# Patient Record
Sex: FEMALE | Race: WHITE | NOT HISPANIC OR LATINO | Employment: FULL TIME | URBAN - METROPOLITAN AREA
[De-identification: names, ages, dates, MRNs, and addresses within clinical notes are randomized per-mention and may not be internally consistent; named-entity substitution may affect disease eponyms.]

---

## 2017-02-21 DIAGNOSIS — F19.982 INSOMNIA DUE TO DRUG: ICD-10-CM

## 2017-02-21 RX ORDER — ZOLPIDEM TARTRATE 5 MG/1
5 TABLET ORAL NIGHTLY PRN
Qty: 30 TABLET | Refills: 1 | Status: SHIPPED | OUTPATIENT
Start: 2017-02-21 | End: 2017-05-29 | Stop reason: SDUPTHER

## 2017-05-29 DIAGNOSIS — F19.982 INSOMNIA DUE TO DRUG: ICD-10-CM

## 2017-05-30 RX ORDER — ZOLPIDEM TARTRATE 5 MG/1
5 TABLET ORAL NIGHTLY PRN
Qty: 30 TABLET | Refills: 1 | Status: SHIPPED | OUTPATIENT
Start: 2017-05-30 | End: 2018-01-31

## 2017-06-01 ENCOUNTER — TELEPHONE (OUTPATIENT)
Dept: INTERNAL MEDICINE | Facility: CLINIC | Age: 47
End: 2017-06-01

## 2017-06-01 NOTE — TELEPHONE ENCOUNTER
----- Message from Lina Morales sent at 5/31/2017  2:58 PM CDT -----  Contact: [facility]  Pt needs to have a refill on Ambien. Pls send rx to..    [facility] [place] - [address]  [address] [facility] [place] [address]  Phone: [phone] Fax: [phone]

## 2017-06-29 DIAGNOSIS — F41.9 ANXIETY: ICD-10-CM

## 2017-06-30 RX ORDER — ALPRAZOLAM 0.5 MG/1
0.5 TABLET, EXTENDED RELEASE ORAL DAILY
Qty: 30 TABLET | Refills: 5 | Status: SHIPPED | OUTPATIENT
Start: 2017-06-30 | End: 2018-03-29 | Stop reason: SDUPTHER

## 2017-07-25 DIAGNOSIS — G25.81 RLS (RESTLESS LEGS SYNDROME): ICD-10-CM

## 2017-08-03 RX ORDER — ROPINIROLE 1 MG/1
1 TABLET, FILM COATED ORAL NIGHTLY
Qty: 30 TABLET | Refills: 0 | Status: SHIPPED | OUTPATIENT
Start: 2017-08-03 | End: 2017-12-20 | Stop reason: SDUPTHER

## 2017-12-20 DIAGNOSIS — G25.81 RLS (RESTLESS LEGS SYNDROME): ICD-10-CM

## 2017-12-20 RX ORDER — ROPINIROLE 1 MG/1
1 TABLET, FILM COATED ORAL NIGHTLY
Qty: 30 TABLET | Refills: 0 | Status: SHIPPED | OUTPATIENT
Start: 2017-12-20 | End: 2018-01-31 | Stop reason: SDUPTHER

## 2018-01-30 ENCOUNTER — TELEPHONE (OUTPATIENT)
Dept: INTERNAL MEDICINE | Facility: CLINIC | Age: 48
End: 2018-01-30

## 2018-01-30 NOTE — TELEPHONE ENCOUNTER
S/w pt sched, pt states was seeing PCP @ [facility] but reports they no longer take her ins. Pt request appt for med refills and pain on palm/right 5th digit, sched pt for appt w/ PA [place] tomorrow 01/31/18. Pt conf appt date/time and provider.

## 2018-01-30 NOTE — TELEPHONE ENCOUNTER
----- Message from Kayla Woods sent at 1/30/2018  4:32 PM CST -----  Contact: Pt   Pt called and requested to be seen sooner than the time offered which was 2- pt is almost out of her medication callback number to discuss is [phone]

## 2018-01-31 ENCOUNTER — OFFICE VISIT (OUTPATIENT)
Dept: INTERNAL MEDICINE | Facility: CLINIC | Age: 48
End: 2018-01-31
Payer: MEDICAID

## 2018-01-31 VITALS
SYSTOLIC BLOOD PRESSURE: 90 MMHG | RESPIRATION RATE: 16 BRPM | OXYGEN SATURATION: 99 % | BODY MASS INDEX: 23.2 KG/M2 | TEMPERATURE: 98 F | HEART RATE: 71 BPM | HEIGHT: 63 IN | DIASTOLIC BLOOD PRESSURE: 68 MMHG | WEIGHT: 130.94 LBS

## 2018-01-31 DIAGNOSIS — L08.9 INFECTED ABRASION OF RIGHT HAND, INITIAL ENCOUNTER: Primary | ICD-10-CM

## 2018-01-31 DIAGNOSIS — G25.81 RLS (RESTLESS LEGS SYNDROME): ICD-10-CM

## 2018-01-31 DIAGNOSIS — S60.511A INFECTED ABRASION OF RIGHT HAND, INITIAL ENCOUNTER: Primary | ICD-10-CM

## 2018-01-31 DIAGNOSIS — K22.4 ESOPHAGEAL SPASM: ICD-10-CM

## 2018-01-31 PROCEDURE — 3008F BODY MASS INDEX DOCD: CPT | Mod: ,,, | Performed by: PHYSICIAN ASSISTANT

## 2018-01-31 PROCEDURE — 99999 PR PBB SHADOW E&M-EST. PATIENT-LVL IV: CPT | Mod: PBBFAC,,, | Performed by: PHYSICIAN ASSISTANT

## 2018-01-31 PROCEDURE — 99213 OFFICE O/P EST LOW 20 MIN: CPT | Mod: S$PBB,,, | Performed by: PHYSICIAN ASSISTANT

## 2018-01-31 PROCEDURE — 99214 OFFICE O/P EST MOD 30 MIN: CPT | Mod: PBBFAC,PO | Performed by: PHYSICIAN ASSISTANT

## 2018-01-31 RX ORDER — MUPIROCIN CALCIUM 20 MG/G
CREAM TOPICAL 2 TIMES DAILY
Qty: 15 G | Refills: 2 | Status: SHIPPED | OUTPATIENT
Start: 2018-01-31 | End: 2018-03-29 | Stop reason: ALTCHOICE

## 2018-01-31 RX ORDER — MELOXICAM 15 MG/1
15 TABLET ORAL DAILY PRN
COMMUNITY
End: 2018-08-30

## 2018-01-31 RX ORDER — SULFAMETHOXAZOLE AND TRIMETHOPRIM 400; 80 MG/1; MG/1
1 TABLET ORAL 2 TIMES DAILY
Qty: 20 TABLET | Refills: 1 | Status: SHIPPED | OUTPATIENT
Start: 2018-01-31 | End: 2018-03-29 | Stop reason: SINTOL

## 2018-01-31 RX ORDER — ROPINIROLE 1 MG/1
1 TABLET, FILM COATED ORAL NIGHTLY
Qty: 30 TABLET | Refills: 0 | Status: SHIPPED | OUTPATIENT
Start: 2018-01-31 | End: 2018-03-12 | Stop reason: SDUPTHER

## 2018-01-31 RX ORDER — TRAZODONE HYDROCHLORIDE 50 MG/1
50 TABLET ORAL NIGHTLY
Qty: 30 TABLET | Refills: 5 | Status: SHIPPED | OUTPATIENT
Start: 2018-01-31 | End: 2018-07-27 | Stop reason: SDUPTHER

## 2018-01-31 RX ORDER — TRAZODONE HYDROCHLORIDE 50 MG/1
50 TABLET ORAL NIGHTLY
COMMUNITY
End: 2018-01-31 | Stop reason: SDUPTHER

## 2018-01-31 NOTE — PROGRESS NOTES
Subjective:       Patient ID: Laurel Calvo is a 47 y.o.W/ female.    Chief Complaint: Hand Injury (R) and Medication Refill    HPI         She comes in by herself today and has the above need.  She fell on concrete about 2 weeks ago and abraded her right hand.  It doesn't want to heal up.  She has been applying Neosporin to it but it doesn't seem to help.  She also needs refill of her Requip and also her trazodone.  She doesn't think she's had a tetanus shot in the past 10 years.        She just recently had to give up her job so she is now unemployed and she's getting along on Medicaid.  She needs to see her rheumatologist, dermatologist and neurologist but most of them will take Medicaid.  We will help her try to find doctors at except her insurance.    Review of Systems    Otherwise negative concerning the INTEGUMENT, INFECTIOUS DISEASE, IMMUNE system review.    Objective:      Physical Exam    She has an ulceration on the palmar aspect of her fifth metacarpal distal end.  It doesn't show any signs of inflammation and there is no pus forming.  There is a yellowish coating across it.  There is no lymphangitis present either.  She is able to make a fist.    Assessment:       1. Infected abrasion of right hand, initial encounter    2. RLS (restless legs syndrome)    3. Esophageal spasm        Plan:     1.  Stop the Neosporin and a want her to start taking Bactroban 2-3 times per day.  Also start her on Bactrim 400 mg twice a day ×10-20 days.  Then she needs to follow-up here in about 11 days to see what kind of progress she is making.  2.  Refilled her Desyrel and also her Requip.

## 2018-02-01 ENCOUNTER — TELEPHONE (OUTPATIENT)
Dept: INTERNAL MEDICINE | Facility: CLINIC | Age: 48
End: 2018-02-01

## 2018-02-01 NOTE — TELEPHONE ENCOUNTER
----- Message from Angelique Rosenberg sent at 1/31/2018  1:59 PM CST -----  Contact: pt  Please call pt @[phone], pt returning nurse call, pt was in clinic this morning.

## 2018-02-05 ENCOUNTER — TELEPHONE (OUTPATIENT)
Dept: INTERNAL MEDICINE | Facility: CLINIC | Age: 48
End: 2018-02-05

## 2018-02-05 NOTE — TELEPHONE ENCOUNTER
Pt states that the Bactrim she is taking for the infection is causing her Lupus to flare up terribly.  It started within 2 hours of the first dose.  She feels so bad she can hardly get out of bed.  She stated this is the 2nd antibiotic to do this to her in 6 months.  The first was Cipro.  She would like to know what to do.  Should she keep taking it an continue to feel bad?  Should she change meds? She stated that her hand is improving.  She cannot come in for an appointment until tomorrow at the earliest.  Please advise./rpr

## 2018-02-05 NOTE — TELEPHONE ENCOUNTER
----- Message from Keshawn Dimas sent at 2/5/2018 12:35 PM CST -----  Contact: self [phone]  Would like to consult with nurse regarding reaction to antibiotic medication, and appointment access for a follow-up appointment before the end of the week.     Please call back at [phone].  Md Garry

## 2018-02-06 NOTE — TELEPHONE ENCOUNTER
----- Message from Skylar Chavez sent at 2/6/2018  9:28 AM CST -----  Contact: Pt  Please call pt at [phone] (home)   Regarding an antibiotic that she was put on is causing her lupus to flare up pt was waiting for return phone call follow up with what she needs to do in regards to the medication.

## 2018-02-07 ENCOUNTER — TELEPHONE (OUTPATIENT)
Dept: INTERNAL MEDICINE | Facility: CLINIC | Age: 48
End: 2018-02-07

## 2018-02-07 NOTE — TELEPHONE ENCOUNTER
----- Message from Keshawn Dimas sent at 2/7/2018  3:40 PM CST -----  Contact: self [phone]  Would like to consult with nurse regarding side effect of antibiotic medication..  Please call back at [phone].  Md Garry

## 2018-02-07 NOTE — TELEPHONE ENCOUNTER
S/w pt (see telephone call documentation 02/05/18), pt states that Bactrim is 2nd antibiotic to cause lupus symptom flare-up this year, 1st one was Cipro. Pt states she has been holding Bactrim for the last 2 days and migraine has resolved, and lupus flare-up symptom has also resolved.   Advised pt I would advise PA [place] her reported side effects resolved and pt also reports R hand wound has also approved in appearance and pain has all but resolved, and call her back w/ his response, pt voiced understanding.

## 2018-02-08 NOTE — TELEPHONE ENCOUNTER
If there is no surrounding redness, and it is not raised, and is not leaking, then we don't need an antibiotic other than applying some Bactroban to it once or twice a day.

## 2018-03-07 ENCOUNTER — TELEPHONE (OUTPATIENT)
Dept: INTERNAL MEDICINE | Facility: CLINIC | Age: 48
End: 2018-03-07

## 2018-03-07 NOTE — TELEPHONE ENCOUNTER
----- Message from Cori Esqueda sent at 3/7/2018 12:16 PM CST -----  Contact: self   Patient would like to consult with nurse regarding previous neurologist appointment . Please call back at [phone].      Thanks,  Cori Esqueda

## 2018-03-07 NOTE — TELEPHONE ENCOUNTER
Left pt voicemail to call appt desk at [phone] to r/s missed 02/2018 Neurology appt, see [employer] message sent.

## 2018-03-12 DIAGNOSIS — G25.81 RLS (RESTLESS LEGS SYNDROME): ICD-10-CM

## 2018-03-12 RX ORDER — ROPINIROLE 1 MG/1
1 TABLET, FILM COATED ORAL NIGHTLY
Qty: 30 TABLET | Refills: 5 | Status: SHIPPED | OUTPATIENT
Start: 2018-03-12 | End: 2018-10-15 | Stop reason: SDUPTHER

## 2018-03-13 ENCOUNTER — TELEPHONE (OUTPATIENT)
Dept: INTERNAL MEDICINE | Facility: CLINIC | Age: 48
End: 2018-03-13

## 2018-03-13 NOTE — TELEPHONE ENCOUNTER
----- Message from Angelique Rosenberg sent at 3/13/2018 12:13 PM CDT -----  Contact: pt  Please call pt @ [phone] regarding appt missed on 2/1, pt states she need nurse to get her in before the year is out, pt was sick.

## 2018-03-16 ENCOUNTER — TELEPHONE (OUTPATIENT)
Dept: INTERNAL MEDICINE | Facility: CLINIC | Age: 48
End: 2018-03-16

## 2018-03-16 NOTE — TELEPHONE ENCOUNTER
----- Message from Annemarie Sabillon sent at 3/16/2018  8:51 AM CDT -----  Contact: Patient  Patient states that she had to cancel her neurology appt because of illness, and wants the nurse to reschedule for her, because patient feels nurse can get her in soon, please call her back at [phone]. Thank you

## 2018-03-16 NOTE — TELEPHONE ENCOUNTER
----- Message from Td Avitia sent at 3/16/2018  9:24 AM CDT -----  Pt is requesting a call from nurse to discuss a prescription for panic attacks due to the lupus flare.        Please call pt back at [phone]

## 2018-03-29 ENCOUNTER — OFFICE VISIT (OUTPATIENT)
Dept: INTERNAL MEDICINE | Facility: CLINIC | Age: 48
End: 2018-03-29
Payer: MEDICAID

## 2018-03-29 VITALS
DIASTOLIC BLOOD PRESSURE: 62 MMHG | BODY MASS INDEX: 22.61 KG/M2 | SYSTOLIC BLOOD PRESSURE: 82 MMHG | HEIGHT: 63 IN | WEIGHT: 127.63 LBS | HEART RATE: 74 BPM | TEMPERATURE: 98 F | OXYGEN SATURATION: 97 % | RESPIRATION RATE: 16 BRPM

## 2018-03-29 DIAGNOSIS — M32.19 SYSTEMIC LUPUS ERYTHEMATOSUS WITH OTHER ORGAN INVOLVEMENT, UNSPECIFIED SLE TYPE: Primary | Chronic | ICD-10-CM

## 2018-03-29 DIAGNOSIS — G40.409: ICD-10-CM

## 2018-03-29 DIAGNOSIS — F41.9 ANXIETY: ICD-10-CM

## 2018-03-29 PROCEDURE — 99213 OFFICE O/P EST LOW 20 MIN: CPT | Mod: S$PBB,,, | Performed by: PHYSICIAN ASSISTANT

## 2018-03-29 PROCEDURE — 99215 OFFICE O/P EST HI 40 MIN: CPT | Mod: PBBFAC,PO | Performed by: PHYSICIAN ASSISTANT

## 2018-03-29 PROCEDURE — 99999 PR PBB SHADOW E&M-EST. PATIENT-LVL V: CPT | Mod: PBBFAC,,, | Performed by: PHYSICIAN ASSISTANT

## 2018-03-29 RX ORDER — LAMOTRIGINE 100 MG/1
100 TABLET ORAL 2 TIMES DAILY
Qty: 60 TABLET | Refills: 3 | Status: SHIPPED | OUTPATIENT
Start: 2018-03-29 | End: 2018-07-24 | Stop reason: SDUPTHER

## 2018-03-29 RX ORDER — CHOLECALCIFEROL (VITAMIN D3) 25 MCG
1000 TABLET ORAL DAILY
COMMUNITY

## 2018-03-29 RX ORDER — ALPRAZOLAM 0.5 MG/1
0.5 TABLET, EXTENDED RELEASE ORAL DAILY
Qty: 30 TABLET | Refills: 5 | Status: SHIPPED | OUTPATIENT
Start: 2018-03-29 | End: 2019-07-29

## 2018-03-29 NOTE — PROGRESS NOTES
Subjective:       Patient ID: Laurel Calvo is a 47 y.o.W/ female.    Chief Complaint: Panic Attack    HPI         She comes in today by herself and has the above problem.  She recently lost her job and now she is on Medicaid.  She was seeing a rheumatologist at the [facility] but they refused to see her now because she has Medicaid.  She can get into any rheumatologist here in town for at least 4 or 5 months because of her insurance situation.  She is getting into a little bit of a panic because she is about to run out of her seizure medicine Lamictal, and Xanax for her panic attacks.  She also needs to get in with a rheumatologist relatively soon and also a neurologist because of her seizure disorder.        At this time of year, she usually has a flareup with her systemic lupus erythematosus and it can cause a variety of problems.  She gets migraines more often, joint pain, skin rash due to UV exposure, fatigue, anxiousness, and cognitive issues.  She has a few others that she can't think of them right now.    Review of Systems    Otherwise negative.  She doesn't have any acute problems or chronic problems she wishes to discuss further.    Objective:      Physical Exam    NEUROLOGIC: She is alert, oriented ×3, cooperative and quite pleasant.  Her mentation is clear and concise.  CN II through XII are intact and equal.  Motor and neuro sensory functions are intact and normal.  She is ambulatory without any mechanical device assistance such as a cane or walker.  EENT: All within normal limits.  She is wearing glasses today.  CHEST: Clear BS anterior posterior.  HEART: RSR.  S1 is greater than S2.  There is no murmur or gallop.  She doesn't have any peripheral edema.    Assessment:       1. Systemic lupus erythematosus with other organ involvement, unspecified SLE type    2. Anxiety    3. Simple partial to complex partial seizure to generalized seizure        Plan:     1.  Refilled both of her medicines  today.  2.  We'll try and get her worked in to rheumatology and neurology here in the clinic at the earliest we can get available.  3.  She has a new PCP Dr. Zachary Blue and she supposed to see him next week and I encouraged her to keep that appointment.

## 2018-04-02 ENCOUNTER — TELEPHONE (OUTPATIENT)
Dept: INTERNAL MEDICINE | Facility: CLINIC | Age: 48
End: 2018-04-02

## 2018-04-02 NOTE — TELEPHONE ENCOUNTER
D/t pt's ins, unable to find pt appt w/ 2-3mths w/ Neuro and Rheum providers w/in system, faxed referrals w/ their universal referral form & pt's demographics and snapshot to [facility] for ASAP appts for Rheumotology and Neurology Providers, fax transmission confirmed [phone].   See [employer] message.

## 2018-04-03 ENCOUNTER — TELEPHONE (OUTPATIENT)
Dept: INTERNAL MEDICINE | Facility: CLINIC | Age: 48
End: 2018-04-03

## 2018-04-03 NOTE — TELEPHONE ENCOUNTER
(see previous telephone call documentation) Received notification from [facility] that they do not offer Neurology/Provider services. See [employer] message sent to pt.

## 2018-04-10 ENCOUNTER — TELEPHONE (OUTPATIENT)
Dept: INTERNAL MEDICINE | Facility: CLINIC | Age: 48
End: 2018-04-10

## 2018-04-10 NOTE — TELEPHONE ENCOUNTER
Per fax request received from pt's pharm, initiated prior auth request to pt's ins for Alprazolam 0.5mg rx, submitted online via [email or web address] Request [id number]; returned fax to pt's ins advising them PA request initiated and will alert them when we receive a response from pt's ins, fax transmission confirmed [phone].

## 2018-04-11 ENCOUNTER — TELEPHONE (OUTPATIENT)
Dept: INTERNAL MEDICINE | Facility: CLINIC | Age: 48
End: 2018-04-11

## 2018-04-11 NOTE — TELEPHONE ENCOUNTER
Received PA DENIAL from pt's ins for Alprazolam ER rx. Faxed denial to pt's pharm notifying them pt will have to pay out of pocket/cash copay for rx and instructed pharm to apply [email or web address] coupon/discount included in fax for approx <$27 pt copay for rx, fax transmission confirmed [phone].

## 2018-04-27 ENCOUNTER — TELEPHONE (OUTPATIENT)
Dept: RHEUMATOLOGY | Facility: CLINIC | Age: 48
End: 2018-04-27

## 2018-04-27 NOTE — TELEPHONE ENCOUNTER
Called patient regarding referral from Sadi Peoples PA-C, scheduled an appointment with Dr. DAVID for 4.30.18 at 10 am. Pt verbalized understanding.

## 2018-04-30 ENCOUNTER — OFFICE VISIT (OUTPATIENT)
Dept: RHEUMATOLOGY | Facility: CLINIC | Age: 48
End: 2018-04-30
Payer: MEDICAID

## 2018-04-30 ENCOUNTER — LAB VISIT (OUTPATIENT)
Dept: LAB | Facility: HOSPITAL | Age: 48
End: 2018-04-30
Attending: INTERNAL MEDICINE
Payer: MEDICAID

## 2018-04-30 VITALS
WEIGHT: 126.75 LBS | HEART RATE: 69 BPM | DIASTOLIC BLOOD PRESSURE: 54 MMHG | HEIGHT: 63 IN | BODY MASS INDEX: 22.46 KG/M2 | SYSTOLIC BLOOD PRESSURE: 84 MMHG

## 2018-04-30 DIAGNOSIS — L93.0 DISCOID LUPUS ERYTHEMATOSUS: Chronic | ICD-10-CM

## 2018-04-30 DIAGNOSIS — M32.19 SYSTEMIC LUPUS ERYTHEMATOSUS WITH OTHER ORGAN INVOLVEMENT, UNSPECIFIED SLE TYPE: Chronic | ICD-10-CM

## 2018-04-30 DIAGNOSIS — M32.19 SYSTEMIC LUPUS ERYTHEMATOSUS WITH OTHER ORGAN INVOLVEMENT, UNSPECIFIED SLE TYPE: Primary | Chronic | ICD-10-CM

## 2018-04-30 LAB
ALBUMIN SERPL BCP-MCNC: 4.6 G/DL
ALP SERPL-CCNC: 35 U/L
ALT SERPL W/O P-5'-P-CCNC: 8 U/L
ANION GAP SERPL CALC-SCNC: 9 MMOL/L
AST SERPL-CCNC: 14 U/L
BASOPHILS # BLD AUTO: 0.02 K/UL
BASOPHILS NFR BLD: 0.4 %
BILIRUB SERPL-MCNC: 1.1 MG/DL
BUN SERPL-MCNC: 16 MG/DL
C3 SERPL-MCNC: 98 MG/DL
C4 SERPL-MCNC: 17 MG/DL
CALCIUM SERPL-MCNC: 9.7 MG/DL
CHLORIDE SERPL-SCNC: 103 MMOL/L
CO2 SERPL-SCNC: 29 MMOL/L
CREAT SERPL-MCNC: 0.9 MG/DL
CRP SERPL-MCNC: 1.2 MG/L
DIFFERENTIAL METHOD: NORMAL
EOSINOPHIL # BLD AUTO: 0.1 K/UL
EOSINOPHIL NFR BLD: 1.1 %
ERYTHROCYTE [DISTWIDTH] IN BLOOD BY AUTOMATED COUNT: 12.5 %
ERYTHROCYTE [SEDIMENTATION RATE] IN BLOOD BY WESTERGREN METHOD: 2 MM/HR
EST. GFR  (AFRICAN AMERICAN): >60 ML/MIN/1.73 M^2
EST. GFR  (NON AFRICAN AMERICAN): >60 ML/MIN/1.73 M^2
GLUCOSE SERPL-MCNC: 77 MG/DL
HCT VFR BLD AUTO: 43.9 %
HGB BLD-MCNC: 14.4 G/DL
LYMPHOCYTES # BLD AUTO: 1.7 K/UL
LYMPHOCYTES NFR BLD: 31.8 %
MCH RBC QN AUTO: 29.7 PG
MCHC RBC AUTO-ENTMCNC: 32.8 G/DL
MCV RBC AUTO: 91 FL
MONOCYTES # BLD AUTO: 0.4 K/UL
MONOCYTES NFR BLD: 8.1 %
NEUTROPHILS # BLD AUTO: 3.1 K/UL
NEUTROPHILS NFR BLD: 58.6 %
PLATELET # BLD AUTO: 193 K/UL
PMV BLD AUTO: 11.9 FL
POTASSIUM SERPL-SCNC: 4.4 MMOL/L
PROT SERPL-MCNC: 7 G/DL
RBC # BLD AUTO: 4.85 M/UL
SODIUM SERPL-SCNC: 141 MMOL/L
WBC # BLD AUTO: 5.31 K/UL

## 2018-04-30 PROCEDURE — 86160 COMPLEMENT ANTIGEN: CPT

## 2018-04-30 PROCEDURE — 85613 RUSSELL VIPER VENOM DILUTED: CPT

## 2018-04-30 PROCEDURE — 36415 COLL VENOUS BLD VENIPUNCTURE: CPT | Mod: PO

## 2018-04-30 PROCEDURE — 86140 C-REACTIVE PROTEIN: CPT

## 2018-04-30 PROCEDURE — 85651 RBC SED RATE NONAUTOMATED: CPT | Mod: PO

## 2018-04-30 PROCEDURE — 86146 BETA-2 GLYCOPROTEIN ANTIBODY: CPT | Mod: 59

## 2018-04-30 PROCEDURE — 99213 OFFICE O/P EST LOW 20 MIN: CPT | Mod: PBBFAC,PO | Performed by: INTERNAL MEDICINE

## 2018-04-30 PROCEDURE — 99999 PR PBB SHADOW E&M-EST. PATIENT-LVL III: CPT | Mod: PBBFAC,,, | Performed by: INTERNAL MEDICINE

## 2018-04-30 PROCEDURE — 99205 OFFICE O/P NEW HI 60 MIN: CPT | Mod: S$PBB,,, | Performed by: INTERNAL MEDICINE

## 2018-04-30 PROCEDURE — 86160 COMPLEMENT ANTIGEN: CPT | Mod: 59

## 2018-04-30 PROCEDURE — 80053 COMPREHEN METABOLIC PANEL: CPT | Mod: PO

## 2018-04-30 PROCEDURE — 86038 ANTINUCLEAR ANTIBODIES: CPT

## 2018-04-30 PROCEDURE — 85025 COMPLETE CBC W/AUTO DIFF WBC: CPT | Mod: PO

## 2018-04-30 PROCEDURE — 86147 CARDIOLIPIN ANTIBODY EA IG: CPT

## 2018-04-30 RX ORDER — METHYLPREDNISOLONE 4 MG/1
TABLET ORAL
Qty: 1 PACKAGE | Refills: 2 | Status: SHIPPED | OUTPATIENT
Start: 2018-04-30 | End: 2018-08-30

## 2018-04-30 ASSESSMENT — SYSTEMIC LUPUS ERYTHEMATOSUS DISEASE ACTIVITY INDEX (SLEDAI): TOTAL_SCORE: 0

## 2018-04-30 NOTE — PROGRESS NOTES
Subjective:       Patient ID: Laurel Calvo is a 47 y.o. female.    Chief Complaint: Lupus    HPI 47YF with PMH Discoid SLE dx 8yrs ago ( SU 1: 160 speckled manifested fatigue, photosensitivity, rash, joint pains), Seizures dx 4yrs ago on Lamictal. Pt was being managed by Dr Younger @ [facility] on Plaquenil 200mg BID since diagnosis ( skin biopsy). Pt reports that she has a lupus flare last spring where she had severe joint pains/swelling requiring MTX injection which she did for about 2 months and discontinued due to GI side effects.  Pt reports fatigue. Seen by Optho ~ 2 weeks ago  Dr Pearl @ [facility] and was told everything was normal but she will check if OCT was done.     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 Denies illicit drug use, tobacco use. Occasional EtOh use     Past Medical History:   Diagnosis Date    Anxiety     GERD (gastroesophageal reflux disease)      Past Surgical History:   Procedure Laterality Date    HYSTERECTOMY  2016    WISDOM TOOTH EXTRACTION      X 4     Family History   Problem Relation Age of Onset    No Known Problems Mother     No Known Problems Father     No Known Problems Sister     No Known Problems Brother     Stomach cancer Paternal Grandfather     Colon cancer Neg Hx        Current Outpatient Prescriptions on File Prior to Visit   Medication Sig Dispense Refill    ALPRAZolam (XANAX XR) 0.5 MG Tb24 Take 1 tablet (0.5 mg total) by mouth once daily. 30 tablet 5    hydroxychloroquine (PLAQUENIL) 200 mg tablet Take  "200 mg by mouth 2 (two) times daily.       lamoTRIgine (LAMICTAL) 100 MG tablet Take 1 tablet (100 mg total) by mouth 2 (two) times daily. 60 tablet 3    meloxicam (MOBIC) 15 MG tablet Take 15 mg by mouth daily as needed for Pain.      multivitamin (THERAGRAN) per tablet Take 1 tablet by mouth once daily.       ondansetron (ZOFRAN-ODT) 4 MG TbDL Take 4 mg by mouth every 6 (six) hours as needed.       rOPINIRole (REQUIP) 1 MG tablet TAKE 1 TABLET (1 MG TOTAL) BY MOUTH EVERY EVENING. 30 tablet 5    traZODone (DESYREL) 50 MG tablet Take 1 tablet (50 mg total) by mouth every evening. 30 tablet 5    vitamin D 1000 units Tab Take 1,000 Units by mouth once daily.       No current facility-administered medications on file prior to visit.      Review of patient's allergies indicates:   Allergen Reactions    Bactrim [sulfamethoxazole-trimethoprim] Other (See Comments)     Lupus symptoms exacerbation, headache.    Ciprofloxacin Other (See Comments)     Lupus symptoms exacerbation.         Review of Systems   Constitutional: Positive for fatigue. Negative for chills and fever.   HENT: Negative for mouth sores and trouble swallowing.    Eyes: Negative for redness and visual disturbance.   Respiratory: Negative for chest tightness and shortness of breath.    Cardiovascular: Negative for chest pain and palpitations.   Gastrointestinal: Negative for abdominal pain, blood in stool, diarrhea, nausea and vomiting.   Genitourinary: Negative for difficulty urinating, frequency, hematuria and urgency.   Musculoskeletal: Negative for arthralgias and joint swelling.   Skin: Negative for color change and rash.   Neurological: Positive for dizziness and weakness. Negative for tremors and numbness.   Psychiatric/Behavioral: Negative for behavioral problems, dysphoric mood and sleep disturbance.         Objective:   BP (!) 84/54   Pulse 69   Ht 5' 3" (1.6 m)   Wt 57.5 kg (126 lb 12.2 oz)   LMP 08/26/2013   BMI 22.46 kg/m²    "   Physical Exam   Constitutional: She is oriented to person, place, and time and well-developed, well-nourished, and in no distress.   HENT:   Head: Normocephalic and atraumatic.   Eyes: EOM are normal. Pupils are equal, round, and reactive to light.   Neck: Normal range of motion. Neck supple.   Cardiovascular: Normal rate and regular rhythm.    Pulmonary/Chest: Effort normal and breath sounds normal.   Abdominal: Soft. Bowel sounds are normal.   Lymphadenopathy:     She has no cervical adenopathy.   Neurological: She is alert and oriented to person, place, and time.   Skin: Skin is warm and dry.     Psychiatric: Affect normal.   Musculoskeletal: Normal range of motion.       Results for BRITTANY BILLINGS (MRN [id number]) as of 4/30/2018 10:39   Ref. Range 8/20/2015 14:33   WBC Latest Ref Range: 3.90 - 12.70 K/uL 7.00   RBC Latest Ref Range: 4.00 - 5.40 M/uL 4.39   Hemoglobin Latest Ref Range: 12.0 - 16.0 g/dL 13.9   Hematocrit Latest Ref Range: 37.0 - 48.5 % 41.5   MCV Latest Ref Range: 82 - 98 fL 95   MCH Latest Ref Range: 27.0 - 31.0 pg 31.7 (H)   MCHC Latest Ref Range: 32.0 - 36.0 % 33.5   RDW Latest Ref Range: 11.5 - 14.5 % 12.1   Platelets Latest Ref Range: 150 - 350 K/uL 193   MPV Latest Ref Range: 9.2 - 12.9 fL 12.1   Gran% Latest Ref Range: 38.0 - 73.0 % 62.0   Gran # (ANC) Latest Ref Range: 1.8 - 7.7 K/uL 4.3   Lymph% Latest Ref Range: 18.0 - 48.0 % 31.4   Lymph # Latest Ref Range: 1.0 - 4.8 K/uL 2.2   Mono% Latest Ref Range: 4.0 - 15.0 % 5.3   Mono # Latest Ref Range: 0.3 - 1.0 K/uL 0.4   Eosinophil% Latest Ref Range: 0.0 - 8.0 % 0.9   Eos # Latest Ref Range: 0.0 - 0.5 K/uL 0.1   Basophil% Latest Ref Range: 0.0 - 1.9 % 0.4   Baso # Latest Ref Range: 0.00 - 0.20 K/uL 0.03     Results for BRITTANY BILLINGS (MRN [id number]) as of 4/30/2018 11:05   Ref. Range 8/20/2015 14:33   Sodium Latest Ref Range: 136 - 145 mmol/L 141   Potassium Latest Ref Range: 3.5 - 5.1 mmol/L 3.8   Chloride Latest Ref Range: 95 - 110 mmol/L  105   CO2 Latest Ref Range: 23 - 29 mmol/L 26   Anion Gap Latest Ref Range: 8 - 16 mmol/L 10   BUN, Bld Latest Ref Range: 6 - 20 mg/dL 10   Creatinine Latest Ref Range: 0.5 - 1.4 mg/dL 0.9   eGFR if non African American Latest Ref Range: >60 mL/min/1.73 m^2 >60   eGFR if  Latest Ref Range: >60 mL/min/1.73 m^2 >60   Glucose Latest Ref Range: 70 - 110 mg/dL 102   Calcium Latest Ref Range: 8.7 - 10.5 mg/dL 10.0   Alkaline Phosphatase Latest Ref Range: 55 - 135 U/L 17 (L)   Total Protein Latest Ref Range: 6.0 - 8.4 g/dL 6.8   Albumin Latest Ref Range: 3.5 - 5.2 g/dL 4.3   Total Bilirubin Latest Ref Range: 0.1 - 1.0 mg/dL 0.8   AST Latest Ref Range: 10 - 40 U/L 14   ALT Latest Ref Range: 10 - 44 U/L 10   Results for BRITTANY BILLINGS (MRN [id number]) as of 4/30/2018 11:05   Ref. Range 8/20/2015 14:33   Vit D, 25-Hydroxy Latest Ref Range: 30 - 96 ng/mL 50       anti DNA 2.92 <25.00 IU    Note: INTERPRETATION:   NEGATIVE <25 IU  BORDERLINE 25 - 30 IU  LOW POSITIVE 30 - 60 IU  POSITIVE 60 - 200 IU  STRONG POSITIVE > 200 IU     Anti SSA 0.88 <20.00 EU   Anti SSB 0.37 <20.00 EU   Anti Sm 0.29 <20.00 EU   Anti Sm/RNP 1.36 <20.00 EU   Anti Scl70 0.00 <20.00 EU   Anti Centromere 1.04 <20.00 EU          Assessment:       1. Systemic lupus erythematosus with other organ involvement, unspecified SLE type            Plan:         Brittany was seen today for lupus.    Diagnoses and all orders for this visit:    Systemic lupus erythematosus with other organ involvement, unspecified SLE type  SLEDAI 0 with previous labs done @ outside facility.  SU 1: 160 speckled with negative profile. Normal complements, UA with no blood or protein.  Will obtain repeat labs to monitor disease activity  Continue plaquenil. Discussed with patient regarding decreasing dose of plaquenil from 200mg BID to 200mg qd since she has been on this medication for 8 yrs and is at increased risk of plaquenil toxicity  -     methylPREDNISolone (MEDROL  DOSEPACK) 4 mg tablet; use as directed  -     SU; Standing  -     C3 complement; Standing  -     C4 complement; Standing  -     CBC auto differential; Standing  -     Comprehensive metabolic panel; Standing  -     Sedimentation rate, manual; Standing  -     C-reactive protein; Standing  -     Cardiolipin antibody; Standing  -     Beta-2 glycoprotein antibodies; Standing  -     Protein / creatinine ratio, urine; Standing  -     Urinalysis; Standing  -     DRVVT; Standing    Discoid lupus erythematosus  -     methylPREDNISolone (MEDROL DOSEPACK) 4 mg tablet; use as directed  -     SU; Standing  -     C3 complement; Standing  -     C4 complement; Standing  -     CBC auto differential; Standing  -     Comprehensive metabolic panel; Standing  -     Sedimentation rate, manual; Standing  -     C-reactive protein; Standing  -     Cardiolipin antibody; Standing  -     Beta-2 glycoprotein antibodies; Standing  -     Protein / creatinine ratio, urine; Standing  -     Urinalysis; Standing  -     DRVVT; Standing       RTC in 6 months

## 2018-04-30 NOTE — LETTER
April 30, 2018      Sadi Peoples PA-C  [address] [place] [address]           [facility] - Rheumatology  [address] [place] [address]  Phone: [phone]  Fax: [phone]          Patient: Laurel Calvo   MR Number: [id number]   YOB: 1970   Date of Visit: 4/30/2018       Dear Sadi Peoples:    Thank you for referring Laurel Calvo to me for evaluation. Attached you will find relevant portions of my assessment and plan of care.    If you have questions, please do not hesitate to call me. I look forward to following Laurel Calvo along with you.    Sincerely,    Teodoro Forman MD    Enclosure  CC:  No Recipients    If you would like to receive this communication electronically, please contact [email or web address] or [phone] to request more information on [email or web address] Link access.    For providers and/or their staff who would like to refer a patient to [facility], please contact us through our one-stop-shop provider referral line, [facility] , at [phone].    If you feel you have received this communication in error or would no longer like to receive these types of communications, please e-mail [email or web address]

## 2018-04-30 NOTE — ASSESSMENT & PLAN NOTE
Long-standing history of systemic lupus erythematosus with discoid lupus, extreme photosensitivity, arthralgias, myalgias, fatigue.  Her lupus has been stable for several years until last year when she flared up very bad in the spring.  She is here to establish care since her prior rheumatologist have stopped accepting her insurance since she switched to Medicaid.  Her SLEDAI score today was 0.  She is on 200 mg twice daily plaque on all for more than 8 years.  Check serologies today.  Consider dose reduction in future visits.

## 2018-04-30 NOTE — PROGRESS NOTES
Assessment/Plans:-  # Systemic lupus erythematosus:-  Long-standing history of systemic lupus erythematosus with discoid lupus, extreme photosensitivity, arthralgias, myalgias, fatigue.  Her lupus has been stable for several years until last year when she flared up very bad in the spring.  She is here to establish care since her prior rheumatologist have stopped accepting her insurance since she switched to Medicaid.  Her SLEDAI score today was 0.  She is on 200 mg twice daily plaque on all for more than 8 years.  Check serologies today.  Consider dose reduction in future visits.  - methylPREDNISolone (MEDROL DOSEPACK) 4 mg tablet; use as directed  Dispense: 1 Package; Refill: 2  - SU; Standing  - C3 complement; Standing  - C4 complement; Standing  - CBC auto differential; Standing  - Comprehensive metabolic panel; Standing  - Sedimentation rate, manual; Standing  - C-reactive protein; Standing  - Cardiolipin antibody; Standing  - Beta-2 glycoprotein antibodies; Standing  - Protein / creatinine ratio, urine; Standing  - Urinalysis; Standing  - DRVVT; Standing    # Discoid lupus erythematosus  - methylPREDNISolone (MEDROL DOSEPACK) 4 mg tablet; use as directed  Dispense: 1 Package; Refill: 2  - SU; Standing  - C3 complement; Standing  - C4 complement; Standing  - CBC auto differential; Standing  - Comprehensive metabolic panel; Standing  - Sedimentation rate, manual; Standing  - C-reactive protein; Standing  - Cardiolipin antibody; Standing  - Beta-2 glycoprotein antibodies; Standing  - Protein / creatinine ratio, urine; Standing  - Urinalysis; Standing  - DRVVT; Standing       Disclaimer: This note was prepared using voice recognition system and is likely to have sound alike errors .  Please call me with any questions    Teodoro Forman MD  [facility] - Rheumatology

## 2018-05-01 LAB
ANA SER QL IF: NORMAL
CARDIOLIPIN IGG SER IA-ACNC: <9.4 GPL
CARDIOLIPIN IGM SER IA-ACNC: <9.4 MPL
LA PPP-IMP: NEGATIVE

## 2018-05-03 LAB
B2 GLYCOPROT1 IGA SER QL: <9 SAU
B2 GLYCOPROT1 IGG SER QL: <9 SGU
B2 GLYCOPROT1 IGM SER QL: <9 SMU

## 2018-07-24 DIAGNOSIS — M32.19 SYSTEMIC LUPUS ERYTHEMATOSUS WITH OTHER ORGAN INVOLVEMENT, UNSPECIFIED SLE TYPE: Chronic | ICD-10-CM

## 2018-07-24 DIAGNOSIS — G40.409: ICD-10-CM

## 2018-07-24 RX ORDER — LAMOTRIGINE 100 MG/1
100 TABLET ORAL 2 TIMES DAILY
Qty: 60 TABLET | Refills: 5 | Status: SHIPPED | OUTPATIENT
Start: 2018-07-24 | End: 2019-01-28 | Stop reason: SDUPTHER

## 2018-07-27 DIAGNOSIS — K22.4 ESOPHAGEAL SPASM: ICD-10-CM

## 2018-07-27 RX ORDER — TRAZODONE HYDROCHLORIDE 50 MG/1
50 TABLET ORAL NIGHTLY
Qty: 30 TABLET | Refills: 3 | Status: SHIPPED | OUTPATIENT
Start: 2018-07-27 | End: 2018-11-23 | Stop reason: SDUPTHER

## 2018-08-30 ENCOUNTER — TELEPHONE (OUTPATIENT)
Dept: INTERNAL MEDICINE | Facility: CLINIC | Age: 48
End: 2018-08-30

## 2018-08-30 ENCOUNTER — HOSPITAL ENCOUNTER (OUTPATIENT)
Dept: RADIOLOGY | Facility: HOSPITAL | Age: 48
Discharge: HOME | End: 2018-08-30
Attending: FAMILY MEDICINE
Payer: MEDICAID

## 2018-08-30 ENCOUNTER — OFFICE VISIT (OUTPATIENT)
Dept: INTERNAL MEDICINE | Facility: CLINIC | Age: 48
End: 2018-08-30
Payer: MEDICAID

## 2018-08-30 VITALS
OXYGEN SATURATION: 99 % | HEART RATE: 73 BPM | SYSTOLIC BLOOD PRESSURE: 102 MMHG | HEIGHT: 64 IN | BODY MASS INDEX: 22.2 KG/M2 | WEIGHT: 130.06 LBS | DIASTOLIC BLOOD PRESSURE: 78 MMHG | TEMPERATURE: 99 F

## 2018-08-30 VITALS — HEIGHT: 64 IN | WEIGHT: 130 LBS | BODY MASS INDEX: 22.2 KG/M2

## 2018-08-30 DIAGNOSIS — R79.89 LOW TESTOSTERONE LEVEL IN FEMALE: Chronic | ICD-10-CM

## 2018-08-30 DIAGNOSIS — R79.89 ABNORMAL THYROID BLOOD TEST: ICD-10-CM

## 2018-08-30 DIAGNOSIS — E80.6 HYPERBILIRUBINEMIA: ICD-10-CM

## 2018-08-30 DIAGNOSIS — Z12.39 SCREENING FOR BREAST CANCER: ICD-10-CM

## 2018-08-30 DIAGNOSIS — Z13.220 SCREENING FOR LIPID DISORDERS: ICD-10-CM

## 2018-08-30 DIAGNOSIS — Z11.4 SCREENING FOR HIV WITHOUT PRESENCE OF RISK FACTORS: ICD-10-CM

## 2018-08-30 DIAGNOSIS — Z23 NEED FOR DIPHTHERIA-TETANUS-PERTUSSIS (TDAP) VACCINE: ICD-10-CM

## 2018-08-30 DIAGNOSIS — Z11.3 ROUTINE SCREENING FOR STI (SEXUALLY TRANSMITTED INFECTION): ICD-10-CM

## 2018-08-30 DIAGNOSIS — R53.83 OTHER FATIGUE: Primary | Chronic | ICD-10-CM

## 2018-08-30 DIAGNOSIS — G40.409: ICD-10-CM

## 2018-08-30 PROCEDURE — 77067 SCR MAMMO BI INCL CAD: CPT | Mod: TC,PO

## 2018-08-30 PROCEDURE — 77063 BREAST TOMOSYNTHESIS BI: CPT | Mod: 26,,, | Performed by: RADIOLOGY

## 2018-08-30 PROCEDURE — 99214 OFFICE O/P EST MOD 30 MIN: CPT | Mod: S$PBB,,, | Performed by: FAMILY MEDICINE

## 2018-08-30 PROCEDURE — 90471 IMMUNIZATION ADMIN: CPT | Mod: PBBFAC,PO

## 2018-08-30 PROCEDURE — 77067 SCR MAMMO BI INCL CAD: CPT | Mod: 26,,, | Performed by: RADIOLOGY

## 2018-08-30 PROCEDURE — 99999 PR PBB SHADOW E&M-EST. PATIENT-LVL IV: CPT | Mod: PBBFAC,,, | Performed by: FAMILY MEDICINE

## 2018-08-30 PROCEDURE — 99214 OFFICE O/P EST MOD 30 MIN: CPT | Mod: PBBFAC,PO,25 | Performed by: FAMILY MEDICINE

## 2018-08-30 RX ORDER — ESTRADIOL 2 MG/1
TABLET ORAL
Refills: 1 | COMMUNITY
Start: 2018-07-13

## 2018-08-30 NOTE — PROGRESS NOTES
"CHIEF COMPLAINT  Establish Care    This is my first time treating her here. All problems addressed today are NEW TO ME.     HISTORY OF PRESENT ILLNESS    PROBLEM/CONDITION: She reports her seizure disorder is well-controlled, with no seizures within the last year. She is due for serum drug level monitoring, and she is requesting referral to new neurologist.      PROBLEM/CONDITION: She reports abnormal serum TSH on recent labs performed elsewhere.      PROBLEM/CONDITION: She says that she receives testosterone supplements from [facility] clinic for treatment of "low testosterone."      PROBLEM/CONDITION: Previous labs showed nonspecific elevated total bilirubin. She reports no hepatic specific symptoms.      No other complaints or concerns reported.    Problem List Items Addressed This Visit        Neuro    Simple partial to complex partial seizure to generalized seizure    Overview     Prior neurologist: Ivan Huynh MD           Current Assessment & Plan     Last seizure 12-15 months ago.         Relevant Orders    Ambulatory referral to Neurology    LAMOTRIGINE LEVEL (Completed)       Endocrine    Abnormal thyroid blood test    Relevant Orders    TSH (Completed)    Low testosterone level in female (Chronic)    Overview     Treated by Dr. Brennen Mercer ([facility])            Other    Hyperbilirubinemia    Current Assessment & Plan     Lab Results   Component Value Date    ALT 8 (L) 04/30/2018    AST 14 04/30/2018    ALKPHOS 35 (L) 04/30/2018    BILITOT 1.1 (H) 04/30/2018             Relevant Orders    Hepatic function panel (Completed)    Other fatigue - Primary (Chronic)    Relevant Orders    TSH (Completed)      Other Visit Diagnoses     Screening for HIV without presence of risk factors        Relevant Orders    HIV 1 / 2 ANTIBODY (Completed)    Routine screening for STI (sexually transmitted infection)        Relevant Orders    RPR (Completed)    Screening for lipid disorders     " "   Relevant Orders    Lipid panel (Completed)    Screening for breast cancer        Need for diphtheria-tetanus-pertussis (Tdap) vaccine        Relevant Orders    (In Office Administered) Tdap Vaccine (Completed)          PAST MEDICAL HISTORY, FAMILY HISTORY and SOCIAL HISTORY, CURRENT MEDICATION LIST, and ALLERGY LIST reviewed by me (MICHAEL Antony MD) and are updated consistent with the patient's report.    REVIEW OF SYSTEMS  CONSTITUTIONAL: No fever or chills reported.   CARDIOVASCULAR: No angina or orthopnea reported.   ENDOCRINE: No polyuria or polydipsia reported.   GASTROINTESTINAL: No hematemesis or melena reported.     PHYSICAL EXAM  Vitals:    08/30/18 1431   BP: 102/78   BP Location: Right arm   Patient Position: Sitting   BP Method: Medium (Manual)   Pulse: 73   Temp: 98.6 °F (37 °C)   TempSrc: Tympanic   SpO2: 99%   Weight: 59 kg (130 lb 1.1 oz)   Height: 5' 4" (1.626 m)     CONSTITUTIONAL: Vital signs noted. No apparent distress. Does not appear acutely ill or septic. Appears adequately hydrated.  EYE: Sclerae anicteric. Lids and conjunctiva unremarkable.  ENT: External ENT unremarkable. Oropharynx moist.  NECK: Trachea midline. Thyroid nontender.  PULM: Lungs clear. Breathing unlabored.  CV: Auscultation reveals regular rate and rhythm without murmur, gallop or rub. No carotid bruit.  GI: Soft and nontender. Bowel sounds normal.  DERM: Skin warm and moist with normal turgor.  NEURO: There are no gross focal motor deficits or gross deficits of cranial nerves III-XII.  PSYCH: Alert and oriented x 3. Mood is grossly neutral. Affect appropriate. Judgment and insight not grossly compromised.  MSK: Grossly normal stance and gait.     ASSESSMENT and PLAN  Other fatigue  -     TSH; Future; Expected date: 08/30/2018    Simple partial to complex partial seizure to generalized seizure  -     Ambulatory referral to Neurology  -     LAMOTRIGINE LEVEL; Future; Expected date: 08/30/2018    Abnormal thyroid blood " "test  -     TSH; Future; Expected date: 08/30/2018    Hyperbilirubinemia  -     Hepatic function panel; Future; Expected date: 08/30/2018    Screening for HIV without presence of risk factors  -     HIV 1 / 2 ANTIBODY; Future; Expected date: 08/30/2018    Routine screening for STI (sexually transmitted infection)  -     RPR; Future; Expected date: 08/30/2018    Screening for lipid disorders  -     Lipid panel; Future; Expected date: 08/30/2018    Screening for breast cancer  -     Cancel: Mammo Digital Screening Bilateral With CAD; Future; Expected date: 08/30/2018    Need for diphtheria-tetanus-pertussis (Tdap) vaccine  -     (In Office Administered) Tdap Vaccine    Low testosterone level in female        PRESCRIPTION MEDICATION MANAGEMENT  Except as noted below, CURRENT MEDICATIONS are to remain unchanged from that listed above.     Medications Discontinued During This Encounter   Medication Reason    meloxicam (MOBIC) 15 MG tablet Patient no longer taking    methylPREDNISolone (MEDROL DOSEPACK) 4 mg tablet Patient no longer taking    multivitamin capsule Duplicate Order       Follow-up in about 2 weeks (around 9/13/2018) for review test results and discuss treatment plan, re-evaluate problem(s) discussed today.    There are no Patient Instructions on file for this visit.    ABOUT THIS DOCUMENTATION:  · The order of the conditions listed in the HPI is one of convenience and does not necessarily reflect the chronology of the appointment, nor the relative importance of a condition.  · Documentation entered by me for this encounter was done in part using speech-recognition technology. Although I have made an effort to ensure accuracy, "sound like" errors may exist and should be interpreted in context.                        -MICHAEL Antony MD     "

## 2018-08-30 NOTE — TELEPHONE ENCOUNTER
----- Message from Cori Esqueda sent at 8/30/2018  1:50 PM CDT -----  Contact: self   Patient ride running late she will be about 15 minutes late for her appointment. Arrival time approximately 2:15 pm.      Thanks,  Cori Esqueda

## 2018-08-30 NOTE — ASSESSMENT & PLAN NOTE
Lab Results   Component Value Date    ALT 8 (L) 04/30/2018    AST 14 04/30/2018    ALKPHOS 35 (L) 04/30/2018    BILITOT 1.1 (H) 04/30/2018

## 2018-09-19 ENCOUNTER — TELEPHONE (OUTPATIENT)
Dept: INTERNAL MEDICINE | Facility: CLINIC | Age: 48
End: 2018-09-19

## 2018-09-19 NOTE — TELEPHONE ENCOUNTER
----- Message from Landry Jensen sent at 9/19/2018  9:06 AM CDT -----  Contact: Pt  Please give pt a call at ..[phone] (home) regarding her appt being rescheduled before the next available due to the medicaid

## 2018-09-28 ENCOUNTER — OFFICE VISIT (OUTPATIENT)
Dept: INTERNAL MEDICINE | Facility: CLINIC | Age: 48
End: 2018-09-28
Payer: MEDICAID

## 2018-09-28 VITALS
OXYGEN SATURATION: 99 % | TEMPERATURE: 98 F | DIASTOLIC BLOOD PRESSURE: 68 MMHG | BODY MASS INDEX: 21.76 KG/M2 | SYSTOLIC BLOOD PRESSURE: 102 MMHG | HEIGHT: 64 IN | WEIGHT: 127.44 LBS | HEART RATE: 68 BPM

## 2018-09-28 DIAGNOSIS — M25.562 CHRONIC PAIN OF LEFT KNEE: Chronic | ICD-10-CM

## 2018-09-28 DIAGNOSIS — M32.19 SYSTEMIC LUPUS ERYTHEMATOSUS WITH OTHER ORGAN INVOLVEMENT, UNSPECIFIED SLE TYPE: Chronic | ICD-10-CM

## 2018-09-28 DIAGNOSIS — R79.89 ABNORMAL THYROID BLOOD TEST: ICD-10-CM

## 2018-09-28 DIAGNOSIS — M54.50 CHRONIC LEFT-SIDED LOW BACK PAIN WITHOUT SCIATICA: Primary | Chronic | ICD-10-CM

## 2018-09-28 DIAGNOSIS — G89.29 CHRONIC PAIN OF LEFT KNEE: Chronic | ICD-10-CM

## 2018-09-28 DIAGNOSIS — G89.29 CHRONIC LEFT-SIDED LOW BACK PAIN WITHOUT SCIATICA: Primary | Chronic | ICD-10-CM

## 2018-09-28 PROCEDURE — 99214 OFFICE O/P EST MOD 30 MIN: CPT | Mod: PBBFAC,PO | Performed by: FAMILY MEDICINE

## 2018-09-28 PROCEDURE — 99999 PR PBB SHADOW E&M-EST. PATIENT-LVL IV: CPT | Mod: PBBFAC,,, | Performed by: FAMILY MEDICINE

## 2018-09-28 PROCEDURE — 99214 OFFICE O/P EST MOD 30 MIN: CPT | Mod: S$PBB,,, | Performed by: FAMILY MEDICINE

## 2018-09-28 NOTE — ASSESSMENT & PLAN NOTE
Lab Results   Component Value Date    TSH 1.646 08/30/2018    TSH 2.40 11/20/2009      This condition appears to be RESOLVED.

## 2018-09-28 NOTE — PROGRESS NOTES
"CHIEF COMPLAINT  Back Pain      HISTORY OF PRESENT ILLNESS    PROBLEM/CONDITION: NEW PROBLEM reported is chronic low back pain. ONSET greater than 15 years ago. QUALITY described as aching discomfort. TIMING described as intermittent, waxing and waning. SEVERITY described as MILD to MODERATE at worst at present. In the past, pain has been more MODERATELY SEVERE at worst. ALLEVIATING FACTORS in the past have included physical therapy and interventional pain management corticosteroid injections. She says that she had imaging studies performed around 15 years ago that showed a lumbar disc herniation. She hasn't had follow-up imaging since then. No fever, sweats, involuntary weight loss, loss of lower extremity strength or sensation, urinary incontinence or fecal incontinence reported. ASSOCIATED SYMPTOMS include discomfort in her left knee. ALLEVIATING FACTORS for the knee pain include wearing a knee support/brace. She identifies no recent relevant injuries for either of these two complaints. She identifies ASSOCIATED "sciatica." However, the symptoms she described are atypical for sciatica, and straight leg raise test is unequivocally negative on exam today. We discussed risks and benefits of treatment options. It was agreed to treat with physical therapy. She will let me know if this fails to resolve the problem.      PROBLEM/CONDITION: She is on plaquenil for diagnosis of lupus, having been evaluated by rheumatology here in April. All of her rheumatologic tests came back negative. Her lupus appears stable. She has follow-up with her rheumatologist next month.      PROBLEM/CONDITION: As noted previously, she repoted history of previous abnormal TSH. Repeat TSH measurement was NORMAL.    Problem List Items Addressed This Visit        Immunology/Multi System    Systemic lupus erythematosus (Chronic)       Endocrine    RESOLVED: Abnormal thyroid blood test    Current Assessment & Plan     Lab Results   Component Value " "Date    TSH 1.646 08/30/2018    TSH 2.40 11/20/2009      This condition appears to be RESOLVED.            Orthopedic    Chronic pain of left knee (Chronic)    Overview     XR KNEE COMP LEFT 4 OR MORE VIEWS - October 27, 2017  CLINICAL HISTORY:   Left knee pain  FINDINGS:   No fracture, dislocation, or arthritic change of the knee.  Dictated and Electronically Signed By: Jem Damon MD on October 27, 2017         Chronic left-sided low back pain without sciatica - Primary (Chronic)    Overview     Had MRI around 2003 which reportedly showed a "herniated disk." We are requesting records.         Relevant Orders    Ambulatory Referral to Physical/Occupational Therapy          PAST MEDICAL HISTORY, FAMILY HISTORY and SOCIAL HISTORY, CURRENT MEDICATION LIST, and ALLERGY LIST reviewed by me (MICHAEL Antony MD) and are updated consistent with the patient's report.    REVIEW OF SYSTEMS  CONSTITUTIONAL: No fever, sweats, or involuntary weight loss reported.  PULM: No shortness of breath or breathing problems reported.  : No change in urinary habits reported.  GI: No change in bowel habits reported.  NEURO: No saddle anesthesia or loss of lower extremity strength or sensation reported.     PHYSICAL EXAM  Vitals:    09/28/18 0842   BP: 102/68   BP Location: Right arm   Patient Position: Sitting   BP Method: Medium (Manual)   Pulse: 68   Temp: 97.6 °F (36.4 °C)   TempSrc: Oral   SpO2: 99%   Weight: 57.8 kg (127 lb 6.8 oz)   Height: 5' 4" (1.626 m)     CONSTITUTIONAL: Vital signs noted. No apparent distress. Does not appear acutely ill or septic. Appears adequately hydrated.  EYE: Sclerae anicteric. Lids and conjunctiva unremarkable.  ENT: External ENT unremarkable. Oropharynx moist.  NECK: Trachea midline. Thyroid nontender.  PULM: Lungs clear. Breathing unlabored.  CV: Auscultation reveals regular rate and rhythm without murmur, gallop or rub. No carotid bruit.  GI: Soft and nontender. Bowel sounds normal.  DERM: Skin " "warm and moist with normal turgor.  NEURO: There are no gross focal motor deficits or gross deficits of cranial nerves III-XII.  PSYCH: Alert and oriented x 3. Mood is grossly neutral. Affect appropriate. Judgment and insight not grossly compromised.  MSK: Grossly normal stance and gait. Thoracolumbar spine palpates normally and reasonably symmetric. Straight-leg raise test is negative. LE strength, gross sensation, and DTRs symmetric and normal. Bilateral knees are normal to inspection, palpation, stress testing, and ROM exam.       ASSESSMENT and PLAN  Chronic left-sided low back pain without sciatica  -     Ambulatory Referral to Physical/Occupational Therapy    Chronic pain of left knee    Systemic lupus erythematosus with other organ involvement, unspecified SLE type    Abnormal thyroid blood test        PRESCRIPTION MEDICATION MANAGEMENT     There are no discontinued medications.    Follow-up for any new complaints or concerns.    There are no Patient Instructions on file for this visit.    ABOUT THIS DOCUMENTATION:  · The order of the conditions listed in the HPI is one of convenience and does not necessarily reflect the chronology of the appointment, nor the relative importance of a condition.  · Documentation entered by me for this encounter was done in part using speech-recognition technology. Although I have made an effort to ensure accuracy, "sound like" errors may exist and should be interpreted in context.                        -MICHAEL Antony MD     "

## 2018-09-28 NOTE — PROGRESS NOTES
"New problem reported is chronic low back pain. Onset greater than 15 years ago. Quality described as aching discomfort. Timing described as intermittent, waxing and waning. Severity described as mild to moderate at worst at present. In the past, pain has been more moderately severe at worst. Alleviating factors in the past have included physical therapy and interventional pain management corticosteroid injections. She says that she had imaging studies performed around 15 years ago that showed a lumbar disc herniation. She hasn't had follow-up imaging since then. Low back pain is low risk. Associated symptoms include discomfort in her left knee. Alleviating factors for the knee pain include wearing a knee support/brace. She identifies no recent relevant injuries for either of these two complaints. She identifies associated "sciatica." However, the symptoms she described are atypical for sciatica, and straight leg raise test is unequivocally negative on exam today. We discussed risks and benefits of treatment options. It was agreed to treat with physical therapy. She will let me know if this fails to resolve the problem. Next problem. She is on plaquenil for diagnosis of lupus, having been evaluated by rheumatology here in April. All of her rheumatologic tests came back negative. Her lupus appears stable. She has follow-up with her rheumatologist next month. Next problem. As noted previously, she felt history of previous abnormal TSH. Repeat TSH measurement was NORMAL. ***Normal physical exam for back and knees."

## 2018-10-15 DIAGNOSIS — G25.81 RLS (RESTLESS LEGS SYNDROME): ICD-10-CM

## 2018-10-15 RX ORDER — ROPINIROLE 1 MG/1
1 TABLET, FILM COATED ORAL NIGHTLY
Qty: 30 TABLET | Refills: 5 | Status: SHIPPED | OUTPATIENT
Start: 2018-10-15 | End: 2019-03-25 | Stop reason: SDUPTHER

## 2018-11-19 DIAGNOSIS — K22.4 ESOPHAGEAL SPASM: ICD-10-CM

## 2018-11-23 RX ORDER — TRAZODONE HYDROCHLORIDE 50 MG/1
TABLET ORAL
Qty: 30 TABLET | Refills: 3 | Status: SHIPPED | OUTPATIENT
Start: 2018-11-23 | End: 2019-03-18 | Stop reason: SDUPTHER

## 2019-01-18 DIAGNOSIS — M32.19 SYSTEMIC LUPUS ERYTHEMATOSUS WITH OTHER ORGAN INVOLVEMENT, UNSPECIFIED SLE TYPE: Chronic | ICD-10-CM

## 2019-01-18 DIAGNOSIS — G40.409: ICD-10-CM

## 2019-01-18 RX ORDER — LAMOTRIGINE 100 MG/1
TABLET ORAL
Qty: 60 TABLET | Refills: 5 | Status: CANCELLED | OUTPATIENT
Start: 2019-01-18

## 2019-01-25 DIAGNOSIS — G40.409: ICD-10-CM

## 2019-01-25 DIAGNOSIS — M32.19 SYSTEMIC LUPUS ERYTHEMATOSUS WITH OTHER ORGAN INVOLVEMENT, UNSPECIFIED SLE TYPE: Chronic | ICD-10-CM

## 2019-01-25 RX ORDER — LAMOTRIGINE 100 MG/1
100 TABLET ORAL 2 TIMES DAILY
Qty: 60 TABLET | Refills: 0 | Status: CANCELLED | OUTPATIENT
Start: 2019-01-25

## 2019-01-25 NOTE — TELEPHONE ENCOUNTER
Pharmacy request refill for patient Lamotrigine.     Med/Refill set up to be routed by MD if approved

## 2019-01-25 NOTE — TELEPHONE ENCOUNTER
----- Message from Lina Sorto sent at 1/25/2019 12:15 PM CST -----  Contact: Jacqui- [facility]  Lindsay is calling in regards to Dr. Peoples's pt. Jacqui is calling for refill request on pt's Lamotrigine. Please call Jacqui back at [phone].      Thanks,   Lina Sorto

## 2019-01-28 DIAGNOSIS — M32.19 SYSTEMIC LUPUS ERYTHEMATOSUS WITH OTHER ORGAN INVOLVEMENT, UNSPECIFIED SLE TYPE: Chronic | ICD-10-CM

## 2019-01-28 DIAGNOSIS — G40.409: ICD-10-CM

## 2019-01-28 NOTE — TELEPHONE ENCOUNTER
Pharmacy requesting refill on patient lamictal. This is their 2nd request    Set up med/pharmacy for MD to route.

## 2019-01-30 RX ORDER — LAMOTRIGINE 100 MG/1
100 TABLET ORAL 2 TIMES DAILY
Qty: 180 TABLET | Refills: 1 | Status: SHIPPED | OUTPATIENT
Start: 2019-01-30 | End: 2019-07-25 | Stop reason: SDUPTHER

## 2019-01-30 NOTE — TELEPHONE ENCOUNTER
Attempted to call patient to advised medication refill approved no or answer and VM box full. Also sent [employer] message.

## 2019-03-18 DIAGNOSIS — K22.4 ESOPHAGEAL SPASM: ICD-10-CM

## 2019-03-25 DIAGNOSIS — K22.4 ESOPHAGEAL SPASM: ICD-10-CM

## 2019-03-25 DIAGNOSIS — G25.81 RLS (RESTLESS LEGS SYNDROME): ICD-10-CM

## 2019-03-25 NOTE — TELEPHONE ENCOUNTER
----- Message from Td Avitia sent at 3/25/2019  1:01 PM CDT -----  ..Type:  RX Refill Request    Who Called: Jacqui ( [facility] )  Refill or New Rx:refill   RX Name and Strength:trazodone 50 mg and ropinirole 1 mg   How is the patient currently taking it? (ex. 1XDay):  Is this a 30 day or 90 day RX:30  Preferred Pharmacy with phone number:.  [facility] [address] [address]  Phone: [phone] Fax: [phone]    Local or Mail Order:local   Ordering Provider: jackie  Would the patient rather a call back or a response via [facility]? Call back   Best Call Back Number: [phone]  Additional Information:

## 2019-03-26 RX ORDER — TRAZODONE HYDROCHLORIDE 50 MG/1
50 TABLET ORAL NIGHTLY PRN
Qty: 90 TABLET | Refills: 1 | Status: SHIPPED | OUTPATIENT
Start: 2019-03-26 | End: 2019-06-23 | Stop reason: SDUPTHER

## 2019-03-26 RX ORDER — TRAZODONE HYDROCHLORIDE 50 MG/1
50 TABLET ORAL NIGHTLY
Qty: 30 TABLET | Refills: 3 | OUTPATIENT
Start: 2019-03-26

## 2019-03-26 RX ORDER — ROPINIROLE 1 MG/1
1 TABLET, FILM COATED ORAL NIGHTLY PRN
Qty: 90 TABLET | Refills: 1 | Status: SHIPPED | OUTPATIENT
Start: 2019-03-26 | End: 2019-06-23 | Stop reason: SDUPTHER

## 2019-03-26 NOTE — TELEPHONE ENCOUNTER
Called patient and informed her that Dr. Antony refilled her prescriptions for Trazodone and Requip. She verbalized understanding.

## 2019-06-23 DIAGNOSIS — K22.4 ESOPHAGEAL SPASM: ICD-10-CM

## 2019-06-23 DIAGNOSIS — G25.81 RLS (RESTLESS LEGS SYNDROME): ICD-10-CM

## 2019-06-24 RX ORDER — ROPINIROLE 1 MG/1
1 TABLET, FILM COATED ORAL NIGHTLY PRN
Qty: 90 TABLET | Refills: 0 | Status: SHIPPED | OUTPATIENT
Start: 2019-06-24 | End: 2019-09-20 | Stop reason: SDUPTHER

## 2019-06-24 RX ORDER — TRAZODONE HYDROCHLORIDE 50 MG/1
50 TABLET ORAL NIGHTLY PRN
Qty: 90 TABLET | Refills: 0 | Status: SHIPPED | OUTPATIENT
Start: 2019-06-24 | End: 2019-09-20 | Stop reason: SDUPTHER

## 2019-06-24 NOTE — TELEPHONE ENCOUNTER
Left a voicemail for patient that Dr. Antony sent in two prescriptions for her and that she needs to schedule a follow up appointment with him.

## 2019-06-24 NOTE — TELEPHONE ENCOUNTER
TASK: Please let her know that I sent eRx refill for the requested medications, but I will need to see her for re-evaluation prior to giving additional refills. If she doesn't already have a timely follow-up appointment with me scheduled, please offer to help schedule one, anytime prior to needing another refill.

## 2019-07-25 ENCOUNTER — TELEPHONE (OUTPATIENT)
Dept: INTERNAL MEDICINE | Facility: CLINIC | Age: 49
End: 2019-07-25

## 2019-07-25 DIAGNOSIS — G40.409: ICD-10-CM

## 2019-07-25 DIAGNOSIS — Z79.899 ENCOUNTER FOR LONG-TERM CURRENT USE OF MEDICATION: Primary | ICD-10-CM

## 2019-07-25 DIAGNOSIS — M32.19 SYSTEMIC LUPUS ERYTHEMATOSUS WITH OTHER ORGAN INVOLVEMENT, UNSPECIFIED SLE TYPE: Chronic | ICD-10-CM

## 2019-07-29 RX ORDER — LAMOTRIGINE 100 MG/1
100 TABLET ORAL 2 TIMES DAILY
Qty: 60 TABLET | Refills: 2 | Status: SHIPPED | OUTPATIENT
Start: 2019-07-29 | End: 2019-10-14 | Stop reason: SDUPTHER

## 2019-07-29 NOTE — TELEPHONE ENCOUNTER
I am unable to give her any further refills until she comes in and allows me to re-evaluate her.    ACTION NEEDED: Please contact her to let her know I gave her a limited refill and help her schedule labs (listed below) and follow-up appointment with me a few days thereafter.  Schedule her in first available appointment. Thanks.    Orders Placed This Encounter    LAMOTRIGINE LEVEL    CBC auto differential    Comprehensive metabolic panel    Lipid panel    lamoTRIgine (LAMICTAL) 100 MG tablet        Requested Prescriptions     Signed Prescriptions Disp Refills    lamoTRIgine (LAMICTAL) 100 MG tablet 60 tablet 2     Sig: Take 1 tablet (100 mg total) by mouth 2 (two) times daily. - APPOINTMENT REQUIRED FOR MORE REFILLS     Authorizing Provider: MICHAEL AVERY

## 2019-07-29 NOTE — TELEPHONE ENCOUNTER
Called and left a voicemail for patient to call back in regards to setting up needed labs and follow up with Dr. Antony.

## 2019-08-21 ENCOUNTER — PATIENT MESSAGE (OUTPATIENT)
Dept: INTERNAL MEDICINE | Facility: CLINIC | Age: 49
End: 2019-08-21

## 2019-08-22 ENCOUNTER — TELEPHONE (OUTPATIENT)
Dept: INTERNAL MEDICINE | Facility: CLINIC | Age: 49
End: 2019-08-22

## 2019-08-22 NOTE — TELEPHONE ENCOUNTER
Spoke with patient about her symptoms and was able to get her an appointment with Dr. Sorto today, she verbalized understanding.

## 2019-08-22 NOTE — TELEPHONE ENCOUNTER
----- Message from Mariusz Thacker sent at 8/21/2019 11:45 AM CDT -----  Contact: pt   ..Type:  Sooner Apoointment Request    Caller is requesting a sooner appointment.  Caller declined first available appointment listed below.  Caller will not accept being placed on the waitlist and is requesting a message be sent to doctor.  Name of Caller: pt   When is the first available appointment? 10/3   Symptoms: flu like symptoms, headache   Would the patient rather a call back or a response via [facility]?      Best Call Back Number: ..[phone]    Additional Information:

## 2019-08-23 ENCOUNTER — PATIENT MESSAGE (OUTPATIENT)
Dept: RHEUMATOLOGY | Facility: CLINIC | Age: 49
End: 2019-08-23

## 2019-08-26 ENCOUNTER — LAB VISIT (OUTPATIENT)
Dept: LAB | Facility: HOSPITAL | Age: 49
End: 2019-08-26
Attending: INTERNAL MEDICINE
Payer: MEDICAID

## 2019-08-26 ENCOUNTER — OFFICE VISIT (OUTPATIENT)
Dept: RHEUMATOLOGY | Facility: CLINIC | Age: 49
End: 2019-08-26
Payer: MEDICAID

## 2019-08-26 VITALS
SYSTOLIC BLOOD PRESSURE: 109 MMHG | WEIGHT: 129.19 LBS | BODY MASS INDEX: 22.18 KG/M2 | HEART RATE: 78 BPM | DIASTOLIC BLOOD PRESSURE: 69 MMHG

## 2019-08-26 DIAGNOSIS — M32.19 SYSTEMIC LUPUS ERYTHEMATOSUS WITH OTHER ORGAN INVOLVEMENT, UNSPECIFIED SLE TYPE: Chronic | ICD-10-CM

## 2019-08-26 DIAGNOSIS — L93.0 DISCOID LUPUS ERYTHEMATOSUS: Chronic | ICD-10-CM

## 2019-08-26 DIAGNOSIS — R79.82 ELEVATED C-REACTIVE PROTEIN (CRP): ICD-10-CM

## 2019-08-26 DIAGNOSIS — Z79.899 LONG-TERM USE OF PLAQUENIL: ICD-10-CM

## 2019-08-26 DIAGNOSIS — L93.0 DISCOID LUPUS ERYTHEMATOSUS: Primary | ICD-10-CM

## 2019-08-26 LAB
C3 SERPL-MCNC: 133 MG/DL (ref 50–180)
C4 SERPL-MCNC: 37 MG/DL (ref 11–44)
CRP SERPL-MCNC: 10.8 MG/L (ref 0–8.2)
ERYTHROCYTE [SEDIMENTATION RATE] IN BLOOD BY WESTERGREN METHOD: 3 MM/HR (ref 0–36)

## 2019-08-26 PROCEDURE — 85652 RBC SED RATE AUTOMATED: CPT

## 2019-08-26 PROCEDURE — 99214 PR OFFICE/OUTPT VISIT, EST, LEVL IV, 30-39 MIN: ICD-10-PCS | Mod: S$PBB,,, | Performed by: INTERNAL MEDICINE

## 2019-08-26 PROCEDURE — 36415 COLL VENOUS BLD VENIPUNCTURE: CPT

## 2019-08-26 PROCEDURE — 99999 PR PBB SHADOW E&M-EST. PATIENT-LVL III: ICD-10-PCS | Mod: PBBFAC,,, | Performed by: INTERNAL MEDICINE

## 2019-08-26 PROCEDURE — 99214 OFFICE O/P EST MOD 30 MIN: CPT | Mod: S$PBB,,, | Performed by: INTERNAL MEDICINE

## 2019-08-26 PROCEDURE — 99999 PR PBB SHADOW E&M-EST. PATIENT-LVL III: CPT | Mod: PBBFAC,,, | Performed by: INTERNAL MEDICINE

## 2019-08-26 PROCEDURE — 86140 C-REACTIVE PROTEIN: CPT

## 2019-08-26 PROCEDURE — 99213 OFFICE O/P EST LOW 20 MIN: CPT | Mod: PBBFAC | Performed by: INTERNAL MEDICINE

## 2019-08-26 PROCEDURE — 86160 COMPLEMENT ANTIGEN: CPT | Mod: 59

## 2019-08-26 PROCEDURE — 86160 COMPLEMENT ANTIGEN: CPT

## 2019-08-26 PROCEDURE — 86038 ANTINUCLEAR ANTIBODIES: CPT

## 2019-08-26 RX ORDER — PREDNISONE 20 MG/1
TABLET ORAL
Refills: 0 | COMMUNITY
Start: 2019-08-23 | End: 2019-10-14

## 2019-08-26 RX ORDER — PROMETHAZINE HYDROCHLORIDE 25 MG/1
25 TABLET ORAL EVERY 6 HOURS PRN
COMMUNITY
Start: 2017-08-17 | End: 2021-03-01

## 2019-08-26 RX ORDER — EPINEPHRINE 0.3 MG/.3ML
0.3 INJECTION SUBCUTANEOUS DAILY PRN
COMMUNITY
Start: 2017-11-22

## 2019-08-26 NOTE — ASSESSMENT & PLAN NOTE
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  No recurrence of discoid lupus.  Concern for systemic lupus because of the recent symptoms explained above.  No active synovitis, dactylitis, enthesitis or effusion on examination today.  No rash.  Extremely low chances of recurrence of lupus or conversion to systemic lupus.  Check serologies.  Provided reassurance.

## 2019-08-26 NOTE — PROGRESS NOTES
RHEUMATOLOGY CLINIC FOLLOW UP VISIT  Chief complaints:-  To follow up for lupus.    HPI:-  Laurel Acevedo a 49 y.o. pleasant female comes in for a follow up visit.  She saw me once in April of 2018 when she established care for history of systemic lupus erythematosus and discoid lupus.  She was on Plaquenil for more than 5 years then.  She was supposed to return in 3 months which she have not done so far.  Recently she had symptoms of fever, headache, neck pain associated with chills for which she was seen at emergency department.  The condition was very similar to viral meningitis which was getting better on conservative therapy.  So she was advised to take Tylenol and NSAIDs and advised to check on her lupus activity.  She is here to do the same today.  In the last year or so she denies any recurrence of her lupus rash.  No discoid rash in her scalp.  No photosensitivity.  Her chronic fatigue syndrome has been persistent which has caused significant difficulty in her working more than 2 or 3 days a week as .  In terms of lupus she denies any symptoms of flare-up other than this acute symptoms last month.  Today she denies any of the symptoms.      Review of Systems   Constitutional: Positive for malaise/fatigue. Negative for chills and fever.   HENT: Negative for congestion and sore throat.    Eyes: Negative for blurred vision and redness.   Respiratory: Negative for cough and shortness of breath.    Cardiovascular: Negative for chest pain and leg swelling.   Gastrointestinal: Negative for abdominal pain.   Genitourinary: Negative for dysuria.   Musculoskeletal: Negative for back pain, falls, joint pain, myalgias and neck pain.   Skin: Negative for rash.   Neurological: Negative for headaches.   Endo/Heme/Allergies: Does not bruise/bleed easily.   Psychiatric/Behavioral: Negative for memory loss. The patient does not have insomnia.         Past Medical History:   Diagnosis Date    Anxiety     Chronic left-sided low back pain without sciatica 9/28/2018    Chronic pain of left knee 9/28/2018    XR KNEE COMP LEFT 4 OR MORE VIEWS - October 27, 2017 CLINICAL HISTORY:   Left knee pain FINDINGS:   No fracture, dislocation, or arthritic change of the knee. Dictated and Electronically Signed By: Jem Damon MD on October 27, 2017    GERD (gastroesophageal reflux disease)     Lupus     Seizures        Past Surgical History:   Procedure Laterality Date    HYSTERECTOMY  2016    OOPHORECTOMY      WISDOM TOOTH EXTRACTION      X 4        Social History     Tobacco Use    Smoking status: Never Smoker    Smokeless tobacco: Never Used   Substance Use Topics    Alcohol use: Yes     Comment: Occasional/Rare    Drug use: No       Family History   Problem Relation Age of Onset    No Known Problems Mother     No Known Problems Father     No Known Problems Sister     No Known Problems Brother     Stomach cancer Paternal Grandfather     Breast cancer Maternal Grandmother     Breast cancer Paternal Grandmother     Colon cancer Neg Hx        Review of patient's allergies indicates:   Allergen Reactions    Bactrim [sulfamethoxazole-trimethoprim] Other (See Comments)     Lupus symptoms exacerbation, headache.    Ciprofloxacin Other (See Comments)     Lupus symptoms exacerbation, headaches.       Vitals:    08/26/19 0916   BP: 109/69   Pulse: 78   Weight: 58.6 kg (129 lb 3 oz)   PainSc: 0-No pain       Physical Exam   Constitutional: She is oriented to person, place, and time and well-developed, well-nourished, and in no distress. No distress.   HENT:   Head: Normocephalic.   Mouth/Throat: Oropharynx is clear and moist.   Eyes: Pupils are equal, round, and reactive to light. Conjunctivae and EOM are normal.   Neck: Normal range of motion. Neck supple.   Cardiovascular: Normal rate and intact distal pulses.   Pulmonary/Chest: Effort normal. No  respiratory distress.   Abdominal: Soft. There is no tenderness.   Musculoskeletal:   No synovitis over small joints of hands or feet.  No effusion over large joints.   Neurological: She is alert and oriented to person, place, and time. No cranial nerve deficit.   Skin: Skin is warm. No rash noted. No erythema.   Psychiatric: Mood and affect normal.   Nursing note and vitals reviewed.      Medication List with Changes/Refills   Current Medications    EPINEPHRINE (EPIPEN 2-MELONIE) 0.3 MG/0.3 ML ATIN    Inject 0.3 mg into the muscle daily as needed.    ESTRADIOL (ESTRACE) 2 MG TABLET    TAKE 1 TABLET BY MOUTH ONE TIME DAILY    HYDROXYCHLOROQUINE (PLAQUENIL) 200 MG TABLET    Take 200 mg by mouth 2 (two) times daily.     LAMOTRIGINE (LAMICTAL) 100 MG TABLET    Take 1 tablet (100 mg total) by mouth 2 (two) times daily. - APPOINTMENT REQUIRED FOR MORE REFILLS    MULTIVITAMIN (THERAGRAN) PER TABLET    Take 1 tablet by mouth once daily.     ONDANSETRON (ZOFRAN-ODT) 4 MG TBDL    Take 4 mg by mouth every 6 (six) hours as needed.     PREDNISONE (DELTASONE) 20 MG TABLET    TAKE ONE TABLET BY MOUTH TWICE DAILY FOR THREE DAYS    PROMETHAZINE (PHENERGAN) 25 MG TABLET    Take 25 mg by mouth every 6 (six) hours as needed.    ROPINIROLE (REQUIP) 1 MG TABLET    Take 1 tablet (1 mg total) by mouth nightly as needed (restless legs). - APPOINTMENT REQUIRED FOR MORE REFILLS    TRAZODONE (DESYREL) 50 MG TABLET    Take 1 tablet (50 mg total) by mouth nightly as needed for Insomnia. - APPOINTMENT REQUIRED FOR MORE REFILLS    VITAMIN D 1000 UNITS TAB    Take 1,000 Units by mouth once daily.       Assessment/Plans:-  1. Discoid lupus erythematosus    2. Elevated C-reactive protein (CRP)    3. Long-term use of Plaquenil      Problem List Items Addressed This Visit        Derm    Discoid lupus erythematosus - Primary (Chronic)    Current Assessment & Plan     History of discoid lupus erythematosus which has been in remission for several years now.   No recurrence of discoid lupus.  Concern for systemic lupus because of the recent symptoms explained above.  No active synovitis, dactylitis, enthesitis or effusion on examination today.  No rash.  Extremely low chances of recurrence of lupus or conversion to systemic lupus.  Check serologies.  Provided reassurance.            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  Repeat C-reactive protein to document downward trending levels.  No active disease to suspect any underlying systemic lupus activity at this time.         Long-term use of Plaquenil    Current Assessment & Plan     On Plaquenil for several years.  Recent ophthalmology screening was normal per patient.  If serologies returned normal, consider dose reduction and discontinuation of Plaquenil.             # follow-up in 1 year.      Disclaimer: This note was prepared using voice recognition system and is likely to have sound alike errors and is not proof read.  Please call me with any questions.

## 2019-08-26 NOTE — ASSESSMENT & PLAN NOTE
On Plaquenil for several years.  Recent ophthalmology screening was normal per patient.  If serologies returned normal, consider dose reduction and discontinuation of Plaquenil.

## 2019-08-27 LAB — ANA SER QL IF: NORMAL

## 2019-09-10 ENCOUNTER — PATIENT MESSAGE (OUTPATIENT)
Dept: INTERNAL MEDICINE | Facility: CLINIC | Age: 49
End: 2019-09-10

## 2019-09-11 PROBLEM — F13.10 SEDATIVE, HYPNOTIC OR ANXIOLYTIC ABUSE: Status: ACTIVE | Noted: 2017-09-08

## 2019-09-11 PROBLEM — F13.20 ANXIOLYTIC DEPENDENCE: Status: ACTIVE | Noted: 2017-09-08

## 2019-09-12 ENCOUNTER — TELEPHONE (OUTPATIENT)
Dept: INTERNAL MEDICINE | Facility: CLINIC | Age: 49
End: 2019-09-12

## 2019-09-12 NOTE — TELEPHONE ENCOUNTER
S/w pt and sched for past due f/u/med refills, and per pt c/o change in Lt arm ROM w/ Dr. Antony 09/17/19. Pt confirmed appt date/time at new  location and to CB PRN.

## 2019-09-12 NOTE — TELEPHONE ENCOUNTER
----- Message from Geraldine Hinson sent at 9/12/2019  9:46 AM CDT -----  Contact: pt   .Type:  Sooner Apoointment Request    Caller is requesting a sooner appointment.  Caller declined first available appointment listed below.  Caller will not accept being placed on the waitlist and is requesting a message be sent to doctor.  Name of Caller: pt  When is the first available appointment? 12/3  Symptoms: medication refill/left arm pain   Would the patient rather a call back or a response via [facility]?  Either    Best Call Back Number: [phone] (home)     Additional Information:

## 2019-09-19 ENCOUNTER — PATIENT MESSAGE (OUTPATIENT)
Dept: INTERNAL MEDICINE | Facility: CLINIC | Age: 49
End: 2019-09-19

## 2019-09-19 DIAGNOSIS — G25.81 RLS (RESTLESS LEGS SYNDROME): ICD-10-CM

## 2019-09-19 DIAGNOSIS — K22.4 ESOPHAGEAL SPASM: ICD-10-CM

## 2019-09-19 NOTE — TELEPHONE ENCOUNTER
----- Message from Maria E Reeves sent at 9/19/2019  1:15 PM CDT -----  Contact: self  please work patient in tomorrow at anytime, couldn't find transportation today..[phone] (home)

## 2019-09-20 NOTE — TELEPHONE ENCOUNTER
I am unable to work her in for an appointment with me for the next 2 weeks because I will be out of the office attending a conference.    ACTIONS NEEDED:   1. Have her clarify what meds she needs refilled and submit those to me as a refill request.  2. Schedule her for appointment with me anytime after October 7. (Schedule override approved.) She can be seen at Urgent Care or worked in at to see an JOSE ENRIQUE if that appointment is not soon enough for her.     Thanks!     IN RESPONSE TO HER MESSAGE EARLIER TODAY:      IT APPEARS THAT SHE NEVER READ MY PREVIOUS MESSAGE:

## 2019-10-02 RX ORDER — TRAZODONE HYDROCHLORIDE 50 MG/1
50 TABLET ORAL NIGHTLY PRN
Qty: 30 TABLET | Refills: 0 | Status: SHIPPED | OUTPATIENT
Start: 2019-10-02 | End: 2019-10-14 | Stop reason: SDUPTHER

## 2019-10-02 RX ORDER — ROPINIROLE 1 MG/1
1 TABLET, FILM COATED ORAL NIGHTLY PRN
Qty: 30 TABLET | Refills: 0 | Status: SHIPPED | OUTPATIENT
Start: 2019-10-02 | End: 2019-10-14 | Stop reason: SDUPTHER

## 2019-10-02 NOTE — TELEPHONE ENCOUNTER
Requested Prescriptions     Signed Prescriptions Disp Refills    traZODone (DESYREL) 50 MG tablet 30 tablet 0     Sig: Take 1 tablet (50 mg total) by mouth nightly as needed for Insomnia. - APPOINTMENT REQUIRED FOR MORE REFILLS     Authorizing Provider: MICHAEL ANTONY    rOPINIRole (REQUIP) 1 MG tablet 30 tablet 0     Sig: Take 1 tablet (1 mg total) by mouth nightly as needed (restless legs). - APPOINTMENT REQUIRED FOR MORE REFILLS     Authorizing Provider: MICHAEL ANTONY     Future Appointments   Date Time Provider Department Center   10/14/2019 11:00 AM MICHAEL Antony MD [facility]

## 2019-10-14 ENCOUNTER — LAB VISIT (OUTPATIENT)
Dept: LAB | Facility: HOSPITAL | Age: 49
End: 2019-10-14
Payer: MEDICAID

## 2019-10-14 ENCOUNTER — TELEPHONE (OUTPATIENT)
Dept: INTERNAL MEDICINE | Facility: CLINIC | Age: 49
End: 2019-10-14

## 2019-10-14 ENCOUNTER — OFFICE VISIT (OUTPATIENT)
Dept: INTERNAL MEDICINE | Facility: CLINIC | Age: 49
End: 2019-10-14
Payer: MEDICAID

## 2019-10-14 VITALS
SYSTOLIC BLOOD PRESSURE: 90 MMHG | HEART RATE: 68 BPM | OXYGEN SATURATION: 98 % | WEIGHT: 128.5 LBS | TEMPERATURE: 97 F | HEIGHT: 64 IN | BODY MASS INDEX: 21.94 KG/M2 | DIASTOLIC BLOOD PRESSURE: 72 MMHG

## 2019-10-14 DIAGNOSIS — G43.109 MIGRAINE WITH AURA AND WITHOUT STATUS MIGRAINOSUS, NOT INTRACTABLE: Chronic | ICD-10-CM

## 2019-10-14 DIAGNOSIS — F41.9 INSOMNIA SECONDARY TO ANXIETY: ICD-10-CM

## 2019-10-14 DIAGNOSIS — Z12.39 SCREENING FOR BREAST CANCER: ICD-10-CM

## 2019-10-14 DIAGNOSIS — G40.409: Primary | ICD-10-CM

## 2019-10-14 DIAGNOSIS — G40.409: ICD-10-CM

## 2019-10-14 DIAGNOSIS — G25.81 RLS (RESTLESS LEGS SYNDROME): ICD-10-CM

## 2019-10-14 DIAGNOSIS — F51.05 INSOMNIA SECONDARY TO ANXIETY: ICD-10-CM

## 2019-10-14 DIAGNOSIS — F40.298 OTHER SPECIFIED PHOBIA: ICD-10-CM

## 2019-10-14 DIAGNOSIS — Z13.220 SCREENING FOR LIPID DISORDERS: ICD-10-CM

## 2019-10-14 DIAGNOSIS — Z79.899 ENCOUNTER FOR LONG-TERM CURRENT USE OF MEDICATION: ICD-10-CM

## 2019-10-14 DIAGNOSIS — S83.411A SPRAIN OF MEDIAL COLLATERAL LIGAMENT OF RIGHT KNEE, INITIAL ENCOUNTER: ICD-10-CM

## 2019-10-14 DIAGNOSIS — M75.102 ROTATOR CUFF SYNDROME, LEFT: Chronic | ICD-10-CM

## 2019-10-14 LAB
ALBUMIN SERPL BCP-MCNC: 4.8 G/DL (ref 3.5–5.2)
ALP SERPL-CCNC: 40 U/L (ref 55–135)
ALT SERPL W/O P-5'-P-CCNC: 11 U/L (ref 10–44)
ANION GAP SERPL CALC-SCNC: 9 MMOL/L (ref 8–16)
AST SERPL-CCNC: 17 U/L (ref 10–40)
BASOPHILS # BLD AUTO: 0.05 K/UL (ref 0–0.2)
BASOPHILS NFR BLD: 0.9 % (ref 0–1.9)
BILIRUB SERPL-MCNC: 1 MG/DL (ref 0.1–1)
BUN SERPL-MCNC: 17 MG/DL (ref 6–20)
CALCIUM SERPL-MCNC: 9.7 MG/DL (ref 8.7–10.5)
CHLORIDE SERPL-SCNC: 103 MMOL/L (ref 95–110)
CHOLEST SERPL-MCNC: 207 MG/DL (ref 120–199)
CHOLEST/HDLC SERPL: 2.7 {RATIO} (ref 2–5)
CO2 SERPL-SCNC: 29 MMOL/L (ref 23–29)
CREAT SERPL-MCNC: 1 MG/DL (ref 0.5–1.4)
DIFFERENTIAL METHOD: ABNORMAL
EOSINOPHIL # BLD AUTO: 0.1 K/UL (ref 0–0.5)
EOSINOPHIL NFR BLD: 0.9 % (ref 0–8)
ERYTHROCYTE [DISTWIDTH] IN BLOOD BY AUTOMATED COUNT: 13.4 % (ref 11.5–14.5)
EST. GFR  (AFRICAN AMERICAN): >60 ML/MIN/1.73 M^2
EST. GFR  (NON AFRICAN AMERICAN): >60 ML/MIN/1.73 M^2
GLUCOSE SERPL-MCNC: 84 MG/DL (ref 70–110)
HCT VFR BLD AUTO: 43.8 % (ref 37–48.5)
HDLC SERPL-MCNC: 77 MG/DL (ref 40–75)
HDLC SERPL: 37.2 % (ref 20–50)
HGB BLD-MCNC: 13.7 G/DL (ref 12–16)
IMM GRANULOCYTES # BLD AUTO: 0.01 K/UL (ref 0–0.04)
IMM GRANULOCYTES NFR BLD AUTO: 0.2 % (ref 0–0.5)
LDLC SERPL CALC-MCNC: 117.6 MG/DL (ref 63–159)
LYMPHOCYTES # BLD AUTO: 2.3 K/UL (ref 1–4.8)
LYMPHOCYTES NFR BLD: 41.4 % (ref 18–48)
MCH RBC QN AUTO: 29 PG (ref 27–31)
MCHC RBC AUTO-ENTMCNC: 31.3 G/DL (ref 32–36)
MCV RBC AUTO: 93 FL (ref 82–98)
MONOCYTES # BLD AUTO: 0.5 K/UL (ref 0.3–1)
MONOCYTES NFR BLD: 9.7 % (ref 4–15)
NEUTROPHILS # BLD AUTO: 2.6 K/UL (ref 1.8–7.7)
NEUTROPHILS NFR BLD: 46.9 % (ref 38–73)
NONHDLC SERPL-MCNC: 130 MG/DL
NRBC BLD-RTO: 0 /100 WBC
PLATELET # BLD AUTO: 201 K/UL (ref 150–350)
PMV BLD AUTO: 12.3 FL (ref 9.2–12.9)
POTASSIUM SERPL-SCNC: 4.2 MMOL/L (ref 3.5–5.1)
PROT SERPL-MCNC: 7.4 G/DL (ref 6–8.4)
RBC # BLD AUTO: 4.73 M/UL (ref 4–5.4)
SODIUM SERPL-SCNC: 141 MMOL/L (ref 136–145)
TRIGL SERPL-MCNC: 62 MG/DL (ref 30–150)
WBC # BLD AUTO: 5.48 K/UL (ref 3.9–12.7)

## 2019-10-14 PROCEDURE — 80175 DRUG SCREEN QUAN LAMOTRIGINE: CPT

## 2019-10-14 PROCEDURE — 80061 LIPID PANEL: CPT

## 2019-10-14 PROCEDURE — 99214 OFFICE O/P EST MOD 30 MIN: CPT | Mod: PBBFAC | Performed by: FAMILY MEDICINE

## 2019-10-14 PROCEDURE — 85025 COMPLETE CBC W/AUTO DIFF WBC: CPT

## 2019-10-14 PROCEDURE — 99999 PR PBB SHADOW E&M-EST. PATIENT-LVL IV: CPT | Mod: PBBFAC,,, | Performed by: FAMILY MEDICINE

## 2019-10-14 PROCEDURE — 99999 PR PBB SHADOW E&M-EST. PATIENT-LVL IV: ICD-10-PCS | Mod: PBBFAC,,, | Performed by: FAMILY MEDICINE

## 2019-10-14 PROCEDURE — 99214 OFFICE O/P EST MOD 30 MIN: CPT | Mod: S$PBB,,, | Performed by: FAMILY MEDICINE

## 2019-10-14 PROCEDURE — 99214 PR OFFICE/OUTPT VISIT, EST, LEVL IV, 30-39 MIN: ICD-10-PCS | Mod: S$PBB,,, | Performed by: FAMILY MEDICINE

## 2019-10-14 PROCEDURE — 80053 COMPREHEN METABOLIC PANEL: CPT

## 2019-10-14 RX ORDER — DIAZEPAM 5 MG/1
5 TABLET ORAL DAILY PRN
Qty: 2 TABLET | Refills: 1 | Status: SHIPPED | OUTPATIENT
Start: 2019-10-14 | End: 2021-03-04

## 2019-10-14 RX ORDER — TRAZODONE HYDROCHLORIDE 50 MG/1
50 TABLET ORAL NIGHTLY PRN
Qty: 90 TABLET | Refills: 4 | Status: SHIPPED | OUTPATIENT
Start: 2019-10-14 | End: 2020-12-02

## 2019-10-14 RX ORDER — LAMOTRIGINE 100 MG/1
100 TABLET ORAL 2 TIMES DAILY
Qty: 180 TABLET | Refills: 4 | Status: SHIPPED | OUTPATIENT
Start: 2019-10-14 | End: 2020-12-14 | Stop reason: SDUPTHER

## 2019-10-14 RX ORDER — TESTOSTERONE CYPIONATE 200 MG/ML
INJECTION, SOLUTION INTRAMUSCULAR
COMMUNITY

## 2019-10-14 RX ORDER — ROPINIROLE 1 MG/1
1 TABLET, FILM COATED ORAL NIGHTLY PRN
Qty: 90 TABLET | Refills: 4 | Status: SHIPPED | OUTPATIENT
Start: 2019-10-14 | End: 2020-12-02

## 2019-10-14 NOTE — TELEPHONE ENCOUNTER
Contacted patient to schedule overdue mammogram. Patient scheduled mammogram for 10/24/2019 @1:40pm.[place]augherty

## 2019-10-15 ENCOUNTER — TELEPHONE (OUTPATIENT)
Dept: INTERNAL MEDICINE | Facility: CLINIC | Age: 49
End: 2019-10-15

## 2019-10-15 NOTE — PROGRESS NOTES
CHIEF COMPLAINT  Medication Refill      HISTORY, ASSESSMENT, and PLAN    1. Simple partial to complex partial seizure to generalized seizure    2. Migraine with aura and without status migrainosus, not intractable    3. Insomnia secondary to anxiety    4. RLS (restless legs syndrome)    5. Rotator cuff syndrome, left (chronic, recurrent)    6. Sprain of medial collateral ligament of right knee, initial encounter    7. Screening for lipid disorders    8. Other specified phobia    9. Screening for breast cancer      Chronic conditions are STABLE and COMPENSATED/CONTROLLED. Musculoskeletal complaints are new, but the problems are actually chronic, previously responding well to physical therapy. She appears to be tolerating her current medications well and reports preceiving no adverse side-effects. [facility] Controlled Prescription Drug Monitoring database was queried and showed no activity to suggest abuse, diversion, or other inappropriate use of prescription medications.     Orders Placed This Encounter    Mammo Digital Screening Bilat    Lipid panel    LAMOTRIGINE LEVEL    Ambulatory Referral to Physical/Occupational Therapy    rOPINIRole (REQUIP) 1 MG tablet    traZODone (DESYREL) 50 MG tablet    lamoTRIgine (LAMICTAL) 100 MG tablet    diazePAM (VALIUM) 5 MG tablet       Problem List Items Addressed This Visit        Neuro    Simple partial to complex partial seizure to generalized seizure - Primary    Overview     Prior neurologist: Ivan Huynh MD           Relevant Medications    lamoTRIgine (LAMICTAL) 100 MG tablet    Other Relevant Orders    LAMOTRIGINE LEVEL    RLS (restless legs syndrome)    Relevant Medications    rOPINIRole (REQUIP) 1 MG tablet    Migraine with aura and without status migrainosus, not intractable (Chronic)    Relevant Orders    LAMOTRIGINE LEVEL       Psychiatric    Insomnia secondary to anxiety    Relevant Medications    traZODone (DESYREL) 50 MG tablet     Fear of flying    Overview     Fear of flying         Relevant Medications    diazePAM (VALIUM) 5 MG tablet       Orthopedic    Sprain of medial collateral ligament of right knee (recurrent)    Relevant Orders    Ambulatory Referral to Physical/Occupational Therapy    Rotator cuff syndrome, left (chronic, recurrent) (Chronic)    Relevant Orders    Ambulatory Referral to Physical/Occupational Therapy      Other Visit Diagnoses     Screening for lipid disorders        Relevant Orders    Lipid panel (Completed)    Screening for breast cancer        Relevant Orders    Mammo Digital Screening Bilat           Outpatient Medications Prior to Visit   Medication Sig Dispense Refill    EPINEPHrine (EPIPEN 2-MELONIE) 0.3 mg/0.3 mL AtIn Inject 0.3 mg into the muscle daily as needed.      estradiol (ESTRACE) 2 MG tablet TAKE 1 TABLET BY MOUTH ONE TIME DAILY  1    multivitamin (THERAGRAN) per tablet Take 1 tablet by mouth once daily.       ondansetron (ZOFRAN-ODT) 4 MG TbDL Take 4 mg by mouth every 6 (six) hours as needed.       promethazine (PHENERGAN) 25 MG tablet Take 25 mg by mouth every 6 (six) hours as needed.      testosterone cypionate (DEPOTESTOTERONE CYPIONATE) 200 mg/mL injection Inject into the muscle every 28 days. (Dose? - By Dr[facility] Brennen Mercer at [facility])      vitamin D 1000 units Tab Take 1,000 Units by mouth once daily.      lamoTRIgine (LAMICTAL) 100 MG tablet Take 1 tablet (100 mg total) by mouth 2 (two) times daily. - APPOINTMENT REQUIRED FOR MORE REFILLS 60 tablet 2    rOPINIRole (REQUIP) 1 MG tablet Take 1 tablet (1 mg total) by mouth nightly as needed (restless legs). - APPOINTMENT REQUIRED FOR MORE REFILLS 30 tablet 0    traZODone (DESYREL) 50 MG tablet Take 1 tablet (50 mg total) by mouth nightly as needed for Insomnia. - APPOINTMENT REQUIRED FOR MORE REFILLS 30 tablet 0    hydroxychloroquine (PLAQUENIL) 200 mg tablet Take 200 mg by mouth 2 (two) times daily.        "predniSONE (DELTASONE) 20 MG tablet TAKE ONE TABLET BY MOUTH TWICE DAILY FOR THREE DAYS  0     No facility-administered medications prior to visit.         Medications Ordered This Encounter   Medications    diazePAM (VALIUM) 5 MG tablet     Sig: Take 1 tablet (5 mg total) by mouth daily as needed (fear of flying).     Dispense:  2 tablet     Refill:  1    lamoTRIgine (LAMICTAL) 100 MG tablet     Sig: Take 1 tablet (100 mg total) by mouth 2 (two) times daily.     Dispense:  180 tablet     Refill:  4    rOPINIRole (REQUIP) 1 MG tablet     Sig: Take 1 tablet (1 mg total) by mouth nightly as needed (restless legs).     Dispense:  90 tablet     Refill:  4    traZODone (DESYREL) 50 MG tablet     Sig: Take 1 tablet (50 mg total) by mouth nightly as needed for Insomnia.     Dispense:  90 tablet     Refill:  4       Medications Discontinued During This Encounter   Medication Reason    hydroxychloroquine (PLAQUENIL) 200 mg tablet Patient no longer taking    predniSONE (DELTASONE) 20 MG tablet Patient no longer taking    rOPINIRole (REQUIP) 1 MG tablet Reorder    traZODone (DESYREL) 50 MG tablet Reorder    lamoTRIgine (LAMICTAL) 100 MG tablet Reorder        Follow up for any new complaints or concerns, periodic management of chronic medical conditions.    REVIEW OF SYSTEMS  CONSTITUTIONAL: No fever reported.  CARDIOVASCULAR: No chest pain reported.  PULMONARY: No trouble breathing reported.   PSYCHIATRIC: No mood instability reported.     PHYSICAL EXAM  Vitals:    10/14/19 1039   BP: 90/72   BP Location: Left arm   Patient Position: Sitting   BP Method: Medium (Manual)   Pulse: 68   Temp: 97.2 °F (36.2 °C)   TempSrc: Tympanic   SpO2: 98%   Weight: 58.3 kg (128 lb 8.5 oz)   Height: 5' 4" (1.626 m)     CONSTITUTIONAL: Vital signs noted. No apparent distress. Does not appear acutely ill or septic. Appears adequately hydrated.  HEENT: External ENT grossly unremarkable. Hearing grossly intact. Oropharynx moist.  PULM: " "Lungs clear. Breathing unlabored.  HEART: Auscultation reveals regular rate and rhythm without murmur, gallop or rub.  DERM: Skin warm and moist with normal turgor.  NEURO: There are no gross focal motor deficits or gross deficits of cranial nerves III-XII.  PSYCHIATRIC: Alert and conversant. Mood grossly neutral. Judgment and insight not grossly compromised.     TOTAL TIME evaluating and managing this patient for this encounter exceeded 25 minutes, the majority spent counseling and coordinating care for the listed diagnoses.  Documentation entered by me for this encounter may have been done in part using speech-recognition technology. Although I have made an effort to ensure accuracy, "sound like" errors may exist and should be interpreted in context. -MICHAEL Antony MD.   "

## 2019-10-15 NOTE — TELEPHONE ENCOUNTER
----- Message from Carolyne Rankin sent at 10/15/2019  9:22 AM CDT -----  Contact: Patient  Patient would like a call back concerning the valium prescription sent over yesterday. Please call at Ph .[phone].

## 2019-10-16 ENCOUNTER — PATIENT MESSAGE (OUTPATIENT)
Dept: INTERNAL MEDICINE | Facility: CLINIC | Age: 49
End: 2019-10-16

## 2019-10-16 ENCOUNTER — TELEPHONE (OUTPATIENT)
Dept: INTERNAL MEDICINE | Facility: CLINIC | Age: 49
End: 2019-10-16

## 2019-10-16 LAB — LAMOTRIGINE SERPL-MCNC: 1.3 UG/ML (ref 2–15)

## 2019-10-16 NOTE — PROGRESS NOTES
"Laurel Corbin.    I'm happy to report that these tests are NORMAL or at least ACCEPTABLE. Although your lamotrigine level is on the low side, this is acceptable assuming you have had no seizures in the last couple of years, and your dose remains the same.    Want to learn more about your test results and what they mean? It's as simple as 1, 2, 3.     (1) Log in to your [facility] account at [email or web address]     (2) From the "View test results" tab, click on the test you want to know more about.     (3) Click on the "About This Test" link.    If you have any additional questions about your test results, be sure to write them down and we can discuss them face-to-face at your next appointment.  If you have any urgent questions in the meantime, please send me a message using [facility], or you can call my office at [phone].    Thanks for letting me care for you, thanks for trusting us with your healthcare needs, and thanks for using [facility].    Sincerely,    MICHAEL Antony MD"

## 2019-10-16 NOTE — TELEPHONE ENCOUNTER
----- Message from Landry Jensen sent at 10/16/2019  9:36 AM CDT -----  Contact: Pt  Please give pt a call at .[phone] (home) she is returning the nurse call regarding the 2 valiums not being enough to fly.

## 2019-10-28 ENCOUNTER — TELEPHONE (OUTPATIENT)
Dept: ADMINISTRATIVE | Facility: HOSPITAL | Age: 49
End: 2019-10-28

## 2019-10-28 NOTE — TELEPHONE ENCOUNTER
Attempted to contact patient to schedule overdue mammogram. Voicemail left for patient to call back to schedule mammogram. PD[place]

## 2019-10-29 ENCOUNTER — TELEPHONE (OUTPATIENT)
Dept: ADMINISTRATIVE | Facility: HOSPITAL | Age: 49
End: 2019-10-29

## 2019-10-29 NOTE — TELEPHONE ENCOUNTER
Attempted to contact patient to schedule overdue mammogram. Left voicemail for patient to call back to schedule mammogram. PDaugherty

## 2019-10-30 ENCOUNTER — TELEPHONE (OUTPATIENT)
Dept: ADMINISTRATIVE | Facility: HOSPITAL | Age: 49
End: 2019-10-30

## 2019-10-30 NOTE — TELEPHONE ENCOUNTER
Attempted to contact patient to schedule overdue mammogram. [facility]

## 2020-01-06 ENCOUNTER — TELEPHONE (OUTPATIENT)
Dept: INTERNAL MEDICINE | Facility: CLINIC | Age: 50
End: 2020-01-06

## 2020-01-06 NOTE — TELEPHONE ENCOUNTER
----- Message from Tate Méndez sent at 1/6/2020 12:20 PM CST -----  Contact: Pt  Type:  Sooner Apoointment Request    Caller is requesting a sooner appointment.  Caller declined first available appointment listed below.  Caller will not accept being placed on the waitlist and is requesting a message be sent to doctor.  Name of Caller:Laurel  When is the first available appointment?03/2020  Symptoms:Gallbladder concern  Would the patient rather a call back or a response via [facility]? Call back  Best Call Back Number:[phone]  Additional Information: na

## 2020-01-06 NOTE — TELEPHONE ENCOUNTER
Spoke with patient and she states that she is having some dull, aching pain under her right rib cage that radiates to her back. The pain started off bad this morning and has gotten a little better since. I informed her that there are no same day appointments available here in our clinic and she can try an urgent care. She verbalized understanding and said she will do that.

## 2020-10-06 ENCOUNTER — PATIENT MESSAGE (OUTPATIENT)
Dept: ADMINISTRATIVE | Facility: HOSPITAL | Age: 50
End: 2020-10-06

## 2020-12-01 DIAGNOSIS — G25.81 RLS (RESTLESS LEGS SYNDROME): ICD-10-CM

## 2020-12-01 DIAGNOSIS — F51.05 INSOMNIA SECONDARY TO ANXIETY: ICD-10-CM

## 2020-12-01 DIAGNOSIS — F41.9 INSOMNIA SECONDARY TO ANXIETY: ICD-10-CM

## 2020-12-02 RX ORDER — ROPINIROLE 1 MG/1
TABLET, FILM COATED ORAL
Qty: 90 TABLET | Refills: 0 | Status: SHIPPED | OUTPATIENT
Start: 2020-12-02 | End: 2020-12-14 | Stop reason: SDUPTHER

## 2020-12-02 RX ORDER — TRAZODONE HYDROCHLORIDE 50 MG/1
TABLET ORAL
Qty: 90 TABLET | Refills: 0 | Status: SHIPPED | OUTPATIENT
Start: 2020-12-02 | End: 2020-12-14 | Stop reason: SDUPTHER

## 2020-12-03 ENCOUNTER — TELEPHONE (OUTPATIENT)
Dept: INTERNAL MEDICINE | Facility: CLINIC | Age: 50
End: 2020-12-03

## 2020-12-03 NOTE — TELEPHONE ENCOUNTER
REFILL REQUEST APPROVED - APPOINTMENT REQUIRED FOR MORE REFILLS  Requested Prescriptions   Pending Prescriptions Disp Refills    traZODone (DESYREL) 50 MG tablet [Pharmacy Med Name: trazodone 50 mg tablet] 90 tablet 0     Sig: TAKE ONE TABLET BY MOUTH EVERY EVENING AS NEEDED FOR insomnia    rOPINIRole (REQUIP) 1 MG tablet [Pharmacy Med Name: ropinirole 1 mg tablet] 90 tablet 0     Sig: TAKE ONE TABLET BY MOUTH EVERY EVENING AS NEEDED FOR RESTLESSNESS LEGS      #LMRX

## 2020-12-10 ENCOUNTER — TELEPHONE (OUTPATIENT)
Dept: INTERNAL MEDICINE | Facility: CLINIC | Age: 50
End: 2020-12-10

## 2020-12-10 NOTE — TELEPHONE ENCOUNTER
----- Message from Kenya Newman sent at 12/10/2020 10:15 AM CST -----  Contact: BRITTANY BILLINGS [[id number]]  Type:  Sooner Apoointment Request    Caller is requesting a sooner appointment.  Caller declined first available appointment listed below.  Caller will not accept being placed on the waitlist and is requesting a message be sent to doctor.  Name of Caller:BRITTANY BILLINGS [[id number]]  When is the first available appointment?03/21/2021  Symptoms: flared disc in neck paining   Would the patient rather a call back or a response via [facility]?   [facility] Call Back Number:[phone] (home)   Additional Information: pt is requesting a returning from nurse in regards to a sooner appt today and for her annual visit with

## 2020-12-10 NOTE — TELEPHONE ENCOUNTER
Spoke with patient and she stated that she is feeling bad and is now at an urgent care location to get a medication to help her. She wants to schedule her annual appointment soon for medication refills, mammogram and so on. I informed her that we will call her back to schedule that soon. She verbalized understanding.

## 2020-12-14 ENCOUNTER — OFFICE VISIT (OUTPATIENT)
Dept: INTERNAL MEDICINE | Facility: CLINIC | Age: 50
End: 2020-12-14
Payer: MEDICAID

## 2020-12-14 ENCOUNTER — LAB VISIT (OUTPATIENT)
Dept: LAB | Facility: HOSPITAL | Age: 50
End: 2020-12-14
Attending: NURSE PRACTITIONER
Payer: MEDICAID

## 2020-12-14 VITALS
WEIGHT: 131.63 LBS | OXYGEN SATURATION: 99 % | HEART RATE: 81 BPM | TEMPERATURE: 98 F | HEIGHT: 64 IN | BODY MASS INDEX: 22.47 KG/M2 | DIASTOLIC BLOOD PRESSURE: 76 MMHG | SYSTOLIC BLOOD PRESSURE: 104 MMHG

## 2020-12-14 DIAGNOSIS — F51.05 INSOMNIA SECONDARY TO ANXIETY: ICD-10-CM

## 2020-12-14 DIAGNOSIS — M54.50 LUMBAR PAIN: ICD-10-CM

## 2020-12-14 DIAGNOSIS — Z12.39 ENCOUNTER FOR SCREENING FOR MALIGNANT NEOPLASM OF BREAST, UNSPECIFIED SCREENING MODALITY: ICD-10-CM

## 2020-12-14 DIAGNOSIS — Z00.00 ANNUAL PHYSICAL EXAM: Primary | ICD-10-CM

## 2020-12-14 DIAGNOSIS — Z00.00 ANNUAL PHYSICAL EXAM: ICD-10-CM

## 2020-12-14 DIAGNOSIS — M54.2 CERVICALGIA: ICD-10-CM

## 2020-12-14 DIAGNOSIS — F41.9 INSOMNIA SECONDARY TO ANXIETY: ICD-10-CM

## 2020-12-14 DIAGNOSIS — G25.81 RLS (RESTLESS LEGS SYNDROME): ICD-10-CM

## 2020-12-14 DIAGNOSIS — G40.409: ICD-10-CM

## 2020-12-14 LAB
ALBUMIN SERPL BCP-MCNC: 5 G/DL (ref 3.5–5.2)
ALP SERPL-CCNC: 38 U/L (ref 55–135)
ALT SERPL W/O P-5'-P-CCNC: 13 U/L (ref 10–44)
ANION GAP SERPL CALC-SCNC: 9 MMOL/L (ref 8–16)
AST SERPL-CCNC: 14 U/L (ref 10–40)
BASOPHILS # BLD AUTO: 0.04 K/UL (ref 0–0.2)
BASOPHILS NFR BLD: 0.6 % (ref 0–1.9)
BILIRUB SERPL-MCNC: 0.4 MG/DL (ref 0.1–1)
BUN SERPL-MCNC: 23 MG/DL (ref 6–20)
CALCIUM SERPL-MCNC: 9.5 MG/DL (ref 8.7–10.5)
CHLORIDE SERPL-SCNC: 101 MMOL/L (ref 95–110)
CO2 SERPL-SCNC: 31 MMOL/L (ref 23–29)
CREAT SERPL-MCNC: 0.9 MG/DL (ref 0.5–1.4)
DIFFERENTIAL METHOD: ABNORMAL
EOSINOPHIL # BLD AUTO: 0 K/UL (ref 0–0.5)
EOSINOPHIL NFR BLD: 0.4 % (ref 0–8)
ERYTHROCYTE [DISTWIDTH] IN BLOOD BY AUTOMATED COUNT: 12.3 % (ref 11.5–14.5)
EST. GFR  (AFRICAN AMERICAN): >60 ML/MIN/1.73 M^2
EST. GFR  (NON AFRICAN AMERICAN): >60 ML/MIN/1.73 M^2
GLUCOSE SERPL-MCNC: 95 MG/DL (ref 70–110)
HCT VFR BLD AUTO: 43.2 % (ref 37–48.5)
HGB BLD-MCNC: 13.8 G/DL (ref 12–16)
IMM GRANULOCYTES # BLD AUTO: 0.02 K/UL (ref 0–0.04)
IMM GRANULOCYTES NFR BLD AUTO: 0.3 % (ref 0–0.5)
LYMPHOCYTES # BLD AUTO: 1.9 K/UL (ref 1–4.8)
LYMPHOCYTES NFR BLD: 26.9 % (ref 18–48)
MCH RBC QN AUTO: 29.9 PG (ref 27–31)
MCHC RBC AUTO-ENTMCNC: 31.9 G/DL (ref 32–36)
MCV RBC AUTO: 94 FL (ref 82–98)
MONOCYTES # BLD AUTO: 0.4 K/UL (ref 0.3–1)
MONOCYTES NFR BLD: 5.1 % (ref 4–15)
NEUTROPHILS # BLD AUTO: 4.6 K/UL (ref 1.8–7.7)
NEUTROPHILS NFR BLD: 66.7 % (ref 38–73)
NRBC BLD-RTO: 0 /100 WBC
PLATELET # BLD AUTO: 216 K/UL (ref 150–350)
PMV BLD AUTO: 11.9 FL (ref 9.2–12.9)
POTASSIUM SERPL-SCNC: 3.8 MMOL/L (ref 3.5–5.1)
PROT SERPL-MCNC: 7.5 G/DL (ref 6–8.4)
RBC # BLD AUTO: 4.61 M/UL (ref 4–5.4)
SODIUM SERPL-SCNC: 141 MMOL/L (ref 136–145)
WBC # BLD AUTO: 6.91 K/UL (ref 3.9–12.7)

## 2020-12-14 PROCEDURE — 36415 COLL VENOUS BLD VENIPUNCTURE: CPT

## 2020-12-14 PROCEDURE — 99999 PR PBB SHADOW E&M-EST. PATIENT-LVL V: ICD-10-PCS | Mod: PBBFAC,,, | Performed by: NURSE PRACTITIONER

## 2020-12-14 PROCEDURE — 84439 ASSAY OF FREE THYROXINE: CPT

## 2020-12-14 PROCEDURE — 80061 LIPID PANEL: CPT

## 2020-12-14 PROCEDURE — 99215 OFFICE O/P EST HI 40 MIN: CPT | Mod: PBBFAC | Performed by: NURSE PRACTITIONER

## 2020-12-14 PROCEDURE — 99213 PR OFFICE/OUTPT VISIT, EST, LEVL III, 20-29 MIN: ICD-10-PCS | Mod: S$PBB,,, | Performed by: NURSE PRACTITIONER

## 2020-12-14 PROCEDURE — 84443 ASSAY THYROID STIM HORMONE: CPT

## 2020-12-14 PROCEDURE — 80175 DRUG SCREEN QUAN LAMOTRIGINE: CPT

## 2020-12-14 PROCEDURE — 80053 COMPREHEN METABOLIC PANEL: CPT

## 2020-12-14 PROCEDURE — 99999 PR PBB SHADOW E&M-EST. PATIENT-LVL V: CPT | Mod: PBBFAC,,, | Performed by: NURSE PRACTITIONER

## 2020-12-14 PROCEDURE — 85025 COMPLETE CBC W/AUTO DIFF WBC: CPT

## 2020-12-14 PROCEDURE — 99213 OFFICE O/P EST LOW 20 MIN: CPT | Mod: S$PBB,,, | Performed by: NURSE PRACTITIONER

## 2020-12-14 RX ORDER — BACLOFEN 10 MG/1
10 TABLET ORAL 3 TIMES DAILY PRN
COMMUNITY
Start: 2020-12-10 | End: 2021-03-08

## 2020-12-14 RX ORDER — HYDROXYCHLOROQUINE SULFATE 200 MG/1
TABLET, FILM COATED ORAL
COMMUNITY
End: 2021-10-12

## 2020-12-14 RX ORDER — METHYLPREDNISOLONE 4 MG/1
TABLET ORAL
COMMUNITY
Start: 2020-12-10 | End: 2021-01-29

## 2020-12-14 RX ORDER — LAMOTRIGINE 100 MG/1
100 TABLET ORAL 2 TIMES DAILY
Qty: 180 TABLET | Refills: 2 | Status: SHIPPED | OUTPATIENT
Start: 2020-12-14 | End: 2021-03-08 | Stop reason: SDUPTHER

## 2020-12-14 RX ORDER — ROPINIROLE 1 MG/1
TABLET, FILM COATED ORAL
Qty: 90 TABLET | Refills: 2 | Status: SHIPPED | OUTPATIENT
Start: 2020-12-14 | End: 2021-03-08 | Stop reason: SDUPTHER

## 2020-12-14 RX ORDER — TRAZODONE HYDROCHLORIDE 50 MG/1
50 TABLET ORAL NIGHTLY
Qty: 90 TABLET | Refills: 2 | Status: SHIPPED | OUTPATIENT
Start: 2020-12-14 | End: 2021-03-08 | Stop reason: SDUPTHER

## 2020-12-15 LAB
CHOLEST SERPL-MCNC: 193 MG/DL (ref 120–199)
CHOLEST/HDLC SERPL: 2.4 {RATIO} (ref 2–5)
HDLC SERPL-MCNC: 80 MG/DL (ref 40–75)
HDLC SERPL: 41.5 % (ref 20–50)
LDLC SERPL CALC-MCNC: 97.6 MG/DL (ref 63–159)
NONHDLC SERPL-MCNC: 113 MG/DL
T4 FREE SERPL-MCNC: 0.87 NG/DL (ref 0.71–1.51)
TRIGL SERPL-MCNC: 77 MG/DL (ref 30–150)
TSH SERPL DL<=0.005 MIU/L-ACNC: 4.44 UIU/ML (ref 0.4–4)

## 2020-12-16 DIAGNOSIS — R79.89 ABNORMAL TSH: Primary | ICD-10-CM

## 2020-12-18 LAB — LAMOTRIGINE SERPL-MCNC: 1 UG/ML (ref 2–15)

## 2021-01-04 ENCOUNTER — HOSPITAL ENCOUNTER (OUTPATIENT)
Dept: RADIOLOGY | Facility: HOSPITAL | Age: 51
Discharge: HOME | End: 2021-01-04
Attending: NURSE PRACTITIONER
Payer: MEDICAID

## 2021-01-04 VITALS — BODY MASS INDEX: 22.47 KG/M2 | WEIGHT: 131.63 LBS | HEIGHT: 64 IN

## 2021-01-04 DIAGNOSIS — Z00.00 ANNUAL PHYSICAL EXAM: ICD-10-CM

## 2021-01-04 DIAGNOSIS — Z12.39 ENCOUNTER FOR SCREENING FOR MALIGNANT NEOPLASM OF BREAST, UNSPECIFIED SCREENING MODALITY: ICD-10-CM

## 2021-01-04 PROCEDURE — 77063 MAMMO DIGITAL SCREENING BILAT WITH TOMO: ICD-10-PCS | Mod: 26,,, | Performed by: RADIOLOGY

## 2021-01-04 PROCEDURE — 77063 BREAST TOMOSYNTHESIS BI: CPT | Mod: 26,,, | Performed by: RADIOLOGY

## 2021-01-04 PROCEDURE — 77067 SCR MAMMO BI INCL CAD: CPT | Mod: TC

## 2021-01-04 PROCEDURE — 77067 MAMMO DIGITAL SCREENING BILAT WITH TOMO: ICD-10-PCS | Mod: 26,,, | Performed by: RADIOLOGY

## 2021-01-04 PROCEDURE — 77067 SCR MAMMO BI INCL CAD: CPT | Mod: 26,,, | Performed by: RADIOLOGY

## 2021-01-21 ENCOUNTER — LAB VISIT (OUTPATIENT)
Dept: LAB | Facility: HOSPITAL | Age: 51
End: 2021-01-21
Attending: NURSE PRACTITIONER
Payer: MEDICAID

## 2021-01-21 DIAGNOSIS — R79.89 ABNORMAL TSH: ICD-10-CM

## 2021-01-21 LAB — TSH SERPL DL<=0.005 MIU/L-ACNC: 3.51 UIU/ML (ref 0.4–4)

## 2021-01-21 PROCEDURE — 84443 ASSAY THYROID STIM HORMONE: CPT

## 2021-01-21 PROCEDURE — 36415 COLL VENOUS BLD VENIPUNCTURE: CPT

## 2021-01-22 ENCOUNTER — PATIENT MESSAGE (OUTPATIENT)
Dept: INTERNAL MEDICINE | Facility: CLINIC | Age: 51
End: 2021-01-22

## 2021-01-23 ENCOUNTER — PATIENT MESSAGE (OUTPATIENT)
Dept: INTERNAL MEDICINE | Facility: CLINIC | Age: 51
End: 2021-01-23

## 2021-01-29 ENCOUNTER — OFFICE VISIT (OUTPATIENT)
Dept: INTERNAL MEDICINE | Facility: CLINIC | Age: 51
End: 2021-01-29
Payer: MEDICAID

## 2021-01-29 VITALS
BODY MASS INDEX: 23.18 KG/M2 | DIASTOLIC BLOOD PRESSURE: 72 MMHG | SYSTOLIC BLOOD PRESSURE: 96 MMHG | HEIGHT: 64 IN | WEIGHT: 135.81 LBS | TEMPERATURE: 98 F | HEART RATE: 72 BPM | OXYGEN SATURATION: 99 %

## 2021-01-29 DIAGNOSIS — E03.9 HYPOTHYROIDISM, UNSPECIFIED TYPE: Primary | ICD-10-CM

## 2021-01-29 PROCEDURE — 99999 PR PBB SHADOW E&M-EST. PATIENT-LVL IV: CPT | Mod: PBBFAC,,, | Performed by: NURSE PRACTITIONER

## 2021-01-29 PROCEDURE — 99214 OFFICE O/P EST MOD 30 MIN: CPT | Mod: PBBFAC | Performed by: NURSE PRACTITIONER

## 2021-01-29 PROCEDURE — 99214 OFFICE O/P EST MOD 30 MIN: CPT | Mod: S$PBB,,, | Performed by: NURSE PRACTITIONER

## 2021-01-29 PROCEDURE — 99999 PR PBB SHADOW E&M-EST. PATIENT-LVL IV: ICD-10-PCS | Mod: PBBFAC,,, | Performed by: NURSE PRACTITIONER

## 2021-01-29 PROCEDURE — 99214 PR OFFICE/OUTPT VISIT, EST, LEVL IV, 30-39 MIN: ICD-10-PCS | Mod: S$PBB,,, | Performed by: NURSE PRACTITIONER

## 2021-01-29 RX ORDER — LEVOTHYROXINE SODIUM 25 UG/1
25 TABLET ORAL
Qty: 30 TABLET | Refills: 1 | Status: SHIPPED | OUTPATIENT
Start: 2021-01-29 | End: 2021-03-08 | Stop reason: SDUPTHER

## 2021-02-03 ENCOUNTER — PATIENT MESSAGE (OUTPATIENT)
Dept: INTERNAL MEDICINE | Facility: CLINIC | Age: 51
End: 2021-02-03

## 2021-02-03 DIAGNOSIS — E03.9 HYPOTHYROIDISM, UNSPECIFIED TYPE: Primary | ICD-10-CM

## 2021-02-24 ENCOUNTER — TELEPHONE (OUTPATIENT)
Dept: PAIN MEDICINE | Facility: CLINIC | Age: 51
End: 2021-02-24

## 2021-02-24 ENCOUNTER — PATIENT MESSAGE (OUTPATIENT)
Dept: INTERNAL MEDICINE | Facility: CLINIC | Age: 51
End: 2021-02-24

## 2021-02-26 ENCOUNTER — PATIENT MESSAGE (OUTPATIENT)
Dept: INTERNAL MEDICINE | Facility: CLINIC | Age: 51
End: 2021-02-26

## 2021-03-01 ENCOUNTER — TELEPHONE (OUTPATIENT)
Dept: INTERNAL MEDICINE | Facility: CLINIC | Age: 51
End: 2021-03-01

## 2021-03-01 ENCOUNTER — OFFICE VISIT (OUTPATIENT)
Dept: URGENT CARE | Facility: CLINIC | Age: 51
End: 2021-03-01
Payer: MEDICAID

## 2021-03-01 VITALS
OXYGEN SATURATION: 100 % | WEIGHT: 129 LBS | RESPIRATION RATE: 18 BRPM | TEMPERATURE: 98 F | DIASTOLIC BLOOD PRESSURE: 74 MMHG | BODY MASS INDEX: 22.02 KG/M2 | HEIGHT: 64 IN | HEART RATE: 67 BPM | SYSTOLIC BLOOD PRESSURE: 112 MMHG

## 2021-03-01 DIAGNOSIS — R11.0 NAUSEA: ICD-10-CM

## 2021-03-01 DIAGNOSIS — R19.7 DIARRHEA, UNSPECIFIED TYPE: ICD-10-CM

## 2021-03-01 DIAGNOSIS — R34 DECREASED URINATION: ICD-10-CM

## 2021-03-01 DIAGNOSIS — R10.11 POSTPRANDIAL RUQ PAIN: Primary | ICD-10-CM

## 2021-03-01 LAB
BILIRUB UR QL STRIP: NEGATIVE
GLUCOSE UR QL STRIP: NEGATIVE
KETONES UR QL STRIP: NEGATIVE
LEUKOCYTE ESTERASE UR QL STRIP: NEGATIVE
PH, POC UA: 5
POC BLOOD, URINE: NEGATIVE
POC NITRATES, URINE: NEGATIVE
PROT UR QL STRIP: NEGATIVE
SP GR UR STRIP: 1.02 (ref 1–1.03)
UROBILINOGEN UR STRIP-ACNC: NORMAL (ref 0.1–1.1)

## 2021-03-01 PROCEDURE — S0119 ONDANSETRON 4 MG: HCPCS | Mod: S$GLB,,, | Performed by: PHYSICIAN ASSISTANT

## 2021-03-01 PROCEDURE — 81003 URINALYSIS AUTO W/O SCOPE: CPT | Mod: QW,S$GLB,, | Performed by: PHYSICIAN ASSISTANT

## 2021-03-01 PROCEDURE — 99213 PR OFFICE/OUTPT VISIT, EST, LEVL III, 20-29 MIN: ICD-10-PCS | Mod: 25,S$GLB,, | Performed by: PHYSICIAN ASSISTANT

## 2021-03-01 PROCEDURE — 99213 OFFICE O/P EST LOW 20 MIN: CPT | Mod: 25,S$GLB,, | Performed by: PHYSICIAN ASSISTANT

## 2021-03-01 PROCEDURE — S0119 PR ONDANSETRON, ORAL, 4MG: ICD-10-PCS | Mod: S$GLB,,, | Performed by: PHYSICIAN ASSISTANT

## 2021-03-01 PROCEDURE — 81003 POCT URINALYSIS, DIPSTICK, AUTOMATED, W/O SCOPE: ICD-10-PCS | Mod: QW,S$GLB,, | Performed by: PHYSICIAN ASSISTANT

## 2021-03-01 RX ORDER — LOPERAMIDE HYDROCHLORIDE 2 MG/1
2 CAPSULE ORAL 4 TIMES DAILY PRN
Qty: 30 CAPSULE | Refills: 0 | Status: SHIPPED | OUTPATIENT
Start: 2021-03-01 | End: 2021-03-11

## 2021-03-01 RX ORDER — FAMOTIDINE 20 MG/1
20 TABLET, FILM COATED ORAL 2 TIMES DAILY
Qty: 60 TABLET | Refills: 0 | Status: SHIPPED | OUTPATIENT
Start: 2021-03-01 | End: 2021-10-11

## 2021-03-01 RX ORDER — ONDANSETRON 4 MG/1
4 TABLET, ORALLY DISINTEGRATING ORAL
Status: COMPLETED | OUTPATIENT
Start: 2021-03-01 | End: 2021-03-01

## 2021-03-01 RX ORDER — ONDANSETRON 4 MG/1
4 TABLET, FILM COATED ORAL EVERY 8 HOURS PRN
Qty: 30 TABLET | Refills: 0 | Status: SHIPPED | OUTPATIENT
Start: 2021-03-01 | End: 2021-10-12

## 2021-03-01 RX ADMIN — ONDANSETRON 4 MG: 4 TABLET, ORALLY DISINTEGRATING ORAL at 01:03

## 2021-03-04 ENCOUNTER — TELEPHONE (OUTPATIENT)
Dept: URGENT CARE | Facility: CLINIC | Age: 51
End: 2021-03-04

## 2021-03-04 ENCOUNTER — HOSPITAL ENCOUNTER (OUTPATIENT)
Dept: RADIOLOGY | Facility: HOSPITAL | Age: 51
Discharge: HOME | End: 2021-03-04
Attending: PHYSICAL MEDICINE & REHABILITATION
Payer: MEDICAID

## 2021-03-04 ENCOUNTER — OFFICE VISIT (OUTPATIENT)
Dept: PAIN MEDICINE | Facility: CLINIC | Age: 51
End: 2021-03-04
Payer: MEDICAID

## 2021-03-04 ENCOUNTER — HOSPITAL ENCOUNTER (OUTPATIENT)
Dept: RADIOLOGY | Facility: HOSPITAL | Age: 51
Discharge: HOME | End: 2021-03-04
Attending: PHYSICIAN ASSISTANT
Payer: MEDICAID

## 2021-03-04 VITALS
WEIGHT: 129 LBS | HEIGHT: 64 IN | DIASTOLIC BLOOD PRESSURE: 70 MMHG | HEART RATE: 77 BPM | BODY MASS INDEX: 22.02 KG/M2 | SYSTOLIC BLOOD PRESSURE: 106 MMHG

## 2021-03-04 DIAGNOSIS — M25.812 SHOULDER IMPINGEMENT, LEFT: Primary | ICD-10-CM

## 2021-03-04 DIAGNOSIS — M54.9 DORSALGIA, UNSPECIFIED: ICD-10-CM

## 2021-03-04 DIAGNOSIS — M54.50 LUMBAR PAIN: ICD-10-CM

## 2021-03-04 DIAGNOSIS — M70.62 GREATER TROCHANTERIC BURSITIS, LEFT: ICD-10-CM

## 2021-03-04 DIAGNOSIS — R10.11 POSTPRANDIAL RUQ PAIN: ICD-10-CM

## 2021-03-04 DIAGNOSIS — R11.0 NAUSEA: ICD-10-CM

## 2021-03-04 DIAGNOSIS — R19.7 DIARRHEA, UNSPECIFIED TYPE: ICD-10-CM

## 2021-03-04 DIAGNOSIS — M54.2 CERVICALGIA: ICD-10-CM

## 2021-03-04 PROCEDURE — 72110 XR LUMBAR SPINE 5 VIEW WITH FLEX AND EXT: ICD-10-PCS | Mod: 26,,, | Performed by: RADIOLOGY

## 2021-03-04 PROCEDURE — 72110 X-RAY EXAM L-2 SPINE 4/>VWS: CPT | Mod: 26,,, | Performed by: RADIOLOGY

## 2021-03-04 PROCEDURE — 20610 DRAIN/INJ JOINT/BURSA W/O US: CPT | Mod: S$PBB,LT,, | Performed by: PHYSICAL MEDICINE & REHABILITATION

## 2021-03-04 PROCEDURE — 99204 PR OFFICE/OUTPT VISIT, NEW, LEVL IV, 45-59 MIN: ICD-10-PCS | Mod: 25,S$PBB,, | Performed by: PHYSICAL MEDICINE & REHABILITATION

## 2021-03-04 PROCEDURE — 99999 PR PBB SHADOW E&M-EST. PATIENT-LVL V: ICD-10-PCS | Mod: PBBFAC,,, | Performed by: PHYSICAL MEDICINE & REHABILITATION

## 2021-03-04 PROCEDURE — 76705 ECHO EXAM OF ABDOMEN: CPT | Mod: 26,,, | Performed by: RADIOLOGY

## 2021-03-04 PROCEDURE — 20610 PR DRAIN/INJECT LARGE JOINT/BURSA: ICD-10-PCS | Mod: S$PBB,LT,, | Performed by: PHYSICAL MEDICINE & REHABILITATION

## 2021-03-04 PROCEDURE — 99204 OFFICE O/P NEW MOD 45 MIN: CPT | Mod: 25,S$PBB,, | Performed by: PHYSICAL MEDICINE & REHABILITATION

## 2021-03-04 PROCEDURE — 76705 US ABDOMEN LIMITED: ICD-10-PCS | Mod: 26,,, | Performed by: RADIOLOGY

## 2021-03-04 PROCEDURE — 72052 XR CERVICAL SPINE 5 VIEW WITH FLEX AND EXT: ICD-10-PCS | Mod: 26,,, | Performed by: RADIOLOGY

## 2021-03-04 PROCEDURE — 99215 OFFICE O/P EST HI 40 MIN: CPT | Mod: PBBFAC,25 | Performed by: PHYSICAL MEDICINE & REHABILITATION

## 2021-03-04 PROCEDURE — 72052 X-RAY EXAM NECK SPINE 6/>VWS: CPT | Mod: TC

## 2021-03-04 PROCEDURE — 76705 ECHO EXAM OF ABDOMEN: CPT | Mod: TC

## 2021-03-04 PROCEDURE — 72052 X-RAY EXAM NECK SPINE 6/>VWS: CPT | Mod: 26,,, | Performed by: RADIOLOGY

## 2021-03-04 PROCEDURE — 20610 DRAIN/INJ JOINT/BURSA W/O US: CPT | Mod: PBBFAC | Performed by: PHYSICAL MEDICINE & REHABILITATION

## 2021-03-04 PROCEDURE — 72110 X-RAY EXAM L-2 SPINE 4/>VWS: CPT | Mod: TC

## 2021-03-04 PROCEDURE — 99999 PR PBB SHADOW E&M-EST. PATIENT-LVL V: CPT | Mod: PBBFAC,,, | Performed by: PHYSICAL MEDICINE & REHABILITATION

## 2021-03-04 RX ORDER — METHYLPREDNISOLONE ACETATE 40 MG/ML
40 INJECTION, SUSPENSION INTRA-ARTICULAR; INTRALESIONAL; INTRAMUSCULAR; SOFT TISSUE
Status: COMPLETED | OUTPATIENT
Start: 2021-03-04 | End: 2021-03-04

## 2021-03-04 RX ADMIN — METHYLPREDNISOLONE ACETATE 40 MG: 40 INJECTION, SUSPENSION INTRA-ARTICULAR; INTRALESIONAL; INTRAMUSCULAR; SOFT TISSUE at 05:03

## 2021-03-05 ENCOUNTER — TELEPHONE (OUTPATIENT)
Dept: PAIN MEDICINE | Facility: CLINIC | Age: 51
End: 2021-03-05

## 2021-03-08 ENCOUNTER — OFFICE VISIT (OUTPATIENT)
Dept: INTERNAL MEDICINE | Facility: CLINIC | Age: 51
End: 2021-03-08
Payer: MEDICAID

## 2021-03-08 DIAGNOSIS — F41.9 INSOMNIA SECONDARY TO ANXIETY: ICD-10-CM

## 2021-03-08 DIAGNOSIS — F51.05 INSOMNIA SECONDARY TO ANXIETY: ICD-10-CM

## 2021-03-08 DIAGNOSIS — G25.81 RLS (RESTLESS LEGS SYNDROME): ICD-10-CM

## 2021-03-08 DIAGNOSIS — G40.209 PARTIAL SYMPTOMATIC EPILEPSY WITH COMPLEX PARTIAL SEIZURES, NOT INTRACTABLE, WITHOUT STATUS EPILEPTICUS: Chronic | ICD-10-CM

## 2021-03-08 DIAGNOSIS — E03.9 HYPOTHYROIDISM, UNSPECIFIED TYPE: Primary | ICD-10-CM

## 2021-03-08 PROCEDURE — 99214 OFFICE O/P EST MOD 30 MIN: CPT | Mod: 95,,, | Performed by: FAMILY MEDICINE

## 2021-03-08 PROCEDURE — 99214 PR OFFICE/OUTPT VISIT, EST, LEVL IV, 30-39 MIN: ICD-10-PCS | Mod: 95,,, | Performed by: FAMILY MEDICINE

## 2021-03-08 RX ORDER — LAMOTRIGINE 100 MG/1
100 TABLET ORAL 2 TIMES DAILY
Qty: 60 TABLET | Refills: 5 | Status: SHIPPED | OUTPATIENT
Start: 2021-03-08 | End: 2021-03-08 | Stop reason: SDUPTHER

## 2021-03-08 RX ORDER — LEVOTHYROXINE SODIUM 25 UG/1
25 TABLET ORAL
Qty: 30 TABLET | Refills: 5 | Status: SHIPPED | OUTPATIENT
Start: 2021-03-08 | End: 2021-08-31

## 2021-03-08 RX ORDER — ROPINIROLE 1 MG/1
TABLET, FILM COATED ORAL
Qty: 30 TABLET | Refills: 11 | Status: SHIPPED | OUTPATIENT
Start: 2021-03-08 | End: 2021-10-12 | Stop reason: ALTCHOICE

## 2021-03-08 RX ORDER — TRAZODONE HYDROCHLORIDE 50 MG/1
50 TABLET ORAL NIGHTLY
Qty: 30 TABLET | Refills: 11 | Status: SHIPPED | OUTPATIENT
Start: 2021-03-08 | End: 2021-12-27 | Stop reason: SDUPTHER

## 2021-03-08 RX ORDER — LAMOTRIGINE 100 MG/1
100 TABLET ORAL 2 TIMES DAILY
Qty: 60 TABLET | Refills: 5 | Status: SHIPPED | OUTPATIENT
Start: 2021-03-08 | End: 2021-12-27 | Stop reason: SDUPTHER

## 2021-03-19 ENCOUNTER — IMMUNIZATION (OUTPATIENT)
Dept: INTERNAL MEDICINE | Facility: CLINIC | Age: 51
End: 2021-03-19
Payer: MEDICAID

## 2021-03-19 ENCOUNTER — LAB VISIT (OUTPATIENT)
Dept: LAB | Facility: HOSPITAL | Age: 51
End: 2021-03-19
Attending: FAMILY MEDICINE
Payer: MEDICAID

## 2021-03-19 DIAGNOSIS — E03.9 HYPOTHYROIDISM, UNSPECIFIED TYPE: ICD-10-CM

## 2021-03-19 DIAGNOSIS — Z23 NEED FOR VACCINATION: Primary | ICD-10-CM

## 2021-03-19 DIAGNOSIS — G40.209 PARTIAL SYMPTOMATIC EPILEPSY WITH COMPLEX PARTIAL SEIZURES, NOT INTRACTABLE, WITHOUT STATUS EPILEPTICUS: Chronic | ICD-10-CM

## 2021-03-19 PROCEDURE — 80175 DRUG SCREEN QUAN LAMOTRIGINE: CPT | Performed by: FAMILY MEDICINE

## 2021-03-19 PROCEDURE — 84443 ASSAY THYROID STIM HORMONE: CPT | Performed by: FAMILY MEDICINE

## 2021-03-19 PROCEDURE — 91300 COVID-19, MRNA, LNP-S, PF, 30 MCG/0.3 ML DOSE VACCINE: CPT | Mod: PBBFAC | Performed by: FAMILY MEDICINE

## 2021-03-19 PROCEDURE — 36415 COLL VENOUS BLD VENIPUNCTURE: CPT | Performed by: FAMILY MEDICINE

## 2021-03-20 LAB — TSH SERPL DL<=0.005 MIU/L-ACNC: 2.17 UIU/ML (ref 0.4–4)

## 2021-03-22 LAB — LAMOTRIGINE SERPL-MCNC: 3.1 UG/ML (ref 2–15)

## 2021-04-09 ENCOUNTER — IMMUNIZATION (OUTPATIENT)
Dept: INTERNAL MEDICINE | Facility: CLINIC | Age: 51
End: 2021-04-09
Payer: MEDICAID

## 2021-04-09 DIAGNOSIS — Z23 NEED FOR VACCINATION: Primary | ICD-10-CM

## 2021-04-09 PROCEDURE — 91300 COVID-19, MRNA, LNP-S, PF, 30 MCG/0.3 ML DOSE VACCINE: CPT | Mod: PBBFAC | Performed by: FAMILY MEDICINE

## 2021-04-09 PROCEDURE — 0002A COVID-19, MRNA, LNP-S, PF, 30 MCG/0.3 ML DOSE VACCINE: CPT | Mod: PBBFAC | Performed by: FAMILY MEDICINE

## 2021-07-01 ENCOUNTER — PATIENT MESSAGE (OUTPATIENT)
Dept: INTERNAL MEDICINE | Facility: CLINIC | Age: 51
End: 2021-07-01

## 2021-07-13 ENCOUNTER — PATIENT MESSAGE (OUTPATIENT)
Dept: INTERNAL MEDICINE | Facility: CLINIC | Age: 51
End: 2021-07-13

## 2021-07-14 NOTE — TELEPHONE ENCOUNTER
----- Message from Kayla Woods sent at 4/11/2018  8:07 AM CDT -----  Contact: Pt   Pt requested a callback to discuss being seen before June 2018 pt was scheduled for today but canceled due to car issues callback number to discuss is..[phone] (home)     
Patient rescheduled appointment for later date due to care issue.   
General

## 2021-08-23 ENCOUNTER — TELEPHONE (OUTPATIENT)
Dept: PAIN MEDICINE | Facility: CLINIC | Age: 51
End: 2021-08-23

## 2021-08-23 ENCOUNTER — PATIENT MESSAGE (OUTPATIENT)
Dept: PAIN MEDICINE | Facility: CLINIC | Age: 51
End: 2021-08-23

## 2021-08-24 ENCOUNTER — PATIENT MESSAGE (OUTPATIENT)
Dept: PAIN MEDICINE | Facility: CLINIC | Age: 51
End: 2021-08-24

## 2021-08-24 ENCOUNTER — OFFICE VISIT (OUTPATIENT)
Dept: PAIN MEDICINE | Facility: CLINIC | Age: 51
End: 2021-08-24
Payer: MEDICAID

## 2021-08-24 DIAGNOSIS — M54.9 DORSALGIA, UNSPECIFIED: ICD-10-CM

## 2021-08-24 DIAGNOSIS — M54.16 LUMBAR RADICULOPATHY: Primary | ICD-10-CM

## 2021-08-24 PROCEDURE — 99213 OFFICE O/P EST LOW 20 MIN: CPT | Mod: 95,,, | Performed by: PHYSICAL MEDICINE & REHABILITATION

## 2021-08-24 PROCEDURE — 99213 PR OFFICE/OUTPT VISIT, EST, LEVL III, 20-29 MIN: ICD-10-PCS | Mod: 95,,, | Performed by: PHYSICAL MEDICINE & REHABILITATION

## 2021-08-24 RX ORDER — GABAPENTIN 300 MG/1
CAPSULE ORAL
Qty: 90 CAPSULE | Refills: 2 | Status: SHIPPED | OUTPATIENT
Start: 2021-08-24 | End: 2021-09-02

## 2021-08-24 RX ORDER — METHYLPREDNISOLONE 4 MG/1
TABLET ORAL
Qty: 1 PACKAGE | Refills: 0 | Status: SHIPPED | OUTPATIENT
Start: 2021-08-24 | End: 2021-09-02 | Stop reason: ALTCHOICE

## 2021-08-24 RX ORDER — MELOXICAM 7.5 MG/1
7.5 TABLET ORAL 2 TIMES DAILY WITH MEALS
Qty: 60 TABLET | Refills: 1 | Status: SHIPPED | OUTPATIENT
Start: 2021-08-24 | End: 2021-10-12 | Stop reason: ALTCHOICE

## 2021-09-01 ENCOUNTER — HOSPITAL ENCOUNTER (OUTPATIENT)
Dept: RADIOLOGY | Facility: HOSPITAL | Age: 51
Discharge: HOME | End: 2021-09-01
Attending: PHYSICAL MEDICINE & REHABILITATION
Payer: MEDICAID

## 2021-09-01 DIAGNOSIS — M54.9 DORSALGIA, UNSPECIFIED: ICD-10-CM

## 2021-09-01 PROCEDURE — 72148 MRI LUMBAR SPINE W/O DYE: CPT | Mod: TC

## 2021-09-01 PROCEDURE — 72148 MRI LUMBAR SPINE W/O DYE: CPT | Mod: 26,,, | Performed by: RADIOLOGY

## 2021-09-01 PROCEDURE — 72148 MRI LUMBAR SPINE WITHOUT CONTRAST: ICD-10-PCS | Mod: 26,,, | Performed by: RADIOLOGY

## 2021-09-02 ENCOUNTER — OFFICE VISIT (OUTPATIENT)
Dept: PAIN MEDICINE | Facility: CLINIC | Age: 51
End: 2021-09-02
Payer: MEDICAID

## 2021-09-02 VITALS
BODY MASS INDEX: 21.79 KG/M2 | SYSTOLIC BLOOD PRESSURE: 110 MMHG | WEIGHT: 127.63 LBS | HEIGHT: 64 IN | DIASTOLIC BLOOD PRESSURE: 82 MMHG | HEART RATE: 79 BPM

## 2021-09-02 DIAGNOSIS — M70.62 GREATER TROCHANTERIC BURSITIS, LEFT: Primary | ICD-10-CM

## 2021-09-02 DIAGNOSIS — M46.1 SACROILIITIS: ICD-10-CM

## 2021-09-02 PROCEDURE — 99213 OFFICE O/P EST LOW 20 MIN: CPT | Mod: 25,S$PBB,, | Performed by: PHYSICAL MEDICINE & REHABILITATION

## 2021-09-02 PROCEDURE — 20610 PR DRAIN/INJECT LARGE JOINT/BURSA: ICD-10-PCS | Mod: S$PBB,LT,, | Performed by: PHYSICAL MEDICINE & REHABILITATION

## 2021-09-02 PROCEDURE — 99213 PR OFFICE/OUTPT VISIT, EST, LEVL III, 20-29 MIN: ICD-10-PCS | Mod: 25,S$PBB,, | Performed by: PHYSICAL MEDICINE & REHABILITATION

## 2021-09-02 PROCEDURE — 20610 DRAIN/INJ JOINT/BURSA W/O US: CPT | Mod: PBBFAC | Performed by: PHYSICAL MEDICINE & REHABILITATION

## 2021-09-02 PROCEDURE — 99214 OFFICE O/P EST MOD 30 MIN: CPT | Mod: PBBFAC,25 | Performed by: PHYSICAL MEDICINE & REHABILITATION

## 2021-09-02 PROCEDURE — 99999 PR PBB SHADOW E&M-EST. PATIENT-LVL IV: ICD-10-PCS | Mod: PBBFAC,,, | Performed by: PHYSICAL MEDICINE & REHABILITATION

## 2021-09-02 PROCEDURE — 99999 PR PBB SHADOW E&M-EST. PATIENT-LVL IV: CPT | Mod: PBBFAC,,, | Performed by: PHYSICAL MEDICINE & REHABILITATION

## 2021-09-02 PROCEDURE — 20610 DRAIN/INJ JOINT/BURSA W/O US: CPT | Mod: S$PBB,LT,, | Performed by: PHYSICAL MEDICINE & REHABILITATION

## 2021-09-02 RX ORDER — METHYLPREDNISOLONE ACETATE 40 MG/ML
40 INJECTION, SUSPENSION INTRA-ARTICULAR; INTRALESIONAL; INTRAMUSCULAR; SOFT TISSUE
Status: COMPLETED | OUTPATIENT
Start: 2021-09-02 | End: 2021-09-02

## 2021-09-02 RX ADMIN — METHYLPREDNISOLONE ACETATE 40 MG: 40 INJECTION, SUSPENSION INTRA-ARTICULAR; INTRALESIONAL; INTRAMUSCULAR; SOFT TISSUE at 10:09

## 2021-09-13 ENCOUNTER — CLINICAL SUPPORT (OUTPATIENT)
Dept: REHABILITATION | Facility: HOSPITAL | Age: 51
End: 2021-09-13
Attending: PHYSICAL MEDICINE & REHABILITATION
Payer: MEDICAID

## 2021-09-13 DIAGNOSIS — M25.552 LEFT HIP PAIN: ICD-10-CM

## 2021-09-13 DIAGNOSIS — M70.62 GREATER TROCHANTERIC BURSITIS, LEFT: ICD-10-CM

## 2021-09-13 DIAGNOSIS — G89.29 CHRONIC BILATERAL LOW BACK PAIN WITHOUT SCIATICA: ICD-10-CM

## 2021-09-13 DIAGNOSIS — M46.1 SACROILIITIS: ICD-10-CM

## 2021-09-13 DIAGNOSIS — M54.50 CHRONIC BILATERAL LOW BACK PAIN WITHOUT SCIATICA: ICD-10-CM

## 2021-09-13 PROCEDURE — 97161 PT EVAL LOW COMPLEX 20 MIN: CPT

## 2021-09-17 ENCOUNTER — CLINICAL SUPPORT (OUTPATIENT)
Dept: REHABILITATION | Facility: HOSPITAL | Age: 51
End: 2021-09-17
Attending: PHYSICAL MEDICINE & REHABILITATION
Payer: MEDICAID

## 2021-09-17 DIAGNOSIS — G89.29 CHRONIC BILATERAL LOW BACK PAIN WITHOUT SCIATICA: ICD-10-CM

## 2021-09-17 DIAGNOSIS — M25.552 LEFT HIP PAIN: ICD-10-CM

## 2021-09-17 DIAGNOSIS — M54.50 CHRONIC BILATERAL LOW BACK PAIN WITHOUT SCIATICA: ICD-10-CM

## 2021-09-17 PROCEDURE — 97110 THERAPEUTIC EXERCISES: CPT

## 2021-09-27 ENCOUNTER — CLINICAL SUPPORT (OUTPATIENT)
Dept: REHABILITATION | Facility: HOSPITAL | Age: 51
End: 2021-09-27
Attending: PHYSICAL MEDICINE & REHABILITATION
Payer: MEDICAID

## 2021-09-27 DIAGNOSIS — M54.50 CHRONIC BILATERAL LOW BACK PAIN WITHOUT SCIATICA: ICD-10-CM

## 2021-09-27 DIAGNOSIS — G89.29 CHRONIC BILATERAL LOW BACK PAIN WITHOUT SCIATICA: ICD-10-CM

## 2021-09-27 DIAGNOSIS — M25.552 LEFT HIP PAIN: ICD-10-CM

## 2021-09-27 PROCEDURE — 97110 THERAPEUTIC EXERCISES: CPT

## 2021-10-01 ENCOUNTER — CLINICAL SUPPORT (OUTPATIENT)
Dept: REHABILITATION | Facility: HOSPITAL | Age: 51
End: 2021-10-01
Payer: MEDICAID

## 2021-10-01 DIAGNOSIS — M25.552 LEFT HIP PAIN: ICD-10-CM

## 2021-10-01 DIAGNOSIS — G89.29 CHRONIC BILATERAL LOW BACK PAIN WITHOUT SCIATICA: ICD-10-CM

## 2021-10-01 DIAGNOSIS — M54.50 CHRONIC BILATERAL LOW BACK PAIN WITHOUT SCIATICA: ICD-10-CM

## 2021-10-01 PROCEDURE — 97110 THERAPEUTIC EXERCISES: CPT

## 2021-10-04 ENCOUNTER — TELEPHONE (OUTPATIENT)
Dept: REHABILITATION | Facility: HOSPITAL | Age: 51
End: 2021-10-04

## 2021-10-08 ENCOUNTER — TELEPHONE (OUTPATIENT)
Dept: INTERNAL MEDICINE | Facility: CLINIC | Age: 51
End: 2021-10-08

## 2021-10-08 ENCOUNTER — CLINICAL SUPPORT (OUTPATIENT)
Dept: REHABILITATION | Facility: HOSPITAL | Age: 51
End: 2021-10-08
Payer: MEDICAID

## 2021-10-08 DIAGNOSIS — G89.29 CHRONIC BILATERAL LOW BACK PAIN WITHOUT SCIATICA: ICD-10-CM

## 2021-10-08 DIAGNOSIS — M25.552 LEFT HIP PAIN: ICD-10-CM

## 2021-10-08 DIAGNOSIS — M54.50 CHRONIC BILATERAL LOW BACK PAIN WITHOUT SCIATICA: ICD-10-CM

## 2021-10-08 PROCEDURE — 97110 THERAPEUTIC EXERCISES: CPT

## 2021-10-11 ENCOUNTER — OFFICE VISIT (OUTPATIENT)
Dept: URGENT CARE | Facility: CLINIC | Age: 51
End: 2021-10-11
Payer: MEDICAID

## 2021-10-11 ENCOUNTER — CLINICAL SUPPORT (OUTPATIENT)
Dept: REHABILITATION | Facility: HOSPITAL | Age: 51
End: 2021-10-11
Payer: MEDICAID

## 2021-10-11 VITALS
WEIGHT: 125 LBS | HEIGHT: 64 IN | BODY MASS INDEX: 21.34 KG/M2 | DIASTOLIC BLOOD PRESSURE: 81 MMHG | RESPIRATION RATE: 18 BRPM | OXYGEN SATURATION: 100 % | HEART RATE: 82 BPM | TEMPERATURE: 98 F | SYSTOLIC BLOOD PRESSURE: 127 MMHG

## 2021-10-11 DIAGNOSIS — M54.50 CHRONIC BILATERAL LOW BACK PAIN WITHOUT SCIATICA: ICD-10-CM

## 2021-10-11 DIAGNOSIS — G89.29 CHRONIC BILATERAL LOW BACK PAIN WITHOUT SCIATICA: ICD-10-CM

## 2021-10-11 DIAGNOSIS — K21.9 GASTROESOPHAGEAL REFLUX DISEASE, UNSPECIFIED WHETHER ESOPHAGITIS PRESENT: Primary | ICD-10-CM

## 2021-10-11 DIAGNOSIS — M25.552 LEFT HIP PAIN: ICD-10-CM

## 2021-10-11 DIAGNOSIS — E03.9 HYPOTHYROIDISM, UNSPECIFIED TYPE: ICD-10-CM

## 2021-10-11 DIAGNOSIS — K59.00 CONSTIPATION, UNSPECIFIED CONSTIPATION TYPE: ICD-10-CM

## 2021-10-11 PROCEDURE — 84443 ASSAY THYROID STIM HORMONE: CPT | Performed by: EMERGENCY MEDICINE

## 2021-10-11 PROCEDURE — 99215 PR OFFICE/OUTPT VISIT, EST, LEVL V, 40-54 MIN: ICD-10-PCS | Mod: S$GLB,,, | Performed by: EMERGENCY MEDICINE

## 2021-10-11 PROCEDURE — 99215 OFFICE O/P EST HI 40 MIN: CPT | Mod: S$GLB,,, | Performed by: EMERGENCY MEDICINE

## 2021-10-11 PROCEDURE — 74019 XR ABDOMEN FLAT AND ERECT: ICD-10-PCS | Mod: S$GLB,,, | Performed by: RADIOLOGY

## 2021-10-11 PROCEDURE — 97110 THERAPEUTIC EXERCISES: CPT

## 2021-10-11 PROCEDURE — 74019 RADEX ABDOMEN 2 VIEWS: CPT | Mod: S$GLB,,, | Performed by: RADIOLOGY

## 2021-10-11 RX ORDER — FAMOTIDINE 20 MG/1
20 TABLET, FILM COATED ORAL 2 TIMES DAILY
Qty: 28 TABLET | Refills: 0 | Status: SHIPPED | OUTPATIENT
Start: 2021-10-11 | End: 2021-10-12 | Stop reason: ALTCHOICE

## 2021-10-11 RX ORDER — ONDANSETRON 8 MG/1
8 TABLET, ORALLY DISINTEGRATING ORAL
Status: DISCONTINUED | OUTPATIENT
Start: 2021-10-11 | End: 2021-10-11

## 2021-10-11 RX ORDER — PANTOPRAZOLE SODIUM 40 MG/1
40 TABLET, DELAYED RELEASE ORAL DAILY
Qty: 14 TABLET | Refills: 0 | Status: SHIPPED | OUTPATIENT
Start: 2021-10-11 | End: 2021-10-12 | Stop reason: SDUPTHER

## 2021-10-11 RX ORDER — GABAPENTIN 300 MG/1
600 CAPSULE ORAL 3 TIMES DAILY
COMMUNITY
Start: 2021-09-20 | End: 2021-11-15 | Stop reason: SDUPTHER

## 2021-10-12 ENCOUNTER — OFFICE VISIT (OUTPATIENT)
Dept: INTERNAL MEDICINE | Facility: CLINIC | Age: 51
End: 2021-10-12
Payer: MEDICAID

## 2021-10-12 ENCOUNTER — TELEPHONE (OUTPATIENT)
Dept: INTERNAL MEDICINE | Facility: CLINIC | Age: 51
End: 2021-10-12

## 2021-10-12 DIAGNOSIS — E03.9 ACQUIRED HYPOTHYROIDISM: Chronic | ICD-10-CM

## 2021-10-12 DIAGNOSIS — K21.9 GASTROESOPHAGEAL REFLUX DISEASE, UNSPECIFIED WHETHER ESOPHAGITIS PRESENT: ICD-10-CM

## 2021-10-12 DIAGNOSIS — Z11.59 ENCOUNTER FOR HEPATITIS C SCREENING TEST FOR LOW RISK PATIENT: ICD-10-CM

## 2021-10-12 DIAGNOSIS — K59.00 CONSTIPATION, UNSPECIFIED CONSTIPATION TYPE: ICD-10-CM

## 2021-10-12 DIAGNOSIS — R53.83 FATIGUE, UNSPECIFIED TYPE: Primary | ICD-10-CM

## 2021-10-12 LAB
TSH SERPL DL<=0.005 MIU/L-ACNC: 3.34 UIU/ML (ref 0.4–4)
TSH SERPL DL<=0.005 MIU/L-ACNC: 3.34 UIU/ML (ref 0.4–4)

## 2021-10-12 PROCEDURE — 99214 PR OFFICE/OUTPT VISIT, EST, LEVL IV, 30-39 MIN: ICD-10-PCS | Mod: 95,,, | Performed by: FAMILY MEDICINE

## 2021-10-12 PROCEDURE — 99214 OFFICE O/P EST MOD 30 MIN: CPT | Mod: 95,,, | Performed by: FAMILY MEDICINE

## 2021-10-12 RX ORDER — LEVOTHYROXINE SODIUM 50 UG/1
50 TABLET ORAL
Qty: 90 TABLET | Refills: 0 | Status: SHIPPED | OUTPATIENT
Start: 2021-10-12 | End: 2021-12-27 | Stop reason: SDUPTHER

## 2021-10-12 RX ORDER — PANTOPRAZOLE SODIUM 40 MG/1
40 TABLET, DELAYED RELEASE ORAL DAILY
Qty: 90 TABLET | Refills: 0 | Status: SHIPPED | OUTPATIENT
Start: 2021-10-12 | End: 2021-12-27 | Stop reason: SDUPTHER

## 2021-10-12 RX ORDER — POLYETHYLENE GLYCOL 3350 17 G/17G
17 POWDER, FOR SOLUTION ORAL DAILY
Qty: 850 G | Refills: 3 | Status: SHIPPED | OUTPATIENT
Start: 2021-10-12 | End: 2021-11-09

## 2021-10-15 ENCOUNTER — PATIENT MESSAGE (OUTPATIENT)
Dept: INTERNAL MEDICINE | Facility: CLINIC | Age: 51
End: 2021-10-15
Payer: MEDICAID

## 2021-10-22 ENCOUNTER — CLINICAL SUPPORT (OUTPATIENT)
Dept: REHABILITATION | Facility: HOSPITAL | Age: 51
End: 2021-10-22
Payer: MEDICAID

## 2021-10-22 ENCOUNTER — DOCUMENTATION ONLY (OUTPATIENT)
Dept: REHABILITATION | Facility: HOSPITAL | Age: 51
End: 2021-10-22

## 2021-10-22 DIAGNOSIS — M54.50 CHRONIC BILATERAL LOW BACK PAIN WITHOUT SCIATICA: ICD-10-CM

## 2021-10-22 DIAGNOSIS — G89.29 CHRONIC BILATERAL LOW BACK PAIN WITHOUT SCIATICA: ICD-10-CM

## 2021-10-22 DIAGNOSIS — M25.552 LEFT HIP PAIN: ICD-10-CM

## 2021-10-22 PROCEDURE — 97110 THERAPEUTIC EXERCISES: CPT | Mod: CQ

## 2021-10-25 ENCOUNTER — CLINICAL SUPPORT (OUTPATIENT)
Dept: REHABILITATION | Facility: HOSPITAL | Age: 51
End: 2021-10-25
Payer: MEDICAID

## 2021-10-25 DIAGNOSIS — M54.50 CHRONIC BILATERAL LOW BACK PAIN WITHOUT SCIATICA: ICD-10-CM

## 2021-10-25 DIAGNOSIS — M25.552 LEFT HIP PAIN: ICD-10-CM

## 2021-10-25 DIAGNOSIS — G89.29 CHRONIC BILATERAL LOW BACK PAIN WITHOUT SCIATICA: ICD-10-CM

## 2021-10-25 PROCEDURE — 97110 THERAPEUTIC EXERCISES: CPT

## 2021-10-28 ENCOUNTER — TELEPHONE (OUTPATIENT)
Dept: INTERNAL MEDICINE | Facility: CLINIC | Age: 51
End: 2021-10-28
Payer: MEDICAID

## 2021-11-01 ENCOUNTER — CLINICAL SUPPORT (OUTPATIENT)
Dept: REHABILITATION | Facility: HOSPITAL | Age: 51
End: 2021-11-01
Payer: MEDICAID

## 2021-11-01 DIAGNOSIS — G89.29 CHRONIC BILATERAL LOW BACK PAIN WITHOUT SCIATICA: ICD-10-CM

## 2021-11-01 DIAGNOSIS — M54.50 CHRONIC BILATERAL LOW BACK PAIN WITHOUT SCIATICA: ICD-10-CM

## 2021-11-01 DIAGNOSIS — M25.552 LEFT HIP PAIN: ICD-10-CM

## 2021-11-01 PROCEDURE — 97110 THERAPEUTIC EXERCISES: CPT

## 2021-11-02 ENCOUNTER — PATIENT MESSAGE (OUTPATIENT)
Dept: INTERNAL MEDICINE | Facility: CLINIC | Age: 51
End: 2021-11-02
Payer: MEDICAID

## 2021-11-02 DIAGNOSIS — E03.9 ACQUIRED HYPOTHYROIDISM: Chronic | ICD-10-CM

## 2021-11-07 ENCOUNTER — PATIENT OUTREACH (OUTPATIENT)
Dept: ADMINISTRATIVE | Facility: OTHER | Age: 51
End: 2021-11-07
Payer: MEDICAID

## 2021-11-09 ENCOUNTER — CLINICAL SUPPORT (OUTPATIENT)
Dept: REHABILITATION | Facility: HOSPITAL | Age: 51
End: 2021-11-09
Payer: MEDICAID

## 2021-11-09 ENCOUNTER — OFFICE VISIT (OUTPATIENT)
Dept: PAIN MEDICINE | Facility: CLINIC | Age: 51
End: 2021-11-09
Payer: MEDICAID

## 2021-11-09 VITALS
SYSTOLIC BLOOD PRESSURE: 106 MMHG | HEART RATE: 73 BPM | BODY MASS INDEX: 21.87 KG/M2 | DIASTOLIC BLOOD PRESSURE: 73 MMHG | WEIGHT: 127.44 LBS

## 2021-11-09 DIAGNOSIS — M46.1 SACROILIITIS: ICD-10-CM

## 2021-11-09 DIAGNOSIS — M54.50 CHRONIC BILATERAL LOW BACK PAIN WITHOUT SCIATICA: ICD-10-CM

## 2021-11-09 DIAGNOSIS — G89.29 CHRONIC BILATERAL LOW BACK PAIN WITHOUT SCIATICA: ICD-10-CM

## 2021-11-09 DIAGNOSIS — M25.812 SHOULDER IMPINGEMENT, LEFT: Primary | ICD-10-CM

## 2021-11-09 DIAGNOSIS — M25.552 LEFT HIP PAIN: ICD-10-CM

## 2021-11-09 PROCEDURE — 99214 PR OFFICE/OUTPT VISIT, EST, LEVL IV, 30-39 MIN: ICD-10-PCS | Mod: 25,S$PBB,, | Performed by: PHYSICAL MEDICINE & REHABILITATION

## 2021-11-09 PROCEDURE — 20610 DRAIN/INJ JOINT/BURSA W/O US: CPT | Mod: S$PBB,LT,, | Performed by: PHYSICAL MEDICINE & REHABILITATION

## 2021-11-09 PROCEDURE — 99214 OFFICE O/P EST MOD 30 MIN: CPT | Mod: 25,S$PBB,, | Performed by: PHYSICAL MEDICINE & REHABILITATION

## 2021-11-09 PROCEDURE — 99999 PR PBB SHADOW E&M-EST. PATIENT-LVL III: ICD-10-PCS | Mod: PBBFAC,,, | Performed by: PHYSICAL MEDICINE & REHABILITATION

## 2021-11-09 PROCEDURE — 20610 DRAIN/INJ JOINT/BURSA W/O US: CPT | Mod: PBBFAC | Performed by: PHYSICAL MEDICINE & REHABILITATION

## 2021-11-09 PROCEDURE — 99213 OFFICE O/P EST LOW 20 MIN: CPT | Mod: PBBFAC | Performed by: PHYSICAL MEDICINE & REHABILITATION

## 2021-11-09 PROCEDURE — 99999 PR PBB SHADOW E&M-EST. PATIENT-LVL III: CPT | Mod: PBBFAC,,, | Performed by: PHYSICAL MEDICINE & REHABILITATION

## 2021-11-09 PROCEDURE — 20610 PR DRAIN/INJECT LARGE JOINT/BURSA: ICD-10-PCS | Mod: S$PBB,LT,, | Performed by: PHYSICAL MEDICINE & REHABILITATION

## 2021-11-09 PROCEDURE — 97110 THERAPEUTIC EXERCISES: CPT

## 2021-11-09 RX ORDER — METHYLPREDNISOLONE ACETATE 40 MG/ML
40 INJECTION, SUSPENSION INTRA-ARTICULAR; INTRALESIONAL; INTRAMUSCULAR; SOFT TISSUE
Status: COMPLETED | OUTPATIENT
Start: 2021-11-09 | End: 2021-11-09

## 2021-11-09 RX ORDER — MELOXICAM 7.5 MG/1
7.5 TABLET ORAL 2 TIMES DAILY WITH MEALS
Qty: 60 TABLET | Refills: 1 | Status: SHIPPED | OUTPATIENT
Start: 2021-11-09 | End: 2022-01-08

## 2021-11-09 RX ADMIN — METHYLPREDNISOLONE ACETATE 40 MG: 40 INJECTION, SUSPENSION INTRA-ARTICULAR; INTRALESIONAL; INTRAMUSCULAR; SOFT TISSUE at 10:11

## 2021-11-15 ENCOUNTER — PATIENT MESSAGE (OUTPATIENT)
Dept: PAIN MEDICINE | Facility: CLINIC | Age: 51
End: 2021-11-15
Payer: MEDICAID

## 2021-11-15 RX ORDER — GABAPENTIN 300 MG/1
600 CAPSULE ORAL 3 TIMES DAILY
Qty: 180 CAPSULE | Refills: 1 | Status: SHIPPED | OUTPATIENT
Start: 2021-11-15 | End: 2022-02-23 | Stop reason: SDUPTHER

## 2021-11-16 ENCOUNTER — PATIENT MESSAGE (OUTPATIENT)
Dept: RHEUMATOLOGY | Facility: CLINIC | Age: 51
End: 2021-11-16
Payer: MEDICAID

## 2021-11-23 ENCOUNTER — CLINICAL SUPPORT (OUTPATIENT)
Dept: REHABILITATION | Facility: HOSPITAL | Age: 51
End: 2021-11-23
Payer: MEDICAID

## 2021-11-23 DIAGNOSIS — M25.552 LEFT HIP PAIN: ICD-10-CM

## 2021-11-23 DIAGNOSIS — M54.50 CHRONIC BILATERAL LOW BACK PAIN WITHOUT SCIATICA: ICD-10-CM

## 2021-11-23 DIAGNOSIS — G89.29 CHRONIC BILATERAL LOW BACK PAIN WITHOUT SCIATICA: ICD-10-CM

## 2021-11-23 PROCEDURE — 97110 THERAPEUTIC EXERCISES: CPT

## 2021-11-24 ENCOUNTER — TELEPHONE (OUTPATIENT)
Dept: INTERNAL MEDICINE | Facility: CLINIC | Age: 51
End: 2021-11-24
Payer: MEDICAID

## 2021-12-15 ENCOUNTER — TELEPHONE (OUTPATIENT)
Dept: REHABILITATION | Facility: HOSPITAL | Age: 51
End: 2021-12-15
Payer: MEDICAID

## 2021-12-16 ENCOUNTER — DOCUMENTATION ONLY (OUTPATIENT)
Dept: REHABILITATION | Facility: HOSPITAL | Age: 51
End: 2021-12-16
Payer: MEDICAID

## 2021-12-16 ENCOUNTER — TELEPHONE (OUTPATIENT)
Dept: REHABILITATION | Facility: HOSPITAL | Age: 51
End: 2021-12-16
Payer: MEDICAID

## 2021-12-16 PROBLEM — G89.29 CHRONIC BILATERAL LOW BACK PAIN WITHOUT SCIATICA: Status: RESOLVED | Noted: 2021-09-13 | Resolved: 2021-12-16

## 2021-12-16 PROBLEM — M25.552 LEFT HIP PAIN: Status: RESOLVED | Noted: 2021-09-13 | Resolved: 2021-12-16

## 2021-12-16 PROBLEM — M54.50 CHRONIC BILATERAL LOW BACK PAIN WITHOUT SCIATICA: Status: RESOLVED | Noted: 2021-09-13 | Resolved: 2021-12-16

## 2021-12-20 ENCOUNTER — LAB VISIT (OUTPATIENT)
Dept: LAB | Facility: HOSPITAL | Age: 51
End: 2021-12-20
Attending: FAMILY MEDICINE
Payer: MEDICAID

## 2021-12-20 DIAGNOSIS — R53.83 FATIGUE, UNSPECIFIED TYPE: ICD-10-CM

## 2021-12-20 DIAGNOSIS — Z11.59 ENCOUNTER FOR HEPATITIS C SCREENING TEST FOR LOW RISK PATIENT: ICD-10-CM

## 2021-12-20 DIAGNOSIS — E03.9 ACQUIRED HYPOTHYROIDISM: Chronic | ICD-10-CM

## 2021-12-20 LAB
ALBUMIN SERPL BCP-MCNC: 4.6 G/DL (ref 3.5–5.2)
ALP SERPL-CCNC: 35 U/L (ref 55–135)
ALT SERPL W/O P-5'-P-CCNC: 17 U/L (ref 10–44)
ANION GAP SERPL CALC-SCNC: 7 MMOL/L (ref 8–16)
AST SERPL-CCNC: 20 U/L (ref 10–40)
BILIRUB SERPL-MCNC: 1.4 MG/DL (ref 0.1–1)
BUN SERPL-MCNC: 20 MG/DL (ref 6–20)
CALCIUM SERPL-MCNC: 10 MG/DL (ref 8.7–10.5)
CHLORIDE SERPL-SCNC: 104 MMOL/L (ref 95–110)
CO2 SERPL-SCNC: 30 MMOL/L (ref 23–29)
CREAT SERPL-MCNC: 0.9 MG/DL (ref 0.5–1.4)
ERYTHROCYTE [DISTWIDTH] IN BLOOD BY AUTOMATED COUNT: 12.3 % (ref 11.5–14.5)
EST. GFR  (AFRICAN AMERICAN): >60 ML/MIN/1.73 M^2
EST. GFR  (NON AFRICAN AMERICAN): >60 ML/MIN/1.73 M^2
GLUCOSE SERPL-MCNC: 87 MG/DL (ref 70–110)
HCT VFR BLD AUTO: 46 % (ref 37–48.5)
HGB BLD-MCNC: 14.8 G/DL (ref 12–16)
MCH RBC QN AUTO: 30.2 PG (ref 27–31)
MCHC RBC AUTO-ENTMCNC: 32.2 G/DL (ref 32–36)
MCV RBC AUTO: 94 FL (ref 82–98)
PLATELET # BLD AUTO: 223 K/UL (ref 150–450)
PMV BLD AUTO: 12.4 FL (ref 9.2–12.9)
POTASSIUM SERPL-SCNC: 4.4 MMOL/L (ref 3.5–5.1)
PROT SERPL-MCNC: 6.9 G/DL (ref 6–8.4)
RBC # BLD AUTO: 4.9 M/UL (ref 4–5.4)
SODIUM SERPL-SCNC: 141 MMOL/L (ref 136–145)
TSH SERPL DL<=0.005 MIU/L-ACNC: 2.12 UIU/ML (ref 0.4–4)
WBC # BLD AUTO: 5.29 K/UL (ref 3.9–12.7)

## 2021-12-20 PROCEDURE — 86803 HEPATITIS C AB TEST: CPT | Performed by: FAMILY MEDICINE

## 2021-12-20 PROCEDURE — 85027 COMPLETE CBC AUTOMATED: CPT | Performed by: FAMILY MEDICINE

## 2021-12-20 PROCEDURE — 80053 COMPREHEN METABOLIC PANEL: CPT | Performed by: FAMILY MEDICINE

## 2021-12-20 PROCEDURE — 36415 COLL VENOUS BLD VENIPUNCTURE: CPT | Performed by: FAMILY MEDICINE

## 2021-12-20 PROCEDURE — 84443 ASSAY THYROID STIM HORMONE: CPT | Performed by: FAMILY MEDICINE

## 2021-12-21 LAB — HCV AB SERPL QL IA: NEGATIVE

## 2021-12-27 ENCOUNTER — TELEPHONE (OUTPATIENT)
Dept: PAIN MEDICINE | Facility: CLINIC | Age: 51
End: 2021-12-27
Payer: MEDICAID

## 2021-12-27 ENCOUNTER — OFFICE VISIT (OUTPATIENT)
Dept: INTERNAL MEDICINE | Facility: CLINIC | Age: 51
End: 2021-12-27
Payer: MEDICAID

## 2021-12-27 VITALS
TEMPERATURE: 98 F | WEIGHT: 127.44 LBS | OXYGEN SATURATION: 99 % | HEART RATE: 76 BPM | BODY MASS INDEX: 21.87 KG/M2 | DIASTOLIC BLOOD PRESSURE: 66 MMHG | SYSTOLIC BLOOD PRESSURE: 98 MMHG

## 2021-12-27 DIAGNOSIS — K21.9 GASTROESOPHAGEAL REFLUX DISEASE WITHOUT ESOPHAGITIS: ICD-10-CM

## 2021-12-27 DIAGNOSIS — E03.9 ACQUIRED HYPOTHYROIDISM: Chronic | ICD-10-CM

## 2021-12-27 DIAGNOSIS — G40.209 PARTIAL SYMPTOMATIC EPILEPSY WITH COMPLEX PARTIAL SEIZURES, NOT INTRACTABLE, WITHOUT STATUS EPILEPTICUS: Chronic | ICD-10-CM

## 2021-12-27 DIAGNOSIS — Z00.00 PREVENTATIVE HEALTH CARE: Primary | ICD-10-CM

## 2021-12-27 DIAGNOSIS — R49.0 HOARSE VOICE QUALITY: ICD-10-CM

## 2021-12-27 DIAGNOSIS — R00.2 PALPITATIONS: ICD-10-CM

## 2021-12-27 DIAGNOSIS — R03.0 ELEVATED BLOOD PRESSURE READING WITHOUT DIAGNOSIS OF HYPERTENSION: ICD-10-CM

## 2021-12-27 DIAGNOSIS — F41.9 INSOMNIA SECONDARY TO ANXIETY: ICD-10-CM

## 2021-12-27 DIAGNOSIS — R23.2 FLUSHING: ICD-10-CM

## 2021-12-27 DIAGNOSIS — R17 ELEVATED BILIRUBIN: ICD-10-CM

## 2021-12-27 DIAGNOSIS — Z12.31 BREAST CANCER SCREENING BY MAMMOGRAM: ICD-10-CM

## 2021-12-27 DIAGNOSIS — F51.05 INSOMNIA SECONDARY TO ANXIETY: ICD-10-CM

## 2021-12-27 DIAGNOSIS — K21.9 GASTROESOPHAGEAL REFLUX DISEASE, UNSPECIFIED WHETHER ESOPHAGITIS PRESENT: ICD-10-CM

## 2021-12-27 PROCEDURE — 3008F PR BODY MASS INDEX (BMI) DOCUMENTED: ICD-10-PCS | Mod: CPTII,,, | Performed by: FAMILY MEDICINE

## 2021-12-27 PROCEDURE — 3008F BODY MASS INDEX DOCD: CPT | Mod: CPTII,,, | Performed by: FAMILY MEDICINE

## 2021-12-27 PROCEDURE — 3074F PR MOST RECENT SYSTOLIC BLOOD PRESSURE < 130 MM HG: ICD-10-PCS | Mod: CPTII,,, | Performed by: FAMILY MEDICINE

## 2021-12-27 PROCEDURE — 99999 PR PBB SHADOW E&M-EST. PATIENT-LVL V: CPT | Mod: PBBFAC,,, | Performed by: FAMILY MEDICINE

## 2021-12-27 PROCEDURE — 1159F MED LIST DOCD IN RCRD: CPT | Mod: CPTII,,, | Performed by: FAMILY MEDICINE

## 2021-12-27 PROCEDURE — 99396 PREV VISIT EST AGE 40-64: CPT | Mod: S$PBB,,, | Performed by: FAMILY MEDICINE

## 2021-12-27 PROCEDURE — 3078F PR MOST RECENT DIASTOLIC BLOOD PRESSURE < 80 MM HG: ICD-10-PCS | Mod: CPTII,,, | Performed by: FAMILY MEDICINE

## 2021-12-27 PROCEDURE — 1160F RVW MEDS BY RX/DR IN RCRD: CPT | Mod: CPTII,,, | Performed by: FAMILY MEDICINE

## 2021-12-27 PROCEDURE — 3078F DIAST BP <80 MM HG: CPT | Mod: CPTII,,, | Performed by: FAMILY MEDICINE

## 2021-12-27 PROCEDURE — 99999 PR PBB SHADOW E&M-EST. PATIENT-LVL V: ICD-10-PCS | Mod: PBBFAC,,, | Performed by: FAMILY MEDICINE

## 2021-12-27 PROCEDURE — 99215 OFFICE O/P EST HI 40 MIN: CPT | Mod: PBBFAC | Performed by: FAMILY MEDICINE

## 2021-12-27 PROCEDURE — 3074F SYST BP LT 130 MM HG: CPT | Mod: CPTII,,, | Performed by: FAMILY MEDICINE

## 2021-12-27 PROCEDURE — 99396 PR PREVENTIVE VISIT,EST,40-64: ICD-10-PCS | Mod: S$PBB,,, | Performed by: FAMILY MEDICINE

## 2021-12-27 PROCEDURE — 1159F PR MEDICATION LIST DOCUMENTED IN MEDICAL RECORD: ICD-10-PCS | Mod: CPTII,,, | Performed by: FAMILY MEDICINE

## 2021-12-27 PROCEDURE — 1160F PR REVIEW ALL MEDS BY PRESCRIBER/CLIN PHARMACIST DOCUMENTED: ICD-10-PCS | Mod: CPTII,,, | Performed by: FAMILY MEDICINE

## 2021-12-27 PROCEDURE — 90471 IMMUNIZATION ADMIN: CPT | Mod: PBBFAC

## 2021-12-27 RX ORDER — TRAZODONE HYDROCHLORIDE 50 MG/1
50 TABLET ORAL NIGHTLY
Qty: 90 TABLET | Refills: 3 | Status: SHIPPED | OUTPATIENT
Start: 2021-12-27 | End: 2023-01-09

## 2021-12-27 RX ORDER — PANTOPRAZOLE SODIUM 40 MG/1
40 TABLET, DELAYED RELEASE ORAL DAILY
Qty: 90 TABLET | Refills: 3 | Status: SHIPPED | OUTPATIENT
Start: 2021-12-27 | End: 2022-01-10

## 2021-12-27 RX ORDER — LAMOTRIGINE 100 MG/1
100 TABLET ORAL 2 TIMES DAILY
Qty: 180 TABLET | Refills: 1 | Status: SHIPPED | OUTPATIENT
Start: 2021-12-27 | End: 2022-06-25

## 2021-12-27 RX ORDER — LEVOTHYROXINE SODIUM 50 UG/1
50 TABLET ORAL
Qty: 90 TABLET | Refills: 0 | Status: CANCELLED | OUTPATIENT
Start: 2021-12-27 | End: 2022-12-27

## 2021-12-27 RX ORDER — LEVOTHYROXINE SODIUM 50 UG/1
50 TABLET ORAL
Qty: 90 TABLET | Refills: 3 | Status: SHIPPED | OUTPATIENT
Start: 2021-12-27 | End: 2022-01-13 | Stop reason: SDUPTHER

## 2021-12-29 ENCOUNTER — LAB VISIT (OUTPATIENT)
Dept: LAB | Facility: HOSPITAL | Age: 51
End: 2021-12-29
Payer: MEDICAID

## 2021-12-29 DIAGNOSIS — R03.0 ELEVATED BLOOD PRESSURE READING WITHOUT DIAGNOSIS OF HYPERTENSION: ICD-10-CM

## 2021-12-29 DIAGNOSIS — R00.2 PALPITATIONS: ICD-10-CM

## 2021-12-29 DIAGNOSIS — R23.2 FLUSHING: ICD-10-CM

## 2021-12-29 PROCEDURE — 83835 ASSAY OF METANEPHRINES: CPT | Performed by: FAMILY MEDICINE

## 2022-01-04 ENCOUNTER — PATIENT OUTREACH (OUTPATIENT)
Dept: ADMINISTRATIVE | Facility: OTHER | Age: 52
End: 2022-01-04
Payer: MEDICAID

## 2022-01-04 ENCOUNTER — TELEPHONE (OUTPATIENT)
Dept: PAIN MEDICINE | Facility: CLINIC | Age: 52
End: 2022-01-04
Payer: MEDICAID

## 2022-01-04 NOTE — TELEPHONE ENCOUNTER
Call patient to confirm an appointment with Molly Daniel on 01/05/2022 at [place]. Left a message.

## 2022-01-04 NOTE — PROGRESS NOTES
Health Maintenance Due   Topic Date Due    Colorectal Cancer Screening  Never done    COVID-19 Vaccine (3 - Booster for Pfizer series) 10/09/2021    Mammogram  01/04/2022     Updates were requested from care everywhere.  Chart was reviewed for overdue [facility] Encounters ([facility]) topics (CRS, Breast Cancer Screening, Eye exam)  Health Maintenance has been updated.  LINKS immunization registry triggered.  Immunizations were reconciled.

## 2022-01-05 ENCOUNTER — PATIENT MESSAGE (OUTPATIENT)
Dept: INTERNAL MEDICINE | Facility: CLINIC | Age: 52
End: 2022-01-05
Payer: MEDICAID

## 2022-01-05 LAB
ANNOTATION COMMENT IMP: NORMAL
COLLECT DURATION TIME UR: 24 H
METANEPH 24H UR-MRATE: 67 MCG/24 H
METANEPHS 24H UR-MRATE: 175 MCG/24 H
NORMETANEPHRINE 24H UR-MRATE: 108 MCG/24 H
SPECIMEN VOL 24H UR: 800 ML

## 2022-01-10 ENCOUNTER — OFFICE VISIT (OUTPATIENT)
Dept: OTOLARYNGOLOGY | Facility: CLINIC | Age: 52
End: 2022-01-10
Payer: MEDICAID

## 2022-01-10 VITALS — HEIGHT: 64 IN | WEIGHT: 128.31 LBS | BODY MASS INDEX: 21.91 KG/M2

## 2022-01-10 DIAGNOSIS — R49.0 HOARSE VOICE QUALITY: ICD-10-CM

## 2022-01-10 DIAGNOSIS — K21.9 LARYNGOPHARYNGEAL REFLUX (LPR): Primary | ICD-10-CM

## 2022-01-10 PROCEDURE — 1159F PR MEDICATION LIST DOCUMENTED IN MEDICAL RECORD: ICD-10-PCS | Mod: CPTII,,, | Performed by: OTOLARYNGOLOGY

## 2022-01-10 PROCEDURE — 99204 OFFICE O/P NEW MOD 45 MIN: CPT | Mod: 25,S$PBB,, | Performed by: OTOLARYNGOLOGY

## 2022-01-10 PROCEDURE — 99213 OFFICE O/P EST LOW 20 MIN: CPT | Mod: PBBFAC,25 | Performed by: OTOLARYNGOLOGY

## 2022-01-10 PROCEDURE — 31575 PR LARYNGOSCOPY, FLEXIBLE; DIAGNOSTIC: ICD-10-PCS | Mod: S$PBB,,, | Performed by: OTOLARYNGOLOGY

## 2022-01-10 PROCEDURE — 99999 PR PBB SHADOW E&M-EST. PATIENT-LVL III: ICD-10-PCS | Mod: PBBFAC,,, | Performed by: OTOLARYNGOLOGY

## 2022-01-10 PROCEDURE — 1159F MED LIST DOCD IN RCRD: CPT | Mod: CPTII,,, | Performed by: OTOLARYNGOLOGY

## 2022-01-10 PROCEDURE — 99204 PR OFFICE/OUTPT VISIT, NEW, LEVL IV, 45-59 MIN: ICD-10-PCS | Mod: 25,S$PBB,, | Performed by: OTOLARYNGOLOGY

## 2022-01-10 PROCEDURE — 1160F RVW MEDS BY RX/DR IN RCRD: CPT | Mod: CPTII,,, | Performed by: OTOLARYNGOLOGY

## 2022-01-10 PROCEDURE — 3008F BODY MASS INDEX DOCD: CPT | Mod: CPTII,,, | Performed by: OTOLARYNGOLOGY

## 2022-01-10 PROCEDURE — 1160F PR REVIEW ALL MEDS BY PRESCRIBER/CLIN PHARMACIST DOCUMENTED: ICD-10-PCS | Mod: CPTII,,, | Performed by: OTOLARYNGOLOGY

## 2022-01-10 PROCEDURE — 3008F PR BODY MASS INDEX (BMI) DOCUMENTED: ICD-10-PCS | Mod: CPTII,,, | Performed by: OTOLARYNGOLOGY

## 2022-01-10 PROCEDURE — 31575 DIAGNOSTIC LARYNGOSCOPY: CPT | Mod: S$PBB,,, | Performed by: OTOLARYNGOLOGY

## 2022-01-10 PROCEDURE — 99999 PR PBB SHADOW E&M-EST. PATIENT-LVL III: CPT | Mod: PBBFAC,,, | Performed by: OTOLARYNGOLOGY

## 2022-01-10 PROCEDURE — 31575 DIAGNOSTIC LARYNGOSCOPY: CPT | Mod: PBBFAC | Performed by: OTOLARYNGOLOGY

## 2022-01-10 RX ORDER — PANTOPRAZOLE SODIUM 40 MG/1
40 TABLET, DELAYED RELEASE ORAL 2 TIMES DAILY
Qty: 60 TABLET | Refills: 3 | Status: SHIPPED | OUTPATIENT
Start: 2022-01-10 | End: 2023-04-04

## 2022-01-10 NOTE — PROGRESS NOTES
"Referring Provider:    MICHAEL Antony Md  [id number] [facility]  [place]  [place] [address]  Subjective:   Patient: Laurel Calvo [id number], :1970   Visit date:1/10/2022 9:54 AM    Chief Complaint:  Hoarse (Started about a yr and a half ago. Pt states some days are worse than others. Pt notes there is no pain but sometimes the throat can feel dry and raw)    HPI:    Prior notes reviewed by myself.  Clinical documentation obtained by nursing staff reviewed.     51-year-old female presents with complaints intermittent hoarseness.  She 1st noticed this about 18 months ago but over the last 4-5 months it has become more consistent.  She does have a diagnosis of GERD and takes Protonix 40 mg once daily.  She does not have any recent history vocal abuse, odynophagia, dysphagia, postnasal drip, rhinorrhea, sinus infections.  She denies smoking.  She does admit that she has been having some breakthrough GERD symptoms and has a referral pending for GI.  She has Lupus.      Objective:     Physical Exam:  Vitals:  Ht 5' 4" (1.626 m)   Wt 58.2 kg (128 lb 4.9 oz)   LMP 2013   BMI 22.02 kg/m²   General appearance:  Well developed, well nourished    Ears:  Otoscopy of external auditory canals and tympanic membranes was normal, clinical speech reception thresholds grossly intact, no mass/lesion of auricle.    Nose:  No masses/lesions of external nose, nasal mucosa, septum, and turbinates were within normal limits.    Mouth:  No mass/lesion of lips, teeth, gums, hard/soft palate, tongue, tonsils, or oropharynx.    Neck & Lymphatics:  No cervical lymphadenopathy, no neck mass/crepitus/ asymmetry, trachea is midline, no thyroid enlargement/tenderness/mass.        []  Data Reviewed:    Lab Results   Component Value Date    WBC 5.29 2021    HGB 14.8 2021    HCT 46.0 2021    MCV 94 2021    EOSINOPHIL 0.4 2020       Due to indication in patient's history, presentation or risk factors,  a fiber " optic exam was performed.    SEPARATE PROCEDURE NOTE:    ANESTHESIA:  Topical xylocaine with rashad-synephrine  FINDINGS:  Moderate to severe inaterarytenoid erythema and edema    PROCEDURE:  After verbal consent was obtained, the flexible scope was passed through the patient's nasal cavity without difficulty.  The nasopharynx (adenoid pad) and eustachian tube orifices were first visualized and were found to be normal, without masses or irregularity.  The posterior pharyngeal wall and base of tongue were then examined and no mass or irregular tissue was seen.  The scope was then advanced to the larynx, and the epiglottis, valleculae, and piriform sinuses were normal, without masses or mucosal irregularity.  The false vocal folds and true vocal folds were then examined and were found to have normal mobility (full abduction and adduction) and no masses or mucosal irregularity was seen.  The interartyenoid area had moderate to severe edema and erythema consistent with refulx.        Assessment & Plan:   Laryngopharyngeal reflux (LPR)  -     pantoprazole (PROTONIX) 40 MG tablet; Take 1 tablet (40 mg total) by mouth 2 (two) times daily.  Dispense: 60 tablet; Refill: 3    Hoarse voice quality  -     Ambulatory referral/consult to ENT        We reviewed her flexible laryngoscopy findings which were consistent with poorly controlled reflux.  I recommended that we increase her Protonix to b.i.d. and that she keep her appointment with GI.  She will return to clinic in 12 weeks for re-evaluation.  We discussed the possibility of a speech pathology evaluation if she does not adequately improved.

## 2022-01-11 ENCOUNTER — PATIENT MESSAGE (OUTPATIENT)
Dept: INTERNAL MEDICINE | Facility: CLINIC | Age: 52
End: 2022-01-11
Payer: MEDICAID

## 2022-01-11 ENCOUNTER — TELEPHONE (OUTPATIENT)
Dept: GASTROENTEROLOGY | Facility: CLINIC | Age: 52
End: 2022-01-11
Payer: MEDICAID

## 2022-01-11 NOTE — PROGRESS NOTES
"Hi, Laurel.    I'm happy to report that these test results are NORMAL.    Thanks for letting me care for you, thanks for trusting us with your healthcare needs, and thanks for using [facility].    All the best,    MICHAEL Antony MD  P.S. Want to learn more about your test results and what they mean? It's as simple as 1, 2, 3.     (1) Log in to your [facility] account at [email or web address]     (2) From the "Test Results" tab, click on the test you want to know more about.     (3) Click on the "About This Test" link. "

## 2022-01-11 NOTE — TELEPHONE ENCOUNTER
Please contact Laurel and schedule Laurel an appointment with me at their earliest convenience. I'll properly evaluate this issue and get them the care they need.  The appointment must be an in-office visit (not a virtual visit).  Thanks!

## 2022-01-11 NOTE — TELEPHONE ENCOUNTER
----- Message from Randall Tavarez LPN sent at 1/10/2022  2:56 PM CST -----  Contact: [phone]    ----- Message -----  From: Tereso Hernandez  Sent: 1/10/2022   2:06 PM CST  To: [facility] Clinical Staff    Pt is calling in as a NP she has medicaid and no apts are coming up, please call pt back to see if she can be scheduled, thanks

## 2022-01-12 ENCOUNTER — TELEPHONE (OUTPATIENT)
Dept: GASTROENTEROLOGY | Facility: CLINIC | Age: 52
End: 2022-01-12
Payer: MEDICAID

## 2022-01-12 NOTE — TELEPHONE ENCOUNTER
----- Message from Fernanda Meneses RN sent at 1/12/2022  2:24 PM CST -----  Contact: self  Calling to schedule appt  ----- Message -----  From: Bautista Garcia  Sent: 1/12/2022  10:43 AM CST  To: [facility] Clinical Staff    Type:  Patient Returning Call    Who Called: Laurel Calvo   Who Left Message for Patient: nurse   Does the patient know what this is regarding?:appointment   Would the patient rather a call back or a response via [facility]?  Call back   Best Call Back Number: [phone]  Additional Information:

## 2022-01-13 ENCOUNTER — OFFICE VISIT (OUTPATIENT)
Dept: INTERNAL MEDICINE | Facility: CLINIC | Age: 52
End: 2022-01-13
Payer: MEDICAID

## 2022-01-13 VITALS
HEART RATE: 68 BPM | BODY MASS INDEX: 21.53 KG/M2 | DIASTOLIC BLOOD PRESSURE: 70 MMHG | OXYGEN SATURATION: 99 % | WEIGHT: 126.13 LBS | SYSTOLIC BLOOD PRESSURE: 100 MMHG | HEIGHT: 64 IN | TEMPERATURE: 97 F

## 2022-01-13 DIAGNOSIS — E03.9 ACQUIRED HYPOTHYROIDISM: Chronic | ICD-10-CM

## 2022-01-13 DIAGNOSIS — G40.209 PARTIAL SYMPTOMATIC EPILEPSY WITH COMPLEX PARTIAL SEIZURES, NOT INTRACTABLE, WITHOUT STATUS EPILEPTICUS: Chronic | ICD-10-CM

## 2022-01-13 DIAGNOSIS — K21.9 GASTROESOPHAGEAL REFLUX DISEASE WITHOUT ESOPHAGITIS: ICD-10-CM

## 2022-01-13 DIAGNOSIS — G90.3 NEUROGENIC ORTHOSTATIC HYPOTENSION: Primary | ICD-10-CM

## 2022-01-13 DIAGNOSIS — G43.109 MIGRAINE WITH AURA AND WITHOUT STATUS MIGRAINOSUS, NOT INTRACTABLE: Chronic | ICD-10-CM

## 2022-01-13 PROCEDURE — 3074F PR MOST RECENT SYSTOLIC BLOOD PRESSURE < 130 MM HG: ICD-10-PCS | Mod: CPTII,,, | Performed by: FAMILY MEDICINE

## 2022-01-13 PROCEDURE — 3078F PR MOST RECENT DIASTOLIC BLOOD PRESSURE < 80 MM HG: ICD-10-PCS | Mod: CPTII,,, | Performed by: FAMILY MEDICINE

## 2022-01-13 PROCEDURE — 99999 PR PBB SHADOW E&M-EST. PATIENT-LVL IV: ICD-10-PCS | Mod: PBBFAC,,, | Performed by: FAMILY MEDICINE

## 2022-01-13 PROCEDURE — 1160F RVW MEDS BY RX/DR IN RCRD: CPT | Mod: CPTII,,, | Performed by: FAMILY MEDICINE

## 2022-01-13 PROCEDURE — 3078F DIAST BP <80 MM HG: CPT | Mod: CPTII,,, | Performed by: FAMILY MEDICINE

## 2022-01-13 PROCEDURE — 3008F BODY MASS INDEX DOCD: CPT | Mod: CPTII,,, | Performed by: FAMILY MEDICINE

## 2022-01-13 PROCEDURE — 3074F SYST BP LT 130 MM HG: CPT | Mod: CPTII,,, | Performed by: FAMILY MEDICINE

## 2022-01-13 PROCEDURE — 99214 OFFICE O/P EST MOD 30 MIN: CPT | Mod: S$PBB,,, | Performed by: FAMILY MEDICINE

## 2022-01-13 PROCEDURE — 99999 PR PBB SHADOW E&M-EST. PATIENT-LVL IV: CPT | Mod: PBBFAC,,, | Performed by: FAMILY MEDICINE

## 2022-01-13 PROCEDURE — 1159F PR MEDICATION LIST DOCUMENTED IN MEDICAL RECORD: ICD-10-PCS | Mod: CPTII,,, | Performed by: FAMILY MEDICINE

## 2022-01-13 PROCEDURE — 99214 PR OFFICE/OUTPT VISIT, EST, LEVL IV, 30-39 MIN: ICD-10-PCS | Mod: S$PBB,,, | Performed by: FAMILY MEDICINE

## 2022-01-13 PROCEDURE — 99214 OFFICE O/P EST MOD 30 MIN: CPT | Mod: PBBFAC | Performed by: FAMILY MEDICINE

## 2022-01-13 PROCEDURE — 1160F PR REVIEW ALL MEDS BY PRESCRIBER/CLIN PHARMACIST DOCUMENTED: ICD-10-PCS | Mod: CPTII,,, | Performed by: FAMILY MEDICINE

## 2022-01-13 PROCEDURE — 1159F MED LIST DOCD IN RCRD: CPT | Mod: CPTII,,, | Performed by: FAMILY MEDICINE

## 2022-01-13 PROCEDURE — 3008F PR BODY MASS INDEX (BMI) DOCUMENTED: ICD-10-PCS | Mod: CPTII,,, | Performed by: FAMILY MEDICINE

## 2022-01-13 RX ORDER — ROPINIROLE 1 MG/1
TABLET, FILM COATED ORAL
COMMUNITY
Start: 2022-01-03 | End: 2022-04-06

## 2022-01-13 RX ORDER — LEVOTHYROXINE SODIUM 50 UG/1
50 TABLET ORAL
Qty: 90 TABLET | Refills: 3 | Status: SHIPPED | OUTPATIENT
Start: 2022-01-13 | End: 2023-03-14

## 2022-01-13 NOTE — PATIENT INSTRUCTIONS
"RECOMMENDATIONS:  · Avoid large meals.  · Ingest meals low in carbohydrates.  · Avoid alcohol intake.  · Drink water with meals.  · Avoid activities or sudden standing immediately after eating.  · Consume caffeine with meals.  · Take walks in between meals.      I strongly recommend that you get your COVID-19 vaccine booster. I've already had mine.    The booster shot is necessary to be considered "fully vaccinated" against COVID-19. People who are fully vaccinated are much better protected from severe illness from COVID-19 than people who are unvaccinated or not fully vaccinated.    If your primary COVID-19 vaccine series was Pfizer, you should get a Pfizer booster.    You can learn more about the COVID-19 vaccine and booster shot options at [email or web address].    The quickest and easiest way to schedule an appointment for your COVID-19 booster vaccination is from the [employer] cristino or your [facility] account online at [email or web address]. You can also schedule an appointment for your COVID-19 vaccination by calling [phone].    Please take care of yourself. You are important to me!   "

## 2022-01-13 NOTE — ASSESSMENT & PLAN NOTE
Having breakthrough symptoms on pantoprazole 40 mg BID.  Future Appointments   Date Time Provider Department Center   1/20/2022  9:00 AM Kandis Yanez NP [facility] GASTRO [facility]

## 2022-01-13 NOTE — ASSESSMENT & PLAN NOTE
"She reports post-prandial weakness that correlates with low BP measured at home. No syncope. Alleviated by drinking water. Problem longstanding, but she started checking BP only 1-2 weeks ago, identifying low BP (Examples: BP 86/57, P 71; BP 89/52, P 71). We discussed differential diagnosis and risks and benefits of treatment options.  Additional instructions were reviewed with her. Refer to "Patient Instructions" section of after visit summary.    ORTHOSTATIC VITAL SIGNS TODAY  LYING SUPINE  · Blood Pressure: 100/70  · Pulse: 70  · Symptoms: NONE  SITTING  · Blood Pressure: 102/72  · Pulse: 72   · Symptoms: NONE  STANDING  · Blood Pressure: 110/80   · Pulse: 80   · Symptoms: NONE     "

## 2022-01-13 NOTE — ASSESSMENT & PLAN NOTE
Lab Results   Component Value Date    TSH 2.121 12/20/2021    TSH 3.338 10/11/2021    TSH 3.338 10/11/2021    FREET4 0.87 12/14/2020

## 2022-01-13 NOTE — PROGRESS NOTES
CHIEF COMPLAINT: Hypotension    DIAGNOSES SPECIFICALLY EVALUATED AND TREATED THIS ENCOUNTER  1. Postprandial hypotension    2. Partial symptomatic epilepsy with complex partial seizures, not intractable, without status epilepticus    3. Migraine with aura and without status migrainosus, not intractable    4. Acquired hypothyroidism  - levothyroxine (SYNTHROID) 50 MCG tablet; Take 1 tablet (50 mcg total) by mouth before breakfast.  Dispense: 90 tablet; Refill: 3    5. Gastroesophageal reflux disease without esophagitis     Follow up in 4 months (on 5/4/2022) for periodic management of chronic medical conditions.    Problem List Items Addressed This Visit     Nonintractable epilepsy with partial to complex partial seizures (Chronic)    Overview     She denies history of generalized seizures.         Current Assessment & Plan     Well controlled.         Gastroesophageal reflux disease without esophagitis    Current Assessment & Plan     Having breakthrough symptoms on pantoprazole 40 mg BID.  Future Appointments   Date Time Provider Department Center   1/20/2022  9:00 AM Kandis Yanez NP [facility]STR[facility] [place]             Migraine with aura and without status migrainosus, not intractable (Chronic)    Current Assessment & Plan     Well controlled.         Acquired hypothyroidism (Chronic)    Current Assessment & Plan     Lab Results   Component Value Date    TSH 2.121 12/20/2021    TSH 3.338 10/11/2021    TSH 3.338 10/11/2021    FREET4 0.87 12/14/2020            Relevant Medications    levothyroxine (SYNTHROID) 50 MCG tablet    Postprandial hypotension - Primary    Current Assessment & Plan     She reports post-prandial weakness that correlates with low BP measured at home. No syncope. Alleviated by drinking water. Problem longstanding, but she started checking BP only 1-2 weeks ago, identifying low BP (Examples: BP 86/57, P 71; BP 89/52, P 71). We discussed differential diagnosis and risks and benefits of  "treatment options.  Additional instructions were reviewed with her. Refer to "Patient Instructions" section of after visit summary.    ORTHOSTATIC VITAL SIGNS TODAY  LYING SUPINE  · Blood Pressure: 100/70  · Pulse: 70  · Symptoms: NONE  SITTING  · Blood Pressure: 102/72  · Pulse: 72   · Symptoms: NONE  STANDING  · Blood Pressure: 110/80   · Pulse: 80   · Symptoms: NONE              Unless noted herein, any chronic conditions are represented as and appear compensated/controlled and stable, and no other significant complaints or concerns were reported.    PRESCRIPTION DRUG MANAGEMENT  Outpatient Medications Prior to Visit   Medication Sig Dispense Refill    EPINEPHrine (EPIPEN) 0.3 mg/0.3 mL AtIn Inject 0.3 mg into the muscle daily as needed.      estradiol (ESTRACE) 2 MG tablet TAKE 1 TABLET BY MOUTH ONE TIME DAILY  1    gabapentin (NEURONTIN) 300 MG capsule Take 2 capsules (600 mg total) by mouth 3 (three) times daily. 180 capsule 1    lamoTRIgine (LAMICTAL) 100 MG tablet Take 1 tablet (100 mg total) by mouth 2 (two) times daily. 180 tablet 1    multivitamin (THERAGRAN) per tablet Take 1 tablet by mouth once daily.       pantoprazole (PROTONIX) 40 MG tablet Take 1 tablet (40 mg total) by mouth 2 (two) times daily. 60 tablet 3    testosterone cypionate (DEPOTESTOTERONE CYPIONATE) 200 mg/mL injection Inject into the muscle every 28 days. (Dose? - By Dr[facility] Brennen Mercer at [facility])      traZODone (DESYREL) 50 MG tablet Take 1 tablet (50 mg total) by mouth every evening. 90 tablet 3    vitamin D 1000 units Tab Take 1,000 Units by mouth once daily.      levothyroxine (SYNTHROID) 50 MCG tablet Take 1 tablet (50 mcg total) by mouth before breakfast. 90 tablet 3    rOPINIRole (REQUIP) 1 MG tablet SMARTSI Tablet(s) By Mouth Every Evening PRN       No facility-administered medications prior to visit.     Medications Discontinued During This Encounter   Medication Reason    " "levothyroxine (SYNTHROID) 50 MCG tablet Reorder     Medications Ordered This Encounter   Medications    levothyroxine (SYNTHROID) 50 MCG tablet     Sig: Take 1 tablet (50 mcg total) by mouth before breakfast.     Dispense:  90 tablet     Refill:  3     PHYSICAL EXAM  Vitals:    01/13/22 0708   BP: 100/70   BP Location: Left arm   Patient Position: Sitting   BP Method: Medium (Manual)   Pulse: 68   Temp: 97.4 °F (36.3 °C)   TempSrc: Tympanic   SpO2: 99%   Weight: 57.2 kg (126 lb 1.7 oz)   Height: 5' 4" (1.626 m)   CONSTITUTIONAL: Vital signs noted. No apparent distress. Does not appear acutely ill or septic. Appears adequately hydrated.  PULM: Lungs clear. Breathing unlabored.  HEART: Auscultation reveals regular rate.  DERM: Skin warm and moist with normal turgor.  NEURO: There are no gross focal motor deficits.  PSYCHIATRIC: Alert and conversant. Mood grossly neutral. Judgment and insight grossly intact.     Documentation entered by me for this encounter may have been done in part using speech-recognition technology. Although I have made an effort to ensure accuracy, "sound like" errors may exist and should be interpreted in context.   "

## 2022-01-19 ENCOUNTER — TELEPHONE (OUTPATIENT)
Dept: PAIN MEDICINE | Facility: CLINIC | Age: 52
End: 2022-01-19
Payer: MEDICAID

## 2022-01-19 ENCOUNTER — PATIENT MESSAGE (OUTPATIENT)
Dept: PAIN MEDICINE | Facility: CLINIC | Age: 52
End: 2022-01-19
Payer: MEDICAID

## 2022-01-19 NOTE — TELEPHONE ENCOUNTER
Tried to call. No answer. Left   Alexis Boyd, MA [facility]   [facility]

## 2022-01-19 NOTE — TELEPHONE ENCOUNTER
----- Message from Yahaira Vazquez sent at 1/19/2022 12:28 PM CST -----  Contact: Laurel  Type:  Patient Returning Call    Who Called:Laurel  Who Left Message for Patient: Diya  Does the patient know what this is regarding?: Scheduling an appointment  Would the patient rather a call back or a response via [facility]? Call back  Best Call Back Number: [phone]  Additional Information:

## 2022-01-20 ENCOUNTER — LAB VISIT (OUTPATIENT)
Dept: LAB | Facility: HOSPITAL | Age: 52
End: 2022-01-20
Attending: FAMILY MEDICINE
Payer: MEDICAID

## 2022-01-20 ENCOUNTER — OFFICE VISIT (OUTPATIENT)
Dept: GASTROENTEROLOGY | Facility: CLINIC | Age: 52
End: 2022-01-20
Payer: MEDICAID

## 2022-01-20 VITALS
WEIGHT: 129.44 LBS | DIASTOLIC BLOOD PRESSURE: 70 MMHG | HEIGHT: 64 IN | SYSTOLIC BLOOD PRESSURE: 112 MMHG | BODY MASS INDEX: 22.1 KG/M2

## 2022-01-20 DIAGNOSIS — R17 ELEVATED BILIRUBIN: ICD-10-CM

## 2022-01-20 DIAGNOSIS — R13.14 PHARYNGOESOPHAGEAL DYSPHAGIA: Primary | ICD-10-CM

## 2022-01-20 DIAGNOSIS — K21.9 GASTROESOPHAGEAL REFLUX DISEASE WITHOUT ESOPHAGITIS: ICD-10-CM

## 2022-01-20 DIAGNOSIS — R49.0 HOARSENESS OF VOICE: ICD-10-CM

## 2022-01-20 DIAGNOSIS — Z12.11 COLON CANCER SCREENING: ICD-10-CM

## 2022-01-20 DIAGNOSIS — R09.89 THROAT CLEARING: ICD-10-CM

## 2022-01-20 LAB
ALBUMIN SERPL BCP-MCNC: 4.6 G/DL (ref 3.5–5.2)
ALP SERPL-CCNC: 29 U/L (ref 55–135)
ALT SERPL W/O P-5'-P-CCNC: 19 U/L (ref 10–44)
AST SERPL-CCNC: 21 U/L (ref 10–40)
BILIRUB DIRECT SERPL-MCNC: 0.5 MG/DL (ref 0.1–0.3)
BILIRUB SERPL-MCNC: 1.3 MG/DL (ref 0.1–1)
PROT SERPL-MCNC: 6.8 G/DL (ref 6–8.4)

## 2022-01-20 PROCEDURE — 99204 OFFICE O/P NEW MOD 45 MIN: CPT | Mod: S$PBB,,, | Performed by: NURSE PRACTITIONER

## 2022-01-20 PROCEDURE — 1159F MED LIST DOCD IN RCRD: CPT | Mod: CPTII,,, | Performed by: NURSE PRACTITIONER

## 2022-01-20 PROCEDURE — 3078F PR MOST RECENT DIASTOLIC BLOOD PRESSURE < 80 MM HG: ICD-10-PCS | Mod: CPTII,,, | Performed by: NURSE PRACTITIONER

## 2022-01-20 PROCEDURE — 3008F PR BODY MASS INDEX (BMI) DOCUMENTED: ICD-10-PCS | Mod: CPTII,,, | Performed by: NURSE PRACTITIONER

## 2022-01-20 PROCEDURE — 1160F PR REVIEW ALL MEDS BY PRESCRIBER/CLIN PHARMACIST DOCUMENTED: ICD-10-PCS | Mod: CPTII,,, | Performed by: NURSE PRACTITIONER

## 2022-01-20 PROCEDURE — 99204 PR OFFICE/OUTPT VISIT, NEW, LEVL IV, 45-59 MIN: ICD-10-PCS | Mod: S$PBB,,, | Performed by: NURSE PRACTITIONER

## 2022-01-20 PROCEDURE — 3008F BODY MASS INDEX DOCD: CPT | Mod: CPTII,,, | Performed by: NURSE PRACTITIONER

## 2022-01-20 PROCEDURE — 3078F DIAST BP <80 MM HG: CPT | Mod: CPTII,,, | Performed by: NURSE PRACTITIONER

## 2022-01-20 PROCEDURE — 99999 PR PBB SHADOW E&M-EST. PATIENT-LVL IV: CPT | Mod: PBBFAC,,, | Performed by: NURSE PRACTITIONER

## 2022-01-20 PROCEDURE — 80076 HEPATIC FUNCTION PANEL: CPT | Performed by: NURSE PRACTITIONER

## 2022-01-20 PROCEDURE — 99999 PR PBB SHADOW E&M-EST. PATIENT-LVL IV: ICD-10-PCS | Mod: PBBFAC,,, | Performed by: NURSE PRACTITIONER

## 2022-01-20 PROCEDURE — 1159F PR MEDICATION LIST DOCUMENTED IN MEDICAL RECORD: ICD-10-PCS | Mod: CPTII,,, | Performed by: NURSE PRACTITIONER

## 2022-01-20 PROCEDURE — 36415 COLL VENOUS BLD VENIPUNCTURE: CPT | Performed by: NURSE PRACTITIONER

## 2022-01-20 PROCEDURE — 3074F SYST BP LT 130 MM HG: CPT | Mod: CPTII,,, | Performed by: NURSE PRACTITIONER

## 2022-01-20 PROCEDURE — 99214 OFFICE O/P EST MOD 30 MIN: CPT | Mod: PBBFAC | Performed by: NURSE PRACTITIONER

## 2022-01-20 PROCEDURE — 1160F RVW MEDS BY RX/DR IN RCRD: CPT | Mod: CPTII,,, | Performed by: NURSE PRACTITIONER

## 2022-01-20 PROCEDURE — 3074F PR MOST RECENT SYSTOLIC BLOOD PRESSURE < 130 MM HG: ICD-10-PCS | Mod: CPTII,,, | Performed by: NURSE PRACTITIONER

## 2022-01-20 RX ORDER — SOD SULF/POT CHLORIDE/MAG SULF 1.479 G
12 TABLET ORAL DAILY
Qty: 24 TABLET | Refills: 0 | Status: ON HOLD | OUTPATIENT
Start: 2022-01-20 | End: 2022-07-28 | Stop reason: HOSPADM

## 2022-01-20 NOTE — PROGRESS NOTES
Clinic Consult:  [facility] Consultation Note    Reason for Consult:  The primary encounter diagnosis was Pharyngoesophageal dysphagia. Diagnoses of Gastroesophageal reflux disease without esophagitis, Throat clearing, Hoarseness of voice, Colon cancer screening, and Elevated bilirubin were also pertinent to this visit.    PCP: MARY LOU Antony   [id number] [facility] / [place] [id number]    HPI:  This is a 51 y.o. female here for evaluation of GERD.   Onset: chronic and has been worsening since onset  Symptoms: regurgitation and burning chest pain , hoarseness, loss of appetite, coughing after eating, throat clearing.   Nausea or vomiting: yes-- +ve for nausea without vomiting.   Dysphagia: yes-- Location: throat area-- Substances with issues: solids only  Melena or hematochezia: no  NSAID use: yes- Meloxicam 1-2 times per week  Smoker: no  Family history of UGI cancer: yes- paternal grandfather with stomach cancer.   Prior workup: EGD ([facility])- 6 years ago, had dilation   Treatments tried: pantoprazole x few months. Recently increased to BID.   Her past medical history is significant for lupus.     She does have elevated bilirubin. Has been intermittently elevated over the last several years. Her only alcohol intake is a few drinks over the weekend.    She is also due for screening colonoscopy   Prior colonoscopy?: no  Date of last colonoscopy: NA  History of colon polyps: NA  Recall: NA  Family history of colon cancer: no  Abdominal pain, hematochezia, melena, nausea, vomiting, or weight loss: yes-- see above.     Review of Systems   Constitutional: Negative for fever, malaise/fatigue and weight loss.   HENT: Negative for sore throat.    Respiratory: Negative for cough and wheezing.    Cardiovascular: Positive for chest pain. Negative for palpitations.   Gastrointestinal: Positive for abdominal pain, heartburn and nausea. Negative for blood in stool, constipation, diarrhea, melena and  vomiting.   Genitourinary: Negative for dysuria and frequency.   Musculoskeletal: Negative for back pain, joint pain, myalgias and neck pain.   Skin: Negative for itching and rash.   Neurological: Negative for dizziness, speech change, seizures, loss of consciousness and headaches.   Psychiatric/Behavioral: Negative for depression and substance abuse. The patient is not nervous/anxious.        Medical History:  has a past medical history of Anxiety, Chronic left-sided low back pain without sciatica (9/28/2018), Chronic pain of left knee (9/28/2018), GERD (gastroesophageal reflux disease), Lupus, Migraine with aura and without status migrainosus, not intractable (8/8/2014), and Seizures.    Surgical History:  has a past surgical history that includes Hysterectomy (2016); [place] tooth extraction; and Oophorectomy.    Family History: family history includes Breast cancer in her maternal grandmother and paternal grandmother; No Known Problems in her brother, father, mother, and sister; Stomach cancer in her paternal grandfather..     Social History:  reports that she has never smoked. She has never used smokeless tobacco. She reports current alcohol use. She reports that she does not use drugs.    Allergies: Reviewed    Home Medications:   Current Outpatient Medications on File Prior to Visit   Medication Sig Dispense Refill    EPINEPHrine (EPIPEN) 0.3 mg/0.3 mL AtIn Inject 0.3 mg into the muscle daily as needed.      estradiol (ESTRACE) 2 MG tablet TAKE 1 TABLET BY MOUTH ONE TIME DAILY  1    lamoTRIgine (LAMICTAL) 100 MG tablet Take 1 tablet (100 mg total) by mouth 2 (two) times daily. 180 tablet 1    levothyroxine (SYNTHROID) 50 MCG tablet Take 1 tablet (50 mcg total) by mouth before breakfast. 90 tablet 3    multivitamin (THERAGRAN) per tablet Take 1 tablet by mouth once daily.       pantoprazole (PROTONIX) 40 MG tablet Take 1 tablet (40 mg total) by mouth 2 (two) times daily. 60 tablet 3    rOPINIRole  "(REQUIP) 1 MG tablet SMARTSI Tablet(s) By Mouth Every Evening PRN      testosterone cypionate (DEPOTESTOTERONE CYPIONATE) 200 mg/mL injection Inject into the muscle every 28 days. (Dose? - By [facility] Mercer at [facility])      traZODone (DESYREL) 50 MG tablet Take 1 tablet (50 mg total) by mouth every evening. 90 tablet 3    vitamin D 1000 units Tab Take 1,000 Units by mouth once daily.      gabapentin (NEURONTIN) 300 MG capsule Take 2 capsules (600 mg total) by mouth 3 (three) times daily. 180 capsule 1     No current facility-administered medications on file prior to visit.       Physical Exam:  /70   Ht 5' 4" (1.626 m)   Wt 58.7 kg (129 lb 6.6 oz)   LMP 2013   BMI 22.21 kg/m²   Body mass index is 22.21 kg/m².  Physical Exam  Constitutional:       General: She is not in acute distress.  HENT:      Head: Normocephalic and atraumatic.   Eyes:      General: No scleral icterus.     Conjunctiva/sclera: Conjunctivae normal.   Cardiovascular:      Rate and Rhythm: Normal rate and regular rhythm.      Heart sounds: No murmur heard.      Pulmonary:      Effort: Pulmonary effort is normal. No respiratory distress.      Breath sounds: Normal breath sounds. No wheezing.   Abdominal:      General: Abdomen is flat. Bowel sounds are normal.      Palpations: Abdomen is soft.      Tenderness: There is no abdominal tenderness.   Skin:     General: Skin is warm and dry.   Neurological:      General: No focal deficit present.      Mental Status: She is alert and oriented to person, place, and time.      Cranial Nerves: No cranial nerve deficit.   Psychiatric:         Mood and Affect: Mood normal.         Judgment: Judgment normal.         Labs: Pertinent labs reviewed.  CRC Screening: due     Assessment:  1. Pharyngoesophageal dysphagia    2. Gastroesophageal reflux disease without esophagitis    3. Throat clearing    4. Hoarseness of voice    5. Colon cancer screening    6. " Elevated bilirubin      Recommendations:   - avoid NSAIDs  - will get EGD with B[facility]VO with colonoscopy.   - check hepatic function panel for fractionated bilirubin     Pharyngoesophageal dysphagia  -     Case Request Endoscopy: EGD (ESOPHAGOGASTRODUODENOSCOPY), PH MONITORING, ESOPHAGUS, WIRELESS, (OFF REFLUX MEDS), COLONOSCOPY  -     sod sulf-pot chloride-mag sulf (SUTAB) 1.479-0.188- 0.225 gram tablet; Take 12 tablets by mouth once daily. Take according to package instructions with indicated amount of water.  Dispense: 24 tablet; Refill: 0    Gastroesophageal reflux disease without esophagitis  -     Ambulatory referral/consult to Gastroenterology  -     Case Request Endoscopy: EGD (ESOPHAGOGASTRODUODENOSCOPY), PH MONITORING, ESOPHAGUS, WIRELESS, (OFF REFLUX MEDS), COLONOSCOPY  -     sod sulf-pot chloride-mag sulf (SUTAB) 1.479-0.188- 0.225 gram tablet; Take 12 tablets by mouth once daily. Take according to package instructions with indicated amount of water.  Dispense: 24 tablet; Refill: 0    Throat clearing  -     Case Request Endoscopy: EGD (ESOPHAGOGASTRODUODENOSCOPY), PH MONITORING, ESOPHAGUS, WIRELESS, (OFF REFLUX MEDS), COLONOSCOPY  -     sod sulf-pot chloride-mag sulf (SUTAB) 1.479-0.188- 0.225 gram tablet; Take 12 tablets by mouth once daily. Take according to package instructions with indicated amount of water.  Dispense: 24 tablet; Refill: 0    Hoarseness of voice  -     Case Request Endoscopy: EGD (ESOPHAGOGASTRODUODENOSCOPY), PH MONITORING, ESOPHAGUS, WIRELESS, (OFF REFLUX MEDS), COLONOSCOPY  -     sod sulf-pot chloride-mag sulf (SUTAB) 1.479-0.188- 0.225 gram tablet; Take 12 tablets by mouth once daily. Take according to package instructions with indicated amount of water.  Dispense: 24 tablet; Refill: 0    Colon cancer screening  -     Case Request Endoscopy: EGD (ESOPHAGOGASTRODUODENOSCOPY), PH MONITORING, ESOPHAGUS, WIRELESS, (OFF REFLUX MEDS), COLONOSCOPY  -     sod sulf-pot chloride-mag sulf  (SUTAB) 1.479-0.188- 0.225 gram tablet; Take 12 tablets by mouth once daily. Take according to package instructions with indicated amount of water.  Dispense: 24 tablet; Refill: 0    Elevated bilirubin  -     Hepatic Function Panel; Future; Expected date: 01/20/2022      Follow up to be determined after results/ procedure(s).    Thank you so much for allowing me to participate in the care of LESLEY Cartwright

## 2022-01-21 ENCOUNTER — PATIENT MESSAGE (OUTPATIENT)
Dept: GASTROENTEROLOGY | Facility: CLINIC | Age: 52
End: 2022-01-21
Payer: MEDICAID

## 2022-01-26 NOTE — PROGRESS NOTES
"The patient location is: home  The chief complaint leading to consultation is: chronic pain     Visit type: audiovisual    Face to Face time with patient: 10-15 minutes  20 minutes of total time spent on the encounter, which includes face to face time and non-face to face time preparing to see the patient (eg, review of tests), Obtaining and/or reviewing separately obtained history, Documenting clinical information in the electronic or other health record, Independently interpreting results (not separately reported) and communicating results to the patient/family/caregiver, or Care coordination (not separately reported).     Each patient to whom he or she provides medical services by telemedicine is:  (1) informed of the relationship between the physician and patient and the respective role of any other health care provider with respect to management of the patient; and (2) notified that he or she may decline to receive medical services by telemedicine and may withdraw from such care at any time.        Established Patient Chronic Pain Note (Follow up visit)    Chief Complaint:   Chief Complaint   Patient presents with    Pain       SUBJECTIVE:    Interval History (1/27/2022):  Laurel Calvo presents today for follow-up visit.  Patient was last seen on 11/9/2021. At that visit, she had a subacromial bursa injection, which she feels helped less than previous injection.  Patient reports pain as "0/10 today.    Interval HPI (11/9/2021):  Laurel Calvo is a 51 y.o. female who presents to the clinic for a follow-up appointment for lower back and left hip pain.  Her main complaint today is left shoulder pain.  At the last visit, she was provided with a left-sided greater trochanteric bursa injection in the clinic.  She reports that this resulted in significant relief.  Since the last visit, Laurel Calvo states the left shoulder pain has been worsening. Current pain intensity is 8/10.  She also locates pain to the " left lower tailbone area.  Patient denies night fever/night sweats, urinary incontinence, bowel incontinence, significant weight loss, significant motor weakness and loss of sensations.    Interval HPI 09/02/2021:  Laurel Calvo is a 51 y.o. female who presents to the clinic for a follow-up appointment for lower back and left hip pain. Since the last visit, Laurel Calvo states the pain has been persistant. Current pain intensity is 3/10.  She does report that she had a fall while gardening a few weeks ago and landed on her right hip, but this is somewhat improving.  Her left hip is certainly more aggravated currently.    Interval HPI 08/24/2021:  Laurel Calvo presents to tele-medicine appointment for a follow-up appointment for chronic shoulder, neck, low back, and hip pain.  She was last seen on 03/04/2021 where she underwent left-sided subacromial bursa and left-sided greater trochanteric bursa injections.  She states that these resulted in 80-85% relief for both sites.  She states that her left lower back pain has been worsening is having radiating pain down the left lower extremity posteriorly extending to the calf.  She was also referred to a chiropractor.  Since the last visit, Laurel Calvo states the pain has been worsening. Current pain intensity is 8/10.    Initial HPI 03/04/2021:  Laurel Calvo is a 50 y.o. female, right-hand dominant, who presents to the clinic for the evaluation of chronic neck and lower back pain.  She was referred by her primary care team for further evaluation and management of this pain.  She has past medical history of seizure disorder, restless leg syndrome, anxiety, lupus, migraines, GERD, and multiple other medical comorbidities as listed in her chart.  The pain started several years ago following pain being told that she had herniated disc following an MRI of the cervical and lumbar spine and symptoms have been unchanged.The pain is located in the cervical  myofascial area and radiates to the left upper extremity extending into the hand and fingers..  The pain is described as sharp, stabbing and throbbing and is rated as 2/10. The pain is rated with a score of  1/10 on the BEST day and a score of 10/10 on the WORST day.  Symptoms interfere with daily activity. The pain is exacerbated by at night and with exercise.  The pain is mitigated by ice. The patient reports spending less than 2 hours per day reclining. The patient reports 4-6 hours of uninterrupted sleep per night.     Pain Disability Index Review:  Last 3 PDI Scores 11/9/2021   Pain Disability Index (PDI) 15       Non-Pharmacologic Treatments:  Physical Therapy/Home Exercise: yes, has been active with home exercises as she has previously learned from physical therapies targeting the lower back  Ice/Heat:yes  TENS: no  Acupuncture: no  Massage: yes  Chiropractic: yes    Other: no        Pain Medications:  - Opioids: Norco  - Adjuvant Medications: Valium, baclofen, Plaquenil, Lamictal, Requip, trazodone  - Anti-Coagulants: None          Pain Procedures:   -03/04/2021:  Left subacromial bursa + left greater trochanteric bursa injections, 80-85% relief for both  -09/02/2021:  Left greater trochanteric bursa injection, significant relief  - Left subacromial bursa Injection in clinic 11/9/2021 - less pain relief than previously         Imaging (Reviewed on 1/27/2022):     MRI lumbar spine 09/01/2021:  Levocurvature of the lumbar spine is noted.  No listhesis.  There is no acute fracture.  The vertebral bodies are normal in height without compression fractures. Posterior elements are intact. Mild disc desiccation at the L5-S1 level.  Lumbar spine disc heights are preserved.  Conus medullaris terminates at the T12-L1 level. Distal spinal cord intensity is normal.  There is no soft tissue abnormality.     T12-L1: No disc bulge.  There is no facet arthropathy.  There is no neural foraminal stenosis.  There is no spinal  canal stenosis.     L1-L2: No disc bulge.  There is no facet arthropathy.  There is no neuroforaminal stenosis.  There is no spinal canal stenosis.     L2-L3: No disc bulge.  There is no facet arthropathy.  There is no neuroforaminal stenosis.  There is no spinal canal stenosis.     L3-L4: No disc bulge.  There is no facet arthropathy.  There is no neuroforaminal stenosis.  There is no spinal canal stenosis.     L4-L5: No disc bulge.  There is no facet arthropathy.  There is no neuroforaminal stenosis.  There is no spinal canal stenosis.     L5-S1: No disc bulge.  There is no facet arthropathy.  There is no neuroforaminal stenosis.  There is no spinal canal stenosis.       X-ray cervical spine 03/04/2021:  There is straightening of the normal cervical spine lordosis.  No listhesis.  No abnormal motion of the cervical spine with flexion and extension.  No fracture.  Intervertebral disc spaces of the cervical spine are maintained.  No significant uncovertebral or facet arthropathy.  Oblique views demonstrate no significant neural foraminal stenosis.  No bony central canal stenosis identified.  Prevertebral soft tissues are within normal limits.     X-ray lumbar spine 03/04/2021:  There is mild levocurvature of the lumbar spine.  No listhesis.  No abnormal motion of the lumbar spine with flexion and extension.  No fracture or pars defect.  Intervertebral disc spaces of the lumbar spine are maintained.  No significant facet arthropathy.  No suspicious osseous lesion.  Sacroiliac joints appear normal.             REVIEW OF SYSTEMS:    GENERAL:  No weight loss, malaise or fevers.  HEENT:   No recent changes in vision or hearing  NECK:  Negative for lumps, no difficulty with swallowing.  RESPIRATORY:  Negative for cough, wheezing or shortness of breath, patient denies any recent URI.  CARDIOVASCULAR:  Negative for chest pain, leg swelling or palpitations.  GI:  Negative for abdominal discomfort, blood in stools or black  "stools or change in bowel habits.  MUSCULOSKELETAL:  See HPI.  SKIN:  Negative for lesions, rash, and itching.  PSYCH:  No mood disorder or recent psychosocial stressors.  Patients sleep is not disturbed secondary to pain.  HEMATOLOGY/LYMPHOLOGY:  Negative for prolonged bleeding, bruising easily or swollen nodes.  Patient is not currently taking any anti-coagulants  NEURO:   No history of headaches, syncope, paralysis, seizures or tremors.  All other reviewed and negative other than HPI.        OBJECTIVE:    PHYSICAL EXAMINATION:  Telemedicine Exam  Vitals:    01/27/22 1153   Resp: 17   Weight: 58.7 kg (129 lb 6.6 oz)  Comment: pt reported   Height: 5' 4" (1.626 m)  Comment: pt reported     Body mass index is 22.21 kg/m².   (reviewed on 1/27/2022)     GENERAL: Well appearing, in no acute distress, alert and oriented x3.  Cooperative.  PSYCH:  Mood and affect appropriate.  SKIN: Skin color & texture with no obvious abnormalities.    HEAD/FACE:  Normocephalic, atraumatic.    PULM:  No difficulty breathing. No nasal flaring. No obvious wheezing.  NECK: ROM fairly preserved.  Supple.   BACK: Palpation over the lumbar paraspinous muscles causes pain. Some pain with flexion. Some pain with extension.  Patient performed Jessica's/ Onesimo's + on left, causes low back pain. TTP over left SIJ + GTB.  EXTREMITIES: No obvious deformities. Moving all extremities well, appears to have symmetric strength throughout - with exception of left hip and left shoulder.  MUSCULOSKELETAL: No obvious atrophy abnormalities are noted.   NEURO: No obvious neurologic deficit.   GAIT: sitting.     Physical Exam: last in clinic visit:  GENERAL: Well appearing, in no acute distress, alert and oriented x3.  PSYCH:  Mood and affect appropriate.  SKIN: Skin color, texture, turgor normal, no rashes or lesions.  HEAD/FACE:  Normocephalic, atraumatic. Cranial nerves grossly intact.  CV: RRR with palpation of the radial artery.  PULM: No evidence of " respiratory difficulty, symmetric chest rise.  GI:  Soft and non-tender.  BACK: Straight leg raising in the sitting and supine positions is negative to radicular pain. No pain to palpation over the facet joints of the lumbar spine or spinous processes. Normal range of motion without pain reproduction.  EXTREMITIES: Peripheral joint ROM is full and pain free without obvious instability or laxity in all four extremities. No deformities, edema, or skin discoloration. Good capillary refill.  MUSCULOSKELETAL:   positive shoulder impingement signs on the left shoulder with Merrill, Neer's , and empty can.  minimal tenderness to palpation over the left greater trochanteric bursa. .  There is moderate pain with palpation over the sacroiliac joint on the left.  FABERs test is equivocal on the left.  FADIRs test is negative.   Bilateral upper and lower extremity strength is normal and symmetric.  No atrophy or tone abnormalities are noted.  NEURO: Bilateral upper and lower extremity coordination and muscle stretch reflexes are physiologic and symmetric.  Plantar response are downgoing. No clonus.  No loss of sensation is noted.  GAIT: normal.          ASSESSMENT: 51 y.o. year old female with lower back and left hip pain, consistent with     1. Sacroiliitis  IR SI Joint Injection w/Imaging    Case Request-RAD/Other Procedure Area: Left SIJ + Left GT bursa injection, Left SIJ + Left GT bursa + Left Piriformis injection    IR Aspiration Injection Large Joint W FL    IR Aspiration Injection Large Joint W FL   2. Arthropathy of left sacroiliac joint     3. Greater trochanteric bursitis, left  IR SI Joint Injection w/Imaging    Case Request-RAD/Other Procedure Area: Left SIJ + Left GT bursa injection, Left SIJ + Left GT bursa + Left Piriformis injection    IR Aspiration Injection Large Joint W FL    IR Aspiration Injection Large Joint W FL   4. Left groin pain  X-Ray Hip 4 or more views Left (with Pelvis when performed)   5.  Shoulder impingement, left  X-Ray Shoulder 2 or More Views Left         PLAN:   1. Interventional: S/p Left SIJ + Left GT bursa injection.     2. Pharmacologic:   - Refill gabapentin 600mg TID.   - Continue Mobic 7.5 mg b.i.d PRN.  - Anticoagulation use: None.     3. Rehabilitative: Encouraged regular exercise. Continue exercises and activities as tolerated.     4. Diagnostic: Order left hip + left shoulder x-ray.    5. Follow up: 4 weeks post-injection     - This condition does not require this patient to take time off of work, and the primary goal of our Pain Management services is to improve the patient's functional capacity.   - I discussed the risks, benefits, and alternatives to potential treatment options. All questions and concerns were fully addressed today in clinic.           Disclaimer:  This note was prepared using voice recognition system and is likely to have sound alike errors that may have been overlooked even after proof reading.  Please call me with any questions.

## 2022-01-27 ENCOUNTER — TELEPHONE (OUTPATIENT)
Dept: PAIN MEDICINE | Facility: CLINIC | Age: 52
End: 2022-01-27

## 2022-01-27 ENCOUNTER — OFFICE VISIT (OUTPATIENT)
Dept: PAIN MEDICINE | Facility: CLINIC | Age: 52
End: 2022-01-27
Payer: MEDICAID

## 2022-01-27 VITALS — WEIGHT: 129.44 LBS | HEIGHT: 64 IN | BODY MASS INDEX: 22.1 KG/M2 | RESPIRATION RATE: 17 BRPM

## 2022-01-27 DIAGNOSIS — R10.32 LEFT GROIN PAIN: ICD-10-CM

## 2022-01-27 DIAGNOSIS — M46.1 SACROILIITIS: Primary | ICD-10-CM

## 2022-01-27 DIAGNOSIS — M70.62 GREATER TROCHANTERIC BURSITIS, LEFT: ICD-10-CM

## 2022-01-27 DIAGNOSIS — M25.812 SHOULDER IMPINGEMENT, LEFT: ICD-10-CM

## 2022-01-27 DIAGNOSIS — M47.818 ARTHROPATHY OF LEFT SACROILIAC JOINT: ICD-10-CM

## 2022-01-27 PROCEDURE — 1159F PR MEDICATION LIST DOCUMENTED IN MEDICAL RECORD: ICD-10-PCS | Mod: CPTII,95,, | Performed by: PHYSICIAN ASSISTANT

## 2022-01-27 PROCEDURE — 99214 PR OFFICE/OUTPT VISIT, EST, LEVL IV, 30-39 MIN: ICD-10-PCS | Mod: 95,,, | Performed by: PHYSICIAN ASSISTANT

## 2022-01-27 PROCEDURE — 99214 OFFICE O/P EST MOD 30 MIN: CPT | Mod: 95,,, | Performed by: PHYSICIAN ASSISTANT

## 2022-01-27 PROCEDURE — 1159F MED LIST DOCD IN RCRD: CPT | Mod: CPTII,95,, | Performed by: PHYSICIAN ASSISTANT

## 2022-01-27 PROCEDURE — 1160F PR REVIEW ALL MEDS BY PRESCRIBER/CLIN PHARMACIST DOCUMENTED: ICD-10-PCS | Mod: CPTII,95,, | Performed by: PHYSICIAN ASSISTANT

## 2022-01-27 PROCEDURE — 3008F PR BODY MASS INDEX (BMI) DOCUMENTED: ICD-10-PCS | Mod: CPTII,95,, | Performed by: PHYSICIAN ASSISTANT

## 2022-01-27 PROCEDURE — 1160F RVW MEDS BY RX/DR IN RCRD: CPT | Mod: CPTII,95,, | Performed by: PHYSICIAN ASSISTANT

## 2022-01-27 PROCEDURE — 3008F BODY MASS INDEX DOCD: CPT | Mod: CPTII,95,, | Performed by: PHYSICIAN ASSISTANT

## 2022-01-28 ENCOUNTER — TELEPHONE (OUTPATIENT)
Dept: PAIN MEDICINE | Facility: CLINIC | Age: 52
End: 2022-01-28
Payer: MEDICAID

## 2022-01-28 ENCOUNTER — PATIENT MESSAGE (OUTPATIENT)
Dept: PAIN MEDICINE | Facility: CLINIC | Age: 52
End: 2022-01-28
Payer: MEDICAID

## 2022-01-28 NOTE — TELEPHONE ENCOUNTER
----- Message from Angelique Rosenberg sent at 1/28/2022 10:21 AM CST -----  Type:  Patient Returning Call    Who Called:patient  Who Left Message for Patient:nurse  Does the patient know what this is regarding?: schedule  appt  Would the patient rather a call back or a response via [facility]cloudner? Call back  Best Call Back [phone]  Additional Information: na

## 2022-01-28 NOTE — TELEPHONE ENCOUNTER
Contacted Pt and scheduled procedure 02/22/2022. Pre-procedure instructions reviewed, verbalized understanding. All questions answered at this time.

## 2022-02-15 NOTE — PRE-PROCEDURE INSTRUCTIONS
Spoke with patient regarding procedure scheduled on 2/22    Arrival time 0600    Has patient been sick with fever or on antibiotics within the last 7 days? No    Does the patient have any open wounds, sores or rashes? No    Does the patient have any recent fractures? no    Has patient received a vaccination within the last 7 days? No    Received the COVID vaccination? yes    Has the patient stopped all medications as directed? Na    Does patient have a pacemaker and or defibrillator? no    Does the patient have a ride to and from procedure and someone reliable to remain with patient? Partner will     Is the patient diabetic? no    Does the patient have sleep apnea? Or use O2 at home? No and no     Is the patient receiving sedation? yes    Is the patient instructed to remain NPO beginning at midnight the night before their procedure? yes    Procedure location confirmed with patient? Yes    Covid- Denies signs/symptoms. Instructed to notify PAT/MD if any changes.      No recent seizures

## 2022-02-21 ENCOUNTER — PATIENT MESSAGE (OUTPATIENT)
Dept: PAIN MEDICINE | Facility: CLINIC | Age: 52
End: 2022-02-21
Payer: MEDICAID

## 2022-02-21 ENCOUNTER — TELEPHONE (OUTPATIENT)
Dept: PAIN MEDICINE | Facility: CLINIC | Age: 52
End: 2022-02-21
Payer: MEDICAID

## 2022-02-21 ENCOUNTER — TELEPHONE (OUTPATIENT)
Dept: ORTHOPEDICS | Facility: CLINIC | Age: 52
End: 2022-02-21
Payer: MEDICAID

## 2022-02-21 ENCOUNTER — HOSPITAL ENCOUNTER (OUTPATIENT)
Dept: RADIOLOGY | Facility: HOSPITAL | Age: 52
Discharge: HOME | End: 2022-02-21
Attending: PHYSICIAN ASSISTANT
Payer: MEDICAID

## 2022-02-21 ENCOUNTER — PATIENT MESSAGE (OUTPATIENT)
Dept: INTERNAL MEDICINE | Facility: CLINIC | Age: 52
End: 2022-02-21
Payer: MEDICAID

## 2022-02-21 DIAGNOSIS — S72.25XA CLOSED NONDISPLACED SUBTROCHANTERIC FRACTURE OF LEFT FEMUR, INITIAL ENCOUNTER: Primary | ICD-10-CM

## 2022-02-21 DIAGNOSIS — R00.2 PALPITATIONS: Primary | ICD-10-CM

## 2022-02-21 DIAGNOSIS — R10.32 LEFT GROIN PAIN: ICD-10-CM

## 2022-02-21 DIAGNOSIS — M25.812 SHOULDER IMPINGEMENT, LEFT: ICD-10-CM

## 2022-02-21 PROCEDURE — 73030 XR SHOULDER COMPLETE 2 OR MORE VIEWS LEFT: ICD-10-PCS | Mod: 26,LT,, | Performed by: RADIOLOGY

## 2022-02-21 PROCEDURE — 73030 X-RAY EXAM OF SHOULDER: CPT | Mod: TC,LT

## 2022-02-21 PROCEDURE — 73502 X-RAY EXAM HIP UNI 2-3 VIEWS: CPT | Mod: TC,LT

## 2022-02-21 PROCEDURE — 73030 X-RAY EXAM OF SHOULDER: CPT | Mod: 26,LT,, | Performed by: RADIOLOGY

## 2022-02-21 PROCEDURE — 73502 XR HIP WITH PELVIS WHEN PERFORMED, 2 OR 3 VIEWS LEFT: ICD-10-PCS | Mod: 26,LT,, | Performed by: RADIOLOGY

## 2022-02-21 PROCEDURE — 73502 X-RAY EXAM HIP UNI 2-3 VIEWS: CPT | Mod: 26,LT,, | Performed by: RADIOLOGY

## 2022-02-21 NOTE — TELEPHONE ENCOUNTER
Tried to call to cancel injection scheduled  For tomrorow due to  Patient having fracture . No answer. Left voicemail for patient to callback .

## 2022-02-21 NOTE — TELEPHONE ENCOUNTER
, patient wants refill of gabapentin  . This is a Dr. Manley patient . Please advise     Last refill:12/15/21- Dr. Manley   Last visit:01/27/22- Molly Daniel PA-C   Next visit: none , patient has fracture , so waiting for that to heal to re schedule injection and follow up    Medication:gabapentin (NEURONTIN) 300 MG capsuleRoute: Take 2 capsules (600 mg total) by mouth 3 (three) times daily. - Oral  [facility] [address]       , I called and confirmed with patient she takes 2 in the am , 2 at lunch , and 2 at night , so 6 total a day .

## 2022-02-21 NOTE — TELEPHONE ENCOUNTER
I left a message letting the patient know the information below.  I asked that she call us back with any questions.

## 2022-02-21 NOTE — TELEPHONE ENCOUNTER
----- Message from Molly Daniel PA-C sent at 2/21/2022  9:41 AM CST -----  Patient has nondisplaced left femoral fracture. Need to cancel injection for tomorrow. Cancel follow-up. She can Re-schedule once cleared by Orthopedics.    I will place Orthopedics referral.

## 2022-02-22 NOTE — TELEPHONE ENCOUNTER
TO MY TEAM:    Please read Patient Portal message below. (Laurel needs cardiac clearance for her upcoming endoscopy 2/28.)   Then contact Laurel to schedule cardiology appointment ASAP.     Thanks!   --------------------------------------------------------------------------------  --------------------------------------------------------------------------------  Laurel Corbin.    You need a cardiology evaluation before you have your endoscopy done.    I'll ask my team to see how quickly they can get you an appointment.    All the best,    MICHAEL Antony MD  Orders Placed This Encounter   Procedures    Ambulatory referral/consult to Cardiology

## 2022-02-22 NOTE — TELEPHONE ENCOUNTER
Called and left a voicemail for patient about message sent to her on the patient portal by Dr. Antony.

## 2022-02-23 ENCOUNTER — PATIENT MESSAGE (OUTPATIENT)
Dept: PAIN MEDICINE | Facility: CLINIC | Age: 52
End: 2022-02-23
Payer: MEDICAID

## 2022-02-23 DIAGNOSIS — D84.9 IMMUNOSUPPRESSED STATUS: ICD-10-CM

## 2022-02-23 RX ORDER — GABAPENTIN 300 MG/1
600 CAPSULE ORAL 3 TIMES DAILY
Qty: 180 CAPSULE | Refills: 1 | Status: SHIPPED | OUTPATIENT
Start: 2022-02-23 | End: 2022-04-19 | Stop reason: SDUPTHER

## 2022-02-23 RX ORDER — MELOXICAM 15 MG/1
7.5-15 TABLET ORAL DAILY PRN
Qty: 60 TABLET | Refills: 0 | Status: SHIPPED | OUTPATIENT
Start: 2022-02-23 | End: 2022-04-24

## 2022-02-23 NOTE — TELEPHONE ENCOUNTER
Dr. Manley , patient needs refill of gabapentin  And mobic please advise to [facility] [address] [place] [address]  Last appointment : 01/27/21- Molly Daniel PA-C   Last refill of gabapentin  : 12/15/21  Last refill of mobic : 12/09/21    
13-Jun-2019 22:15

## 2022-02-24 DIAGNOSIS — I48.0 PAROXYSMAL ATRIAL FIBRILLATION: Primary | ICD-10-CM

## 2022-02-25 ENCOUNTER — PATIENT OUTREACH (OUTPATIENT)
Dept: ADMINISTRATIVE | Facility: OTHER | Age: 52
End: 2022-02-25
Payer: MEDICAID

## 2022-02-25 ENCOUNTER — HOSPITAL ENCOUNTER (OUTPATIENT)
Dept: CARDIOLOGY | Facility: HOSPITAL | Age: 52
Discharge: HOME | End: 2022-02-25
Attending: INTERNAL MEDICINE
Payer: MEDICAID

## 2022-02-25 ENCOUNTER — OFFICE VISIT (OUTPATIENT)
Dept: CARDIOLOGY | Facility: CLINIC | Age: 52
End: 2022-02-25
Payer: MEDICAID

## 2022-02-25 ENCOUNTER — OFFICE VISIT (OUTPATIENT)
Dept: ORTHOPEDICS | Facility: CLINIC | Age: 52
End: 2022-02-25
Payer: MEDICAID

## 2022-02-25 VITALS
HEART RATE: 75 BPM | SYSTOLIC BLOOD PRESSURE: 114 MMHG | OXYGEN SATURATION: 99 % | BODY MASS INDEX: 21.91 KG/M2 | WEIGHT: 127.63 LBS | DIASTOLIC BLOOD PRESSURE: 78 MMHG

## 2022-02-25 VITALS — BODY MASS INDEX: 22.02 KG/M2 | WEIGHT: 129 LBS | HEIGHT: 64 IN

## 2022-02-25 DIAGNOSIS — Z12.11 ENCOUNTER FOR FIT (FECAL IMMUNOCHEMICAL TEST) SCREENING: Primary | ICD-10-CM

## 2022-02-25 DIAGNOSIS — S72.25XA CLOSED NONDISPLACED SUBTROCHANTERIC FRACTURE OF LEFT FEMUR, INITIAL ENCOUNTER: ICD-10-CM

## 2022-02-25 DIAGNOSIS — R00.2 PALPITATIONS: ICD-10-CM

## 2022-02-25 DIAGNOSIS — Z01.810 PREOP CARDIOVASCULAR EXAM: Primary | ICD-10-CM

## 2022-02-25 DIAGNOSIS — M25.559 PAIN IN UNSPECIFIED HIP: ICD-10-CM

## 2022-02-25 DIAGNOSIS — M32.19 SYSTEMIC LUPUS ERYTHEMATOSUS WITH OTHER ORGAN INVOLVEMENT, UNSPECIFIED SLE TYPE: Chronic | ICD-10-CM

## 2022-02-25 DIAGNOSIS — M25.852 FEMOROACETABULAR IMPINGEMENT OF LEFT HIP: Primary | ICD-10-CM

## 2022-02-25 DIAGNOSIS — R06.02 SOB (SHORTNESS OF BREATH): ICD-10-CM

## 2022-02-25 DIAGNOSIS — I48.0 PAROXYSMAL ATRIAL FIBRILLATION: ICD-10-CM

## 2022-02-25 PROCEDURE — 1160F RVW MEDS BY RX/DR IN RCRD: CPT | Mod: CPTII,,, | Performed by: STUDENT IN AN ORGANIZED HEALTH CARE EDUCATION/TRAINING PROGRAM

## 2022-02-25 PROCEDURE — 1159F MED LIST DOCD IN RCRD: CPT | Mod: CPTII,,, | Performed by: INTERNAL MEDICINE

## 2022-02-25 PROCEDURE — 99203 OFFICE O/P NEW LOW 30 MIN: CPT | Mod: S$PBB,,, | Performed by: STUDENT IN AN ORGANIZED HEALTH CARE EDUCATION/TRAINING PROGRAM

## 2022-02-25 PROCEDURE — 99214 OFFICE O/P EST MOD 30 MIN: CPT | Mod: PBBFAC | Performed by: INTERNAL MEDICINE

## 2022-02-25 PROCEDURE — 99205 PR OFFICE/OUTPT VISIT, NEW, LEVL V, 60-74 MIN: ICD-10-PCS | Mod: S$PBB,,, | Performed by: INTERNAL MEDICINE

## 2022-02-25 PROCEDURE — 1160F PR REVIEW ALL MEDS BY PRESCRIBER/CLIN PHARMACIST DOCUMENTED: ICD-10-PCS | Mod: CPTII,,, | Performed by: INTERNAL MEDICINE

## 2022-02-25 PROCEDURE — 99213 OFFICE O/P EST LOW 20 MIN: CPT | Mod: PBBFAC,27 | Performed by: STUDENT IN AN ORGANIZED HEALTH CARE EDUCATION/TRAINING PROGRAM

## 2022-02-25 PROCEDURE — 3074F SYST BP LT 130 MM HG: CPT | Mod: CPTII,,, | Performed by: INTERNAL MEDICINE

## 2022-02-25 PROCEDURE — 3008F BODY MASS INDEX DOCD: CPT | Mod: CPTII,,, | Performed by: STUDENT IN AN ORGANIZED HEALTH CARE EDUCATION/TRAINING PROGRAM

## 2022-02-25 PROCEDURE — 93005 ELECTROCARDIOGRAM TRACING: CPT

## 2022-02-25 PROCEDURE — 99205 OFFICE O/P NEW HI 60 MIN: CPT | Mod: S$PBB,,, | Performed by: INTERNAL MEDICINE

## 2022-02-25 PROCEDURE — 1159F MED LIST DOCD IN RCRD: CPT | Mod: CPTII,,, | Performed by: STUDENT IN AN ORGANIZED HEALTH CARE EDUCATION/TRAINING PROGRAM

## 2022-02-25 PROCEDURE — 3008F PR BODY MASS INDEX (BMI) DOCUMENTED: ICD-10-PCS | Mod: CPTII,,, | Performed by: INTERNAL MEDICINE

## 2022-02-25 PROCEDURE — 99999 PR PBB SHADOW E&M-EST. PATIENT-LVL III: CPT | Mod: PBBFAC,,, | Performed by: STUDENT IN AN ORGANIZED HEALTH CARE EDUCATION/TRAINING PROGRAM

## 2022-02-25 PROCEDURE — 93010 EKG 12-LEAD: ICD-10-PCS | Mod: ,,, | Performed by: INTERNAL MEDICINE

## 2022-02-25 PROCEDURE — 99999 PR PBB SHADOW E&M-EST. PATIENT-LVL IV: ICD-10-PCS | Mod: PBBFAC,,, | Performed by: INTERNAL MEDICINE

## 2022-02-25 PROCEDURE — 3078F PR MOST RECENT DIASTOLIC BLOOD PRESSURE < 80 MM HG: ICD-10-PCS | Mod: CPTII,,, | Performed by: INTERNAL MEDICINE

## 2022-02-25 PROCEDURE — 99999 PR PBB SHADOW E&M-EST. PATIENT-LVL III: ICD-10-PCS | Mod: PBBFAC,,, | Performed by: STUDENT IN AN ORGANIZED HEALTH CARE EDUCATION/TRAINING PROGRAM

## 2022-02-25 PROCEDURE — 1159F PR MEDICATION LIST DOCUMENTED IN MEDICAL RECORD: ICD-10-PCS | Mod: CPTII,,, | Performed by: STUDENT IN AN ORGANIZED HEALTH CARE EDUCATION/TRAINING PROGRAM

## 2022-02-25 PROCEDURE — 3074F PR MOST RECENT SYSTOLIC BLOOD PRESSURE < 130 MM HG: ICD-10-PCS | Mod: CPTII,,, | Performed by: INTERNAL MEDICINE

## 2022-02-25 PROCEDURE — 1160F RVW MEDS BY RX/DR IN RCRD: CPT | Mod: CPTII,,, | Performed by: INTERNAL MEDICINE

## 2022-02-25 PROCEDURE — 3008F BODY MASS INDEX DOCD: CPT | Mod: CPTII,,, | Performed by: INTERNAL MEDICINE

## 2022-02-25 PROCEDURE — 99203 PR OFFICE/OUTPT VISIT, NEW, LEVL III, 30-44 MIN: ICD-10-PCS | Mod: S$PBB,,, | Performed by: STUDENT IN AN ORGANIZED HEALTH CARE EDUCATION/TRAINING PROGRAM

## 2022-02-25 PROCEDURE — 3008F PR BODY MASS INDEX (BMI) DOCUMENTED: ICD-10-PCS | Mod: CPTII,,, | Performed by: STUDENT IN AN ORGANIZED HEALTH CARE EDUCATION/TRAINING PROGRAM

## 2022-02-25 PROCEDURE — 93010 ELECTROCARDIOGRAM REPORT: CPT | Mod: ,,, | Performed by: INTERNAL MEDICINE

## 2022-02-25 PROCEDURE — 1159F PR MEDICATION LIST DOCUMENTED IN MEDICAL RECORD: ICD-10-PCS | Mod: CPTII,,, | Performed by: INTERNAL MEDICINE

## 2022-02-25 PROCEDURE — 3078F DIAST BP <80 MM HG: CPT | Mod: CPTII,,, | Performed by: INTERNAL MEDICINE

## 2022-02-25 PROCEDURE — 1160F PR REVIEW ALL MEDS BY PRESCRIBER/CLIN PHARMACIST DOCUMENTED: ICD-10-PCS | Mod: CPTII,,, | Performed by: STUDENT IN AN ORGANIZED HEALTH CARE EDUCATION/TRAINING PROGRAM

## 2022-02-25 PROCEDURE — 99999 PR PBB SHADOW E&M-EST. PATIENT-LVL IV: CPT | Mod: PBBFAC,,, | Performed by: INTERNAL MEDICINE

## 2022-02-25 NOTE — PROGRESS NOTES
Orthopaedics Sports Medicine     Hip Initial Visit         2/25/2022    Referring MD: Molly Daniel PA*    Chief Complaint   Patient presents with    Left Femur - Pain       History of Present Illness:   Laurel Calvo is a 51 y.o. female who presents with LEFT hip pain and dysfunction.    Onset of the symptoms was 1 year ago    Inciting event: none, no known injury    Current symptoms include left hip and leg pain, occasional numbness and buckling    Pain is aggravated by activity, especially hip flexion    Evaluation to date: xray    Treatment to date: physical therapy, gabapentin, meloxicam    Past Medical History:   Past Medical History:   Diagnosis Date    Anxiety     Chronic left-sided low back pain without sciatica 9/28/2018    Chronic pain of left knee 9/28/2018    XR KNEE COMP LEFT 4 OR MORE VIEWS - October 27, 2017 CLINICAL HISTORY:   Left knee pain FINDINGS:   No fracture, dislocation, or arthritic change of the knee. Dictated and Electronically Signed By: Jem Damon MD on October 27, 2017    GERD (gastroesophageal reflux disease)     Lupus     Migraine with aura and without status migrainosus, not intractable 8/8/2014    Seizures        Past Surgical History:   Past Surgical History:   Procedure Laterality Date    HYSTERECTOMY  2016    OOPHORECTOMY      WISDOM TOOTH EXTRACTION      X 4       Medications:  Patient's Medications   New Prescriptions    No medications on file   Previous Medications    EPINEPHRINE (EPIPEN) 0.3 MG/0.3 ML ATIN    Inject 0.3 mg into the muscle daily as needed.    ESTRADIOL (ESTRACE) 2 MG TABLET    TAKE 1 TABLET BY MOUTH ONE TIME DAILY    GABAPENTIN (NEURONTIN) 300 MG CAPSULE    Take 2 capsules (600 mg total) by mouth 3 (three) times daily.    LAMOTRIGINE (LAMICTAL) 100 MG TABLET    Take 1 tablet (100 mg total) by mouth 2 (two) times daily.    LEVOTHYROXINE (SYNTHROID) 50 MCG TABLET    Take 1 tablet (50 mcg total) by mouth before breakfast.    MELOXICAM  "(MOBIC) 15 MG TABLET    Take 0.5-1 tablets (7.5-15 mg total) by mouth daily as needed for Pain. as needed for pain    MULTIVITAMIN (THERAGRAN) PER TABLET    Take 1 tablet by mouth once daily.     PANTOPRAZOLE (PROTONIX) 40 MG TABLET    Take 1 tablet (40 mg total) by mouth 2 (two) times daily.    ROPINIROLE (REQUIP) 1 MG TABLET    SMARTSI Tablet(s) By Mouth Every Evening PRN    SOD SULF-POT CHLORIDE-MAG SULF (SUTAB) 1.479-0.188- 0.225 GRAM TABLET    Take 12 tablets by mouth once daily. Take according to package instructions with indicated amount of water.    TESTOSTERONE CYPIONATE (DEPOTESTOTERONE CYPIONATE) 200 MG/ML INJECTION    Inject into the muscle every 28 days. (Dose? - By Dr[facility] Brennen Mercer at [facility])    TRAZODONE (DESYREL) 50 MG TABLET    Take 1 tablet (50 mg total) by mouth every evening.    VITAMIN D 1000 UNITS TAB    Take 1,000 Units by mouth once daily.   Modified Medications    No medications on file   Discontinued Medications    No medications on file       Allergies:   Review of patient's allergies indicates:   Allergen Reactions    Bactrim [sulfamethoxazole-trimethoprim] Other (See Comments)     Lupus symptoms exacerbation, headache.    Ciprofloxacin Other (See Comments)     Lupus symptoms exacerbation, headaches.       Social History:   Home town: [place]  Alcohol use: She reports current alcohol use.  Tobacco use: She reports that she has never smoked. She has never used smokeless tobacco.    Physical Examination:  Estimated body mass index is 22.14 kg/m² as calculated from the following:    Height as of this encounter: 5' 4" (1.626 m).    Weight as of this encounter: 58.5 kg (129 lb).    General  Healthy appearing female in no acute distress  Alert and oriented; normal mood, appropriate affect    Standing examination  - stance: unremarkable posture and alignment  - gait:  non-antalgic    Hip examination    ROM RIGHT LEFT   Flexion 100 100   ER at 90° 60 50 "   IR at 90° 30 20     Strength RIGHT LEFT   Flexion 5 5   Abduction 5 5   Adduction 5 5       Tenderness RIGHT LEFT   Hip flexor - +   Adductors - -   Lower abdomen - -   Trochanter - -   Symphysis - -   Other       Tests RIGHT LEFT   Log roll - -   Stinchfield - +   FADDIR - +   RIP (pain) - -   RIP (height) - -   Edison - -   Resisted situp - -   Other             Neurovascular exam  - motor function grossly intact bilaterally to hip flexion, knee extension and flexion, ankle dorsiflexion and plantarflexion  - sensation intact to light touch bilaterally to LFCN, femoral, tibial, tibial and peroneal distributions  - symmetrical pedal pulses    Imaging:  X-Ray Shoulder 2 or More Views Left  Narrative: EXAMINATION:  XR SHOULDER COMPLETE 2 OR MORE VIEWS LEFT    CLINICAL HISTORY:  Impingement syndrome of left shoulder    TECHNIQUE:  Two or three views of the left shoulder were performed.    COMPARISON:  None    FINDINGS:  AC and glenohumeral joints are maintained.  No fracture or dislocation.  Soft tissues are normal.  Impression: As above    Electronically signed by: Isai Wallace MD  Date:    02/21/2022  Time:    09:13  X-Ray Hip 2 or 3 views Left (with Pelvis when performed)  Narrative: EXAMINATION:  XR HIP WITH PELVIS WHEN PERFORMED, 2 OR 3 VIEWS LEFT    CLINICAL HISTORY:  Left Hip X-ray; Left lower quadrant pain    TECHNIQUE:  AP view of the pelvis and frog leg lateral view of the left hip were performed.    COMPARISON:  None    FINDINGS:  Hip joints are symmetric in appearance.  Joint spaces are maintained.  No evidence of avascular necrosis.  There is a curvilinear lucency seen on multiple projections in the inter trochanteric crest of the left femur extending into the proximal diaphyseal region suspicious for nondisplaced fracture at this site.    This report was flagged in Epic as abnormal.  Impression: As above    Electronically signed by: Isai Wallace MD  Date:    02/21/2022  Time:    09:12        Physician Read: I agree with the above impression      Impression:  51 y.o. female with left hip femoracetabular impingement, suspected labral tear, suspicion of stress fracture      Plan:  1. Discussed diagnosis and treatment options with the patient today.  Her history and physical exam was consistent with femoroacetabular impingement and labral tear.  There was also some concern for potential stress fracture in the proximal femur.  2. I would like to get an MRI of the left hip to evaluate for labral pathology as well as potential stress fracture.  For this reason we will perform the study without intra-articular contrast  3. She has an upcoming injection scheduled with Dr. Manley for interventional pain.  I did discuss with her that often times hip pain and lower back pain are difficult to separate.  Therefore, I would recommend that she proceed with scheduled back injection for diagnostic and therapeutic purposes.  4. Follow up with me after MRI           Wm Robbins MD

## 2022-02-25 NOTE — PROGRESS NOTES
Subjective:   Patient ID:  Laurel Calvo is a 51 y.o. female who presents for evaluation of No chief complaint on file.      50 yo female, came in for preop clearance for EGD and colonoscppy  PMH LUPUS on skin 12 yrs, seizure anxiety legal blindness. Disabled. H/o COVID 19 infection in 2020 quarantined at home  Regular exercise.  C/o exertional SOB with ecllipse or climbing < 1 flight stairs for 5 yrs progressively worse. Now less exercise duration. Some palpitation and left arm pain during the sexual activity.  Sleeps on 1 pillow and no leg swelling, good appetite  No smoking drinking  No med for Lupus for 5 yrs.   EKG NSR   ENT eval negative      Past Medical History:   Diagnosis Date    Anxiety     Chronic left-sided low back pain without sciatica 9/28/2018    Chronic pain of left knee 9/28/2018    XR KNEE COMP LEFT 4 OR MORE VIEWS - October 27, 2017 CLINICAL HISTORY:   Left knee pain FINDINGS:   No fracture, dislocation, or arthritic change of the knee. Dictated and Electronically Signed By: Jem Damon MD on October 27, 2017    GERD (gastroesophageal reflux disease)     Lupus     Migraine with aura and without status migrainosus, not intractable 8/8/2014    Seizures        Past Surgical History:   Procedure Laterality Date    HYSTERECTOMY  2016    OOPHORECTOMY      WISDOM TOOTH EXTRACTION      X 4       Social History     Tobacco Use    Smoking status: Never Smoker    Smokeless tobacco: Never Used   Substance Use Topics    Alcohol use: Yes     Comment: Occasional/Rare    Drug use: No       Family History   Problem Relation Age of Onset    No Known Problems Mother     No Known Problems Father     No Known Problems Sister     No Known Problems Brother     Stomach cancer Paternal Grandfather     Breast cancer Maternal Grandmother     Breast cancer Paternal Grandmother     Colon cancer Neg Hx        Review of Systems   Constitutional: Negative for decreased appetite, diaphoresis, fever,  malaise/fatigue and night sweats.   HENT: Negative for nosebleeds.    Eyes: Negative for blurred vision and double vision.   Cardiovascular: Positive for dyspnea on exertion. Negative for chest pain, claudication, irregular heartbeat, leg swelling, near-syncope, orthopnea, palpitations, paroxysmal nocturnal dyspnea and syncope.   Respiratory: Negative for cough, shortness of breath, sleep disturbances due to breathing, snoring, sputum production and wheezing.    Endocrine: Negative for cold intolerance and polyuria.   Hematologic/Lymphatic: Does not bruise/bleed easily.   Skin: Negative for rash.   Musculoskeletal: Negative for back pain, falls, joint pain, joint swelling and neck pain.   Gastrointestinal: Negative for abdominal pain, heartburn, nausea and vomiting.   Genitourinary: Negative for dysuria, frequency and hematuria.   Neurological: Negative for difficulty with concentration, dizziness, focal weakness, headaches, light-headedness, numbness, seizures and weakness.   Psychiatric/Behavioral: Negative for depression, memory loss and substance abuse. The patient does not have insomnia.    Allergic/Immunologic: Negative for HIV exposure and hives.       Objective:   Physical Exam  HENT:      Head: Normocephalic.   Eyes:      Pupils: Pupils are equal, round, and reactive to light.   Neck:      Thyroid: No thyromegaly.      Vascular: Normal carotid pulses. No carotid bruit or JVD.   Cardiovascular:      Rate and Rhythm: Normal rate and regular rhythm.  No extrasystoles are present.     Chest Wall: PMI is not displaced.      Pulses: Normal pulses.      Heart sounds: Normal heart sounds. No murmur heard.    No gallop. No S3 sounds.   Pulmonary:      Effort: No respiratory distress.      Breath sounds: Normal breath sounds. No stridor.   Abdominal:      General: Bowel sounds are normal.      Palpations: Abdomen is soft.      Tenderness: There is no abdominal tenderness. There is no rebound.   Musculoskeletal:          General: Normal range of motion.   Skin:     Findings: No rash.   Neurological:      Mental Status: She is alert and oriented to person, place, and time.   Psychiatric:         Behavior: Behavior normal.         Lab Results   Component Value Date    CHOL 193 12/14/2020    CHOL 207 (H) 10/14/2019    CHOL 179 08/30/2018     Lab Results   Component Value Date    HDL 80 (H) 12/14/2020    HDL 77 (H) 10/14/2019    HDL 75 08/30/2018     Lab Results   Component Value Date    LDLCALC 97.6 12/14/2020    LDLCALC 117.6 10/14/2019    LDLCALC 90.6 08/30/2018     Lab Results   Component Value Date    TRIG 77 12/14/2020    TRIG 62 10/14/2019    TRIG 67 08/30/2018     Lab Results   Component Value Date    CHOLHDL 41.5 12/14/2020    CHOLHDL 37.2 10/14/2019    CHOLHDL 41.9 08/30/2018       Chemistry        Component Value Date/Time     12/20/2021 1004    K 4.4 12/20/2021 1004     12/20/2021 1004    CO2 30 (H) 12/20/2021 1004    BUN 20 12/20/2021 1004    CREATININE 0.9 12/20/2021 1004    GLU 87 12/20/2021 1004        Component Value Date/Time    CALCIUM 10.0 12/20/2021 1004    ALKPHOS 29 (L) 01/20/2022 0953    AST 21 01/20/2022 0953    ALT 19 01/20/2022 0953    BILITOT 1.3 (H) 01/20/2022 0953    ESTGFRAFRICA >60.0 12/20/2021 1004    EGFRNONAA >60.0 12/20/2021 1004          No results found for: LABA1C, HGBA1C  Lab Results   Component Value Date    TSH 2.121 12/20/2021     No results found for: INR, PROTIME  Lab Results   Component Value Date    WBC 5.29 12/20/2021    HGB 14.8 12/20/2021    HCT 46.0 12/20/2021    MCV 94 12/20/2021     12/20/2021     BNP  @LABRCNTIP(BNP,BNPTRIAGEBLO)@  CrCl cannot be calculated (Patient's most recent lab result is older than the maximum 7 days allowed.).  No results found in the last 24 hours.  No results found in the last 24 hours.  No results found in the last 24 hours.    Assessment:      1. Preop cardiovascular exam    2. Palpitations    3. Systemic lupus erythematosus with other  organ involvement, unspecified SLE type    4. SOB (shortness of breath)        Plan:     Echo and ETT for SOB    Counseled DASH  Check Lipid profile in 6 months  Recommend heart-healthy diet, weight control and regular exercise.  Jaymie. Risk modification.   I have reviewed all pertinent labs and cardiac studies independently. Plans and recommendations have been formulated under my direct supervision. All questions answered and patient voiced understanding.   If symptoms persist go to the ED  F/u with PCP    03/15/2022  Echo showed normal function and structure  Treadmill stress ekg normal.  Low periop risk of CV events for Low risk procedure.  Good functional and exercise capacity.  No chest pain, active arrhythmia and CHF symptoms.  Ok to proceed the scheduled surgery without further cardiac study.

## 2022-03-09 ENCOUNTER — TELEPHONE (OUTPATIENT)
Dept: ORTHOPEDICS | Facility: CLINIC | Age: 52
End: 2022-03-09
Payer: MEDICAID

## 2022-03-09 ENCOUNTER — PATIENT MESSAGE (OUTPATIENT)
Dept: ORTHOPEDICS | Facility: CLINIC | Age: 52
End: 2022-03-09
Payer: MEDICAID

## 2022-03-09 NOTE — TELEPHONE ENCOUNTER
Spoke with patient and got her MRI rescheduled along with her follow up visit with Dr. Robbins. Patient was grateful for the call. -NS    ----- Message from Franny Chaudhary sent at 3/9/2022 12:10 PM CST -----  Contact: PT    Who Called;  Laurel      Does the patient know what this is regarding?: MRI not approved- need to r/s appts    Would the patient rather a call back or a response via [facility]? Callback   Best Call Back Number: .[phone] (home) [phone] (work)     Additional Information:

## 2022-03-10 ENCOUNTER — PATIENT MESSAGE (OUTPATIENT)
Dept: ORTHOPEDICS | Facility: CLINIC | Age: 52
End: 2022-03-10
Payer: MEDICAID

## 2022-03-10 ENCOUNTER — PATIENT MESSAGE (OUTPATIENT)
Dept: PAIN MEDICINE | Facility: CLINIC | Age: 52
End: 2022-03-10
Payer: MEDICAID

## 2022-03-11 ENCOUNTER — TELEPHONE (OUTPATIENT)
Dept: PAIN MEDICINE | Facility: CLINIC | Age: 52
End: 2022-03-11
Payer: MEDICAID

## 2022-03-11 ENCOUNTER — PATIENT MESSAGE (OUTPATIENT)
Dept: PAIN MEDICINE | Facility: CLINIC | Age: 52
End: 2022-03-11
Payer: MEDICAID

## 2022-03-11 NOTE — TELEPHONE ENCOUNTER
Contacted pt. Appt for procedure scheduled 03/31/22with Dr. Sindhu MD . Pt will make f/u on portal 4 weeks post procedure on portal with  Tia Hernández PA-C , pt confirmed knowing how to do . Went over instructions with pt and pt verbalized understanding.Instructions also mailed to pt.  All questions answered.

## 2022-03-14 ENCOUNTER — HOSPITAL ENCOUNTER (OUTPATIENT)
Dept: CARDIOLOGY | Facility: HOSPITAL | Age: 52
Discharge: HOME | End: 2022-03-14
Attending: INTERNAL MEDICINE
Payer: MEDICAID

## 2022-03-14 VITALS
SYSTOLIC BLOOD PRESSURE: 114 MMHG | BODY MASS INDEX: 21.68 KG/M2 | WEIGHT: 127 LBS | HEIGHT: 64 IN | DIASTOLIC BLOOD PRESSURE: 78 MMHG

## 2022-03-14 DIAGNOSIS — M32.19 SYSTEMIC LUPUS ERYTHEMATOSUS WITH OTHER ORGAN INVOLVEMENT, UNSPECIFIED SLE TYPE: Chronic | ICD-10-CM

## 2022-03-14 DIAGNOSIS — R06.02 SOB (SHORTNESS OF BREATH): ICD-10-CM

## 2022-03-14 DIAGNOSIS — R00.2 PALPITATIONS: ICD-10-CM

## 2022-03-14 DIAGNOSIS — M32.19 SYSTEMIC LUPUS ERYTHEMATOSUS WITH OTHER ORGAN INVOLVEMENT, UNSPECIFIED SLE TYPE: ICD-10-CM

## 2022-03-14 LAB
AORTIC ROOT ANNULUS: 2.95 CM
AV INDEX (PROSTH): 0.9
AV MEAN GRADIENT: 3 MMHG
AV PEAK GRADIENT: 5 MMHG
AV REGURGITATION PRESSURE HALF TIME: 1086.27 MS
AV VALVE AREA: 2.87 CM2
AV VELOCITY RATIO: 0.92
BSA FOR ECHO PROCEDURE: 1.61 M2
CV ECHO LV RWT: 0.38 CM
DOP CALC AO PEAK VEL: 1.1 M/S
DOP CALC AO VTI: 26 CM
DOP CALC LVOT AREA: 3.2 CM2
DOP CALC LVOT DIAMETER: 2.01 CM
DOP CALC LVOT PEAK VEL: 1.01 M/S
DOP CALC LVOT STROKE VOLUME: 74.53 CM3
DOP CALC RVOT PEAK VEL: 0.72 M/S
DOP CALC RVOT VTI: 15.1 CM
DOP CALCLVOT PEAK VEL VTI: 23.5 CM
E WAVE DECELERATION TIME: 186.21 MSEC
E/A RATIO: 1.04
E/E' RATIO: 5.43 M/S
ECHO EF ESTIMATED: 54 %
ECHO LV POSTERIOR WALL: 0.79 CM (ref 0.6–1.1)
EJECTION FRACTION: 65 %
FRACTIONAL SHORTENING: 27 % (ref 28–44)
INTERVENTRICULAR SEPTUM: 0.74 CM (ref 0.6–1.1)
IVRT: 88.49 MSEC
LA MAJOR: 4.23 CM
LA MINOR: 4.21 CM
LA WIDTH: 3.09 CM
LEFT ATRIUM SIZE: 3.11 CM
LEFT ATRIUM VOLUME INDEX MOD: 18 ML/M2
LEFT ATRIUM VOLUME INDEX: 21.4 ML/M2
LEFT ATRIUM VOLUME MOD: 28.91 CM3
LEFT ATRIUM VOLUME: 34.47 CM3
LEFT INTERNAL DIMENSION IN SYSTOLE: 2.99 CM (ref 2.1–4)
LEFT VENTRICLE DIASTOLIC VOLUME INDEX: 46.76 ML/M2
LEFT VENTRICLE DIASTOLIC VOLUME: 75.29 ML
LEFT VENTRICLE MASS INDEX: 57 G/M2
LEFT VENTRICLE SYSTOLIC VOLUME INDEX: 21.5 ML/M2
LEFT VENTRICLE SYSTOLIC VOLUME: 34.59 ML
LEFT VENTRICULAR INTERNAL DIMENSION IN DIASTOLE: 4.12 CM (ref 3.5–6)
LEFT VENTRICULAR MASS: 92.48 G
LV LATERAL E/E' RATIO: 4.75 M/S
LV SEPTAL E/E' RATIO: 6.33 M/S
LVOT MG: 2.06 MMHG
LVOT MV: 0.65 CM/S
MV PEAK A VEL: 0.73 M/S
MV PEAK E VEL: 0.76 M/S
MV STENOSIS PRESSURE HALF TIME: 54 MS
MV VALVE AREA P 1/2 METHOD: 4.07 CM2
PISA AR MAX VEL: 3.92 M/S
PISA TR MAX VEL: 2.23 M/S
PULM VEIN S/D RATIO: 1.28
PV MEAN GRADIENT: 1.17 MMHG
PV MV: 0.59 M/S
PV PEAK D VEL: 0.42 M/S
PV PEAK S VEL: 0.54 M/S
PV PEAK VELOCITY: 0.81 CM/S
RA MAJOR: 3.92 CM
RA PRESSURE: 3 MMHG
RA WIDTH: 3.02 CM
RIGHT VENTRICULAR END-DIASTOLIC DIMENSION: 2.8 CM
SINUS: 3.03 CM
STJ: 2.93 CM
TDI LATERAL: 0.16 M/S
TDI SEPTAL: 0.12 M/S
TDI: 0.14 M/S
TR MAX PG: 20 MMHG
TRICUSPID ANNULAR PLANE SYSTOLIC EXCURSION: 1.95 CM
TV REST PULMONARY ARTERY PRESSURE: 23 MMHG

## 2022-03-14 PROCEDURE — 93016 EXERCISE STRESS - EKG (CUPID ONLY): ICD-10-PCS | Mod: ,,, | Performed by: INTERNAL MEDICINE

## 2022-03-14 PROCEDURE — 93306 ECHO (CUPID ONLY): ICD-10-PCS | Mod: 26,,, | Performed by: INTERNAL MEDICINE

## 2022-03-14 PROCEDURE — 93306 TTE W/DOPPLER COMPLETE: CPT

## 2022-03-14 PROCEDURE — 93306 TTE W/DOPPLER COMPLETE: CPT | Mod: 26,,, | Performed by: INTERNAL MEDICINE

## 2022-03-14 PROCEDURE — 93018 EXERCISE STRESS - EKG (CUPID ONLY): ICD-10-PCS | Mod: ,,, | Performed by: INTERNAL MEDICINE

## 2022-03-14 PROCEDURE — 93018 CV STRESS TEST I&R ONLY: CPT | Mod: ,,, | Performed by: INTERNAL MEDICINE

## 2022-03-14 PROCEDURE — 93016 CV STRESS TEST SUPVJ ONLY: CPT | Mod: ,,, | Performed by: INTERNAL MEDICINE

## 2022-03-14 PROCEDURE — 93017 CV STRESS TEST TRACING ONLY: CPT

## 2022-03-15 ENCOUNTER — TELEPHONE (OUTPATIENT)
Dept: CARDIOLOGY | Facility: CLINIC | Age: 52
End: 2022-03-15
Payer: MEDICAID

## 2022-03-15 PROBLEM — Z01.810 PREOP CARDIOVASCULAR EXAM: Status: ACTIVE | Noted: 2022-03-15

## 2022-03-15 LAB
CV STRESS BASE HR: 73 BPM
DIASTOLIC BLOOD PRESSURE: 66 MMHG
OHS CV CPX 1 MINUTE RECOVERY HEART RATE: 115 BPM
OHS CV CPX 85 PERCENT MAX PREDICTED HEART RATE MALE: 138
OHS CV CPX ESTIMATED METS: 11
OHS CV CPX MAX PREDICTED HEART RATE: 163
OHS CV CPX PATIENT IS FEMALE: 1
OHS CV CPX PATIENT IS MALE: 0
OHS CV CPX PEAK DIASTOLIC BLOOD PRESSURE: 84 MMHG
OHS CV CPX PEAK HEAR RATE: 150 BPM
OHS CV CPX PEAK RATE PRESSURE PRODUCT: NORMAL
OHS CV CPX PEAK SYSTOLIC BLOOD PRESSURE: 152 MMHG
OHS CV CPX PERCENT MAX PREDICTED HEART RATE ACHIEVED: 92
OHS CV CPX RATE PRESSURE PRODUCT PRESENTING: 8249
STRESS ECHO POST EXERCISE DUR MIN: 9 MINUTES
STRESS ECHO POST EXERCISE DUR SEC: 31 SECONDS
STRESS ST DEPRESSION: 1 MM
SYSTOLIC BLOOD PRESSURE: 113 MMHG

## 2022-03-15 NOTE — TELEPHONE ENCOUNTER
Spoke with patient regarding notes, answered questions. However, she wanted to know if her symptoms of SOB, fatigue, and palpitations were a results of the mild regurgitation. She read her echo results and would like clarification and to know if there is anything that can be done for her symptoms. Please advise.       ----- Message from Marcelo Adams MD sent at 3/15/2022 11:38 AM CDT -----  Echo and stress test misha  The pre op note is done

## 2022-03-16 ENCOUNTER — TELEPHONE (OUTPATIENT)
Dept: PAIN MEDICINE | Facility: CLINIC | Age: 52
End: 2022-03-16
Payer: MEDICAID

## 2022-03-16 ENCOUNTER — TELEPHONE (OUTPATIENT)
Dept: CARDIOLOGY | Facility: CLINIC | Age: 52
End: 2022-03-16
Payer: MEDICAID

## 2022-03-16 DIAGNOSIS — R06.02 SOB (SHORTNESS OF BREATH): Primary | ICD-10-CM

## 2022-03-16 NOTE — TELEPHONE ENCOUNTER
----- Message from Rita Avitia MA sent at 3/16/2022  8:49 AM CDT -----  Contact: BRITTANY BILLINGS [[id number]]  Pt would to get a referral to see pulmanary. Pt stated that she is still having shortness of breath and feels that her rib cage is poking out.  ----- Message -----  From: Bharti Zurita  Sent: 3/16/2022   8:40 AM CDT  To: [facility] Staff    .Type:  Patient Returning Call    Who Called:BRITTANY BILLINGS [[id number]]  Who Left Message for Patient:  Does the patient know what this is regarding?  Would the patient rather a call back or a response via [facility]? Call   Best Call Back Number:[phone] (home) [phone] (work)   Additional Information:

## 2022-03-16 NOTE — TELEPHONE ENCOUNTER
----- Message from Yahaira Vazquez sent at 3/16/2022  1:07 PM CDT -----  Contact: Laurel Barragan is needing a call back to schedule her post op appointment for 2 weeks out. Please call her back at [phone] or send a message through [email or web address]

## 2022-03-16 NOTE — TELEPHONE ENCOUNTER
Pt unable to schedule due to insurance. Scheduled with Tia Hernández PA-C 4/28/2022 all questions answered.

## 2022-03-22 ENCOUNTER — PATIENT MESSAGE (OUTPATIENT)
Dept: ORTHOPEDICS | Facility: CLINIC | Age: 52
End: 2022-03-22
Payer: MEDICAID

## 2022-03-24 ENCOUNTER — HOSPITAL ENCOUNTER (OUTPATIENT)
Dept: RADIOLOGY | Facility: HOSPITAL | Age: 52
Discharge: HOME | End: 2022-03-24
Attending: STUDENT IN AN ORGANIZED HEALTH CARE EDUCATION/TRAINING PROGRAM
Payer: MEDICAID

## 2022-03-24 DIAGNOSIS — M25.559 PAIN IN UNSPECIFIED HIP: ICD-10-CM

## 2022-03-24 PROCEDURE — 73721 MRI JNT OF LWR EXTRE W/O DYE: CPT | Mod: TC,LT

## 2022-03-27 DIAGNOSIS — G25.81 RESTLESS LEGS SYNDROME: ICD-10-CM

## 2022-03-28 NOTE — PRE-PROCEDURE INSTRUCTIONS
Spoke with patient regarding procedure scheduled on 3.31    Arrival time 0820    Has patient been sick with fever or on antibiotics within the last 7 days? No    Does the patient have any open wounds, sores or rashes? No    Does the patient have any recent fractures? no    Has patient received a vaccination within the last 7 days? No    Received the COVID vaccination? yes     Has the patient stopped all medications as directed? Na    Does patient have a pacemaker and or defibrillator? no    Does the patient have a ride to and from procedure and someone reliable to remain with patient? Partner will     Is the patient diabetic? no    Does the patient have sleep apnea? Or use O2 at home? No and no     Is the patient receiving sedation? yes    Is the patient instructed to remain NPO beginning at midnight the night before their procedure? yes    Procedure location confirmed with patient? Yes    Covid- Denies signs/symptoms. Instructed to notify PAT/MD if any changes.

## 2022-03-28 NOTE — PRE-PROCEDURE INSTRUCTIONS
Attempted to PAT patient for procedure on 3.31. with Dr. Manley. No answer. LVM with return phone number. No return call at this time.

## 2022-03-29 ENCOUNTER — OFFICE VISIT (OUTPATIENT)
Dept: ORTHOPEDICS | Facility: CLINIC | Age: 52
End: 2022-03-29
Payer: MEDICAID

## 2022-03-29 ENCOUNTER — PATIENT MESSAGE (OUTPATIENT)
Dept: GASTROENTEROLOGY | Facility: CLINIC | Age: 52
End: 2022-03-29
Payer: MEDICAID

## 2022-03-29 VITALS — WEIGHT: 127 LBS | BODY MASS INDEX: 21.68 KG/M2 | HEIGHT: 64 IN

## 2022-03-29 DIAGNOSIS — M25.852 FEMOROACETABULAR IMPINGEMENT OF LEFT HIP: Primary | ICD-10-CM

## 2022-03-29 PROCEDURE — 99999 PR PBB SHADOW E&M-EST. PATIENT-LVL III: ICD-10-PCS | Mod: PBBFAC,,, | Performed by: STUDENT IN AN ORGANIZED HEALTH CARE EDUCATION/TRAINING PROGRAM

## 2022-03-29 PROCEDURE — 1159F MED LIST DOCD IN RCRD: CPT | Mod: CPTII,,, | Performed by: STUDENT IN AN ORGANIZED HEALTH CARE EDUCATION/TRAINING PROGRAM

## 2022-03-29 PROCEDURE — 99213 OFFICE O/P EST LOW 20 MIN: CPT | Mod: PBBFAC | Performed by: STUDENT IN AN ORGANIZED HEALTH CARE EDUCATION/TRAINING PROGRAM

## 2022-03-29 PROCEDURE — 99999 PR PBB SHADOW E&M-EST. PATIENT-LVL III: CPT | Mod: PBBFAC,,, | Performed by: STUDENT IN AN ORGANIZED HEALTH CARE EDUCATION/TRAINING PROGRAM

## 2022-03-29 PROCEDURE — 1159F PR MEDICATION LIST DOCUMENTED IN MEDICAL RECORD: ICD-10-PCS | Mod: CPTII,,, | Performed by: STUDENT IN AN ORGANIZED HEALTH CARE EDUCATION/TRAINING PROGRAM

## 2022-03-29 PROCEDURE — 3008F BODY MASS INDEX DOCD: CPT | Mod: CPTII,,, | Performed by: STUDENT IN AN ORGANIZED HEALTH CARE EDUCATION/TRAINING PROGRAM

## 2022-03-29 PROCEDURE — 1160F RVW MEDS BY RX/DR IN RCRD: CPT | Mod: CPTII,,, | Performed by: STUDENT IN AN ORGANIZED HEALTH CARE EDUCATION/TRAINING PROGRAM

## 2022-03-29 PROCEDURE — 1160F PR REVIEW ALL MEDS BY PRESCRIBER/CLIN PHARMACIST DOCUMENTED: ICD-10-PCS | Mod: CPTII,,, | Performed by: STUDENT IN AN ORGANIZED HEALTH CARE EDUCATION/TRAINING PROGRAM

## 2022-03-29 PROCEDURE — 3008F PR BODY MASS INDEX (BMI) DOCUMENTED: ICD-10-PCS | Mod: CPTII,,, | Performed by: STUDENT IN AN ORGANIZED HEALTH CARE EDUCATION/TRAINING PROGRAM

## 2022-03-29 PROCEDURE — 99213 PR OFFICE/OUTPT VISIT, EST, LEVL III, 20-29 MIN: ICD-10-PCS | Mod: S$PBB,,, | Performed by: STUDENT IN AN ORGANIZED HEALTH CARE EDUCATION/TRAINING PROGRAM

## 2022-03-29 PROCEDURE — 99213 OFFICE O/P EST LOW 20 MIN: CPT | Mod: S$PBB,,, | Performed by: STUDENT IN AN ORGANIZED HEALTH CARE EDUCATION/TRAINING PROGRAM

## 2022-03-29 NOTE — PROGRESS NOTES
Orthopaedic Follow-Up Visit    Last Appointment: 02/25/2022  Diagnosis: Femoroacetabular impingement of left hip  Prior Procedure: none    Laurel Calvo is a 51 y.o. female who is here for f/u evaluation of the left hip. The patient was last seen here by me on 02/25/2022 at which point we decided to send her for an MRI prior to considering further treatment options. The patient returns today reporting that the symptoms are unchanged and is interested in proceeding with further treatment options.     To review her history, Laurel Calvo is a 51 y.o. female who presents with LEFT hip pain and dysfunction. Onset of the symptoms was 1 year ago. Inciting event: none, no known injury. Current symptoms include left hip and leg pain, occasional numbness and buckling. Pain is aggravated by activity, especially hip flexion.    Patient's medications, allergies, past medical, surgical, social and family histories were reviewed and updated as appropriate.    Review of Systems   All systems reviewed were negative.  Specifically, the patient denies fever, chills, weight loss, chest pain, shortness of breath, or dyspnea on exertion.      Past Medical History:   Diagnosis Date    Anxiety     Chronic left-sided low back pain without sciatica 9/28/2018    Chronic pain of left knee 9/28/2018    XR KNEE COMP LEFT 4 OR MORE VIEWS - October 27, 2017 CLINICAL HISTORY:   Left knee pain FINDINGS:   No fracture, dislocation, or arthritic change of the knee. Dictated and Electronically Signed By: eJm Damon MD on October 27, 2017    GERD (gastroesophageal reflux disease)     Lupus     Migraine with aura and without status migrainosus, not intractable 8/8/2014    Seizures        Objective:      Physical Exam  Patient is alert and oriented, no distress. Skin is intact. Neuro is normal with no focal motor or sensory findings.    Hip examination    ROM RIGHT LEFT   Flexion 100 100   ER at 90° 60 50   IR at 90° 30 20     Strength RIGHT  LEFT   Flexion 5 5   Abduction 5 5   Adduction 5 5       Tenderness RIGHT LEFT   Hip flexor - +   Adductors - -   Lower abdomen - -   Trochanter - -   Symphysis - -   Other       Tests RIGHT LEFT   Log roll - -   [place]ast[place]field - +   FADDIR - +   RIP (pain) - -   RIP (height) - -   Edison - -   Resisted situp - -   Other       Imaging:  MRI Hip Without Contrast Left  Narrative: EXAMINATION:  MRI HIP WITHOUT CONTRAST LEFT    CLINICAL HISTORY:  Hip pain, chronic, labral tear suspected, xray done;Hip trauma, fracture suspected, xray done;  Pain in unspecified hip    TECHNIQUE:  Multiplanar, multisequence imaging of the pelvis and left hip without the use of contrast.    COMPARISON:  Left hip radiograph 02/21/2022.    FINDINGS:  Osseous structures: Normal marrow signal.  No fracture.  No suspicious osseous lesion.    Hip and Sacroiliac joints: No effusion. Mild-moderate cartilage irregularity at the peripheral aspect of the acetabular roof with mild associated subchondral cystic change.  Probable partial-thickness chondral-labral separation at the anterior/superior labrum.  No labral detached tear or paralabral cyst.    Pubic symphysis: No osteitis pubis.    Bursae: Normal.    Soft tissues: Regional muscles and tendons are normal in appearance.    Miscellaneous: None.  Impression: No acute abnormality.    Mild-moderate degenerative changes at the peripheral aspect of the acetabular roof.    Probable partial thickness chondral-labral separation at the anterior/superior labrum.    Electronically signed by: Jay Watkins  Date:    03/24/2022  Time:    08:18       Assessment/Plan:   Assessment:  Laurel Calvo is a 51 y.o. female with left hip femoroacetabular impingement, labral tear    Plan:    1. Discussed diagnosis and treatment options with her today.  She has femoroacetabular impingement and labral tear.  She also has some degenerative cartilage changes and early arthritis in the hip.  2. She is scheduled to see   Sindhu for injection in the lumbar spine.  I recommend that she proceed with this.  If she gets good relief from the injection, then most likely the majority of her symptoms are coming from her back.  If she does not get good relief from the injection, then I would recommend intra-articular hip injection.  3. Follow-up with me in 6-8 weeks        Kristan Alvarado MA

## 2022-03-31 ENCOUNTER — HOSPITAL ENCOUNTER (OUTPATIENT)
Facility: HOSPITAL | Age: 52
Discharge: HOME | End: 2022-03-31
Attending: PHYSICAL MEDICINE & REHABILITATION | Admitting: PHYSICAL MEDICINE & REHABILITATION
Payer: MEDICAID

## 2022-03-31 VITALS
OXYGEN SATURATION: 100 % | RESPIRATION RATE: 18 BRPM | HEART RATE: 66 BPM | DIASTOLIC BLOOD PRESSURE: 65 MMHG | HEIGHT: 64 IN | BODY MASS INDEX: 22.17 KG/M2 | SYSTOLIC BLOOD PRESSURE: 112 MMHG | TEMPERATURE: 98 F | WEIGHT: 129.88 LBS

## 2022-03-31 DIAGNOSIS — M70.62 GREATER TROCHANTERIC BURSITIS, LEFT: ICD-10-CM

## 2022-03-31 DIAGNOSIS — M46.1 SACROILIITIS: Primary | ICD-10-CM

## 2022-03-31 PROCEDURE — 20610 DRAIN/INJ JOINT/BURSA W/O US: CPT | Mod: LT,,, | Performed by: PHYSICAL MEDICINE & REHABILITATION

## 2022-03-31 PROCEDURE — 20610 PR DRAIN/INJECT LARGE JOINT/BURSA: ICD-10-PCS | Mod: LT,,, | Performed by: PHYSICAL MEDICINE & REHABILITATION

## 2022-03-31 PROCEDURE — 63600175 PHARM REV CODE 636 W HCPCS: Performed by: PHYSICAL MEDICINE & REHABILITATION

## 2022-03-31 PROCEDURE — 77002 PR FLUOROSCOPIC GUIDANCE NEEDLE PLACEMENT: ICD-10-PCS | Mod: 26,,, | Performed by: PHYSICAL MEDICINE & REHABILITATION

## 2022-03-31 PROCEDURE — 20610 DRAIN/INJ JOINT/BURSA W/O US: CPT | Performed by: PHYSICAL MEDICINE & REHABILITATION

## 2022-03-31 PROCEDURE — 77002 NEEDLE LOCALIZATION BY XRAY: CPT | Mod: 26,,, | Performed by: PHYSICAL MEDICINE & REHABILITATION

## 2022-03-31 PROCEDURE — 25500020 PHARM REV CODE 255: Performed by: PHYSICAL MEDICINE & REHABILITATION

## 2022-03-31 PROCEDURE — 25000003 PHARM REV CODE 250: Performed by: PHYSICAL MEDICINE & REHABILITATION

## 2022-03-31 RX ORDER — ONDANSETRON 2 MG/ML
4 INJECTION INTRAMUSCULAR; INTRAVENOUS ONCE AS NEEDED
Status: DISCONTINUED | OUTPATIENT
Start: 2022-03-31 | End: 2022-03-31 | Stop reason: HOSPADM

## 2022-03-31 RX ORDER — FENTANYL CITRATE 50 UG/ML
INJECTION, SOLUTION INTRAMUSCULAR; INTRAVENOUS
Status: DISCONTINUED | OUTPATIENT
Start: 2022-03-31 | End: 2022-03-31 | Stop reason: HOSPADM

## 2022-03-31 RX ORDER — BUPIVACAINE HYDROCHLORIDE 2.5 MG/ML
INJECTION, SOLUTION EPIDURAL; INFILTRATION; INTRACAUDAL
Status: DISCONTINUED | OUTPATIENT
Start: 2022-03-31 | End: 2022-03-31 | Stop reason: HOSPADM

## 2022-03-31 RX ORDER — MIDAZOLAM HYDROCHLORIDE 1 MG/ML
INJECTION, SOLUTION INTRAMUSCULAR; INTRAVENOUS
Status: DISCONTINUED | OUTPATIENT
Start: 2022-03-31 | End: 2022-03-31 | Stop reason: HOSPADM

## 2022-03-31 RX ORDER — METHYLPREDNISOLONE ACETATE 40 MG/ML
INJECTION, SUSPENSION INTRA-ARTICULAR; INTRALESIONAL; INTRAMUSCULAR; SOFT TISSUE
Status: DISCONTINUED | OUTPATIENT
Start: 2022-03-31 | End: 2022-03-31 | Stop reason: HOSPADM

## 2022-03-31 NOTE — OP NOTE
Hip Injection/Arthrogram  ANTERIOR APPROACH  Time-out taken to identify patient and procedure side prior to starting the procedure.   I attest that I have reviewed the patient's home medications prior to the procedure and no contraindication have been identified. I  re-evaluated the patient after the patient was positioned for the procedure in the procedure room immediately before the procedural time-out. The vital signs are current and represent the current state of the patient which has not significantly changed since the preprocedure assessment.                                                                                                                             Date of Service: 03/31/2022    PCP: MARY LOU Antony MD    Referring Physician: Donato Manley MD                                                                                             PROCEDURE:  Left hip joint injection under fluoroscopy.      REASON FOR PROCEDURE: hip arthritis, femoral acetabular impingement    POSTOP DIAGNOSIS: same    PHYSICIAN: Donato Manley MD    ASSISTANTS: None                                                                                   ANESTHESIA:  Xylocaine 1% 9ml with Sodium Bicarbonate 1ml. 3ml per site.                                                                                                              MEDICATIONS INJECTED:  Depo-Medrol 40mg, bupivacaine 0.25% 4 mL                                                                                                                              ESTIMATED BLOOD LOSS:  None.                                                                                                                              COMPLICATIONS:  None.     SEDATION: Conscious sedation provided by M.D    The patient was monitored with continuous pulse oximetry, EKG, and intermittent blood pressure monitors, immediately prior to administration of sedation.  The patient was  hemodynamically stable throughout the entire process was responsive to voice, and breathing spontaneously.  Supplemental O2 was provided at 2L/min via nasal cannula.  Patient was comfortable for the duration of the procedure.     There was a total of 2mg IV Midazolam and 25mcg Fentanyl titrated for the procedure    Total sedation time was >10minutes and <20minutes                                                                                 TECHNIQUE:  With the patient lying in the supine position the the femoral neck identified under fluoroscopy.  The area was prepped and draped in the usual sterile fashion.  Local anesthetic was used, given by raising a wheal and going down to the hub of the 27-gauge needle.  A 3-1/2 inch 22-gauge needle was introduced under fluoroscopy until the tip reached the lateral aspect of the femoral neck. When the tip of the needle was thought   to be in appropriate position, contrast was injected to confirm proper placement.  Medication was then injected slowly.  The patient tolerated the procedure well.                                                                                                                      The patient was monitored for 20-30 minutes after the procedure and was given post procedure and discharge instructions to follow at home.  The      patient is to follow-up with me in two weeks by calling. The patient was discharged in a stable condtion.

## 2022-03-31 NOTE — DISCHARGE INSTRUCTIONS

## 2022-03-31 NOTE — DISCHARGE SUMMARY
Discharge Note  Short Stay      SUMMARY     Admit Date: 3/31/2022    Attending Physician: Donato Manley MD        Discharge Physician: Donato Manley MD        Discharge Date: 3/31/2022 9:02 AM    Procedure(s) (LRB):  Left intra-articular jip joint injection (Left)    Final Diagnosis: Sacroiliitis [M46.1]  Greater trochanteric bursitis, left [M70.62]    Disposition: Home or self care    Patient Instructions:   Current Discharge Medication List      CONTINUE these medications which have NOT CHANGED    Details   levothyroxine (SYNTHROID) 50 MCG tablet Take 1 tablet (50 mcg total) by mouth before breakfast.  Qty: 90 tablet, Refills: 3    Associated Diagnoses: Acquired hypothyroidism      EPINEPHrine (EPIPEN) 0.3 mg/0.3 mL AtIn Inject 0.3 mg into the muscle daily as needed.      estradiol (ESTRACE) 2 MG tablet TAKE 1 TABLET BY MOUTH ONE TIME DAILY  Refills: 1      gabapentin (NEURONTIN) 300 MG capsule Take 2 capsules (600 mg total) by mouth 3 (three) times daily.  Qty: 180 capsule, Refills: 1      lamoTRIgine (LAMICTAL) 100 MG tablet Take 1 tablet (100 mg total) by mouth 2 (two) times daily.  Qty: 180 tablet, Refills: 1    Associated Diagnoses: Partial symptomatic epilepsy with complex partial seizures, not intractable, without status epilepticus      meloxicam (MOBIC) 15 MG tablet Take 0.5-1 tablets (7.5-15 mg total) by mouth daily as needed for Pain. as needed for pain  Qty: 60 tablet, Refills: 0      multivitamin (THERAGRAN) per tablet Take 1 tablet by mouth once daily.       pantoprazole (PROTONIX) 40 MG tablet Take 1 tablet (40 mg total) by mouth 2 (two) times daily.  Qty: 60 tablet, Refills: 3    Associated Diagnoses: Laryngopharyngeal reflux (LPR)      rOPINIRole (REQUIP) 1 MG tablet SMARTSI Tablet(s) By Mouth Every Evening PRN      sod sulf-pot chloride-mag sulf (SUTAB) 1.479-0.188- 0.225 gram tablet Take 12 tablets by mouth once daily. Take according to package instructions with indicated amount of  water.  Qty: 24 tablet, Refills: 0    Associated Diagnoses: Gastroesophageal reflux disease without esophagitis; Pharyngoesophageal dysphagia; Throat clearing; Hoarseness of voice; Colon cancer screening      testosterone cypionate (DEPOTESTOTERONE CYPIONATE) 200 mg/mL injection Inject into the muscle every 28 days. (Dose? - By Dr. Brennen Mercer at [facility])      traZODone (DESYREL) 50 MG tablet Take 1 tablet (50 mg total) by mouth every evening.  Qty: 90 tablet, Refills: 3    Associated Diagnoses: Insomnia secondary to anxiety      vitamin D 1000 units Tab Take 1,000 Units by mouth once daily.                 Discharge Diagnosis: Sacroiliitis [M46.1]  Greater trochanteric bursitis, left [M70.62]  Condition on Discharge: Stable with no complications to procedure   Diet on Discharge: Same as before.  Activity: as per instruction sheet.  Discharge to: Home with a responsible adult.  Follow up: 2-4 weeks       Please call the office at [phone] if you experience any weakness or loss of sensation, fever > 101.5, pain uncontrolled with oral medications, persistent nausea/vomiting/or diarrhea, redness or drainage from the incisions, or any other worrisome concerns. If physician on call was not reached or could not communicate with our office for any reason please go to the nearest emergency department

## 2022-03-31 NOTE — H&P
HPI  Patient presenting for Procedure(s) (LRB):  Left intra-articular jip joint injection (Left)       No health changes since previous encounter    Past Medical History:   Diagnosis Date    Anxiety     Chronic left-sided low back pain without sciatica 2018    Chronic pain of left knee 2018    XR KNEE COMP LEFT 4 OR MORE VIEWS - 2017 CLINICAL HISTORY:   Left knee pain FINDINGS:   No fracture, dislocation, or arthritic change of the knee. Dictated and Electronically Signed By: Jem Damon MD on 2017    GERD (gastroesophageal reflux disease)     Lupus     Migraine with aura and without status migrainosus, not intractable 2014    Seizures      Past Surgical History:   Procedure Laterality Date    HYSTERECTOMY  2016    OOPHORECTOMY      WISDOM TOOTH EXTRACTION      X 4     Review of patient's allergies indicates:   Allergen Reactions    Bactrim [sulfamethoxazole-trimethoprim] Other (See Comments)     Lupus symptoms exacerbation, headache.    Ciprofloxacin Other (See Comments)     Lupus symptoms exacerbation, headaches.        No current facility-administered medications on file prior to encounter.     Current Outpatient Medications on File Prior to Encounter   Medication Sig Dispense Refill    levothyroxine (SYNTHROID) 50 MCG tablet Take 1 tablet (50 mcg total) by mouth before breakfast. 90 tablet 3    EPINEPHrine (EPIPEN) 0.3 mg/0.3 mL AtIn Inject 0.3 mg into the muscle daily as needed.      estradiol (ESTRACE) 2 MG tablet TAKE 1 TABLET BY MOUTH ONE TIME DAILY  1    lamoTRIgine (LAMICTAL) 100 MG tablet Take 1 tablet (100 mg total) by mouth 2 (two) times daily. 180 tablet 1    multivitamin (THERAGRAN) per tablet Take 1 tablet by mouth once daily.       pantoprazole (PROTONIX) 40 MG tablet Take 1 tablet (40 mg total) by mouth 2 (two) times daily. 60 tablet 3    rOPINIRole (REQUIP) 1 MG tablet SMARTSI Tablet(s) By Mouth Every Evening PRN      sod sulf-pot  "chloride-mag sulf (SUTAB) 1.479-0.188- 0.225 gram tablet Take 12 tablets by mouth once daily. Take according to package instructions with indicated amount of water. 24 tablet 0    testosterone cypionate (DEPOTESTOTERONE CYPIONATE) 200 mg/mL injection Inject into the muscle every 28 days. (Dose? - By Dr. Brennen Mercer at [facility])      traZODone (DESYREL) 50 MG tablet Take 1 tablet (50 mg total) by mouth every evening. 90 tablet 3    vitamin D 1000 units Tab Take 1,000 Units by mouth once daily.          PMHx, PSHx, Allergies, Medications reviewed in epic    ROS negative except pain complaints in HPI    OBJECTIVE:    /80 (BP Location: Right arm, Patient Position: Lying)   Pulse 65   Temp 97.7 °F (36.5 °C) (Temporal)   Resp 16   Ht 5' 4" (1.626 m)   Wt 58.9 kg (129 lb 13.6 oz)   LMP 08/26/2013   SpO2 100%   Breastfeeding No   BMI 22.29 kg/m²     PHYSICAL EXAMINATION:    GENERAL: Well appearing, in no acute distress, alert and oriented x3.  PSYCH:  Mood and affect appropriate.  SKIN: Skin color, texture, turgor normal, no rashes or lesions which will impact the procedure.  CV: RRR with palpation of the radial artery.  PULM: No evidence of respiratory difficulty, symmetric chest rise. Clear to auscultation.  NEURO: Cranial nerves grossly intact.    Plan:    Proceed with procedure as planned Procedure(s) (LRB):  Left intra-articular jip joint injection (Left)    Donato Manley MD  03/31/2022            "

## 2022-04-01 ENCOUNTER — PATIENT OUTREACH (OUTPATIENT)
Dept: ADMINISTRATIVE | Facility: OTHER | Age: 52
End: 2022-04-01
Payer: MEDICAID

## 2022-04-01 NOTE — PROGRESS NOTES
Health Maintenance Due   Topic Date Due    Colorectal Cancer Screening  Never done    COVID-19 Vaccine (3 - Booster for Pfizer series) 09/09/2021    Mammogram  01/04/2022     Updates were requested from care everywhere.  Chart was reviewed for overdue Proactive [facility] Encounters ([facility]) topics (CRS, Breast Cancer Screening, Eye exam)  Health Maintenance has been updated.  LINKS immunization registry triggered.  Immunizations were reconciled.

## 2022-04-06 RX ORDER — ROPINIROLE 1 MG/1
TABLET, FILM COATED ORAL
Qty: 30 TABLET | Refills: 11 | Status: SHIPPED | OUTPATIENT
Start: 2022-04-06 | End: 2024-02-20

## 2022-04-06 NOTE — TELEPHONE ENCOUNTER
REFILL REQUEST APPROVED.  Requested Prescriptions   Pending Prescriptions Disp Refills    rOPINIRole (REQUIP) 1 MG tablet [Pharmacy Med Name: ropinirole 1 mg tablet] 30 tablet 11     Sig: TAKE ONE TABLET BY MOUTH EVERY EVENING AS NEEDED FOR FOR RESTLESSNESS LEGS

## 2022-04-18 ENCOUNTER — PATIENT MESSAGE (OUTPATIENT)
Dept: ORTHOPEDICS | Facility: CLINIC | Age: 52
End: 2022-04-18
Payer: MEDICAID

## 2022-04-19 ENCOUNTER — PATIENT MESSAGE (OUTPATIENT)
Dept: PAIN MEDICINE | Facility: CLINIC | Age: 52
End: 2022-04-19
Payer: MEDICAID

## 2022-04-19 RX ORDER — GABAPENTIN 300 MG/1
600 CAPSULE ORAL 3 TIMES DAILY
Qty: 180 CAPSULE | Refills: 1 | Status: SHIPPED | OUTPATIENT
Start: 2022-04-19 | End: 2022-06-13 | Stop reason: SDUPTHER

## 2022-04-21 ENCOUNTER — TELEPHONE (OUTPATIENT)
Dept: PAIN MEDICINE | Facility: CLINIC | Age: 52
End: 2022-04-21
Payer: MEDICAID

## 2022-04-27 ENCOUNTER — TELEPHONE (OUTPATIENT)
Dept: PAIN MEDICINE | Facility: CLINIC | Age: 52
End: 2022-04-27
Payer: MEDICAID

## 2022-04-28 ENCOUNTER — OFFICE VISIT (OUTPATIENT)
Dept: PAIN MEDICINE | Facility: CLINIC | Age: 52
End: 2022-04-28
Payer: MEDICAID

## 2022-04-28 VITALS
BODY MASS INDEX: 22.17 KG/M2 | HEART RATE: 69 BPM | RESPIRATION RATE: 17 BRPM | DIASTOLIC BLOOD PRESSURE: 79 MMHG | HEIGHT: 64 IN | WEIGHT: 129.88 LBS | SYSTOLIC BLOOD PRESSURE: 118 MMHG

## 2022-04-28 DIAGNOSIS — M46.1 SACROILIITIS: Primary | ICD-10-CM

## 2022-04-28 DIAGNOSIS — M70.62 GREATER TROCHANTERIC BURSITIS, LEFT: ICD-10-CM

## 2022-04-28 PROCEDURE — 3074F PR MOST RECENT SYSTOLIC BLOOD PRESSURE < 130 MM HG: ICD-10-PCS | Mod: CPTII,,, | Performed by: PHYSICIAN ASSISTANT

## 2022-04-28 PROCEDURE — 3078F DIAST BP <80 MM HG: CPT | Mod: CPTII,,, | Performed by: PHYSICIAN ASSISTANT

## 2022-04-28 PROCEDURE — 99214 OFFICE O/P EST MOD 30 MIN: CPT | Mod: PBBFAC | Performed by: PHYSICIAN ASSISTANT

## 2022-04-28 PROCEDURE — 3078F PR MOST RECENT DIASTOLIC BLOOD PRESSURE < 80 MM HG: ICD-10-PCS | Mod: CPTII,,, | Performed by: PHYSICIAN ASSISTANT

## 2022-04-28 PROCEDURE — 99214 PR OFFICE/OUTPT VISIT, EST, LEVL IV, 30-39 MIN: ICD-10-PCS | Mod: S$PBB,,, | Performed by: PHYSICIAN ASSISTANT

## 2022-04-28 PROCEDURE — 3008F PR BODY MASS INDEX (BMI) DOCUMENTED: ICD-10-PCS | Mod: CPTII,,, | Performed by: PHYSICIAN ASSISTANT

## 2022-04-28 PROCEDURE — 99999 PR PBB SHADOW E&M-EST. PATIENT-LVL IV: ICD-10-PCS | Mod: PBBFAC,,, | Performed by: PHYSICIAN ASSISTANT

## 2022-04-28 PROCEDURE — 1160F PR REVIEW ALL MEDS BY PRESCRIBER/CLIN PHARMACIST DOCUMENTED: ICD-10-PCS | Mod: CPTII,,, | Performed by: PHYSICIAN ASSISTANT

## 2022-04-28 PROCEDURE — 1160F RVW MEDS BY RX/DR IN RCRD: CPT | Mod: CPTII,,, | Performed by: PHYSICIAN ASSISTANT

## 2022-04-28 PROCEDURE — 1159F PR MEDICATION LIST DOCUMENTED IN MEDICAL RECORD: ICD-10-PCS | Mod: CPTII,,, | Performed by: PHYSICIAN ASSISTANT

## 2022-04-28 PROCEDURE — 99999 PR PBB SHADOW E&M-EST. PATIENT-LVL IV: CPT | Mod: PBBFAC,,, | Performed by: PHYSICIAN ASSISTANT

## 2022-04-28 PROCEDURE — 99214 OFFICE O/P EST MOD 30 MIN: CPT | Mod: S$PBB,,, | Performed by: PHYSICIAN ASSISTANT

## 2022-04-28 PROCEDURE — 3008F BODY MASS INDEX DOCD: CPT | Mod: CPTII,,, | Performed by: PHYSICIAN ASSISTANT

## 2022-04-28 PROCEDURE — 3074F SYST BP LT 130 MM HG: CPT | Mod: CPTII,,, | Performed by: PHYSICIAN ASSISTANT

## 2022-04-28 PROCEDURE — 1159F MED LIST DOCD IN RCRD: CPT | Mod: CPTII,,, | Performed by: PHYSICIAN ASSISTANT

## 2022-04-28 RX ORDER — TIZANIDINE 4 MG/1
2-4 TABLET ORAL 2 TIMES DAILY PRN
Qty: 60 TABLET | Refills: 0 | Status: SHIPPED | OUTPATIENT
Start: 2022-04-28 | End: 2022-04-28 | Stop reason: CLARIF

## 2022-04-28 NOTE — PROGRESS NOTES
"Established Patient Chronic Pain Note (Follow up visit)    Chief Complaint:   Chief Complaint   Patient presents with    injection follow-up     9% relief for last injection       SUBJECTIVE:    Interval History (4/28/2022): Laurel Calvo presents today for follow-up visit.  she underwent left intraarticular hip joint injection on 3/31/22.  The patient reports she had relief x 5-6 days after the procedure, but once she returned to normal activity the pain returned and then worsened. Reports one week of severe pain 8/10, but has now decreased slightly to a constant 5/10. Has follow up appointment with Dr. Robbins in 2 weeks to discuss other treatment options for hip.  Reports she does experience pain in the lower lumbar region left > right and some radiation into the outer hips. Greatest pain continues to originate from groin. Patient reports pain as 5/10 today. Taking ibuprofen with temporary moderate relief.    Interval History (1/27/2022):  Laurel Calvo presents today for follow-up visit.  Patient was last seen on 11/9/2021. At that visit, she had a subacromial bursa injection, which she feels helped less than previous injection.  Patient reports pain as "0/10 today.    Interval HPI (11/9/2021):  Laurel Calvo is a 51 y.o. female who presents to the clinic for a follow-up appointment for lower back and left hip pain.  Her main complaint today is left shoulder pain.  At the last visit, she was provided with a left-sided greater trochanteric bursa injection in the clinic.  She reports that this resulted in significant relief.  Since the last visit, Laurel Calvo states the left shoulder pain has been worsening. Current pain intensity is 8/10.  She also locates pain to the left lower tailbone area.  Patient denies night fever/night sweats, urinary incontinence, bowel incontinence, significant weight loss, significant motor weakness and loss of sensations.    Interval HPI 09/02/2021:  Laurel Calvo is a " 51 y.o. female who presents to the clinic for a follow-up appointment for lower back and left hip pain. Since the last visit, Laurel Calvo states the pain has been persistant. Current pain intensity is 3/10.  She does report that she had a fall while gardening a few weeks ago and landed on her right hip, but this is somewhat improving.  Her left hip is certainly more aggravated currently.    Interval HPI 08/24/2021:  Laurel Calvo presents to tele-medicine appointment for a follow-up appointment for chronic shoulder, neck, low back, and hip pain.  She was last seen on 03/04/2021 where she underwent left-sided subacromial bursa and left-sided greater trochanteric bursa injections.  She states that these resulted in 80-85% relief for both sites.  She states that her left lower back pain has been worsening is having radiating pain down the left lower extremity posteriorly extending to the calf.  She was also referred to a chiropractor.  Since the last visit, Laurel Calvo states the pain has been worsening. Current pain intensity is 8/10.    Initial HPI 03/04/2021:  Laurel Calvo is a 50 y.o. female, right-hand dominant, who presents to the clinic for the evaluation of chronic neck and lower back pain.  She was referred by her primary care team for further evaluation and management of this pain.  She has past medical history of seizure disorder, restless leg syndrome, anxiety, lupus, migraines, GERD, and multiple other medical comorbidities as listed in her chart.  The pain started several years ago following pain being told that she had herniated disc following an MRI of the cervical and lumbar spine and symptoms have been unchanged.The pain is located in the cervical myofascial area and radiates to the left upper extremity extending into the hand and fingers..  The pain is described as sharp, stabbing and throbbing and is rated as 2/10. The pain is rated with a score of  1/10 on the BEST day and a score of  10/10 on the WORST day.  Symptoms interfere with daily activity. The pain is exacerbated by at night and with exercise.  The pain is mitigated by ice. The patient reports spending less than 2 hours per day reclining. The patient reports 4-6 hours of uninterrupted sleep per night.     Pain Disability Index Review:  Last 3 PDI Scores 4/28/2022 11/9/2021   Pain Disability Index (PDI) 59 15       Non-Pharmacologic Treatments:  Physical Therapy/Home Exercise: yes, has been active with home exercises as she has previously learned from physical therapies targeting the lower back  Ice/Heat:yes  TENS: no  Acupuncture: no  Massage: yes  Chiropractic: yes    Other: no        Pain Medications:  - Opioids: Norco  - Adjuvant Medications: Valium, baclofen, Plaquenil, Lamictal, Requip, trazodone  - Anti-Coagulants: None          Pain Procedures:   -03/04/2021:  Left subacromial bursa + left greater trochanteric bursa injections, 80-85% relief for both  -09/02/2021:  Left greater trochanteric bursa injection, significant relief  - Left subacromial bursa Injection in clinic 11/9/2021 - less pain relief than previously         Imaging (Reviewed on 4/28/2022):     MRI hip 03/24/2022  FINDINGS:  Osseous structures: Normal marrow signal.  No fracture.  No suspicious osseous lesion.     Hip and Sacroiliac joints: No effusion. Mild-moderate cartilage irregularity at the peripheral aspect of the acetabular roof with mild associated subchondral cystic change.  Probable partial-thickness chondral-labral separation at the anterior/superior labrum.  No labral detached tear or paralabral cyst.     Pubic symphysis: No osteitis pubis.     Bursae: Normal.     Soft tissues: Regional muscles and tendons are normal in appearance.     Miscellaneous: None.     Impression:     No acute abnormality.     Mild-moderate degenerative changes at the peripheral aspect of the acetabular roof.     Probable partial thickness chondral-labral separation at the  anterior/superior labrum.    X-ray hip 02/21/2022  FINDINGS:  Hip joints are symmetric in appearance.  Joint spaces are maintained.  No evidence of avascular necrosis.  There is a curvilinear lucency seen on multiple projections in the inter trochanteric crest of the left femur extending into the proximal diaphyseal region suspicious for nondisplaced fracture at this site.     This report was flagged in Epic as abnormal.    MRI lumbar spine 09/01/2021:  Levocurvature of the lumbar spine is noted.  No listhesis.  There is no acute fracture.  The vertebral bodies are normal in height without compression fractures. Posterior elements are intact. Mild disc desiccation at the L5-S1 level.  Lumbar spine disc heights are preserved.  Conus medullaris terminates at the T12-L1 level. Distal spinal cord intensity is normal.  There is no soft tissue abnormality.     T12-L1: No disc bulge.  There is no facet arthropathy.  There is no neural foraminal stenosis.  There is no spinal canal stenosis.     L1-L2: No disc bulge.  There is no facet arthropathy.  There is no neuroforaminal stenosis.  There is no spinal canal stenosis.     L2-L3: No disc bulge.  There is no facet arthropathy.  There is no neuroforaminal stenosis.  There is no spinal canal stenosis.     L3-L4: No disc bulge.  There is no facet arthropathy.  There is no neuroforaminal stenosis.  There is no spinal canal stenosis.     L4-L5: No disc bulge.  There is no facet arthropathy.  There is no neuroforaminal stenosis.  There is no spinal canal stenosis.     L5-S1: No disc bulge.  There is no facet arthropathy.  There is no neuroforaminal stenosis.  There is no spinal canal stenosis.       X-ray cervical spine 03/04/2021:  There is straightening of the normal cervical spine lordosis.  No listhesis.  No abnormal motion of the cervical spine with flexion and extension.  No fracture.  Intervertebral disc spaces of the cervical spine are maintained.  No significant  "uncovertebral or facet arthropathy.  Oblique views demonstrate no significant neural foraminal stenosis.  No bony central canal stenosis identified.  Prevertebral soft tissues are within normal limits.     X-ray lumbar spine 03/04/2021:  There is mild levocurvature of the lumbar spine.  No listhesis.  No abnormal motion of the lumbar spine with flexion and extension.  No fracture or pars defect.  Intervertebral disc spaces of the lumbar spine are maintained.  No significant facet arthropathy.  No suspicious osseous lesion.  Sacroiliac joints appear normal.             REVIEW OF SYSTEMS:    GENERAL:  No weight loss, malaise or fevers.  HEENT:   No recent changes in vision or hearing  NECK:  Negative for lumps, no difficulty with swallowing.  RESPIRATORY:  Negative for cough, wheezing or shortness of breath, patient denies any recent URI.  CARDIOVASCULAR:  Negative for chest pain, leg swelling or palpitations.  GI:  Negative for abdominal discomfort, blood in stools or black stools or change in bowel habits.  MUSCULOSKELETAL:  See HPI.  SKIN:  Negative for lesions, rash, and itching.  PSYCH:  No mood disorder or recent psychosocial stressors.  Patients sleep is not disturbed secondary to pain.  HEMATOLOGY/LYMPHOLOGY:  Negative for prolonged bleeding, bruising easily or swollen nodes.  Patient is not currently taking any anti-coagulants  NEURO:   No history of headaches, syncope, paralysis, seizures or tremors.  All other reviewed and negative other than HPI.        OBJECTIVE:    PHYSICAL EXAMINATION:  Telemedicine Exam  Vitals:    04/28/22 1039   BP: 118/79   Pulse: 69   Resp: 17   Weight: 58.9 kg (129 lb 13.6 oz)   Height: 5' 4" (1.626 m)     Body mass index is 22.29 kg/m².   (reviewed on 4/28/2022)     GENERAL: Well appearing, in no acute distress, alert and oriented x3.  Cooperative.  PSYCH:  Mood and affect appropriate.  SKIN: Skin color & texture with no obvious abnormalities.    HEAD/FACE:  Normocephalic, " atraumatic.    PULM:  No difficulty breathing. No nasal flaring. No obvious wheezing.  NECK: ROM fairly preserved.  Supple.   BACK: Palpation over the lumbar paraspinous muscles causes pain. Some pain with flexion. Some pain with extension.  Patient performed Jessica's/ Onesimo's + on left, causes low back pain. TTP over left SIJ + GTB.  EXTREMITIES: No obvious deformities. Moving all extremities well, appears to have symmetric strength throughout - with exception of left hip and left shoulder.  MUSCULOSKELETAL: No obvious atrophy abnormalities are noted.   NEURO: No obvious neurologic deficit.   GAIT: sitting.     Physical Exam: last in clinic visit:  GENERAL: Well appearing, in no acute distress, alert and oriented x3.  PSYCH:  Mood and affect appropriate.  SKIN: Skin color, texture, turgor normal, no rashes or lesions.  HEAD/FACE:  Normocephalic, atraumatic. Cranial nerves grossly intact.  CV: RRR with palpation of the radial artery.  PULM: No evidence of respiratory difficulty, symmetric chest rise.  GI:  Soft and non-tender.  BACK: Straight leg raising in the sitting and supine positions is negative to radicular pain. No pain to palpation over the facet joints of the lumbar spine or spinous processes. Normal range of motion without pain reproduction.  EXTREMITIES: Peripheral joint ROM is full and pain free without obvious instability or laxity in all four extremities. No deformities, edema, or skin discoloration. Good capillary refill.  MUSCULOSKELETAL:   positive shoulder impingement signs on the left shoulder with Merrill, Neer's , and empty can.  minimal tenderness to palpation over the left greater trochanteric bursa. .  There is moderate pain with palpation over the sacroiliac joint on the left.  FABERs test is equivocal on the left.  FADIRs test is negative.   Bilateral upper and lower extremity strength is normal and symmetric.  No atrophy or tone abnormalities are noted.  NEURO: Bilateral upper and lower  extremity coordination and muscle stretch reflexes are physiologic and symmetric.  Plantar response are downgoing. No clonus.  No loss of sensation is noted.  GAIT: normal.      ASSESSMENT: 51 y.o. year old female with lower back and left hip pain, consistent with     1. Sacroiliitis  IR SI Joint Injection Bilat w/Image    Case Request-RAD/Other Procedure Area: Bilateral GT bursa + bilateral SIJ injection RN IV Sedation, Bilateral GT bursa + bilateral SIJ injection RN IV Sedation    IR Aspiration Injection Large Joint W FL    IR Aspiration Injection Large Joint W FL    DISCONTINUED: tiZANidine (ZANAFLEX) 4 MG tablet   2. Greater trochanteric bursitis, left  IR SI Joint Injection Bilat w/Image    Case Request-RAD/Other Procedure Area: Bilateral GT bursa + bilateral SIJ injection RN IV Sedation, Bilateral GT bursa + bilateral SIJ injection RN IV Sedation    IR Aspiration Injection Large Joint W FL    IR Aspiration Injection Large Joint W FL    DISCONTINUED: tiZANidine (ZANAFLEX) 4 MG tablet         PLAN:   1. Interventional: Schedule bilateral S/p Left SIJ + Left GT bursa injection   S/p left intra-articular hip joint injection with limited relief  S/p Left SIJ + Left GT bursa injection.     2. Pharmacologic:   - continue  gabapentin 600mg TID.   - Continue Mobic 7.5 mg b.i.d PRN. OR Ibuprofen  -No muscle relaxants due to risk of decreased seizure threshold, patient voiced understanding  - Anticoagulation use: None.     3. Rehabilitative: Encouraged regular exercise. Continue exercises and activities as tolerated.     4. Diagnostic: Reviewed    5. Consults/referral: Follow up with [facility] as scheduled to discuss alternate treatment options for hip    5. Follow up: 4 weeks post-injection     - This condition does not require this patient to take time off of work, and the primary goal of our Pain Management services is to improve the patient's functional capacity.   - I discussed the risks, benefits, and alternatives  to potential treatment options. All questions and concerns were fully addressed today in clinic.       Tia Hernández PA-C  Interventional Pain Medicine    Disclaimer:  This note was prepared using voice recognition system and is likely to have sound alike errors that may have been overlooked even after proof reading.  Please call me with any questions.

## 2022-05-06 NOTE — PRE-PROCEDURE INSTRUCTIONS
Spoke with patient regarding procedure scheduled on 5.12    Arrival time 0820     Has patient been sick with fever or on antibiotics within the last 7 days? No    Does the patient have any open wounds, sores or rashes? No    Does the patient have any recent fractures? no    Has patient received a vaccination within the last 7 days? No    Received the COVID vaccination? yes    Has the patient stopped all medications as directed? Na    Does patient have a pacemaker and or defibrillator? no    Does the patient have a ride to and from procedure and someone reliable to remain with patient?  will     Is the patient diabetic? no    Does the patient have sleep apnea? Or use O2 at home? No and no     Is the patient receiving sedation? yes    Is the patient instructed to remain NPO beginning at midnight the night before their procedure? yes    Procedure location confirmed with patient? Yes    Covid- Denies signs/symptoms. Instructed to notify PAT/MD if any changes.

## 2022-05-12 ENCOUNTER — HOSPITAL ENCOUNTER (OUTPATIENT)
Facility: HOSPITAL | Age: 52
Discharge: HOME | End: 2022-05-12
Attending: PHYSICAL MEDICINE & REHABILITATION | Admitting: PHYSICAL MEDICINE & REHABILITATION
Payer: MEDICAID

## 2022-05-12 VITALS
WEIGHT: 129.31 LBS | BODY MASS INDEX: 22.07 KG/M2 | HEART RATE: 69 BPM | OXYGEN SATURATION: 98 % | HEIGHT: 64 IN | TEMPERATURE: 98 F | RESPIRATION RATE: 17 BRPM | SYSTOLIC BLOOD PRESSURE: 101 MMHG | DIASTOLIC BLOOD PRESSURE: 76 MMHG

## 2022-05-12 DIAGNOSIS — M70.62 GREATER TROCHANTERIC BURSITIS, LEFT: ICD-10-CM

## 2022-05-12 DIAGNOSIS — M46.1 SACROILIITIS: Primary | ICD-10-CM

## 2022-05-12 PROCEDURE — 20610 DRAIN/INJ JOINT/BURSA W/O US: CPT | Mod: 50,,, | Performed by: PHYSICAL MEDICINE & REHABILITATION

## 2022-05-12 PROCEDURE — 20610 DRAIN/INJ JOINT/BURSA W/O US: CPT | Mod: 50,59 | Performed by: PHYSICAL MEDICINE & REHABILITATION

## 2022-05-12 PROCEDURE — A9585 GADOBUTROL INJECTION: HCPCS | Performed by: PHYSICAL MEDICINE & REHABILITATION

## 2022-05-12 PROCEDURE — 27096 INJECT SACROILIAC JOINT: CPT | Mod: 50 | Performed by: PHYSICAL MEDICINE & REHABILITATION

## 2022-05-12 PROCEDURE — 27096 PR INJECTION,SACROILIAC JOINT: ICD-10-PCS | Mod: 50,59,, | Performed by: PHYSICAL MEDICINE & REHABILITATION

## 2022-05-12 PROCEDURE — 25000003 PHARM REV CODE 250: Performed by: PHYSICAL MEDICINE & REHABILITATION

## 2022-05-12 PROCEDURE — 27096 INJECT SACROILIAC JOINT: CPT | Mod: 50,59,, | Performed by: PHYSICAL MEDICINE & REHABILITATION

## 2022-05-12 PROCEDURE — 20610 PR DRAIN/INJECT LARGE JOINT/BURSA: ICD-10-PCS | Mod: 50,,, | Performed by: PHYSICAL MEDICINE & REHABILITATION

## 2022-05-12 PROCEDURE — 25500020 PHARM REV CODE 255: Performed by: PHYSICAL MEDICINE & REHABILITATION

## 2022-05-12 PROCEDURE — 27096 INJECT SACROILIAC JOINT: CPT | Mod: RT

## 2022-05-12 PROCEDURE — 63600175 PHARM REV CODE 636 W HCPCS: Performed by: PHYSICAL MEDICINE & REHABILITATION

## 2022-05-12 RX ORDER — BUPIVACAINE HYDROCHLORIDE 2.5 MG/ML
INJECTION, SOLUTION EPIDURAL; INFILTRATION; INTRACAUDAL
Status: DISCONTINUED | OUTPATIENT
Start: 2022-05-12 | End: 2022-05-12 | Stop reason: HOSPADM

## 2022-05-12 RX ORDER — METHYLPREDNISOLONE ACETATE 40 MG/ML
INJECTION, SUSPENSION INTRA-ARTICULAR; INTRALESIONAL; INTRAMUSCULAR; SOFT TISSUE
Status: DISCONTINUED | OUTPATIENT
Start: 2022-05-12 | End: 2022-05-12 | Stop reason: HOSPADM

## 2022-05-12 RX ORDER — MIDAZOLAM HYDROCHLORIDE 1 MG/ML
INJECTION, SOLUTION INTRAMUSCULAR; INTRAVENOUS
Status: DISCONTINUED | OUTPATIENT
Start: 2022-05-12 | End: 2022-05-12 | Stop reason: HOSPADM

## 2022-05-12 RX ORDER — ONDANSETRON 2 MG/ML
4 INJECTION INTRAMUSCULAR; INTRAVENOUS ONCE AS NEEDED
Status: DISCONTINUED | OUTPATIENT
Start: 2022-05-12 | End: 2022-05-12 | Stop reason: HOSPADM

## 2022-05-12 RX ORDER — FENTANYL CITRATE 50 UG/ML
INJECTION, SOLUTION INTRAMUSCULAR; INTRAVENOUS
Status: DISCONTINUED | OUTPATIENT
Start: 2022-05-12 | End: 2022-05-12 | Stop reason: HOSPADM

## 2022-05-12 RX ORDER — GADOBUTROL 604.72 MG/ML
INJECTION INTRAVENOUS
Status: DISCONTINUED | OUTPATIENT
Start: 2022-05-12 | End: 2022-05-12 | Stop reason: HOSPADM

## 2022-05-12 NOTE — OP NOTE
Time-out taken to identify patient and procedure side prior to starting the procedure.     05/12/2022      PROCEDURE:  1) Bilateral greater trochanteric bursa injection    2) Bilateral sacroiliac joint injection                            REASON FOR PROCEDURE:   Sacroiliitis [M46.1]  Greater trochanteric bursitis[M70.61]    PHYSICIAN: Donato Manley MD  ASSISTANTS: None    SEDATION: Conscious sedation provided by M.D    The patient was monitored with continuous pulse oximetry, EKG, and intermittent blood pressure monitors, immediately prior to administration of sedation.  The patient was hemodynamically stable throughout the entire process was responsive to voice, and breathing spontaneously.  Supplemental O2 was provided at 2L/min via nasal cannula.  Patient was comfortable for the duration of the procedure.     There was a total of 2mg IV Midazolam and 50mcg Fentanyl titrated for the procedure    Total sedation time was >10minutes and <20minutes        MEDICATIONS INJECTED: 1mL 40mg/ml Depo-Medrol and 4mL Bupivacaine 0.25% into each site    LOCAL ANESTHETIC USED: Xylocaine 1% 6ml     ESTIMATED BLOOD LOSS: None.   COMPLICATIONS: None.     TECHNIQUE:   Greater trochanteric bursa injection:  The area overlying the greater trochanteric bursa was identified using fluoroscopy, and the area overlying the skin was prepped and draped in usual sterile fashion. Local Xylocaine was injected by raising a wheel and going down to the periosteum using a 27-gauge hypodermic needle. A 5 inch 22-gauge spinal needle was introduce into the Bilateral greater trochanteric bursa Negative pressure applied to confirm no intravascular placement. Omnipaque was injected to confirm placement and to confirm that there was no vascular runoff. The medication was then injected slowly.  Displacement of the contrast after injection of the medication confirmed that the medication went into the area of the greater trochanteric bursa    Sacroiliac  joint injection:   Laying in the prone position, the patient was prepped and draped in the usual sterile fashion using ChloraPrep and fenestrated drape.  The area was determined under fluoroscopy.  Local Xylocaine was injected by raising a wheel and going down to the periosteum using a 27-gauge hypodermic needle.  The 3.5 inch 22-gauge spinal needle was introduce into the Bilateral sacroiliac joint.  Negative pressure applied to confirm no intravascular placement.  Omnipaque was injected to confirm placement and to confirm that there was no vascular runoff.  The medication was then injected slowly.  The patient tolerated the procedure well.                       The patient was monitored for approximately 30 minutes after the procedure. Patient was given post procedure and discharge instructions to follow at home. We will see the patient back in two weeks or the patient may call to inform of status. The patient was discharged in a stable condition

## 2022-05-12 NOTE — DISCHARGE SUMMARY
Discharge Note  Short Stay      SUMMARY     Admit Date: 5/12/2022    Attending Physician: Donato Manley MD        Discharge Physician: Donato Manley MD        Discharge Date: 5/12/2022 9:38 AM    Procedure(s) (LRB):  Bilateral GT bursa + bilateral SIJ injection RN IV Sedation (Bilateral)  Bilateral GT bursa + bilateral SIJ injection RN IV Sedation (Bilateral)    Final Diagnosis: Sacroiliitis [M46.1]  Greater trochanteric bursitis, left [M70.62]    Disposition: Home or self care    Patient Instructions:   Current Discharge Medication List      CONTINUE these medications which have NOT CHANGED    Details   gabapentin (NEURONTIN) 300 MG capsule Take 2 capsules (600 mg total) by mouth 3 (three) times daily.  Qty: 180 capsule, Refills: 1      lamoTRIgine (LAMICTAL) 100 MG tablet Take 1 tablet (100 mg total) by mouth 2 (two) times daily.  Qty: 180 tablet, Refills: 1    Associated Diagnoses: Partial symptomatic epilepsy with complex partial seizures, not intractable, without status epilepticus      levothyroxine (SYNTHROID) 50 MCG tablet Take 1 tablet (50 mcg total) by mouth before breakfast.  Qty: 90 tablet, Refills: 3    Associated Diagnoses: Acquired hypothyroidism      traZODone (DESYREL) 50 MG tablet Take 1 tablet (50 mg total) by mouth every evening.  Qty: 90 tablet, Refills: 3    Associated Diagnoses: Insomnia secondary to anxiety      EPINEPHrine (EPIPEN) 0.3 mg/0.3 mL AtIn Inject 0.3 mg into the muscle daily as needed.      estradiol (ESTRACE) 2 MG tablet TAKE 1 TABLET BY MOUTH ONE TIME DAILY  Refills: 1      multivitamin (THERAGRAN) per tablet Take 1 tablet by mouth once daily.       pantoprazole (PROTONIX) 40 MG tablet Take 1 tablet (40 mg total) by mouth 2 (two) times daily.  Qty: 60 tablet, Refills: 3    Associated Diagnoses: Laryngopharyngeal reflux (LPR)      rOPINIRole (REQUIP) 1 MG tablet TAKE ONE TABLET BY MOUTH EVERY EVENING AS NEEDED FOR FOR RESTLESSNESS LEGS  Qty: 30 tablet, Refills: 11     Associated Diagnoses: Restless legs syndrome      sod sulf-pot chloride-mag sulf (SUTAB) 1.479-0.188- 0.225 gram tablet Take 12 tablets by mouth once daily. Take according to package instructions with indicated amount of water.  Qty: 24 tablet, Refills: 0    Associated Diagnoses: Gastroesophageal reflux disease without esophagitis; Pharyngoesophageal dysphagia; Throat clearing; Hoarseness of voice; Colon cancer screening      testosterone cypionate (DEPOTESTOTERONE CYPIONATE) 200 mg/mL injection Inject into the muscle every 28 days. (Dose? - By Dr. Brennen Mercer at [facility])      vitamin D 1000 units Tab Take 1,000 Units by mouth once daily.                 Discharge Diagnosis: Sacroiliitis [M46.1]  Greater trochanteric bursitis, left [M70.62]  Condition on Discharge: Stable with no complications to procedure   Diet on Discharge: Same as before.  Activity: as per instruction sheet.  Discharge to: Home with a responsible adult.  Follow up: 2-4 weeks       Please call the office at [phone] if you experience any weakness or loss of sensation, fever > 101.5, pain uncontrolled with oral medications, persistent nausea/vomiting/or diarrhea, redness or drainage from the incisions, or any other worrisome concerns. If physician on call was not reached or could not communicate with our office for any reason please go to the nearest emergency department

## 2022-05-12 NOTE — H&P
HPI  Patient presenting for Procedure(s) (LRB):  Bilateral GT bursa + bilateral SIJ injection RN IV Sedation (Bilateral)          No health changes since previous encounter    Past Medical History:   Diagnosis Date    Anxiety     Chronic left-sided low back pain without sciatica 9/28/2018    Chronic pain of left knee 9/28/2018    XR KNEE COMP LEFT 4 OR MORE VIEWS - October 27, 2017 CLINICAL HISTORY:   Left knee pain FINDINGS:   No fracture, dislocation, or arthritic change of the knee. Dictated and Electronically Signed By: Jem Damon MD on October 27, 2017    GERD (gastroesophageal reflux disease)     Lupus     Migraine with aura and without status migrainosus, not intractable 8/8/2014    Seizures      Past Surgical History:   Procedure Laterality Date    HYSTERECTOMY  2016    INJECTION OF ANESTHETIC AGENT INTO SACROILIAC JOINT Left 3/31/2022    Procedure: Left intra-articular hip joint injection;  Surgeon: Donato Manley MD;  Location: [facility];  Service: Pain Management;  Laterality: Left;    INJECTION OF ANESTHETIC AGENT INTO SACROILIAC JOINT  3/31/2022    Procedure: ;  Surgeon: Donato Manley MD;  Location: [facility];  Service: Pain Management;;    OOPHORECTOMY      WISDOM TOOTH EXTRACTION      X 4     Review of patient's allergies indicates:   Allergen Reactions    Bactrim [sulfamethoxazole-trimethoprim] Other (See Comments)     Lupus symptoms exacerbation, headache.    Ciprofloxacin Other (See Comments)     Lupus symptoms exacerbation, headaches.        No current facility-administered medications on file prior to encounter.     Current Outpatient Medications on File Prior to Encounter   Medication Sig Dispense Refill    gabapentin (NEURONTIN) 300 MG capsule Take 2 capsules (600 mg total) by mouth 3 (three) times daily. 180 capsule 1    lamoTRIgine (LAMICTAL) 100 MG tablet Take 1 tablet (100 mg total) by mouth 2 (two) times daily. 180 tablet 1    levothyroxine (SYNTHROID) 50  "MCG tablet Take 1 tablet (50 mcg total) by mouth before breakfast. 90 tablet 3    traZODone (DESYREL) 50 MG tablet Take 1 tablet (50 mg total) by mouth every evening. 90 tablet 3    EPINEPHrine (EPIPEN) 0.3 mg/0.3 mL AtIn Inject 0.3 mg into the muscle daily as needed.      estradiol (ESTRACE) 2 MG tablet TAKE 1 TABLET BY MOUTH ONE TIME DAILY  1    multivitamin (THERAGRAN) per tablet Take 1 tablet by mouth once daily.       pantoprazole (PROTONIX) 40 MG tablet Take 1 tablet (40 mg total) by mouth 2 (two) times daily. 60 tablet 3    rOPINIRole (REQUIP) 1 MG tablet TAKE ONE TABLET BY MOUTH EVERY EVENING AS NEEDED FOR FOR RESTLESSNESS LEGS 30 tablet 11    sod sulf-pot chloride-mag sulf (SUTAB) 1.479-0.188- 0.225 gram tablet Take 12 tablets by mouth once daily. Take according to package instructions with indicated amount of water. 24 tablet 0    testosterone cypionate (DEPOTESTOTERONE CYPIONATE) 200 mg/mL injection Inject into the muscle every 28 days. (Dose? - By Dr. Brennen Mercer at [facility])      vitamin D 1000 units Tab Take 1,000 Units by mouth once daily.          PMHx, PSHx, Allergies, Medications reviewed in epic    ROS negative except pain complaints in HPI    OBJECTIVE:    /75 (BP Location: Right arm, Patient Position: Sitting)   Pulse 70   Temp 97.9 °F (36.6 °C) (Temporal)   Resp 18   Ht 5' 4" (1.626 m)   Wt 58.7 kg (129 lb 4.8 oz)   LMP 08/26/2013   SpO2 99%   Breastfeeding No   BMI 22.19 kg/m²     PHYSICAL EXAMINATION:    GENERAL: Well appearing, in no acute distress, alert and oriented x3.  PSYCH:  Mood and affect appropriate.  SKIN: Skin color, texture, turgor normal, no rashes or lesions which will impact the procedure.  CV: RRR with palpation of the radial artery.  PULM: No evidence of respiratory difficulty, symmetric chest rise. Clear to auscultation.  NEURO: Cranial nerves grossly intact.    Plan:    Proceed with procedure as planned Procedure(s) " (LRB):  Bilateral GT bursa + bilateral SIJ injection RN IV Sedation (Bilateral)      Donato Manley MD  05/12/2022

## 2022-05-12 NOTE — DISCHARGE INSTRUCTIONS

## 2022-05-13 ENCOUNTER — OFFICE VISIT (OUTPATIENT)
Dept: ORTHOPEDICS | Facility: CLINIC | Age: 52
End: 2022-05-13
Payer: MEDICAID

## 2022-05-13 VITALS — HEIGHT: 64 IN | WEIGHT: 129 LBS | BODY MASS INDEX: 22.02 KG/M2

## 2022-05-13 DIAGNOSIS — M25.852 FEMOROACETABULAR IMPINGEMENT OF LEFT HIP: Primary | ICD-10-CM

## 2022-05-13 PROCEDURE — 99999 PR PBB SHADOW E&M-EST. PATIENT-LVL III: ICD-10-PCS | Mod: PBBFAC,,, | Performed by: STUDENT IN AN ORGANIZED HEALTH CARE EDUCATION/TRAINING PROGRAM

## 2022-05-13 PROCEDURE — 1160F RVW MEDS BY RX/DR IN RCRD: CPT | Mod: CPTII,,, | Performed by: STUDENT IN AN ORGANIZED HEALTH CARE EDUCATION/TRAINING PROGRAM

## 2022-05-13 PROCEDURE — 1159F MED LIST DOCD IN RCRD: CPT | Mod: CPTII,,, | Performed by: STUDENT IN AN ORGANIZED HEALTH CARE EDUCATION/TRAINING PROGRAM

## 2022-05-13 PROCEDURE — 1160F PR REVIEW ALL MEDS BY PRESCRIBER/CLIN PHARMACIST DOCUMENTED: ICD-10-PCS | Mod: CPTII,,, | Performed by: STUDENT IN AN ORGANIZED HEALTH CARE EDUCATION/TRAINING PROGRAM

## 2022-05-13 PROCEDURE — 3008F PR BODY MASS INDEX (BMI) DOCUMENTED: ICD-10-PCS | Mod: CPTII,,, | Performed by: STUDENT IN AN ORGANIZED HEALTH CARE EDUCATION/TRAINING PROGRAM

## 2022-05-13 PROCEDURE — 1159F PR MEDICATION LIST DOCUMENTED IN MEDICAL RECORD: ICD-10-PCS | Mod: CPTII,,, | Performed by: STUDENT IN AN ORGANIZED HEALTH CARE EDUCATION/TRAINING PROGRAM

## 2022-05-13 PROCEDURE — 99214 OFFICE O/P EST MOD 30 MIN: CPT | Mod: S$PBB,,, | Performed by: STUDENT IN AN ORGANIZED HEALTH CARE EDUCATION/TRAINING PROGRAM

## 2022-05-13 PROCEDURE — 99999 PR PBB SHADOW E&M-EST. PATIENT-LVL III: CPT | Mod: PBBFAC,,, | Performed by: STUDENT IN AN ORGANIZED HEALTH CARE EDUCATION/TRAINING PROGRAM

## 2022-05-13 PROCEDURE — 99214 PR OFFICE/OUTPT VISIT, EST, LEVL IV, 30-39 MIN: ICD-10-PCS | Mod: S$PBB,,, | Performed by: STUDENT IN AN ORGANIZED HEALTH CARE EDUCATION/TRAINING PROGRAM

## 2022-05-13 PROCEDURE — 3008F BODY MASS INDEX DOCD: CPT | Mod: CPTII,,, | Performed by: STUDENT IN AN ORGANIZED HEALTH CARE EDUCATION/TRAINING PROGRAM

## 2022-05-13 PROCEDURE — 99213 OFFICE O/P EST LOW 20 MIN: CPT | Mod: PBBFAC | Performed by: STUDENT IN AN ORGANIZED HEALTH CARE EDUCATION/TRAINING PROGRAM

## 2022-05-13 NOTE — PROGRESS NOTES
Orthopaedic Follow-Up Visit    Last Appointment: 03/29/2022  Diagnosis: Femoroacetabular impingement of left hip  Prior Procedure: none    Laurel Calvo is a 51 y.o. female who is here for f/u evaluation of the left hip. The patient was last seen here by me on 03/29/2022 at which point we decided to proceed with guided injection both into the hip as well as the SI joints prior to considering further treatment options. The patient returns today reporting that the symptoms improved for about 6 days after hip injection but are unchanged so far after SI injection yesterday.      To review her history, Laurel Calvo is a 51 y.o. female who presents with LEFT hip pain and dysfunction. Onset of the symptoms was 1 year ago. Inciting event: none, no known injury. Current symptoms include left hip and leg pain, occasional numbness and buckling. Pain is aggravated by activity, especially hip flexion.    Patient's medications, allergies, past medical, surgical, social and family histories were reviewed and updated as appropriate.    Review of Systems   All systems reviewed were negative.  Specifically, the patient denies fever, chills, weight loss, chest pain, shortness of breath, or dyspnea on exertion.      Past Medical History:   Diagnosis Date    Anxiety     Chronic left-sided low back pain without sciatica 9/28/2018    Chronic pain of left knee 9/28/2018    XR KNEE COMP LEFT 4 OR MORE VIEWS - October 27, 2017 CLINICAL HISTORY:   Left knee pain FINDINGS:   No fracture, dislocation, or arthritic change of the knee. Dictated and Electronically Signed By: Jem Damon MD on October 27, 2017    GERD (gastroesophageal reflux disease)     Lupus     Migraine with aura and without status migrainosus, not intractable 8/8/2014    Seizures        Objective:      Physical Exam  Patient is alert and oriented, no distress. Skin is intact. Neuro is normal with no focal motor or sensory findings.     Hip examination     ROM RIGHT  LEFT   Flexion 100 100   ER at 90° 60 50   IR at 90° 30 20      Strength RIGHT LEFT   Flexion 5 5   Abduction 5 5   Adduction 5 5         Tenderness RIGHT LEFT   Hip flexor - +   Adductors - -   Lower abdomen - -   Trochanter - -   Symphysis - -   Other          Tests RIGHT LEFT   Log roll - -   Stinchfield - +   FADDIR - +   RIP (pain) - -   RIP (height) - -   Edison - -   Resisted situp - -   Other          Imaging:  MRI Hip Without Contrast Left  Narrative: EXAMINATION:  MRI HIP WITHOUT CONTRAST LEFT     CLINICAL HISTORY:  Hip pain, chronic, labral tear suspected, xray done;Hip trauma, fracture suspected, xray done;  Pain in unspecified hip     TECHNIQUE:  Multiplanar, multisequence imaging of the pelvis and left hip without the use of contrast.     COMPARISON:  Left hip radiograph 02/21/2022.     FINDINGS:  Osseous structures: Normal marrow signal.  No fracture.  No suspicious osseous lesion.     Hip and Sacroiliac joints: No effusion. Mild-moderate cartilage irregularity at the peripheral aspect of the acetabular roof with mild associated subchondral cystic change.  Probable partial-thickness chondral-labral separation at the anterior/superior labrum.  No labral detached tear or paralabral cyst.     Pubic symphysis: No osteitis pubis.     Bursae: Normal.     Soft tissues: Regional muscles and tendons are normal in appearance.     Miscellaneous: None.  Impression: No acute abnormality.     Mild-moderate degenerative changes at the peripheral aspect of the acetabular roof.     Probable partial thickness chondral-labral separation at the anterior/superior labrum.     Electronically signed by:         Jay Watkins  Date:                                        03/24/2022  Time:                                       08:18      Assessment/Plan:   Assessment:  Laurel Calvo is a 51 y.o. female with left hip femoroacetabular impingement, labral tear    Plan:    1. We discussed diagnosis and treatment options with  her today.  She has a combination of left hip femoral acetabular impingement with labral tear as well as subchondral cyst formation.  She also has some component of lumbar radiculopathy and SI pain.  2. She has only 1 day out from her SI joint injection.  I would like her take a little bit of time over the next few weeks to assess her improvement.  3. If she is not getting any better than we will discuss moving forward with hip arthroscopy and labral repair.  4. Follow-up with me in 4-6 weeks        Kristan Alvarado MA

## 2022-06-13 ENCOUNTER — OFFICE VISIT (OUTPATIENT)
Dept: PAIN MEDICINE | Facility: CLINIC | Age: 52
End: 2022-06-13
Payer: MEDICAID

## 2022-06-13 VITALS
HEIGHT: 64 IN | BODY MASS INDEX: 21.68 KG/M2 | DIASTOLIC BLOOD PRESSURE: 74 MMHG | HEART RATE: 81 BPM | RESPIRATION RATE: 17 BRPM | WEIGHT: 127 LBS | SYSTOLIC BLOOD PRESSURE: 105 MMHG

## 2022-06-13 DIAGNOSIS — R10.32 LEFT GROIN PAIN: Primary | ICD-10-CM

## 2022-06-13 PROCEDURE — 3008F BODY MASS INDEX DOCD: CPT | Mod: CPTII,,, | Performed by: PHYSICIAN ASSISTANT

## 2022-06-13 PROCEDURE — 3008F PR BODY MASS INDEX (BMI) DOCUMENTED: ICD-10-PCS | Mod: CPTII,,, | Performed by: PHYSICIAN ASSISTANT

## 2022-06-13 PROCEDURE — 1159F MED LIST DOCD IN RCRD: CPT | Mod: CPTII,,, | Performed by: PHYSICIAN ASSISTANT

## 2022-06-13 PROCEDURE — 3074F SYST BP LT 130 MM HG: CPT | Mod: CPTII,,, | Performed by: PHYSICIAN ASSISTANT

## 2022-06-13 PROCEDURE — 3078F DIAST BP <80 MM HG: CPT | Mod: CPTII,,, | Performed by: PHYSICIAN ASSISTANT

## 2022-06-13 PROCEDURE — 1160F RVW MEDS BY RX/DR IN RCRD: CPT | Mod: CPTII,,, | Performed by: PHYSICIAN ASSISTANT

## 2022-06-13 PROCEDURE — 3078F PR MOST RECENT DIASTOLIC BLOOD PRESSURE < 80 MM HG: ICD-10-PCS | Mod: CPTII,,, | Performed by: PHYSICIAN ASSISTANT

## 2022-06-13 PROCEDURE — 99999 PR PBB SHADOW E&M-EST. PATIENT-LVL III: CPT | Mod: PBBFAC,,, | Performed by: PHYSICIAN ASSISTANT

## 2022-06-13 PROCEDURE — 99214 OFFICE O/P EST MOD 30 MIN: CPT | Mod: S$PBB,,, | Performed by: PHYSICIAN ASSISTANT

## 2022-06-13 PROCEDURE — 99214 PR OFFICE/OUTPT VISIT, EST, LEVL IV, 30-39 MIN: ICD-10-PCS | Mod: S$PBB,,, | Performed by: PHYSICIAN ASSISTANT

## 2022-06-13 PROCEDURE — 99213 OFFICE O/P EST LOW 20 MIN: CPT | Mod: PBBFAC | Performed by: PHYSICIAN ASSISTANT

## 2022-06-13 PROCEDURE — 1159F PR MEDICATION LIST DOCUMENTED IN MEDICAL RECORD: ICD-10-PCS | Mod: CPTII,,, | Performed by: PHYSICIAN ASSISTANT

## 2022-06-13 PROCEDURE — 1160F PR REVIEW ALL MEDS BY PRESCRIBER/CLIN PHARMACIST DOCUMENTED: ICD-10-PCS | Mod: CPTII,,, | Performed by: PHYSICIAN ASSISTANT

## 2022-06-13 PROCEDURE — 3074F PR MOST RECENT SYSTOLIC BLOOD PRESSURE < 130 MM HG: ICD-10-PCS | Mod: CPTII,,, | Performed by: PHYSICIAN ASSISTANT

## 2022-06-13 PROCEDURE — 99999 PR PBB SHADOW E&M-EST. PATIENT-LVL III: ICD-10-PCS | Mod: PBBFAC,,, | Performed by: PHYSICIAN ASSISTANT

## 2022-06-13 RX ORDER — GABAPENTIN 300 MG/1
600 CAPSULE ORAL 3 TIMES DAILY
Qty: 90 CAPSULE | Refills: 2 | Status: SHIPPED | OUTPATIENT
Start: 2022-06-13 | End: 2022-08-19 | Stop reason: SDUPTHER

## 2022-06-13 NOTE — PROGRESS NOTES
Established Patient Chronic Pain Note (Follow up visit)    Chief Complaint:   Chief Complaint   Patient presents with    Back Pain     Patient can not specify the percentage of relief due to other issues, but 30% on certain days.  Pain level 3/10       SUBJECTIVE:  Interval History (6/13/2022): Laurel Calvo presents today for follow-up visit.  she underwent bilateral GT bursa injection + bilateral SIJ injection on 5/12/22.  she reports 30% pain relief.  Patient reports pain as 3/10 today. Saw Dr. Robbins on 05/13/2022. Per Dr. 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  She also has some component of lumbar radiculopathy and SI pain. Will consider hip arthroscopy and labral repair. Has follow up scheduled with Dr. Robbins on 06/17/2022. Patient reports she is now having right sided hip pain due to favoring left side due to pain. Reports she would like to move forward with hip repair with Dr. Robbins, then consider continued injections with IPM. Also reports shoulder pain beginning to flare up. Previously had left subacromial bursa Injection in clinic 11/9/2021 with good relief. Reports injections typically last about 6 months.      Interval History (4/28/2022): Laurel Calvo presents today for follow-up visit.  she underwent left intraarticular hip joint injection on 03/31/2022.  The patient reports she had relief x 5-6 days after the procedure, but once she returned to normal activity the pain returned and then worsened. Reports one week of severe pain 8/10, but has now decreased slightly to a constant 5/10. Has follow up appointment with Dr. Robbins in 2 weeks to discuss other treatment options for hip.  Reports she does experience pain in the lower lumbar region left > right and some radiation into the outer hips. Greatest pain continues to originate from groin. Patient reports pain as 5/10 today. Taking ibuprofen  "with temporary moderate relief.    Interval History (1/27/2022):  Laurel Calvo presents today for follow-up visit.  Patient was last seen on 11/9/2021. At that visit, she had a subacromial bursa injection, which she feels helped less than previous injection.  Patient reports pain as "0/10 today.    Interval HPI (11/9/2021):  Laurel Calvo is a 51 y.o. female who presents to the clinic for a follow-up appointment for lower back and left hip pain.  Her main complaint today is left shoulder pain.  At the last visit, she was provided with a left-sided greater trochanteric bursa injection in the clinic.  She reports that this resulted in significant relief.  Since the last visit, Laurel Calvo states the left shoulder pain has been worsening. Current pain intensity is 8/10.  She also locates pain to the left lower tailbone area.  Patient denies night fever/night sweats, urinary incontinence, bowel incontinence, significant weight loss, significant motor weakness and loss of sensations.    Interval HPI 09/02/2021:  Laurel Calvo is a 51 y.o. female who presents to the clinic for a follow-up appointment for lower back and left hip pain. Since the last visit, Laurel Calvo states the pain has been persistant. Current pain intensity is 3/10.  She does report that she had a fall while gardening a few weeks ago and landed on her right hip, but this is somewhat improving.  Her left hip is certainly more aggravated currently.    Interval HPI 08/24/2021:  Laurel Calvo presents to tele-medicine appointment for a follow-up appointment for chronic shoulder, neck, low back, and hip pain.  She was last seen on 03/04/2021 where she underwent left-sided subacromial bursa and left-sided greater trochanteric bursa injections.  She states that these resulted in 80-85% relief for both sites.  She states that her left lower back pain has been worsening is having radiating pain down the left lower extremity posteriorly extending " to the calf.  She was also referred to a chiropractor.  Since the last visit, Laurel Calvo states the pain has been worsening. Current pain intensity is 8/10.    Initial HPI 03/04/2021:  Laurel Calvo is a 50 y.o. female, right-hand dominant, who presents to the clinic for the evaluation of chronic neck and lower back pain.  She was referred by her primary care team for further evaluation and management of this pain.  She has past medical history of seizure disorder, restless leg syndrome, anxiety, lupus, migraines, GERD, and multiple other medical comorbidities as listed in her chart.  The pain started several years ago following pain being told that she had herniated disc following an MRI of the cervical and lumbar spine and symptoms have been unchanged.The pain is located in the cervical myofascial area and radiates to the left upper extremity extending into the hand and fingers..  The pain is described as sharp, stabbing and throbbing and is rated as 2/10. The pain is rated with a score of  1/10 on the BEST day and a score of 10/10 on the WORST day.  Symptoms interfere with daily activity. The pain is exacerbated by at night and with exercise.  The pain is mitigated by ice. The patient reports spending less than 2 hours per day reclining. The patient reports 4-6 hours of uninterrupted sleep per night.     Pain Disability Index Review:  Last 3 PDI Scores 6/13/2022 4/28/2022 11/9/2021   Pain Disability Index (PDI) 48 [phone] 15       Non-Pharmacologic Treatments:  Physical Therapy/Home Exercise: yes, has been active with home exercises as she has previously learned from physical therapies targeting the lower back  Ice/Heat:yes  TENS: no  Acupuncture: no  Massage: yes  Chiropractic: yes    Other: no        Pain Medications:  - Opioids: Norco  - Adjuvant Medications: Valium, baclofen, Plaquenil, Lamictal, Requip, trazodone  - Anti-Coagulants: None          Pain Procedures:   -03/04/2021:  Left subacromial bursa +  left greater trochanteric bursa injections, 80-85% relief for both  -09/02/2021:  Left greater trochanteric bursa injection, significant relief  - Left subacromial bursa Injection in clinic 11/9/2021 - less pain relief than previously   -left IA hip joint injection on 03/31/2022 with relief for only 5-6 days, then pain returned and worsened  -bilateral GT bursa injection + bilateral SIJ injection on 5/12/22 with 30% relief       Imaging (Reviewed on 6/13/2022):     MRI hip 03/24/2022  FINDINGS:  Osseous structures: Normal marrow signal.  No fracture.  No suspicious osseous lesion.     Hip and Sacroiliac joints: No effusion. Mild-moderate cartilage irregularity at the peripheral aspect of the acetabular roof with mild associated subchondral cystic change.  Probable partial-thickness chondral-labral separation at the anterior/superior labrum.  No labral detached tear or paralabral cyst.     Pubic symphysis: No osteitis pubis.     Bursae: Normal.     Soft tissues: Regional muscles and tendons are normal in appearance.     Miscellaneous: None.     Impression:     No acute abnormality.     Mild-moderate degenerative changes at the peripheral aspect of the acetabular roof.     Probable partial thickness chondral-labral separation at the anterior/superior labrum.    X-ray hip 02/21/2022  FINDINGS:  Hip joints are symmetric in appearance.  Joint spaces are maintained.  No evidence of avascular necrosis.  There is a curvilinear lucency seen on multiple projections in the inter trochanteric crest of the left femur extending into the proximal diaphyseal region suspicious for nondisplaced fracture at this site.     This report was flagged in Epic as abnormal.    MRI lumbar spine 09/01/2021:  Levocurvature of the lumbar spine is noted.  No listhesis.  There is no acute fracture.  The vertebral bodies are normal in height without compression fractures. Posterior elements are intact. Mild disc desiccation at the L5-S1 level.   Lumbar spine disc heights are preserved.  Conus medullaris terminates at the T12-L1 level. Distal spinal cord intensity is normal.  There is no soft tissue abnormality.     T12-L1: No disc bulge.  There is no facet arthropathy.  There is no neural foraminal stenosis.  There is no spinal canal stenosis.     L1-L2: No disc bulge.  There is no facet arthropathy.  There is no neuroforaminal stenosis.  There is no spinal canal stenosis.     L2-L3: No disc bulge.  There is no facet arthropathy.  There is no neuroforaminal stenosis.  There is no spinal canal stenosis.     L3-L4: No disc bulge.  There is no facet arthropathy.  There is no neuroforaminal stenosis.  There is no spinal canal stenosis.     L4-L5: No disc bulge.  There is no facet arthropathy.  There is no neuroforaminal stenosis.  There is no spinal canal stenosis.     L5-S1: No disc bulge.  There is no facet arthropathy.  There is no neuroforaminal stenosis.  There is no spinal canal stenosis.       X-ray cervical spine 03/04/2021:  There is straightening of the normal cervical spine lordosis.  No listhesis.  No abnormal motion of the cervical spine with flexion and extension.  No fracture.  Intervertebral disc spaces of the cervical spine are maintained.  No significant uncovertebral or facet arthropathy.  Oblique views demonstrate no significant neural foraminal stenosis.  No bony central canal stenosis identified.  Prevertebral soft tissues are within normal limits.     X-ray lumbar spine 03/04/2021:  There is mild levocurvature of the lumbar spine.  No listhesis.  No abnormal motion of the lumbar spine with flexion and extension.  No fracture or pars defect.  Intervertebral disc spaces of the lumbar spine are maintained.  No significant facet arthropathy.  No suspicious osseous lesion.  Sacroiliac joints appear normal.             REVIEW OF SYSTEMS:    GENERAL:  No weight loss, malaise or fevers.  HEENT:   No recent changes in vision or hearing  NECK:   "Negative for lumps, no difficulty with swallowing.  RESPIRATORY:  Negative for cough, wheezing or shortness of breath, patient denies any recent URI.  CARDIOVASCULAR:  Negative for chest pain, leg swelling or palpitations.  GI:  Negative for abdominal discomfort, blood in stools or black stools or change in bowel habits.  MUSCULOSKELETAL:  See HPI.  SKIN:  Negative for lesions, rash, and itching.  PSYCH:  No mood disorder or recent psychosocial stressors.  Patients sleep is not disturbed secondary to pain.  HEMATOLOGY/LYMPHOLOGY:  Negative for prolonged bleeding, bruising easily or swollen nodes.  Patient is not currently taking any anti-coagulants  NEURO:   No history of headaches, syncope, paralysis, seizures or tremors.  All other reviewed and negative other than HPI.        OBJECTIVE:    PHYSICAL EXAMINATION:  Telemedicine Exam  Vitals:    06/13/22 1002   BP: 105/74   Pulse: 81   Resp: 17   Weight: 57.6 kg (126 lb 15.8 oz)   Height: 5' 4" (1.626 m)     Body mass index is 21.8 kg/m².   (reviewed on 6/13/2022)       Physical Exam: last in clinic visit:  GENERAL: Well appearing, in no acute distress, alert and oriented x3.  PSYCH:  Mood and affect appropriate.  SKIN: Skin color, texture, turgor normal, no rashes or lesions.  HEAD/FACE:  Normocephalic, atraumatic. Cranial nerves grossly intact.  CV: RRR with palpation of the radial artery.  PULM: No evidence of respiratory difficulty, symmetric chest rise.  GI:  Soft and non-tender.  BACK: Straight leg raising in the sitting and supine positions is negative to radicular pain. No pain to palpation over the facet joints of the lumbar spine or spinous processes. Normal range of motion without pain reproduction.  EXTREMITIES: Peripheral joint ROM is full and pain free without obvious instability or laxity in all four extremities. No deformities, edema, or skin discoloration. Good capillary refill.  MUSCULOSKELETAL:   positive shoulder impingement signs on the left " shoulder with Merrill, Neer's , and empty can.  minimal tenderness to palpation over the left greater trochanteric bursa. .  There is moderate pain with palpation over the sacroiliac joint on the left.  FABERs test is equivocal on the left.  FADIRs test is negative.   Bilateral upper and lower extremity strength is normal and symmetric.  No atrophy or tone abnormalities are noted.  NEURO: Bilateral upper and lower extremity coordination and muscle stretch reflexes are physiologic and symmetric.  Plantar response are downgoing. No clonus.  No loss of sensation is noted.  GAIT: normal.      ASSESSMENT: 52 y.o. year old female with lower back and left hip pain, consistent with     1. Left groin pain  gabapentin (NEURONTIN) 300 MG capsule         PLAN: None at this time  S/p bilateral Left SIJ + Left GT bursa injection with 30% relief  S/p left intra-articular hip joint injection with limited relief  S/p Left SIJ + Left GT bursa injection.     2. Pharmacologic:   - continue  gabapentin 600mg TID. Refill sent to pharmacy  - Continue Mobic 7.5 mg b.i.d PRN. OR Ibuprofen  -No muscle relaxants due to risk of decreased seizure threshold, patient voiced understanding  - Anticoagulation use: None.     3. Rehabilitative: Encouraged regular exercise. Continue exercises and activities as tolerated.     4. Diagnostic: Reviewed    5. Consults/referral: Follow up with [facility] as scheduled to discuss hip arthroscopy and labral repair    5. Follow up: Dr. Manley for repeat in clinic left subacromial bursa injection    - This condition does not require this patient to take time off of work, and the primary goal of our Pain Management services is to improve the patient's functional capacity.   - I discussed the risks, benefits, and alternatives to potential treatment options. All questions and concerns were fully addressed today in clinic.       Tia Hernández PA-C  Interventional Pain Medicine    Disclaimer:  This note was  prepared using voice recognition system and is likely to have sound alike errors that may have been overlooked even after proof reading.  Please call me with any questions.

## 2022-06-14 NOTE — PROGRESS NOTES
Subjective:       Patient ID: Laurel Calvo is a 50 y.o. female.    Chief Complaint: Annual Exam and Medication Refill    Patient presents for annual exam and medications.  Due for labs.  No seizures.      Reports painful neck spasm and lower back pain/hip/shoulder to the left.  Been to PT intermittent.  Reports having herniated disc to c-spine and lumbar.  She's interested in injections if possible.     Medication Refill  Associated symptoms include arthralgias, myalgias and neck pain. Pertinent negatives include no chills, coughing, fatigue or fever.     Review of Systems   Constitutional: Negative for chills, fatigue and fever.   Respiratory: Negative for cough and choking.    Musculoskeletal: Positive for arthralgias, back pain, myalgias and neck pain.   Psychiatric/Behavioral: Negative for agitation and confusion.         Objective:      Physical Exam  Vitals signs reviewed.   Constitutional:       Appearance: Normal appearance.   HENT:      Head: Normocephalic.   Cardiovascular:      Rate and Rhythm: Normal rate and regular rhythm.   Pulmonary:      Effort: Pulmonary effort is normal.      Breath sounds: Normal breath sounds.   Abdominal:      General: Bowel sounds are normal.      Tenderness: There is no abdominal tenderness.   Musculoskeletal: Normal range of motion.   Skin:     General: Skin is warm.   Neurological:      General: No focal deficit present.      Mental Status: She is alert.   Psychiatric:         Mood and Affect: Mood normal.         Behavior: Behavior normal. Behavior is cooperative.         Assessment:       1. Annual physical exam    2. Encounter for screening for malignant neoplasm of breast, unspecified screening modality    3. Simple partial to complex partial seizure to generalized seizure    4. Insomnia secondary to anxiety    5. RLS (restless legs syndrome)    6. Cervicalgia    7. Lumbar pain        Plan:     Annual physical exam  -     LAMOTRIGINE LEVEL; Future; Expected date:  12/14/2020  -     Lipid Panel; Future; Expected date: 12/14/2020  -     Comprehensive Metabolic Panel; Future; Expected date: 12/14/2020  -     CBC Auto Differential; Future; Expected date: 12/14/2020  -     TSH; Future; Expected date: 12/14/2020  -     Mammo Digital Screening Bilat; Future; Expected date: 12/14/2020    Encounter for screening for malignant neoplasm of breast, unspecified screening modality  -     Mammo Digital Screening Bilat; Future; Expected date: 12/14/2020    Simple partial to complex partial seizure to generalized seizure  -     lamoTRIgine (LAMICTAL) 100 MG tablet; Take 1 tablet (100 mg total) by mouth 2 (two) times daily.  Dispense: 180 tablet; Refill: 2    Insomnia secondary to anxiety  -     traZODone (DESYREL) 50 MG tablet; Take 1 tablet (50 mg total) by mouth every evening.  Dispense: 90 tablet; Refill: 2    RLS (restless legs syndrome)  -     rOPINIRole (REQUIP) 1 MG tablet; TAKE ONE TABLET BY MOUTH EVERY EVENING AS NEEDED FOR RESTLESSNESS LEGS  Dispense: 90 tablet; Refill: 2    Cervicalgia  -     Ambulatory referral/consult to Pain Clinic; Future; Expected date: 12/21/2020    Lumbar pain  -     Ambulatory referral/consult to Pain Clinic; Future; Expected date: 12/21/2020        Labs today.      Schedule pain appointment and mammogram.     Due for follow up in 6-12 months.    independent

## 2022-06-17 ENCOUNTER — OFFICE VISIT (OUTPATIENT)
Dept: ORTHOPEDICS | Facility: CLINIC | Age: 52
End: 2022-06-17
Payer: MEDICAID

## 2022-06-17 VITALS — HEIGHT: 64 IN | BODY MASS INDEX: 21.51 KG/M2 | WEIGHT: 126 LBS

## 2022-06-17 DIAGNOSIS — S73.192D ACETABULAR LABRUM TEAR, LEFT, SUBSEQUENT ENCOUNTER: ICD-10-CM

## 2022-06-17 DIAGNOSIS — M25.852 FEMOROACETABULAR IMPINGEMENT OF LEFT HIP: Primary | ICD-10-CM

## 2022-06-17 PROCEDURE — 1159F MED LIST DOCD IN RCRD: CPT | Mod: CPTII,,, | Performed by: STUDENT IN AN ORGANIZED HEALTH CARE EDUCATION/TRAINING PROGRAM

## 2022-06-17 PROCEDURE — 1159F PR MEDICATION LIST DOCUMENTED IN MEDICAL RECORD: ICD-10-PCS | Mod: CPTII,,, | Performed by: STUDENT IN AN ORGANIZED HEALTH CARE EDUCATION/TRAINING PROGRAM

## 2022-06-17 PROCEDURE — 99213 OFFICE O/P EST LOW 20 MIN: CPT | Mod: PBBFAC | Performed by: STUDENT IN AN ORGANIZED HEALTH CARE EDUCATION/TRAINING PROGRAM

## 2022-06-17 PROCEDURE — 1160F RVW MEDS BY RX/DR IN RCRD: CPT | Mod: CPTII,,, | Performed by: STUDENT IN AN ORGANIZED HEALTH CARE EDUCATION/TRAINING PROGRAM

## 2022-06-17 PROCEDURE — 1160F PR REVIEW ALL MEDS BY PRESCRIBER/CLIN PHARMACIST DOCUMENTED: ICD-10-PCS | Mod: CPTII,,, | Performed by: STUDENT IN AN ORGANIZED HEALTH CARE EDUCATION/TRAINING PROGRAM

## 2022-06-17 PROCEDURE — 99214 OFFICE O/P EST MOD 30 MIN: CPT | Mod: S$PBB,,, | Performed by: STUDENT IN AN ORGANIZED HEALTH CARE EDUCATION/TRAINING PROGRAM

## 2022-06-17 PROCEDURE — 3008F PR BODY MASS INDEX (BMI) DOCUMENTED: ICD-10-PCS | Mod: CPTII,,, | Performed by: STUDENT IN AN ORGANIZED HEALTH CARE EDUCATION/TRAINING PROGRAM

## 2022-06-17 PROCEDURE — 3008F BODY MASS INDEX DOCD: CPT | Mod: CPTII,,, | Performed by: STUDENT IN AN ORGANIZED HEALTH CARE EDUCATION/TRAINING PROGRAM

## 2022-06-17 PROCEDURE — 99214 PR OFFICE/OUTPT VISIT, EST, LEVL IV, 30-39 MIN: ICD-10-PCS | Mod: S$PBB,,, | Performed by: STUDENT IN AN ORGANIZED HEALTH CARE EDUCATION/TRAINING PROGRAM

## 2022-06-17 PROCEDURE — 99999 PR PBB SHADOW E&M-EST. PATIENT-LVL III: ICD-10-PCS | Mod: PBBFAC,,, | Performed by: STUDENT IN AN ORGANIZED HEALTH CARE EDUCATION/TRAINING PROGRAM

## 2022-06-17 PROCEDURE — 99999 PR PBB SHADOW E&M-EST. PATIENT-LVL III: CPT | Mod: PBBFAC,,, | Performed by: STUDENT IN AN ORGANIZED HEALTH CARE EDUCATION/TRAINING PROGRAM

## 2022-06-17 NOTE — PROGRESS NOTES
Orthopaedic Follow-Up Visit    Last Appointment: 05/13/2022  Diagnosis: Femoroacetabular impingement of left hip  Prior Procedure: none    Laurel Calvo is a 52 y.o. female who is here for f/u evaluation of the left hip. The patient was last seen here by me on 05/13/2022 at which point we decided to allow time to assess effect of SI injection prior to considering further treatment options. The patient returns today reporting that the symptoms did not improve with SI injection and is interested in proceeding with further treatment options.     To review her history, Laurel Calvo is a 51 y.o. female who presents with LEFT hip pain and dysfunction. Onset of the symptoms was 1 year ago. Inciting event: none, no known injury. Current symptoms include left hip and leg pain, occasional numbness and buckling. Pain is aggravated by activity, especially hip flexion. She has had US guided injection into the left hip which provided relief for about 6 days.  Symptoms then returned and she has remained symptomatic since then.    Patient's medications, allergies, past medical, surgical, social and family histories were reviewed and updated as appropriate.    Review of Systems   All systems reviewed were negative.  Specifically, the patient denies fever, chills, weight loss, chest pain, shortness of breath, or dyspnea on exertion.      Past Medical History:   Diagnosis Date    Anxiety     Chronic left-sided low back pain without sciatica 9/28/2018    Chronic pain of left knee 9/28/2018    XR KNEE COMP LEFT 4 OR MORE VIEWS - October 27, 2017 CLINICAL HISTORY:   Left knee pain FINDINGS:   No fracture, dislocation, or arthritic change of the knee. Dictated and Electronically Signed By: Jem Damon MD on October 27, 2017    GERD (gastroesophageal reflux disease)     Lupus     Migraine with aura and without status migrainosus, not intractable 8/8/2014    Seizures        Objective:      Physical Exam  Patient is alert and  oriented, no distress. Skin is intact. Neuro is normal with no focal motor or sensory findings.    Hip examination     ROM RIGHT LEFT   Flexion 100 100   ER at 90° 60 50   IR at 90° 30 20      Strength RIGHT LEFT   Flexion 5 5   Abduction 5 5   Adduction 5 5         Tenderness RIGHT LEFT   Hip flexor - +   Adductors - -   Lower abdomen - -   Trochanter - -   Symphysis - -   Other          Tests RIGHT LEFT   Log roll - -   Stinchfield - +   FADDIR - +   RIP (pain) - -   RIP (height) - -   Edison - -   Resisted situp - -   Other          Imaging:      MRI Hip Without Contrast Left  Narrative: EXAMINATION:  MRI HIP WITHOUT CONTRAST LEFT     CLINICAL HISTORY:  Hip pain, chronic, labral tear suspected, xray done;Hip trauma, fracture suspected, xray done;  Pain in unspecified hip     TECHNIQUE:  Multiplanar, multisequence imaging of the pelvis and left hip without the use of contrast.     COMPARISON:  Left hip radiograph 02/21/2022.     FINDINGS:  Osseous structures: Normal marrow signal.  No fracture.  No suspicious osseous lesion.     Hip and Sacroiliac joints: No effusion. Mild-moderate cartilage irregularity at the peripheral aspect of the acetabular roof with mild associated subchondral cystic change.  Probable partial-thickness chondral-labral separation at the anterior/superior labrum.  No labral detached tear or paralabral cyst.     Pubic symphysis: No osteitis pubis.     Bursae: Normal.     Soft tissues: Regional muscles and tendons are normal in appearance.     Miscellaneous: None.  Impression: No acute abnormality.     Mild-moderate degenerative changes at the peripheral aspect of the acetabular roof.     Probable partial thickness chondral-labral separation at the anterior/superior labrum.     Electronically signed by:         Jay Watkins  Date:                                        03/24/2022  Time:                                       08:18    Physician rayradames:  I agree with the above impression.      X-Ray Hip 2 or 3 views Left (with Pelvis when performed)  Narrative: EXAMINATION:  XR HIP WITH PELVIS WHEN PERFORMED, 2 OR 3 VIEWS LEFT    CLINICAL HISTORY:  Left Hip X-ray; Left lower quadrant pain    TECHNIQUE:  AP view of the pelvis and frog leg lateral view of the left hip were performed.    COMPARISON:  None    FINDINGS:  Hip joints are symmetric in appearance.  Joint spaces are maintained.  No evidence of avascular necrosis.  There is a curvilinear lucency seen on multiple projections in the inter trochanteric crest of the left femur extending into the proximal diaphyseal region suspicious for nondisplaced fracture at this site.    This report was flagged in Epic as abnormal.  Impression: As above    Electronically signed by: Isai Wallace MD  Date:    02/21/2022  Time:    09:12       Physician Read: I agree with the above impression with the following addition.    LCEA: 22.7  Crossover: neg  Alpha angle: 55      Assessment/Plan:   Assessment:  Laurel Calvo is a 52 y.o. female with left hip femoroacetabular impingement, labral tear    Plan:    1. Discussed diagnosis and treatment options with her today.  She has history, physical exam, and imaging findings consistent with femoroacetabular impingement and acetabular labral tear.  She also has some symptoms consistent with lower back pain and SI pain.  2. She has tried rest, activity modification, oral anti-inflammatories, and intra-articular injection.  She got short-term relief from intra-articular injection but symptoms returned shortly thereafter.  3. Despite her extensive nonoperative interventions, she continues have symptoms on a daily basis that limit her activities and diminished her quality of life.  4. I recommend left hip arthroscopy with labral repair, femoral plasty, acetabular plasty, and capsular closure.  We discussed all the risks, benefits, limitations, and alternatives of surgery today.  We discussed the postoperative rehab and recovery  protocol in detail.  Despite the risks, she elected to proceed forward with surgery the consent was freely signed.  5. Follow-up with me 10-14 days after surgery.        Kristan Alvarado MA

## 2022-06-20 DIAGNOSIS — M25.852 FEMOROACETABULAR IMPINGEMENT OF LEFT HIP: Primary | ICD-10-CM

## 2022-06-23 DIAGNOSIS — M25.852 FEMOROACETABULAR IMPINGEMENT OF LEFT HIP: ICD-10-CM

## 2022-06-23 DIAGNOSIS — Z98.890 POST-OPERATIVE STATE: Primary | ICD-10-CM

## 2022-06-24 DIAGNOSIS — G40.209 PARTIAL SYMPTOMATIC EPILEPSY WITH COMPLEX PARTIAL SEIZURES, NOT INTRACTABLE, WITHOUT STATUS EPILEPTICUS: Chronic | ICD-10-CM

## 2022-06-25 RX ORDER — LAMOTRIGINE 100 MG/1
TABLET ORAL
Qty: 180 TABLET | Refills: 0 | Status: SHIPPED | OUTPATIENT
Start: 2022-06-25 | End: 2022-09-22

## 2022-06-25 NOTE — TELEPHONE ENCOUNTER
Requested Prescriptions   Pending Prescriptions Disp Refills    lamoTRIgine (LAMICTAL) 100 MG tablet [Pharmacy Med Name: lamotrigine 100 mg tablet] 180 tablet 0     Sig: TAKE ONE TABLET BY MOUTH TWICE DAILY     #LMRX  TO MY TEAM:    Please notify Laurel of refill response above.   Appointment needed for further refills.   Schedule Laurel for next available regular appointment with me, assuming they haven't already scheduled one.   Please close any care gaps that you can by standing order guidelines.    Thanks!    Future Appointments   Date Time Provider Department Center   8/3/2022  1:20 PM Donato Manley MD [facility]       HEALTH MAINTENANCE INTERVENTIONS - DUE OR DUE SOON  Health Maintenance Due   Topic Date Due    Colorectal Cancer Screening  Never done    COVID-19 Vaccine (3 - Booster for Pfizer series) 09/09/2021    Mammogram  01/04/2022    Shingles Vaccine (1 of 2) Never done

## 2022-06-27 ENCOUNTER — PATIENT MESSAGE (OUTPATIENT)
Dept: PAIN MEDICINE | Facility: CLINIC | Age: 52
End: 2022-06-27
Payer: MEDICAID

## 2022-06-27 ENCOUNTER — TELEPHONE (OUTPATIENT)
Dept: PAIN MEDICINE | Facility: CLINIC | Age: 52
End: 2022-06-27
Payer: MEDICAID

## 2022-06-27 NOTE — TELEPHONE ENCOUNTER
----- Message from Maria Eugenia Potter sent at 6/27/2022 12:49 PM CDT -----  Contact: Patient  Patient called to consult with nurse or staff regarding a missed call. She would like a call back about rescheduling her appointment and can be reached at [phone]. Thanks/

## 2022-06-27 NOTE — TELEPHONE ENCOUNTER
Pt stated she wanted to move appt with Dr. Manley to the [facility] due to her being in physical therapy at the [facility]. Pt is having Hip surgery in July. Appt was moved to the [facility]. Pt wanted to get in clinic injection and stated Dr. Manley has never used ultrasound machine. All questions answered.

## 2022-06-27 NOTE — TELEPHONE ENCOUNTER
LILA for pt in regards to her having an appt with Dr. Manley on 8/3/2022. Pt send a message stating she wanted to reschedule her injection

## 2022-07-05 ENCOUNTER — HOSPITAL ENCOUNTER (OUTPATIENT)
Dept: PREADMISSION TESTING | Facility: HOSPITAL | Age: 52
Discharge: HOME | End: 2022-07-05
Attending: STUDENT IN AN ORGANIZED HEALTH CARE EDUCATION/TRAINING PROGRAM
Payer: MEDICAID

## 2022-07-05 VITALS
HEART RATE: 78 BPM | DIASTOLIC BLOOD PRESSURE: 66 MMHG | SYSTOLIC BLOOD PRESSURE: 108 MMHG | RESPIRATION RATE: 16 BRPM | BODY MASS INDEX: 21.51 KG/M2 | TEMPERATURE: 98 F | OXYGEN SATURATION: 96 % | WEIGHT: 126 LBS | HEIGHT: 64 IN

## 2022-07-05 DIAGNOSIS — Z01.818 PRE-OP EVALUATION: Primary | ICD-10-CM

## 2022-07-05 DIAGNOSIS — H54.8 LEGALLY BLIND: ICD-10-CM

## 2022-07-05 DIAGNOSIS — M25.852 FEMOROACETABULAR IMPINGEMENT OF LEFT HIP: ICD-10-CM

## 2022-07-05 DIAGNOSIS — E03.9 ACQUIRED HYPOTHYROIDISM: Chronic | ICD-10-CM

## 2022-07-05 DIAGNOSIS — M32.19 SYSTEMIC LUPUS ERYTHEMATOSUS WITH OTHER ORGAN INVOLVEMENT, UNSPECIFIED SLE TYPE: Chronic | ICD-10-CM

## 2022-07-05 DIAGNOSIS — F41.9 ANXIETY: ICD-10-CM

## 2022-07-05 DIAGNOSIS — K21.9 GASTROESOPHAGEAL REFLUX DISEASE, UNSPECIFIED WHETHER ESOPHAGITIS PRESENT: ICD-10-CM

## 2022-07-05 LAB
ALBUMIN SERPL BCP-MCNC: 4.5 G/DL (ref 3.5–5.2)
ALP SERPL-CCNC: 27 U/L (ref 55–135)
ALT SERPL W/O P-5'-P-CCNC: 22 U/L (ref 10–44)
ANION GAP SERPL CALC-SCNC: 8 MMOL/L (ref 8–16)
AST SERPL-CCNC: 21 U/L (ref 10–40)
BASOPHILS # BLD AUTO: 0.04 K/UL (ref 0–0.2)
BASOPHILS NFR BLD: 0.7 % (ref 0–1.9)
BILIRUB SERPL-MCNC: 1.6 MG/DL (ref 0.1–1)
BUN SERPL-MCNC: 13 MG/DL (ref 6–20)
CALCIUM SERPL-MCNC: 9.9 MG/DL (ref 8.7–10.5)
CHLORIDE SERPL-SCNC: 103 MMOL/L (ref 95–110)
CO2 SERPL-SCNC: 29 MMOL/L (ref 23–29)
CREAT SERPL-MCNC: 1 MG/DL (ref 0.5–1.4)
DIFFERENTIAL METHOD: NORMAL
EOSINOPHIL # BLD AUTO: 0.1 K/UL (ref 0–0.5)
EOSINOPHIL NFR BLD: 1.5 % (ref 0–8)
ERYTHROCYTE [DISTWIDTH] IN BLOOD BY AUTOMATED COUNT: 12.8 % (ref 11.5–14.5)
EST. GFR  (AFRICAN AMERICAN): >60 ML/MIN/1.73 M^2
EST. GFR  (NON AFRICAN AMERICAN): >60 ML/MIN/1.73 M^2
GLUCOSE SERPL-MCNC: 84 MG/DL (ref 70–110)
HCT VFR BLD AUTO: 43 % (ref 37–48.5)
HGB BLD-MCNC: 14.4 G/DL (ref 12–16)
IMM GRANULOCYTES # BLD AUTO: 0.01 K/UL (ref 0–0.04)
IMM GRANULOCYTES NFR BLD AUTO: 0.2 % (ref 0–0.5)
LYMPHOCYTES # BLD AUTO: 1.7 K/UL (ref 1–4.8)
LYMPHOCYTES NFR BLD: 30.3 % (ref 18–48)
MCH RBC QN AUTO: 30.6 PG (ref 27–31)
MCHC RBC AUTO-ENTMCNC: 33.5 G/DL (ref 32–36)
MCV RBC AUTO: 92 FL (ref 82–98)
MONOCYTES # BLD AUTO: 0.4 K/UL (ref 0.3–1)
MONOCYTES NFR BLD: 7.7 % (ref 4–15)
NEUTROPHILS # BLD AUTO: 3.3 K/UL (ref 1.8–7.7)
NEUTROPHILS NFR BLD: 59.6 % (ref 38–73)
NRBC BLD-RTO: 0 /100 WBC
PLATELET # BLD AUTO: 207 K/UL (ref 150–450)
PMV BLD AUTO: 11.8 FL (ref 9.2–12.9)
POTASSIUM SERPL-SCNC: 4.3 MMOL/L (ref 3.5–5.1)
PROT SERPL-MCNC: 7 G/DL (ref 6–8.4)
RBC # BLD AUTO: 4.7 M/UL (ref 4–5.4)
SODIUM SERPL-SCNC: 140 MMOL/L (ref 136–145)
WBC # BLD AUTO: 5.45 K/UL (ref 3.9–12.7)

## 2022-07-05 PROCEDURE — 80053 COMPREHEN METABOLIC PANEL: CPT | Performed by: PHYSICIAN ASSISTANT

## 2022-07-05 PROCEDURE — 85025 COMPLETE CBC W/AUTO DIFF WBC: CPT | Performed by: PHYSICIAN ASSISTANT

## 2022-07-05 RX ORDER — ESTRADIOL 0.03 MG/D
1 PATCH TRANSDERMAL WEEKLY
Status: ON HOLD | COMMUNITY
End: 2022-07-28 | Stop reason: HOSPADM

## 2022-07-05 NOTE — H&P
Preoperative History and Physical  [facility]                                                                   Chief Complaint: Preoperative evaluation     History of Present Illness:      Laurel Calvo is a 52 y.o. female with PMH of lupus ( In remission) , hypothyroidism, seizures (controlled), GERD,  And legally blind who presents to the office today for a preoperative consultation at the request of Dr. Robbins  who plans on performing L hip femoroplasty  on July 28.     Functional Status:      The patient is able to climb a flight of stairs, limited by labrum tear . The patient is able to ambulate  without difficulty. The patient's functional status is affected by the surgical problem. The patient's functional status is not affected by shortness of breath, chest pain, dyspnea on exertion and fatigue.  Active prior to hip pain , walks dog daily no complains of CP or SOB   MET score greater than 4    Past Medical History:      Past Medical History:   Diagnosis Date    Anxiety     Arthritis     Chronic left-sided low back pain without sciatica 9/28/2018    Chronic pain of left knee 9/28/2018    XR KNEE COMP LEFT 4 OR MORE VIEWS - October 27, 2017 CLINICAL HISTORY:   Left knee pain FINDINGS:   No fracture, dislocation, or arthritic change of the knee. Dictated and Electronically Signed By: Jem Damon MD on October 27, 2017    GERD (gastroesophageal reflux disease)     Lupus     Migraine with aura and without status migrainosus, not intractable 8/8/2014    Seizures     Thyroid disease         Past Surgical History:      Past Surgical History:   Procedure Laterality Date    HYSTERECTOMY  2016    INJECTION OF ANESTHETIC AGENT INTO SACROILIAC JOINT Left 3/31/2022    Procedure: Left intra-articular hip joint injection;  Surgeon: Donato Manley MD;  Location: [facility];  Service: Pain Management;  Laterality: Left;    INJECTION OF ANESTHETIC  AGENT INTO SACROILIAC JOINT  3/31/2022    Procedure: ;  Surgeon: Donato Manley MD;  Location: [facility];  Service: Pain Management;;    INJECTION OF ANESTHETIC AGENT INTO SACROILIAC JOINT Bilateral 5/12/2022    Procedure: Bilateral GT bursa + bilateral SIJ injection RN IV Sedation;  Surgeon: Donato Manley MD;  Location: [facility];  Service: Pain Management;  Laterality: Bilateral;    INJECTION OF JOINT Bilateral 5/12/2022    Procedure: Bilateral GT bursa + bilateral SIJ injection RN IV Sedation;  Surgeon: Donato Manley MD;  Location: [facility];  Service: Pain Management;  Laterality: Bilateral;    OOPHORECTOMY      TONSILLECTOMY      WISDOM TOOTH EXTRACTION      X 4        Social History:      Social History     Socioeconomic History    Marital status:     Number of children: 0   Tobacco Use    Smoking status: Never Smoker    Smokeless tobacco: Never Used   Substance and Sexual Activity    Alcohol use: Yes     Comment: Occasional/Rare    Drug use: No     Comment: synthetic marijuana - gummie     Sexual activity: Yes     Partners: Male   Social History Narrative    No smokers in household, 1 dog.        Family History:      Family History   Problem Relation Age of Onset    No Known Problems Mother     No Known Problems Father     No Known Problems Sister     No Known Problems Brother     Stomach cancer Paternal Grandfather     Breast cancer Maternal Grandmother     Breast cancer Paternal Grandmother     Colon cancer Neg Hx        Allergies:      Review of patient's allergies indicates:   Allergen Reactions    Bactrim [sulfamethoxazole-trimethoprim] Other (See Comments)     Lupus symptoms exacerbation, headache.    Ciprofloxacin Other (See Comments)     Lupus symptoms exacerbation, headaches.       Medications:      Current Outpatient Medications   Medication Sig    EPINEPHrine (EPIPEN) 0.3 mg/0.3 mL AtIn Inject 0.3 mg into the muscle daily as needed.     gabapentin (NEURONTIN) 300 MG capsule Take 2 capsules (600 mg total) by mouth 3 (three) times daily.    lamoTRIgine (LAMICTAL) 100 MG tablet TAKE ONE TABLET BY MOUTH TWICE DAILY    levothyroxine (SYNTHROID) 50 MCG tablet Take 1 tablet (50 mcg total) by mouth before breakfast.    multivitamin (THERAGRAN) per tablet Take 1 tablet by mouth once daily.     pantoprazole (PROTONIX) 40 MG tablet Take 1 tablet (40 mg total) by mouth 2 (two) times daily.    testosterone cypionate (DEPOTESTOTERONE CYPIONATE) 200 mg/mL injection Inject into the muscle every 28 days. (Dose? - By Dr. Brennen Mercer at [facility])    estradiol (ESTRACE) 2 MG tablet TAKE 1 TABLET BY MOUTH ONE TIME DAILY    estradiol 0.025 mg/24 hr 0.025 mg/24 hr Place 1 patch onto the skin once a week.    rOPINIRole (REQUIP) 1 MG tablet TAKE ONE TABLET BY MOUTH EVERY EVENING AS NEEDED FOR FOR RESTLESSNESS LEGS (Patient not taking: Reported on 7/5/2022)    sod sulf-pot chloride-mag sulf (SUTAB) 1.479-0.188- 0.225 gram tablet Take 12 tablets by mouth once daily. Take according to package instructions with indicated amount of water. (Patient not taking: Reported on 7/5/2022)    traZODone (DESYREL) 50 MG tablet Take 1 tablet (50 mg total) by mouth every evening. (Patient not taking: Reported on 7/5/2022)    vitamin D 1000 units Tab Take 1,000 Units by mouth once daily.     No current facility-administered medications for this encounter.       Vitals:      Vitals:    07/05/22 0852   BP: 108/66   Pulse: 78   Resp: 16   Temp: 97.7 °F (36.5 °C)       Review of Systems:        Constitutional: Negative for fever, chills, weight loss, malaise/fatigue and diaphoresis.   HENT: Negative for hearing loss, ear pain, nosebleeds, congestion, sore throat, neck pain, tinnitus and ear discharge.    Eyes: Negative for blurred vision, double vision, photophobia, pain, discharge and redness. Legally blind   Respiratory: Negative for cough,  hemoptysis, sputum production, shortness of breath, wheezing and stridor.    Cardiovascular: Negative for chest pain, palpitations, orthopnea, claudication, leg swelling and PND.   Gastrointestinal: Negative for , nausea, vomiting, abdominal pain, diarrhea, constipation, blood in stool and melena. + GERD, controlled   Genitourinary: Negative for dysuria, urgency, frequency, hematuria and flank pain.   Musculoskeletal: Negative for myalgias, and falls.  Positive back pain, joint pain  Skin: Negative for itching and rash.   Neurological: Negative for dizziness, tingling, tremors, sensory change, speech change, focal weakness, , loss of consciousness, weakness and headaches.   Endo/Heme/Allergies: Negative for environmental allergies and polydipsia. Does not bleed easily. Easy bruising   Psychiatric/Behavioral: Negative for depression, suicidal ideas, hallucinations, memory loss and . The patient is not nervous/anxious and does not have insomnia.    All 14 systems reviewed and negative except as noted above.    Physical Exam:      Constitutional: Appears well-developed, well-nourished and in no acute distress.  Patient is oriented to person, place, and time.   Head: Normocephalic and atraumatic. Mucous membranes moist.  Neck: Neck supple no mass.   Cardiovascular: Normal rate and regular rhythm.  S1 S2 appreciated by ascultation.  Pulmonary/Chest: Effort normal and clear to auscultation bilaterally. No respiratory distress.   Abdomen: Soft. Non-tender and non-distended. Bowel sounds are normal.   Neurological: Patient is alert and oriented to person, place and time. Moves all extremities.  Skin: Warm and dry. No lesions.  Extremities: No clubbing, cyanosis or edema.    Laboratory data:      Reviewed and noted in plan where applicable. Please see chart for full laboratory data.    No results for input(s): CPK, CPKMB, TROPONINI, MB in the last 24 hours. No results for input(s): POCTGLUCOSE in the last 24 hours.     No  results found for: INR, PROTIME    Lab Results   Component Value Date    WBC 5.29 2021    HGB 14.8 2021    HCT 46.0 2021    MCV 94 2021     2021       No results for input(s): GLU, NA, K, CL, CO2, BUN, CREATININE, CALCIUM, MG in the last 24 hours.    Predictors of intubation difficulty:       Morbid obesity? no   Anatomically abnormal facies? no   Prominent incisors? no   Receding mandible? no   Short, thick neck? no   Neck range of motion: normal   Dentition: No chipped, loose, or missing teeth.  Based on the Modified Mallampati, patient is a mallampati score: I (soft palate, uvula, fauces, and tonsillar pillars visible)    Cardiographics:      EC22 NSR  Echocardiogram: 3/14/22  · The estimated PA systolic pressure is 23 mmHg.  · The left ventricle is normal in size with normal systolic function.  · Normal left ventricular diastolic function.  · Normal right ventricular size with normal right ventricular systolic function.  · Normal central venous pressure (3 mmHg).  · The estimated ejection fraction is 65%.  · Mild aortic regurgitation.  · Mild mitral regurgitation.     Stress 3/14/22  The EKG portion of this study is negative for ischemia.    The patient reported no chest pain during the stress test.    There were no arrhythmias during stress      Imaging:      Chest x-ray: none     Assessment and Plan:      Femoroacetabular impingement of left hip  - pt presents at the request of Dr. Robbins who plans on performing a L hip femoroplasty on 22  - Known risk factors for perioperative complications: lupus , seizures, hypothyroidism    Difficulty with intubation is not anticipated.    Cardiac Risk Estimation:  Per Revised Cardiac Risk Index patient is a Class I risk with a 3.9%  risk of a major cardiac event.      1.) Preoperative workup as follows: ECG, hemoglobin, hematocrit, electrolytes, creatinine, glucose, liver function studies.  2.) Change in medication  "regimen before surgery: avoid NSAIDs 7 days prior .  3.) Prophylaxis for cardiac events with perioperative beta-blockers: not indicated.  4.) Invasive hemodynamic monitoring perioperatively: not indicated.  5.) Deep vein thrombosis prophylaxis postoperatively: intermittent pneumatic compression boots and regimen to be chosen by surgical team.  6.) Surveillance for postoperative MI with ECG immediately postoperatively and on postoperati ve days 1 and 2 AND troponin levels 24 hours postoperatively and on day 4 or hospital discharge (whichever comes first): not indicated.  7.) Current medications which may produce withdrawal symptoms if withheld perioperatively: none  8.) Other measures: cardiac clearance 2/25/22 withDr. Adams        Systemic lupus erythematosus  - off plaquenil x 2 years   - no flares in 6 years   - no current meds       Nonintractable epilepsy with partial to complex partial seizures  - continue home Lamictal   - last seizure " while"   - take Lamictal AM of surgery  - Continue to follow up with PCP       Acquired hypothyroidism  - continue home synthroid, take AM of surgery  - Continue to follow up with PCP       GERD (gastroesophageal reflux disease)  - well controlled , does not wake pt at night   - not currently taking Protonix      Anxiety  - no meds currently   - Continue to follow up with PCP       Legally blind  - pt wears glasses  - Continue to follow up with opthalmology               "

## 2022-07-05 NOTE — ASSESSMENT & PLAN NOTE
- pt presents at the request of Dr. Robbins who plans on performing a L hip femoroplasty on 7/28/22  - Known risk factors for perioperative complications: lupus , seizures, hypothyroidism    Difficulty with intubation is not anticipated.    Cardiac Risk Estimation:  Per Revised Cardiac Risk Index patient is a Class I risk with a 3.9%  risk of a major cardiac event.      1.) Preoperative workup as follows: ECG, hemoglobin, hematocrit, electrolytes, creatinine, glucose, liver function studies.  2.) Change in medication regimen before surgery: avoid NSAIDs 7 days prior .  3.) Prophylaxis for cardiac events with perioperative beta-blockers: not indicated.  4.) Invasive hemodynamic monitoring perioperatively: not indicated.  5.) Deep vein thrombosis prophylaxis postoperatively: intermittent pneumatic compression boots and regimen to be chosen by surgical team.  6.) Surveillance for postoperative MI with ECG immediately postoperatively and on postoperati ve days 1 and 2 AND troponin levels 24 hours postoperatively and on day 4 or hospital discharge (whichever comes first): not indicated.  7.) Current medications which may produce withdrawal symptoms if withheld perioperatively: none  8.) Other measures: cardiac clearance 2/25/22 with Adams

## 2022-07-05 NOTE — ASSESSMENT & PLAN NOTE
"- continue home Lamictal   - last seizure " while"   - take Lamictal AM of surgery  - Continue to follow up with PCP     "

## 2022-07-05 NOTE — DISCHARGE INSTRUCTIONS
To confirm, Your doctor has instructed you that surgery is scheduled for 7/28/2022.       Please report to [facility].      Pre admit office will call afternoon prior to surgery with final arrival time.    INSTRUCTIONS IMPORTANT!!!     Do not eat, drink, or smoke after 12 midnight-including water. OK to brush teeth, no gum, candy or mints!    ¨ Take only these medicines with a small swallow of water-morning of surgery.    Lamictal, Synthroid    Pre operative instructions:  Please review the Pre-Operative Instruction booklet that you were given.        Bathing Instructions--See page 6 in the Pre-operative booklet.      Prevention of surgical site infections:     -Keep incisions clean and dry.   -Do not soak/submerge incisions in water until completely healed.   -Do not apply lotions, powders, creams, or deodorants to site.   -Always make sure hands are cleaned with antibacterial soap/ alcohol-based  prior to touching the surgical site.  (This includes doctors, nurses, staff, and yourself.)    Signs and symptoms:   -Redness and pain around the area where you had surgery   -Drainage of cloudy fluid from your surgical wound   -Fever over 100.4       I have read or had read and explained to me, and understand the above information.  Additional comments or instructions:  Received a copy of Pre-operative instructions booklet, FAQ surgical site infection sheet, and packets of hibiclens (if indicated).

## 2022-07-25 ENCOUNTER — PATIENT MESSAGE (OUTPATIENT)
Dept: SURGERY | Facility: HOSPITAL | Age: 52
End: 2022-07-25
Payer: MEDICAID

## 2022-07-25 RX ORDER — DIAZEPAM 5 MG/1
5 TABLET ORAL EVERY 6 HOURS PRN
Qty: 2 TABLET | Refills: 0 | Status: SHIPPED | OUTPATIENT
Start: 2022-07-25 | End: 2023-02-28

## 2022-07-26 ENCOUNTER — ANESTHESIA EVENT (OUTPATIENT)
Dept: SURGERY | Facility: HOSPITAL | Age: 52
End: 2022-07-26
Payer: MEDICAID

## 2022-07-26 NOTE — ANESTHESIA PREPROCEDURE EVALUATION
07/26/2022  Laurel Calvo is a 52 y.o., female.  Past Medical History:   Diagnosis Date    Anxiety     Arthritis     Chronic left-sided low back pain without sciatica 9/28/2018    Chronic pain of left knee 9/28/2018    XR KNEE COMP LEFT 4 OR MORE VIEWS - October 27, 2017 CLINICAL HISTORY:   Left knee pain FINDINGS:   No fracture, dislocation, or arthritic change of the knee. Dictated and Electronically Signed By: Jem Damon MD on October 27, 2017    GERD (gastroesophageal reflux disease)     Lupus     Migraine with aura and without status migrainosus, not intractable 8/8/2014    Seizures     Thyroid disease        Past Surgical History:   Procedure Laterality Date    HYSTERECTOMY  2016    INJECTION OF ANESTHETIC AGENT INTO SACROILIAC JOINT Left 3/31/2022    Procedure: Left intra-articular hip joint injection;  Surgeon: Donato Manley MD;  Location: [facility];  Service: Pain Management;  Laterality: Left;    INJECTION OF ANESTHETIC AGENT INTO SACROILIAC JOINT  3/31/2022    Procedure: ;  Surgeon: Donato Manley MD;  Location: [facility];  Service: Pain Management;;    INJECTION OF ANESTHETIC AGENT INTO SACROILIAC JOINT Bilateral 5/12/2022    Procedure: Bilateral GT bursa + bilateral SIJ injection RN IV Sedation;  Surgeon: Donato Manley MD;  Location: [facility];  Service: Pain Management;  Laterality: Bilateral;    INJECTION OF JOINT Bilateral 5/12/2022    Procedure: Bilateral GT bursa + bilateral SIJ injection RN IV Sedation;  Surgeon: Donato Manley MD;  Location: [facility];  Service: Pain Management;  Laterality: Bilateral;    OOPHORECTOMY      TONSILLECTOMY      WISDOM TOOTH EXTRACTION      X 4         Pre-op Assessment    I have reviewed the Patient Summary Reports.     I have reviewed the Nursing Notes. I have reviewed the NPO Status.   I have reviewed the  Medications.     Review of Systems  Anesthesia Hx:  No problems with previous Anesthesia  Denies Family Hx of Anesthesia complications.   Denies Personal Hx of Anesthesia complications.   Social:  Non-Smoker    Hematology/Oncology:  Hematology Normal        Cardiovascular:  Cardiovascular Normal  Cards w/u 1 yr ago showed NEG stress, NL EF on echo with mild MR, AI.   Pulmonary:   Shortness of breath    Renal/:  Renal/ Normal     Hepatic/GI:   GERD    Musculoskeletal:   Arthritis   Spine Disorders: lumbar    Neurological:   Headaches Seizures    Endocrine:   Hypothyroidism    Psych:   anxiety          Physical Exam  General: Oriented and Alert    Airway:  Mallampati: II   Mouth Opening: Normal  TM Distance: Normal  Tongue: Normal  Neck ROM: Normal ROM    Dental:  Intact    Chest/Lungs:  Clear to auscultation, Normal Respiratory Rate    Heart:  Rate: Normal  Rhythm: Regular Rhythm        Anesthesia Plan  Type of Anesthesia, risks & benefits discussed:    Anesthesia Type: Gen ETT  Intra-op Monitoring Plan: Standard ASA Monitors  Post Op Pain Control Plan: multimodal analgesia, IV/PO Opioids PRN and peripheral nerve block  Induction:  IV  Informed Consent: Informed consent signed with the Patient and all parties understand the risks and agree with anesthesia plan.  All questions answered.   ASA Score: 2  Day of Surgery Review of History & Physical: H&P Update referred to the surgeon/provider.    Ready For Surgery From Anesthesia Perspective.     .

## 2022-07-27 ENCOUNTER — PATIENT MESSAGE (OUTPATIENT)
Dept: SURGERY | Facility: HOSPITAL | Age: 52
End: 2022-07-27
Payer: MEDICAID

## 2022-07-27 ENCOUNTER — TELEPHONE (OUTPATIENT)
Dept: PREADMISSION TESTING | Facility: HOSPITAL | Age: 52
End: 2022-07-27
Payer: MEDICAID

## 2022-07-27 NOTE — TELEPHONE ENCOUNTER
Called and spoke with the patient about the following:    Your Surgery arrival time is at 7 am on 7/28/22 at [facility] location.    The address is [address] [place]  [address].     Only one adult (over 18) is to accompany you to surgery, unless it is a Pediatric patient, then 2 adults are encouraged to accompany them to the surgery center.    Your ride MUST STAY the entire time until you are discharged.      Please come in the main lobby (located on the 1st floor).     Be prepared to show your photo ID and insurance card.     Nothing to eat or drink after after midnight, unless you were instructed to take specific medications discussed with the Pre-admit Nurse.     Please call [phone] with any questions or concerns.     Thanks.

## 2022-07-28 ENCOUNTER — HOSPITAL ENCOUNTER (OUTPATIENT)
Dept: RADIOLOGY | Facility: HOSPITAL | Age: 52
Discharge: HOME | End: 2022-07-28
Attending: STUDENT IN AN ORGANIZED HEALTH CARE EDUCATION/TRAINING PROGRAM | Admitting: STUDENT IN AN ORGANIZED HEALTH CARE EDUCATION/TRAINING PROGRAM
Payer: MEDICAID

## 2022-07-28 ENCOUNTER — HOSPITAL ENCOUNTER (OUTPATIENT)
Facility: HOSPITAL | Age: 52
Discharge: HOME | End: 2022-07-28
Attending: STUDENT IN AN ORGANIZED HEALTH CARE EDUCATION/TRAINING PROGRAM | Admitting: STUDENT IN AN ORGANIZED HEALTH CARE EDUCATION/TRAINING PROGRAM
Payer: MEDICAID

## 2022-07-28 ENCOUNTER — ANESTHESIA (OUTPATIENT)
Dept: SURGERY | Facility: HOSPITAL | Age: 52
End: 2022-07-28
Payer: MEDICAID

## 2022-07-28 VITALS
SYSTOLIC BLOOD PRESSURE: 112 MMHG | TEMPERATURE: 98 F | HEIGHT: 64 IN | BODY MASS INDEX: 21.79 KG/M2 | OXYGEN SATURATION: 98 % | DIASTOLIC BLOOD PRESSURE: 64 MMHG | WEIGHT: 127.63 LBS | RESPIRATION RATE: 18 BRPM | HEART RATE: 71 BPM

## 2022-07-28 DIAGNOSIS — S73.192D ACETABULAR LABRUM TEAR, LEFT, SUBSEQUENT ENCOUNTER: Primary | ICD-10-CM

## 2022-07-28 PROCEDURE — 29916 HIP ARTHRO W/LABRAL REPAIR: CPT | Mod: LT,,, | Performed by: STUDENT IN AN ORGANIZED HEALTH CARE EDUCATION/TRAINING PROGRAM

## 2022-07-28 PROCEDURE — 63600175 PHARM REV CODE 636 W HCPCS: Performed by: STUDENT IN AN ORGANIZED HEALTH CARE EDUCATION/TRAINING PROGRAM

## 2022-07-28 PROCEDURE — 63600175 PHARM REV CODE 636 W HCPCS: Performed by: ANESTHESIOLOGY

## 2022-07-28 PROCEDURE — 27201423 OPTIME MED/SURG SUP & DEVICES STERILE SUPPLY: Performed by: STUDENT IN AN ORGANIZED HEALTH CARE EDUCATION/TRAINING PROGRAM

## 2022-07-28 PROCEDURE — D9220A PRA ANESTHESIA: Mod: ANES,,, | Performed by: ANESTHESIOLOGY

## 2022-07-28 PROCEDURE — 71000033 HC RECOVERY, INTIAL HOUR: Performed by: STUDENT IN AN ORGANIZED HEALTH CARE EDUCATION/TRAINING PROGRAM

## 2022-07-28 PROCEDURE — D9220A PRA ANESTHESIA: ICD-10-PCS | Mod: ANES,,, | Performed by: ANESTHESIOLOGY

## 2022-07-28 PROCEDURE — D9220A PRA ANESTHESIA: ICD-10-PCS | Mod: CRNA,,, | Performed by: NURSE ANESTHETIST, CERTIFIED REGISTERED

## 2022-07-28 PROCEDURE — 29914 HIP ARTHRO W/FEMOROPLASTY: CPT | Mod: 51,LT,, | Performed by: STUDENT IN AN ORGANIZED HEALTH CARE EDUCATION/TRAINING PROGRAM

## 2022-07-28 PROCEDURE — 71000039 HC RECOVERY, EACH ADD'L HOUR: Performed by: STUDENT IN AN ORGANIZED HEALTH CARE EDUCATION/TRAINING PROGRAM

## 2022-07-28 PROCEDURE — D9220A PRA ANESTHESIA: Mod: CRNA,,, | Performed by: NURSE ANESTHETIST, CERTIFIED REGISTERED

## 2022-07-28 PROCEDURE — 29916 PR ARTHROSCOPY HIP W/LABRAL REPAIR: ICD-10-PCS | Mod: LT,,, | Performed by: STUDENT IN AN ORGANIZED HEALTH CARE EDUCATION/TRAINING PROGRAM

## 2022-07-28 PROCEDURE — 25000003 PHARM REV CODE 250: Performed by: STUDENT IN AN ORGANIZED HEALTH CARE EDUCATION/TRAINING PROGRAM

## 2022-07-28 PROCEDURE — 37000008 HC ANESTHESIA 1ST 15 MINUTES: Performed by: STUDENT IN AN ORGANIZED HEALTH CARE EDUCATION/TRAINING PROGRAM

## 2022-07-28 PROCEDURE — 63600175 PHARM REV CODE 636 W HCPCS: Performed by: PHYSICIAN ASSISTANT

## 2022-07-28 PROCEDURE — C1713 ANCHOR/SCREW BN/BN,TIS/BN: HCPCS | Performed by: STUDENT IN AN ORGANIZED HEALTH CARE EDUCATION/TRAINING PROGRAM

## 2022-07-28 PROCEDURE — 25000003 PHARM REV CODE 250: Performed by: NURSE ANESTHETIST, CERTIFIED REGISTERED

## 2022-07-28 PROCEDURE — 29914 PR ARTHROSCOPY HIP W/FEMOROPLASTY: ICD-10-PCS | Mod: 51,LT,, | Performed by: STUDENT IN AN ORGANIZED HEALTH CARE EDUCATION/TRAINING PROGRAM

## 2022-07-28 PROCEDURE — 01202 ANES ARTHROSCOPIC PX HIP JT: CPT | Performed by: STUDENT IN AN ORGANIZED HEALTH CARE EDUCATION/TRAINING PROGRAM

## 2022-07-28 PROCEDURE — 25000003 PHARM REV CODE 250: Performed by: PHYSICIAN ASSISTANT

## 2022-07-28 PROCEDURE — 37000009 HC ANESTHESIA EA ADD 15 MINS: Performed by: STUDENT IN AN ORGANIZED HEALTH CARE EDUCATION/TRAINING PROGRAM

## 2022-07-28 PROCEDURE — 73501 X-RAY EXAM HIP UNI 1 VIEW: CPT | Mod: TC,LT

## 2022-07-28 PROCEDURE — 25000003 PHARM REV CODE 250: Performed by: ANESTHESIOLOGY

## 2022-07-28 PROCEDURE — 36000711: Performed by: STUDENT IN AN ORGANIZED HEALTH CARE EDUCATION/TRAINING PROGRAM

## 2022-07-28 PROCEDURE — 36000710: Performed by: STUDENT IN AN ORGANIZED HEALTH CARE EDUCATION/TRAINING PROGRAM

## 2022-07-28 PROCEDURE — 71000015 HC POSTOP RECOV 1ST HR: Performed by: STUDENT IN AN ORGANIZED HEALTH CARE EDUCATION/TRAINING PROGRAM

## 2022-07-28 PROCEDURE — 63600175 PHARM REV CODE 636 W HCPCS: Performed by: NURSE ANESTHETIST, CERTIFIED REGISTERED

## 2022-07-28 DEVICE — ANCHOR CINCHLOCK SS KNOTLESS: Type: IMPLANTABLE DEVICE | Site: HIP | Status: FUNCTIONAL

## 2022-07-28 DEVICE — IMPLANTABLE DEVICE: Type: IMPLANTABLE DEVICE | Site: HIP | Status: FUNCTIONAL

## 2022-07-28 RX ORDER — PROPOFOL 10 MG/ML
VIAL (ML) INTRAVENOUS
Status: DISCONTINUED | OUTPATIENT
Start: 2022-07-28 | End: 2022-07-28

## 2022-07-28 RX ORDER — LIDOCAINE HYDROCHLORIDE 20 MG/ML
INJECTION, SOLUTION EPIDURAL; INFILTRATION; INTRACAUDAL; PERINEURAL
Status: DISCONTINUED | OUTPATIENT
Start: 2022-07-28 | End: 2022-07-28

## 2022-07-28 RX ORDER — EPINEPHRINE 1 MG/ML
INJECTION, SOLUTION INTRACARDIAC; INTRAMUSCULAR; INTRAVENOUS; SUBCUTANEOUS
Status: DISCONTINUED | OUTPATIENT
Start: 2022-07-28 | End: 2022-07-28 | Stop reason: HOSPADM

## 2022-07-28 RX ORDER — ONDANSETRON 2 MG/ML
4 INJECTION INTRAMUSCULAR; INTRAVENOUS ONCE AS NEEDED
Status: DISCONTINUED | OUTPATIENT
Start: 2022-07-28 | End: 2022-07-28 | Stop reason: HOSPADM

## 2022-07-28 RX ORDER — NEOSTIGMINE METHYLSULFATE 1 MG/ML
INJECTION, SOLUTION INTRAVENOUS
Status: DISCONTINUED | OUTPATIENT
Start: 2022-07-28 | End: 2022-07-28

## 2022-07-28 RX ORDER — FENTANYL CITRATE 50 UG/ML
INJECTION, SOLUTION INTRAMUSCULAR; INTRAVENOUS
Status: DISCONTINUED | OUTPATIENT
Start: 2022-07-28 | End: 2022-07-28

## 2022-07-28 RX ORDER — EPHEDRINE SULFATE 50 MG/ML
INJECTION, SOLUTION INTRAVENOUS
Status: DISCONTINUED | OUTPATIENT
Start: 2022-07-28 | End: 2022-07-28

## 2022-07-28 RX ORDER — BUPIVACAINE HYDROCHLORIDE 2.5 MG/ML
INJECTION, SOLUTION EPIDURAL; INFILTRATION; INTRACAUDAL
Status: DISCONTINUED | OUTPATIENT
Start: 2022-07-28 | End: 2022-07-28 | Stop reason: HOSPADM

## 2022-07-28 RX ORDER — EPINEPHRINE 1 MG/ML
INJECTION, SOLUTION INTRACARDIAC; INTRAMUSCULAR; INTRAVENOUS; SUBCUTANEOUS
Status: DISCONTINUED
Start: 2022-07-28 | End: 2022-07-28 | Stop reason: HOSPADM

## 2022-07-28 RX ORDER — MEPERIDINE HYDROCHLORIDE 25 MG/ML
12.5 INJECTION INTRAMUSCULAR; INTRAVENOUS; SUBCUTANEOUS ONCE
Status: DISCONTINUED | OUTPATIENT
Start: 2022-07-28 | End: 2022-07-28 | Stop reason: HOSPADM

## 2022-07-28 RX ORDER — HYDROMORPHONE HYDROCHLORIDE 2 MG/ML
INJECTION, SOLUTION INTRAMUSCULAR; INTRAVENOUS; SUBCUTANEOUS
Status: DISCONTINUED | OUTPATIENT
Start: 2022-07-28 | End: 2022-07-28

## 2022-07-28 RX ORDER — SODIUM CHLORIDE, SODIUM LACTATE, POTASSIUM CHLORIDE, CALCIUM CHLORIDE 600; 310; 30; 20 MG/100ML; MG/100ML; MG/100ML; MG/100ML
INJECTION, SOLUTION INTRAVENOUS CONTINUOUS
Status: DISCONTINUED | OUTPATIENT
Start: 2022-07-28 | End: 2023-10-06

## 2022-07-28 RX ORDER — FENTANYL CITRATE 50 UG/ML
25 INJECTION, SOLUTION INTRAMUSCULAR; INTRAVENOUS EVERY 5 MIN PRN
Status: DISCONTINUED | OUTPATIENT
Start: 2022-07-28 | End: 2022-07-28 | Stop reason: HOSPADM

## 2022-07-28 RX ORDER — BUPIVACAINE HYDROCHLORIDE 2.5 MG/ML
INJECTION, SOLUTION EPIDURAL; INFILTRATION; INTRACAUDAL
Status: DISCONTINUED
Start: 2022-07-28 | End: 2022-07-28 | Stop reason: HOSPADM

## 2022-07-28 RX ORDER — PHENYLEPHRINE HYDROCHLORIDE 10 MG/ML
INJECTION INTRAVENOUS
Status: DISCONTINUED | OUTPATIENT
Start: 2022-07-28 | End: 2022-07-28

## 2022-07-28 RX ORDER — ROCURONIUM BROMIDE 10 MG/ML
INJECTION, SOLUTION INTRAVENOUS
Status: DISCONTINUED | OUTPATIENT
Start: 2022-07-28 | End: 2022-07-28

## 2022-07-28 RX ORDER — CHLORHEXIDINE GLUCONATE ORAL RINSE 1.2 MG/ML
10 SOLUTION DENTAL
Status: DISCONTINUED | OUTPATIENT
Start: 2022-07-28 | End: 2022-07-28 | Stop reason: HOSPADM

## 2022-07-28 RX ORDER — MIDAZOLAM HYDROCHLORIDE 1 MG/ML
INJECTION, SOLUTION INTRAMUSCULAR; INTRAVENOUS
Status: DISCONTINUED | OUTPATIENT
Start: 2022-07-28 | End: 2022-07-28

## 2022-07-28 RX ORDER — DIPHENHYDRAMINE HYDROCHLORIDE 50 MG/ML
25 INJECTION INTRAMUSCULAR; INTRAVENOUS EVERY 6 HOURS PRN
Status: DISCONTINUED | OUTPATIENT
Start: 2022-07-28 | End: 2022-07-28 | Stop reason: HOSPADM

## 2022-07-28 RX ORDER — CEFAZOLIN SODIUM 2 G/50ML
2 SOLUTION INTRAVENOUS
Status: COMPLETED | OUTPATIENT
Start: 2022-07-28 | End: 2022-07-28

## 2022-07-28 RX ORDER — ACETAMINOPHEN 10 MG/ML
INJECTION, SOLUTION INTRAVENOUS
Status: DISCONTINUED | OUTPATIENT
Start: 2022-07-28 | End: 2022-07-28

## 2022-07-28 RX ORDER — HYDROCODONE BITARTRATE AND ACETAMINOPHEN 5; 325 MG/1; MG/1
1 TABLET ORAL
Status: DISCONTINUED | OUTPATIENT
Start: 2022-07-28 | End: 2022-07-28 | Stop reason: HOSPADM

## 2022-07-28 RX ORDER — NAPROXEN 500 MG/1
500 TABLET ORAL 2 TIMES DAILY WITH MEALS
Qty: 60 TABLET | Refills: 0 | Status: SHIPPED | OUTPATIENT
Start: 2022-07-28 | End: 2022-08-27

## 2022-07-28 RX ORDER — ALBUTEROL SULFATE 0.83 MG/ML
2.5 SOLUTION RESPIRATORY (INHALATION) EVERY 4 HOURS PRN
Status: DISCONTINUED | OUTPATIENT
Start: 2022-07-28 | End: 2022-07-28 | Stop reason: HOSPADM

## 2022-07-28 RX ORDER — OXYCODONE HYDROCHLORIDE 5 MG/1
5 TABLET ORAL EVERY 4 HOURS PRN
Qty: 42 TABLET | Refills: 0 | Status: SHIPPED | OUTPATIENT
Start: 2022-07-28 | End: 2023-02-28

## 2022-07-28 RX ORDER — SODIUM CHLORIDE 9 MG/ML
INJECTION, SOLUTION INTRAVENOUS CONTINUOUS
Status: DISCONTINUED | OUTPATIENT
Start: 2022-07-28 | End: 2022-07-28 | Stop reason: HOSPADM

## 2022-07-28 RX ORDER — ACETAMINOPHEN 500 MG
1000 TABLET ORAL EVERY 8 HOURS PRN
Qty: 60 TABLET | Refills: 0 | Status: SHIPPED | OUTPATIENT
Start: 2022-07-28

## 2022-07-28 RX ORDER — ASPIRIN 81 MG/1
81 TABLET ORAL 2 TIMES DAILY
Qty: 28 TABLET | Refills: 0 | Status: SHIPPED | OUTPATIENT
Start: 2022-07-28 | End: 2023-02-28

## 2022-07-28 RX ADMIN — PROPOFOL 150 MG: 10 INJECTION, EMULSION INTRAVENOUS at 08:07

## 2022-07-28 RX ADMIN — GLYCOPYRROLATE 0.4 MG: 0.2 INJECTION, SOLUTION INTRAMUSCULAR; INTRAVITREAL at 11:07

## 2022-07-28 RX ADMIN — EPHEDRINE SULFATE 15 MG: 50 INJECTION INTRAVENOUS at 10:07

## 2022-07-28 RX ADMIN — ACETAMINOPHEN 1000 MG: 10 INJECTION INTRAVENOUS at 09:07

## 2022-07-28 RX ADMIN — MIDAZOLAM 2 MG: 1 INJECTION INTRAMUSCULAR; INTRAVENOUS at 08:07

## 2022-07-28 RX ADMIN — FENTANYL CITRATE 25 MCG: 50 INJECTION, SOLUTION INTRAMUSCULAR; INTRAVENOUS at 12:07

## 2022-07-28 RX ADMIN — LIDOCAINE HYDROCHLORIDE 60 MG: 20 INJECTION, SOLUTION EPIDURAL; INFILTRATION; INTRACAUDAL; PERINEURAL at 08:07

## 2022-07-28 RX ADMIN — CHLORHEXIDINE GLUCONATE 0.12% ORAL RINSE 10 ML: 1.2 LIQUID ORAL at 07:07

## 2022-07-28 RX ADMIN — PHENYLEPHRINE HYDROCHLORIDE 200 MCG: 10 INJECTION INTRAVENOUS at 11:07

## 2022-07-28 RX ADMIN — CEFAZOLIN SODIUM 2 G: 2 SOLUTION INTRAVENOUS at 08:07

## 2022-07-28 RX ADMIN — HYDROCODONE BITARTRATE AND ACETAMINOPHEN 1 TABLET: 5; 325 TABLET ORAL at 12:07

## 2022-07-28 RX ADMIN — SODIUM CHLORIDE, SODIUM LACTATE, POTASSIUM CHLORIDE, AND CALCIUM CHLORIDE: 600; 310; 30; 20 INJECTION, SOLUTION INTRAVENOUS at 07:07

## 2022-07-28 RX ADMIN — ROCURONIUM BROMIDE 40 MG: 10 INJECTION, SOLUTION INTRAVENOUS at 08:07

## 2022-07-28 RX ADMIN — EPHEDRINE SULFATE 10 MG: 50 INJECTION INTRAVENOUS at 09:07

## 2022-07-28 RX ADMIN — ROCURONIUM BROMIDE 10 MG: 10 INJECTION, SOLUTION INTRAVENOUS at 08:07

## 2022-07-28 RX ADMIN — NEOSTIGMINE METHYLSULFATE 5 MG: 1 INJECTION INTRAVENOUS at 11:07

## 2022-07-28 RX ADMIN — SODIUM CHLORIDE, SODIUM LACTATE, POTASSIUM CHLORIDE, AND CALCIUM CHLORIDE: 600; 310; 30; 20 INJECTION, SOLUTION INTRAVENOUS at 09:07

## 2022-07-28 RX ADMIN — HYDROMORPHONE HYDROCHLORIDE 1 MG: 2 INJECTION, SOLUTION INTRAMUSCULAR; INTRAVENOUS; SUBCUTANEOUS at 11:07

## 2022-07-28 RX ADMIN — EPHEDRINE SULFATE 15 MG: 50 INJECTION INTRAVENOUS at 11:07

## 2022-07-28 RX ADMIN — FENTANYL CITRATE 100 MCG: 50 INJECTION, SOLUTION INTRAMUSCULAR; INTRAVENOUS at 08:07

## 2022-07-28 RX ADMIN — PHENYLEPHRINE HYDROCHLORIDE 200 MCG: 10 INJECTION INTRAVENOUS at 09:07

## 2022-07-28 NOTE — DISCHARGE SUMMARY
[facility]  Discharge Note  Short Stay    Procedure(s) (LRB):  ARTHROSCOPY, HIP, WITH FEMOROPLASTY (Left)  ARTHROSCOPY, HIP, WITH ACETABULOPLASTY, WITH REPAIR OF LABRUM (Left)    OUTCOME: Patient tolerated treatment/procedure well without complication and is now ready for discharge.    DISPOSITION: Home or Self Care    FINAL DIAGNOSIS:  Femoroacetabular impingement of left hip    FOLLOWUP: In clinic    DISCHARGE INSTRUCTIONS:  No discharge procedures on file.     TIME SPENT ON DISCHARGE: 10 minutes

## 2022-07-28 NOTE — H&P
[facility]  Orthopedics  H&P    Patient Name: Laurel Calvo  MRN: [id number]  Admission Date: 7/28/2022  Primary Care Provider: MARY LOU Antony MD    Patient information was obtained from patient.     Subjective:     Principal Problem: femoroacetabular impingement of left hip, Left hip acetabular labrum tear    Chief Complaint: Left hip pain     HPI: Laurel Calvo is a 52 y.o. female who is here today for surgery on her left hip. She was last seen on 6/17/2022, no changes in history since last visit.   To review her history, she is a 51 y.o. female who presents with LEFT hip pain and dysfunction. Onset of the symptoms was 1 year ago. Inciting event: none, no known injury. Current symptoms include left hip and leg pain, occasional numbness and buckling. Pain is aggravated by activity, especially hip flexion. She has had US guided injection into the left hip which provided relief for about 6 days. Symptoms then returned and she has remained symptomatic since then. Pt is stable at this time with no further complaints.       Past Medical History:   Diagnosis Date    Anxiety     Arthritis     Chronic left-sided low back pain without sciatica 9/28/2018    Chronic pain of left knee 9/28/2018    XR KNEE COMP LEFT 4 OR MORE VIEWS - October 27, 2017 CLINICAL HISTORY:   Left knee pain FINDINGS:   No fracture, dislocation, or arthritic change of the knee. Dictated and Electronically Signed By: Jem Damon MD on October 27, 2017    GERD (gastroesophageal reflux disease)     Lupus     Migraine with aura and without status migrainosus, not intractable 8/8/2014    Seizures     Thyroid disease        Past Surgical History:   Procedure Laterality Date    HYSTERECTOMY  2016    INJECTION OF ANESTHETIC AGENT INTO SACROILIAC JOINT Left 3/31/2022    Procedure: Left intra-articular hip joint injection;  Surgeon: Donato Manley MD;  Location: [facility];  Service: Pain Management;  Laterality: Left;     INJECTION OF ANESTHETIC AGENT INTO SACROILIAC JOINT  3/31/2022    Procedure: ;  Surgeon: Donato Manley MD;  Location: [facility];  Service: Pain Management;;    INJECTION OF ANESTHETIC AGENT INTO SACROILIAC JOINT Bilateral 5/12/2022    Procedure: Bilateral GT bursa + bilateral SIJ injection RN IV Sedation;  Surgeon: Donato Manley MD;  Location: [facility];  Service: Pain Management;  Laterality: Bilateral;    INJECTION OF JOINT Bilateral 5/12/2022    Procedure: Bilateral GT bursa + bilateral SIJ injection RN IV Sedation;  Surgeon: Donato Manley MD;  Location: [facility];  Service: Pain Management;  Laterality: Bilateral;    OOPHORECTOMY      TONSILLECTOMY      WISDOM TOOTH EXTRACTION      X 4       Review of patient's allergies indicates:   Allergen Reactions    Bactrim [sulfamethoxazole-trimethoprim] Other (See Comments)     Lupus symptoms exacerbation, headache.    Ciprofloxacin Other (See Comments)     Lupus symptoms exacerbation, headaches.       Current Facility-Administered Medications   Medication    0.9%  NaCl infusion    BUPivacaine (PF) 0.25% (2.5 mg/ml) (MARCAINE) 0.25 % (2.5 mg/mL) injection    cefazolin (ANCEF) 2 gram in dextrose 5% 50 mL IVPB (premix)    chlorhexidine 0.12 % solution 10 mL    EPINEPHrine (ADRENALIN) 1 mg/mL (1 mL) injection    lactated ringers infusion     Family History     Problem Relation (Age of Onset)    Breast cancer Maternal Grandmother, Paternal Grandmother    No Known Problems Mother, Father, Sister, Brother    Stomach cancer Paternal Grandfather        Tobacco Use    Smoking status: Never Smoker    Smokeless tobacco: Never Used   Substance and Sexual Activity    Alcohol use: Yes     Comment: Occasional/Rare    Drug use: Yes     Types: Marijuana     Comment: synthetic marijuana - gummie - last 48hrs ago 25mg    Sexual activity: Yes     Partners: Male     ROS  Objective:     Vital Signs (Most Recent):  Temp: 97.8 °F (36.6 °C) (07/28/22  "0730)  Pulse: 89 (07/28/22 0730)  Resp: 16 (07/28/22 0730)  BP: 117/77 (07/28/22 0730)  SpO2: 97 % (07/28/22 0730) Vital Signs (24h Range):  Temp:  [97.8 °F (36.6 °C)] 97.8 °F (36.6 °C)  Pulse:  [89] 89  Resp:  [16] 16  SpO2:  [97 %] 97 %  BP: (117)/(77) 117/77     Weight: 57.9 kg (127 lb 10.3 oz)  Height: 5' 4" (162.6 cm)  Body mass index is 21.91 kg/m².    No intake or output data in the 24 hours ending 07/28/22 0824    Ortho/SPM Exam    Significant Labs: All pertinent labs within the past 24 hours have been reviewed.    Significant Imaging: I have reviewed and interpreted all pertinent imaging results/findings.    Assessment/Plan:     There are no hospital problems to display for this patient.    Plan:  1. Left hip arthroscopy with labral repair, femoral plasty, acetabular plasty, and capsular closure.  2. I had a long discussion with the patient about treatment options, including operative and nonoperative treatments. We discussed pros and cons of each including risks pertinent to surgery including pain, infection, bleeding, damage to adjacent structures like nerves and blood vessels, failure to heal, need for future surgeries, stiffness, instability, loss of limb, anesthesia risks like stroke, blood clot, loss of life. We discussed the possibility of need for later hardware removal in the case that hardware was used. We discussed common and uncommon risks, and discussed patient specific factors that may increase the risks present with surgery. All questions were answered. The patient expressed understanding of the pros and cons of surgery and wanted to proceed with surgical treatment.  3. We discussed COVID19 with the patient, they are aware of our current policies and procedures, were given the option of delaying surgery, and they elect to proceed. All questions were answered.         Lenka Jiménez PA-C  Orthopedics  [facility]    "

## 2022-07-28 NOTE — ANESTHESIA PROCEDURE NOTES
Intubation    Date/Time: 7/28/2022 8:45 AM  Performed by: Christin Durham CRNA  Authorized by: Christin Durham CRNA     Intubation:     Induction:  Intravenous    Intubated:  Postinduction    Mask Ventilation:  Easy mask    Attempts:  1    Attempted By:  CRNA    Method of Intubation:  Direct    Blade:  Al 2    Laryngeal View Grade: Grade I - full view of cords      Difficult Airway Encountered?: No      Complications:  None    Airway Device:  Oral endotracheal tube    Airway Device Size:  7.0    Style/Cuff Inflation:  Cuffed    Inflation Amount (mL):  5    Tube secured:  21    Secured at:  The lips    Placement Verified By:  Capnometry and Revisualization with laryngoscopy    Complicating Factors:  None    Findings Post-Intubation:  BS equal bilateral and atraumatic/condition of teeth unchanged

## 2022-07-28 NOTE — ANESTHESIA POSTPROCEDURE EVALUATION
Anesthesia Post Evaluation    Patient: Laurel Calvo    Procedure(s) Performed: Procedure(s) (LRB):  ARTHROSCOPY, HIP, WITH FEMOROPLASTY (Left)  ARTHROSCOPY, HIP, WITH ACETABULOPLASTY, WITH REPAIR OF LABRUM (Left)    Final Anesthesia Type: general      Patient location during evaluation: PACU  Patient participation: Yes- Able to Participate  Level of consciousness: awake and alert and oriented  Post-procedure vital signs: reviewed and stable  Pain management: adequate  Airway patency: patent    PONV status at discharge: No PONV  Anesthetic complications: no      Cardiovascular status: blood pressure returned to baseline, stable and hemodynamically stable  Respiratory status: unassisted  Hydration status: euvolemic  Follow-up not needed.          Vitals Value Taken Time   /64 07/28/22 1224   Temp 36.3 °C (97.4 °F) 07/28/22 1153   Pulse 67 07/28/22 1224   Resp 56 07/28/22 1224   SpO2 98 % 07/28/22 1224   Vitals shown include unvalidated device data.      No case tracking events are documented in the log.      Pain/Bakari Score: Bakari Score: 10 (7/28/2022 12:10 PM)

## 2022-07-28 NOTE — OP NOTE
ORTHOPAEDIC OPERATIVE REPORT      DATE OF SURGERY: 7/28/2022      PREOPERATIVE DIAGNOSIS:    1. Left hip labral tear  2. Left hip femoroacetabular impingement     POSTOPERATIVE DIAGNOSIS:  1. Left hip labral tear  2. Left hip femoroacetabular impingement     PROCEDURE PERFORMED:    1. Left hip arthroscopic labral repair  2. Left hip arthroscopic femoroplasty  3. Left hip arthroscopic capsular closure      PRIMARY SURGEON: Wm Robbins MD       ESTIMATED BLOOD LOSS: Minimal.       COMPLICATIONS: None.       SPECIMENS: None.       DRAINS: None.      FINDINGS:  Articular cartilage: intact, grade 1-2 changes   Labrum: capsulolabral separation from 11 o'clock to 2 o'clock  Peripheral compartment: moderate size cam lesion      IMPLANTS:    Catalina CinchLock anchor x 3  Catalina Pivot Nanotak anchor x 1        INDICATION FOR SURGERY:    Laurel Calvo is a 52 y.o. female who presented with hip pain. A labral tear was diagnosed on clinical exam and MRI along with physical exam findings consistent with XAVIER. Her symptoms were refractory to conservative measures. After as discussion of the risks and benefits of surgery the patient elected to proceed.       DESCRIPTION OF PROCEDURE:  The patient was identified in the preoperative holding area. The operative extremity was marked and verified with the patient. The consent was reviewed. The patient was taken back to the operating room, placed under general anesthesia. The patient was positioned on the Smith and Nephew traction table, taking special care to pad all bony prominences including the perineum and feet. We then prepped and draped the hip in standard fashion. A time-out was performed to ensure the correct patient, the side, and diagnosis. We then proceeded with surgery. Approximately 40 pounds of traction applied to the left lower extremity, distracting the joint approximately 10 mm.      A standard anterolateral portal was made under fluoroscopic guidance using  a long spinal needle and nitinol wire.  The arthroscope was inserted into this portal and rotated to view the anterior triangle.  We then used a long spinal needle to establish a mid-anterior portal using a outside in technique. A nick-and-spread technique was utilized to minimize risk of injury to the LFCN. A cannula was inserted through this portal and the arthroscope was moved to the mid anterior portal.  Through the anterolateral portal we inserted a capsulotomy knife and performed an inter-portal capsulotomy.  The camera was again moved to the anterolateral portal.  We used a combination of shaver and VAPR to debride the capsule back and allowed exposure of the acetabular rim.    Once exposed, we performed a diagnostic arthroscopy.  The articular surface of the femoral head demonstrated overall intact articular cartilage.  The articular surface of the acetabulum showed some evidence of chondral delamination anteriorly and laterally.  There was a tear of the labrum from approximately the 11 o'clock to 2 o'clock position. The VAPR was used to gently elevate the capsule from the labrum at its peripheral margin on the acetabulum, careful to keep the chondrolabral junction intact. Loose flaps of cartilage were debrided in the acetabulum back to a stable base.    We then prepared the labrum for repair. We used the drill guide to prepare and place 3 knotless CinchLock anchors at the 11, 12, and 1 o'clock positions. We then passed suture in a simple configuration at each  hole site. The suture was shuttled into the anchor and the anchor was malletted in place. The labrum was then tensioned appropriately and the anchor locked. We then used the curved guide for the Nanotak anchor to drill a  hole and place 1 anchor at the 2 o'clock position. A suture was passed at the chondrolabral junction in a mattress configuration and tied peripherally to prevent the knot from articulating with the joint surface. This  gave us excellent restoration of the acetabular labral anatomy. At this point, we took final pictures within the joint and released traction. The joint reduced and the suction-seal of the labrum was restored. Total traction time was 93 minutes.      We then moved our attention to the peripheral compartment. The patient had a moderate sized anterolateral cam deformity. The hip was flexed up to approximately 45 degrees in the leg park. We then worked with a asif to remove the cam and leave a natural contour to the femoral neck. The femoroplasty was aided by multiple fluoroscopic views in different points of flexion to confirm adequate bony resection. The medial and lateral vessels were directly visualized and protected throughout. We finished the femoroplasty in extension to remove any remaining deformity posterolaterally. We took our final fluoroscopic pictures to confirm restoration of appropriate offset. A shaver removed any residual debris in the joint.      We then closed up the capsule using the SlingShot with #2 Orthocord suture in a watertight fashion. Prior to closure, a spinal needle was placed into the joint under direct visualization. The portals were closed with 3-0 nylon and 30 ml of 0.25% Marcaine were injected through the spinal needle. Xeroform, 4x4s and ABD were placed. The patient was awakened from anesthesia having suffered no known complications.     POST OPERATIVE PLAN:  50% weightbearing on the operative extremity for 4 weeks, limit excessive hip flexion and ER/IR  Naproxen 500 mg BID x 30 days for HO prophylaxis  ASA 81 mg BID x 14 days for DVT prophylaxis      Wm Robbins MD

## 2022-07-28 NOTE — TRANSFER OF CARE
"Anesthesia Transfer of Care Note    Patient: Laurel Calvo    Procedure(s) Performed: Procedure(s) (LRB):  ARTHROSCOPY, HIP, WITH FEMOROPLASTY (Left)  ARTHROSCOPY, HIP, WITH ACETABULOPLASTY, WITH REPAIR OF LABRUM (Left)    Patient location: PACU    Anesthesia Type: general    Transport from OR: Transported from OR on room air with adequate spontaneous ventilation    Post pain: adequate analgesia    Post assessment: no apparent anesthetic complications and tolerated procedure well    Post vital signs: stable    Level of consciousness: lethargic    Nausea/Vomiting: no nausea/vomiting    Complications: none    Transfer of care protocol was followed      Last vitals:   Visit Vitals  /77 (BP Location: Right arm, Patient Position: Sitting)   Pulse 89   Temp 36.6 °C (97.8 °F) (Temporal)   Resp 16   Ht 5' 4" (1.626 m)   Wt 57.9 kg (127 lb 10.3 oz)   LMP 08/26/2013   SpO2 97%   Breastfeeding No   BMI 21.91 kg/m²     "

## 2022-07-28 NOTE — DISCHARGE SUMMARY
[facility]  Discharge Note  Short Stay    Procedure(s) (LRB):  ARTHROSCOPY, HIP, WITH FEMOROPLASTY (Left)  ARTHROSCOPY, HIP, WITH ACETABULOPLASTY, WITH REPAIR OF LABRUM (Left)    OUTCOME: Patient tolerated treatment/procedure well without complication and is now ready for discharge.    DISPOSITION: Home or Self Care    FINAL DIAGNOSIS:  Left hip labral tear, femoracetabular impingement    FOLLOWUP: In clinic    DISCHARGE INSTRUCTIONS:  No discharge procedures on file.     TIME SPENT ON DISCHARGE: 10 minutes   Color consistent with ethnicity/race, warm, dry intact, resilient.

## 2022-07-28 NOTE — PATIENT INSTRUCTIONS
DISCHARGE INSTRUCTIONS FOR  HIP ARTHROSCOPY / IMPINGEMENT CORRECTION / LABRAL REPAIR    Contact the Sports Medicine Clinic at [phone] if you have questions about your instructions or follow-up appointment.    DIET:   Start with clear liquids and light foods to minimize nausea. Once these are tolerated, advance to a regular diet.     TRIP HOME:   To encourage blood flow and decrease your risk of blood clots, do the following on your ride home:    If possible, perform ankle pumps by moving your foot up and down.    If your ride home will be longer than 2 hours, it is best to stop at least once to get out of the car and move your leg.    While in the car, consider riding in the back seat with your surgical side elevated.     DRESSING AND WOUND CARE:   Arthroscopic hip surgery is performed through 2 to 5 small incisions around the front and side of the hip. These will be sutured closed and covered with sterile gauze. Keep the dressing clean and dry. It is normal for there to be some drainage after surgery since the hip was irrigated with large amounts of fluid. Reinforce with additional gauze as necessary.  Remove the dressing the 2nd day after surgery and begin changing daily with clean gauze or Band-Aids®. Keep your incisions covered until you follow up in clinic.   If you have Steri-Strips in place of stitches, allow them to stay in place as long as possible. Steri-Strips are made of a fabric material that can get wet in the shower and pat dry with a towel. They usually fall off on their own within 7 to 10 days. You may trim the edges as they begin to curl.      BATHING:  You may bathe or shower on the 2nd day after surgery, but do not scrub or soak the incisions. Dry the area by gently blotting it with a gauze or towel. After it is completely dry, cover the wound with clean gauze or Band-Aids®. Do NOT submerge the incisions (bath/swim) until after the sutures are removed and the wound has completely healed.      ACTIVITY:    Ice should be applied to the hip for 20-30 minutes, 5-6 times a day, to help control pain and swelling. Apply additional times as needed, especially after exercise, for the first 3-4 weeks. Do not apply ice directly to the skin; use a thin barrier in between. Also, do not use heat.    Use crutches to walk with only 50% weight bearing on the surgical leg for 4 weeks. You must use both crutches at all times and place the operative foot on the ground at the same time as both crutches. It is important to move after surgery. Do not plan to walk long distances, climb multiple flights of stairs, kneel, squat, crawl, or carry heavy objects until cleared by your surgeon.    Do not hold the foot completely off the ground while walking as this can stress the hip. *If you received a nerve block, do not put any weight on the leg until the block has completely worn off (up to 24 to 36 hours).    Avoid bending your hip more than 90 degrees and avoid excessive twisting (external or internal rotation).    Avoid sleeping directly on your hip. Sleeping with a pillow between your knees is helpful to decrease movement of the hip and increase comfort.   Physical therapy should be started within 3 days of surgery. Take your therapy prescription to the PT clinic of your choice. If you have not received a therapy prescription, please contact your surgeon's office to have a script sent to your physical therapist.    PAIN CONTROL:   It is important to stay ahead of pain as it becomes challenging to get under control if you fall behind. Ice can help and should be used as much as possible in the first few days.   Narcotic pain medications, such as hydrocodone or oxycodone, should be taken as prescribed. Wean off as soon as possible. Take these with food to decrease the chances of nausea and vomiting. Do not drink alcohol, drive a vehicle, or use heavy machinery while taking narcotic pain medications.    Acetaminophen (Tylenol)  is an effective over-the-counter pain medication that can be used with non-acetaminophen containing narcotics such as plain oxycodone.    HETEROTOPIC OSSIFICATION PREVENTION:  Heterotopic ossification, or abnormal bone formation, in the soft tissues around the hip is uncommon but possible after arthroscopic hip surgery. To prevent heterotopic ossification, you will be given a prescription for the anti-inflammatory medication naproxen after surgery. Take naproxen 500 mg twice daily for 30 days after surgery. Please inform the office if you have a medical condition that would prevent you from taking naproxen. Do not take additional non-steroidal anti-inflammatory medications with the naproxen such as Aleve, Advil, Motrin, ibuprofen, meloxicam, or diclofenac.    ASPIRIN FOR PREVENTION OF BLOOD CLOTS:   You should take one 81 mg baby aspirin twice daily for two weeks starting the evening of the day you have surgery unless instructed otherwise or taking a different blood thinner such as enoxaparin or warfarin. If you are aware that you are at high risk for a blood clot, notify your physician as soon as possible.  Take aspirin at least 30 minutes before taking ibuprofen or Toradol.    CONSTIPATION PREVENTION:   Anesthesia and pain medications, changes in eating and drinking, and less activity can all lead to constipation after surgery. To prevent or reduce constipation, take an over-the-counter stool softener (brands include Colace and Miralax).  Follow the directions on the bottle. Drink plenty of water and eat high fiber foods including whole grains, fresh fruits, vegetables, beans, prunes or prune juice.     PROBLEMS TO REPORT:  Persistent bloody drainage that soaks through reinforced dressings.  Fever greater than 101F or 38C.   Incision that is very red, swollen, draining pus, shows red streaks, or feels hot.  Inability to urinate within 8 hours of surgery (a rare effect of the anesthesia).  If you develop a rash,  generalized itching or swelling from the medications, STOP the medication and call the clinic or the orthopedic surgery resident on call.  After hip arthroscopy, you may experience pain or numbness in the groin, leg, and/or foot. This usually resolves within the first two weeks after surgery. Contact the clinic if you have increasing weakness on the surgical side.    Daytime calls should be directed to the Sports Medicine Clinic at [phone].   Night-time and weekend calls should be directed to the after hours nurse on call, who can be reached through the at [phone].       FREQUENTLY ASKED QUESTIONS     WHAT DAILY ACTIVITIES CAN I DO?  After hip surgery, daily activities such as walking, going up/down stairs, or desk work should not cause damage to your hip. Use the crutches as directed.      CAN I DRIVE OR RIDE BY CAR/ TRAIN/ PLANE?   You should not drive until you are off crutches (4 weeks). You should not drive while taking narcotic pain medications. You may ride in a car after surgery as needed. Be careful to avoid twisting or bending your hip more than 90 degrees. You may take a train or even fly the day after your surgery as long as you feel secure and comfortable. If you do fly, expect some extra swelling due the drop in air pressure. A compressive bandage, ice, and elevation should help this resolve.     WHAT ABOUT WORK?   You may return to an office-type job or to school whenever comfortable. For most patients this occurs 1 week after surgery. For more active jobs that require extended walking, squatting, or lifting, you can wait until after your follow-up appointment. Any other unusual types of jobs should be discussed to determine a date for return to work.     WHAT ABOUT SWELLING?   Expect swelling as a normal process after surgery. Ice, elevation, and other treatments provided at physical therapy will allow this to improve in time. Some swelling may remain for up to 8 weeks, and this is  normal.     WHAT IF IT REALLY HURTS TOO MUCH?   Surgery hurts and you cannot expect to be pain free, but our goal is for it to be tolerable. Try to use all available pain therapies such as narcotics and acetaminophen. Always try more ice and elevation. If the pain is not tolerable, call the clinic or the after hours nurse on call.

## 2022-08-02 ENCOUNTER — CLINICAL SUPPORT (OUTPATIENT)
Dept: REHABILITATION | Facility: HOSPITAL | Age: 52
End: 2022-08-02
Attending: STUDENT IN AN ORGANIZED HEALTH CARE EDUCATION/TRAINING PROGRAM
Payer: MEDICAID

## 2022-08-02 ENCOUNTER — PATIENT MESSAGE (OUTPATIENT)
Dept: ORTHOPEDICS | Facility: CLINIC | Age: 52
End: 2022-08-02
Payer: MEDICAID

## 2022-08-02 DIAGNOSIS — M25.552 LEFT HIP PAIN: Primary | ICD-10-CM

## 2022-08-02 DIAGNOSIS — Z98.890 POST-OPERATIVE STATE: ICD-10-CM

## 2022-08-02 DIAGNOSIS — M62.552 MUSCLE WASTING AND ATROPHY, NOT ELSEWHERE CLASSIFIED, LEFT THIGH: ICD-10-CM

## 2022-08-02 DIAGNOSIS — M25.852 FEMOROACETABULAR IMPINGEMENT OF LEFT HIP: ICD-10-CM

## 2022-08-02 DIAGNOSIS — M25.652 STIFFNESS OF LEFT HIP JOINT: ICD-10-CM

## 2022-08-02 DIAGNOSIS — Z48.89 ENCOUNTER FOR OTHER SPECIFIED SURGICAL AFTERCARE: ICD-10-CM

## 2022-08-02 PROCEDURE — 97110 THERAPEUTIC EXERCISES: CPT

## 2022-08-02 PROCEDURE — 97161 PT EVAL LOW COMPLEX 20 MIN: CPT

## 2022-08-02 NOTE — PLAN OF CARE
[facility]  Physical Therapy Initial Evaluation    Date: 2022   Name: Laurel Calvo  Clinic Number: [id number]    Therapy Diagnosis:    Encounter Diagnoses   Name Primary?    Post-operative state     Femoroacetabular impingement of left hip     Left hip pain Yes    Stiffness of left hip joint     Muscle wasting and atrophy, not elsewhere classified, left thigh     Encounter for other specified surgical aftercare       Physician: Wm Robbins     Physician Orders: PT Eval and Treat  Medical Diagnosis from Referral: Post-Operative state; Femoroacetabular Impingement of Left Hip.   Evaluation Date: 2022  Authorization Period Expiration: 23  Plan of Care Expiration: 10/25/2022  Visit # / Visits authorized:   FOTO: 1/3    Progress Note Due: 2022    Precautions: Standard and Post-Surgical Precautions   Weeks 0-3 (22 - 22)  · Weight bearin% body weight  · ROM: Flexion - 90 degrees; ER - neutral  · No active hip flexion    Weeks 4-6 (22 - 22)  · Full Weight Bearing  · ROM: Flexion - 105; ER - 20  · Initiate AROM/resisted    Time In: 1:15 pm  Time Out: 2:15 pm  Total Billable Time (timed & untimed codes): 60 minutes    SUBJECTIVE   Date of onset: 22  History of current condition - Laurel is a 52 y.o. female whom reports undergoing a L Hip Arthroscopy and Labral Repair. Mechanism of injury: patient reports a history of low back and hip pain. Pain is better.    Pain:  Current 3/10, worst 5/10, best 3/10   Location: anterior and lateral hip, lower back.   Description: Sharp  Aggravating Factors: movement, weight bearing, etc.   Easing Factors: ice pack, medication.     Imaging: not applicable.     Prior Therapy: Yes - prior to surgery.   Social History: Pt lives with their spouse  Occupation: not applicable.  Prior Level of Function: Independent and pain free with all ADL, IADL, community mobility and functional activities.   Current  Level of Function: Patient is unable to participate in her typical ADL's.     Dominant Extremity: right    Pts goals: Pt reported goals are to decrease overall pain levels in order to return to prior functional level.       Medical History:   Past Medical History:   Diagnosis Date    Anxiety     Arthritis     Chronic left-sided low back pain without sciatica 9/28/2018    Chronic pain of left knee 9/28/2018    XR KNEE COMP LEFT 4 OR MORE VIEWS - October 27, 2017 CLINICAL HISTORY:   Left knee pain FINDINGS:   No fracture, dislocation, or arthritic change of the knee. Dictated and Electronically Signed By: Jem Damon MD on October 27, 2017    GERD (gastroesophageal reflux disease)     Lupus     Migraine with aura and without status migrainosus, not intractable 8/8/2014    Seizures     Thyroid disease        Surgical History:   Laurel Calvo  has a past surgical history that includes Hysterectomy (2016); [place] tooth extraction; Oophorectomy; Injection of anesthetic agent into sacroiliac joint (Left, 3/31/2022); Injection of anesthetic agent into sacroiliac joint (3/31/2022); Injection of joint (Bilateral, 5/12/2022); Injection of anesthetic agent into sacroiliac joint (Bilateral, 5/12/2022); Tonsillectomy; arthroscopy, hip (Left, 7/28/2022); and arthroscopy, hip (Left, 7/28/2022).    Medications:   Laurel has a current medication list which includes the following prescription(s): acetaminophen, aspirin, diazepam, epinephrine, estradiol, gabapentin, lamotrigine, levothyroxine, multivitamin, naproxen, oxycodone, pantoprazole, ropinirole, testosterone cypionate, trazodone, and vitamin d, and the following Facility-Administered Medications: lactated ringers.    Allergies:   Review of patient's allergies indicates:   Allergen Reactions    Bactrim [sulfamethoxazole-trimethoprim] Other (See Comments)     Lupus symptoms exacerbation, headache.    Ciprofloxacin Other (See Comments)     Lupus symptoms exacerbation,  "headaches.        OBJECTIVE     RANGE OF MOTION:  *denotes pain     Hip  (degrees) Left Goal   Hip Flexion  70* 120   Hip IR  10* 30   Hip ER  20* 45         STRENGTH:  *denotes pain    L/E MMT Left Goal   Hip Flexion  NT 5/5   Hip Abduction  NT 5/5   Knee Extension NT 5/5   Knee Flexion NT 5/5   Hip ER NT 5/5         Sensation:  Sensation is intact to light touch in B lower extremities     Gait Analysis: patient required use of bilateral axillary crutches, 50% weight bearing.       Functional Mobility Observations noted Goal   Squat NT Funcitonal Nonpainful    Step Down  NT Funcitonal Nonpainful    Single Leg Stance  NT Funcitonal Nonpainful          FUNCTION:     CMS Impairment/Limitation/Restriction for FOTO Hip Survey    Therapist reviewed FOTO scores for Laurel on 8/2/2022.   FOTO documents entered into EPIC - see Media section.    Limitation Score: 89%         TREATMENT       Laurel received Manual Therapy to improve pain and ROM for (0) minutes, including:  Manual Intervention Performed Today    Astym     Hip Circumduction     PROM           Laurel received Therapeutic Exercises to improve strength, endurance, and ROM for (10) minutes including:  Intervention Performed Today    NuStep      Quad Set x  10" hold, 1x10   Supine Hip Abduction: belt x  3" hold x 3 minutes   Supine Hip Adduction: ball x  3" hold x 3 minutes   Bridging      Prone Lying x  3 minutes   Prone Quad Stretch      Prone Hamstring Curl     Gait Training x  bilateral crutches, foot flat with 25-50% weight bearing x 10 minutes         Home Exercises and Patient Education Provided    Education/Self-Care provided: (5) minutes  Issued HEP for hip isometric strengthening.     Written Home Exercises Provided: yes.  Exercises were reviewed and Laurel was able to demonstrate them prior to the end of the session.  Laruel demonstrated good understanding of the education provided.     See EMR under Patient Instructions for exercises provided " "8/2/2022.    ASSESSMENT   Laurel is a 52 y.o. female referred to outpatient Physical Therapy with a medical diagnosis of Post-Operative state; Femoroacetabular Impingement of Left Hip. Pt presents with impairments including: decreased ROM, decreased strength, gait abnormalities and decreased overall function.    Pt prognosis is Excellent.   Pt will benefit from skilled outpatient Physical Therapy to address the deficits stated above and in the chart below, provide pt/family education, and to maximize pt's level of independence.     Plan of care discussed with patient: Yes  Pt's spiritual, cultural and educational needs considered and patient is agreeable to the plan of care and goals as stated below:     Anticipated Barriers for therapy: none    Medical Necessity is demonstrated by the following  History  Co-morbidities and personal factors that may impact the plan of care Co-morbidities:   none    Personal Factors:   coping style     low   Examination  Body Structures and Functions, activity limitations and participation restrictions that may impact the plan of care Body Regions:   lower extremities    Body Systems:    ROM  strength  gait    Participation Restrictions:   See above in "Current Level of Function"     Activity limitations:   Learning and applying knowledge  no deficits    General Tasks and Commands  no deficits    Communication  no deficits    Mobility  walking    Self care  caring for body parts (brushing teeth, shaving, grooming)    Domestic Life  doing house work (cleaning house, washing dishes, laundry)    Interactions/Relationships  no deficits    Life Areas  no deficits    Community and Social Life  community life  recreation and leisure         moderate   Clinical Presentation evolving clinical presentation with changing clinical characteristics moderate   Decision Making/ Complexity Score: low       GOALS:    Short Term Goals:  6 weeks Progress   8/2/2022   1. Patient will report decreased pain " to <3/10 at worst on VAS to progress toward Prior Level of Function.    2. Patient will report improved function by a functional limitation score of <50 out of 100 on FOTO.    3. Patient will improve AROM to 50% of stated goals in order to progress towards Prior Level of Function.    4. Patient will improve strength to 50% of stated goals in order to progress towards Prior Level of Function.      Long Term Goals:  12 weeks Progress  8/2/2022   1. Patient will report decreased pain to <1/10 at worst on VAS to progress toward Prior Level of Function.      2. Patient will report improved function by a functional limitation score of <20 out of 100 on FOTO.    3. Patient will improve ROM and Functional Mobility to stated goals to return to Prior Level of Function.    4. Patient will improve strength to stated goals in order to return to Prior Level of Function.         PLAN   Plan of care Certification: 8/2/2022 to 10/25/2022     Outpatient Physical Therapy 3 times weekly for 12 weeks to include any combination of the following interventions: virtual visits, dry needling, modalities, electrical stimulation (IFC, Pre-Mod, Attended with Functional Dry Needling), Cervical/Lumbar Traction, Gait Training, Manual Therapy, Neuromuscular Re-ed, Patient Education, Self Care, Therapeutic Activites, and Therapeutic Exercise     Thank you for this referral.    These services are reasonable and necessary for the conditions set forth above while under my care.    Jc Echevarria, PT, DPT

## 2022-08-04 ENCOUNTER — CLINICAL SUPPORT (OUTPATIENT)
Dept: REHABILITATION | Facility: HOSPITAL | Age: 52
End: 2022-08-04
Payer: MEDICAID

## 2022-08-04 DIAGNOSIS — M62.552 MUSCLE WASTING AND ATROPHY, NOT ELSEWHERE CLASSIFIED, LEFT THIGH: ICD-10-CM

## 2022-08-04 DIAGNOSIS — M25.652 STIFFNESS OF LEFT HIP JOINT: ICD-10-CM

## 2022-08-04 DIAGNOSIS — Z48.89 ENCOUNTER FOR OTHER SPECIFIED SURGICAL AFTERCARE: ICD-10-CM

## 2022-08-04 DIAGNOSIS — M25.552 LEFT HIP PAIN: Primary | ICD-10-CM

## 2022-08-04 PROCEDURE — 97110 THERAPEUTIC EXERCISES: CPT

## 2022-08-04 NOTE — PROGRESS NOTES
[facility]   Physical Therapy Treatment Note     Name: Laurel Calvo  Clinic Number: [id number]    Therapy Diagnosis:   Encounter Diagnoses   Name Primary?    Left hip pain Yes    Stiffness of left hip joint     Muscle wasting and atrophy, not elsewhere classified, left thigh     Encounter for other specified surgical aftercare      Physician: Wm Robbins    Visit Date: 2022    Physician Orders: PT Eval and Treat  Medical Diagnosis from Referral: Post-Operative state; Femoroacetabular Impingement of Left Hip.   Evaluation Date: 2022  Authorization Period Expiration: 23  Plan of Care Expiration: 10/25/2022  Visit # / Visits authorized:  (+1 for evaluation)  FOTO: 1/3    Progress Note Due: 2022    Precautions: Standard and Post-Surgical Precautions   Weeks 0-3 (22 - 22)  · Weight bearin% body weight  · ROM: Flexion - 90 degrees; ER - neutral  · No active hip flexion    Weeks 4-6 (22 - 22)  · Full Weight Bearing  · ROM: Flexion - 105; ER - 20  · Initiate AROM/resisted      PTA Visit #: 0/5     Time In: 2:35 pm  Time Out: 3:30 pm  Total Billable Time: 30 minutes (decreased due to staffing)  Billing according to Medicaid Guidelines    SUBJECTIVE     Pt reports: some increase in hip soreness.  She was compliant with home exercise program.  Response to previous treatment: no significant change.   Functional change: difficulty sleeping at night.     Pain: 5/10  Location: anterior and lateral hip, lower back    OBJECTIVE     Objective Measures updated at progress report unless specified.     Treatment     Laurel received the treatments listed below:          Laurel received Manual Therapy to improve pain and ROM for (15) minutes, including:  Manual Intervention Performed Today    Astym     Hip Mobilizations  x  lateral and inferior    Hip Circumduction x    Lower extremity rolling  x             Laurel received Therapeutic Exercises to  "improve strength, endurance, and ROM for (10 - decreased due to staffing) minutes including:  Intervention Performed Today    Abdominal Bracing  x  3 minutes    Quad Set x  10" hold, 1x10   Supine Hip Abduction: belt x  3" hold x 3 minutes   Supine Hip Adduction: ball x  3" hold x 3 minutes   Bridging  x  2x10   Prone Lying x  3 minutes   Prone Quad Stretch with Strap x  30"x3   Prone Hamstring Curl     Gait Training   bilateral crutches, foot flat with 25-50% weight bearing x 10 minutes         Patient Education and Home Exercises     Home Exercises Provided and Patient Education Provided     Education provided:   - reviewed for understanding.     Written Home Exercises Provided: Patient instructed to cont prior HEP. Exercises were reviewed and Laurel was able to demonstrate them prior to the end of the session.  Laurel demonstrated good  understanding of the education provided. See EMR under Patient Instructions for exercises provided during therapy sessions    ASSESSMENT   Response to Manual Therapy: patient reported improved hip mobility after Manual Therapy.   Response to Therapeutic Exercise: added Prone Quad Stretch with Strap to improve hip soft tissue mobility, added Abdominal Bracing to improve core strength, and added Bridging and Prone Hamstring Curl to improve lower extremity strength.     Laurel Is progressing well towards her goals.   Pt prognosis is Excellent.     Pt will continue to benefit from skilled outpatient physical therapy to address the deficits listed in the problem list box on initial evaluation, provide pt/family education and to maximize pt's level of independence in the home and community environment.     Pt's spiritual, cultural and educational needs considered and pt agreeable to plan of care and goals.     Anticipated barriers to physical therapy: none.    GOALS:    Short Term Goals:  6 weeks Progress   8/2/2022   1. Patient will report decreased pain to <3/10 at worst on VAS to " progress toward Prior Level of Function.    2. Patient will report improved function by a functional limitation score of <50 out of 100 on FOTO.    3. Patient will improve AROM to 50% of stated goals in order to progress towards Prior Level of Function.    4. Patient will improve strength to 50% of stated goals in order to progress towards Prior Level of Function.      Long Term Goals:  12 weeks Progress  8/2/2022   1. Patient will report decreased pain to <1/10 at worst on VAS to progress toward Prior Level of Function.      2. Patient will report improved function by a functional limitation score of <20 out of 100 on FOTO.    3. Patient will improve ROM and Functional Mobility to stated goals to return to Prior Level of Function.    4. Patient will improve strength to stated goals in order to return to Prior Level of Function.          PLAN   Continue Plan of Care (POC) and progress per patient tolerance. See treatment section for details on planned progressions next session.  Progress Note Due: 9/1/2022      Jc Echevarria, PT

## 2022-08-08 ENCOUNTER — CLINICAL SUPPORT (OUTPATIENT)
Dept: REHABILITATION | Facility: HOSPITAL | Age: 52
End: 2022-08-08
Payer: MEDICAID

## 2022-08-08 DIAGNOSIS — M25.552 LEFT HIP PAIN: Primary | ICD-10-CM

## 2022-08-08 DIAGNOSIS — M62.552 MUSCLE WASTING AND ATROPHY, NOT ELSEWHERE CLASSIFIED, LEFT THIGH: ICD-10-CM

## 2022-08-08 DIAGNOSIS — M25.652 STIFFNESS OF LEFT HIP JOINT: ICD-10-CM

## 2022-08-08 DIAGNOSIS — Z48.89 ENCOUNTER FOR OTHER SPECIFIED SURGICAL AFTERCARE: ICD-10-CM

## 2022-08-08 PROCEDURE — 97110 THERAPEUTIC EXERCISES: CPT | Mod: CQ

## 2022-08-08 NOTE — PROGRESS NOTES
[facility]   Physical Therapy Treatment Note     Name: Laurel Calvo  Clinic Number: [id number]    Therapy Diagnosis:   Encounter Diagnoses   Name Primary?    Left hip pain Yes    Stiffness of left hip joint     Muscle wasting and atrophy, not elsewhere classified, left thigh     Encounter for other specified surgical aftercare      Physician: Wm Robbins    Visit Date: 2022    Physician Orders: PT Eval and Treat  Medical Diagnosis from Referral: Post-Operative state; Femoroacetabular Impingement of Left Hip.   Evaluation Date: 2022  Authorization Period Expiration: 2022  Plan of Care Expiration: 10/25/2022  Visit # / Visits authorized:  (+1 eval)  FOTO: 1/3     Progress Note Due: 2022     Precautions: Standard and Post-Surgical Precautions   Weeks 0-3 (22 - 22)  · Weight bearin% body weight  · ROM: Flexion - 90 degrees; ER - neutral  · No active hip flexion     Weeks 4-6 (22 - 22)  · Full Weight Bearing  · ROM: Flexion - 105; ER - 20  · Initiate AROM/resisted    PTA Visit #:      Time In: 1:50 pm  Time Out: 2:30pm  Total Billable Time: 40 minutes    SUBJECTIVE     Pt reports: her hip is hurting a little more than usual. She states when she sleeps in her bed, she wakes up in the fetal position, which she knows is passing her hip precautions. She will try to use her recliner this week.   She was compliant with home exercise program.  Response to previous treatment: good  Functional change: none noted    Pain: 4-5/10  Location: Left hip    OBJECTIVE     Objective Measures updated at progress report unless specified.     Comparable Signs       Treatment     Laurel received the treatments listed below:      Laurel received Manual Therapy to improve pain and ROM for (10) minutes, including:  Manual Intervention Performed Today    Astym     Hip Mobilizations    lateral and inferior    Hip Circumduction     Lower extremity rolling   "x     Soft tissue mobilization x Left adductors, quads, hamstrings     Laurel received Therapeutic Exercises to improve strength, endurance, and ROM for (30) minutes including:  Intervention Performed Today    Abdominal Bracing  x  3 minutes    Quad Set x  10" hold, 1x15   Supine Hip Abduction: belt x  3" hold x 3 minutes   Supine Hip Adduction: ball x  3" hold x 3 minutes   Bridging  x  3x10   Prone Lying x  3 minutes   Prone Quad Stretch with Strap x  30"x3   Prone Hamstring Curl x 3x10 left   Gait Training   bilateral crutches, foot flat with 25-50% weight bearing x 10 minutes       Patient Education and Home Exercises     Home Exercises Provided and Patient Education Provided     Education provided:   - reviewed for understanding.     Written Home Exercises Provided: Patient instructed to cont prior HEP. Exercises were reviewed and Laurel was able to demonstrate them prior to the end of the session.  Laurel demonstrated good  understanding of the education provided. See EMR under Patient Instructions for exercises provided during therapy sessions    ASSESSMENT   Response to Therapeutic Exercise: added prone hamstring curl today, patient with no issues with performance. Increased bridge reps, patient with good fatigue noted. Good core activation noted throughout session. Patient with increased tenderness and tightness in left adductors which improved with manual therapy. Educated patient to sleep in recliner at home to avoid positions that go past her hip precautions.     Laurel Is progressing well towards her goals.   Pt prognosis is Excellent.     Pt will continue to benefit from skilled outpatient physical therapy to address the deficits listed in the problem list box on initial evaluation, provide pt/family education and to maximize pt's level of independence in the home and community environment.     Pt's spiritual, cultural and educational needs considered and pt agreeable to plan of care and goals.   "   Anticipated barriers to physical therapy: none.    GOALS:    Short Term Goals:  6 weeks Progress   8/2/2022   1. Patient will report decreased pain to <3/10 at worst on VAS to progress toward Prior Level of Function.    2. Patient will report improved function by a functional limitation score of <50 out of 100 on FOTO.    3. Patient will improve AROM to 50% of stated goals in order to progress towards Prior Level of Function.    4. Patient will improve strength to 50% of stated goals in order to progress towards Prior Level of Function.      Long Term Goals:  12 weeks Progress  8/2/2022   1. Patient will report decreased pain to <1/10 at worst on VAS to progress toward Prior Level of Function.      2. Patient will report improved function by a functional limitation score of <20 out of 100 on FOTO.    3. Patient will improve ROM and Functional Mobility to stated goals to return to Prior Level of Function.    4. Patient will improve strength to stated goals in order to return to Prior Level of Function.          PLAN     Continue Plan of Care (POC) and progress per patient tolerance. See treatment section for details on planned progressions next session.  Plan of Care Due: 10/25/22    Shanita Magana PTA

## 2022-08-09 ENCOUNTER — TELEPHONE (OUTPATIENT)
Dept: ORTHOPEDICS | Facility: CLINIC | Age: 52
End: 2022-08-09
Payer: MEDICAID

## 2022-08-09 ENCOUNTER — PATIENT MESSAGE (OUTPATIENT)
Dept: ADMINISTRATIVE | Facility: HOSPITAL | Age: 52
End: 2022-08-09
Payer: MEDICAID

## 2022-08-09 NOTE — TELEPHONE ENCOUNTER
Spoke with patient and got her rescheduled. Patient was grateful for the call. -NS    ----- Message from Maximo Ramírez sent at 8/9/2022  9:43 AM CDT -----  Contact: self  Pt is asking for an return call in reference to apt she had scheduled today for 10 am, pt states transportation didn't pick her up and is needing to reschedule apt,please call back at .[phone] Thx CJ

## 2022-08-10 ENCOUNTER — CLINICAL SUPPORT (OUTPATIENT)
Dept: REHABILITATION | Facility: HOSPITAL | Age: 52
End: 2022-08-10
Payer: MEDICAID

## 2022-08-10 ENCOUNTER — OFFICE VISIT (OUTPATIENT)
Dept: ORTHOPEDICS | Facility: CLINIC | Age: 52
End: 2022-08-10
Payer: MEDICAID

## 2022-08-10 VITALS — BODY MASS INDEX: 21.68 KG/M2 | WEIGHT: 127 LBS | HEIGHT: 64 IN

## 2022-08-10 DIAGNOSIS — M25.552 LEFT HIP PAIN: Primary | ICD-10-CM

## 2022-08-10 DIAGNOSIS — Z48.89 ENCOUNTER FOR OTHER SPECIFIED SURGICAL AFTERCARE: ICD-10-CM

## 2022-08-10 DIAGNOSIS — M25.652 STIFFNESS OF LEFT HIP JOINT: ICD-10-CM

## 2022-08-10 DIAGNOSIS — M62.552 MUSCLE WASTING AND ATROPHY, NOT ELSEWHERE CLASSIFIED, LEFT THIGH: ICD-10-CM

## 2022-08-10 DIAGNOSIS — M25.852 FEMOROACETABULAR IMPINGEMENT OF LEFT HIP: Primary | ICD-10-CM

## 2022-08-10 PROCEDURE — 1160F PR REVIEW ALL MEDS BY PRESCRIBER/CLIN PHARMACIST DOCUMENTED: ICD-10-PCS | Mod: CPTII,,, | Performed by: STUDENT IN AN ORGANIZED HEALTH CARE EDUCATION/TRAINING PROGRAM

## 2022-08-10 PROCEDURE — 3008F PR BODY MASS INDEX (BMI) DOCUMENTED: ICD-10-PCS | Mod: CPTII,,, | Performed by: STUDENT IN AN ORGANIZED HEALTH CARE EDUCATION/TRAINING PROGRAM

## 2022-08-10 PROCEDURE — 99213 OFFICE O/P EST LOW 20 MIN: CPT | Mod: PBBFAC | Performed by: STUDENT IN AN ORGANIZED HEALTH CARE EDUCATION/TRAINING PROGRAM

## 2022-08-10 PROCEDURE — 99024 PR POST-OP FOLLOW-UP VISIT: ICD-10-PCS | Mod: ,,, | Performed by: STUDENT IN AN ORGANIZED HEALTH CARE EDUCATION/TRAINING PROGRAM

## 2022-08-10 PROCEDURE — 3008F BODY MASS INDEX DOCD: CPT | Mod: CPTII,,, | Performed by: STUDENT IN AN ORGANIZED HEALTH CARE EDUCATION/TRAINING PROGRAM

## 2022-08-10 PROCEDURE — 99999 PR PBB SHADOW E&M-EST. PATIENT-LVL III: CPT | Mod: PBBFAC,,, | Performed by: STUDENT IN AN ORGANIZED HEALTH CARE EDUCATION/TRAINING PROGRAM

## 2022-08-10 PROCEDURE — 99024 POSTOP FOLLOW-UP VISIT: CPT | Mod: ,,, | Performed by: STUDENT IN AN ORGANIZED HEALTH CARE EDUCATION/TRAINING PROGRAM

## 2022-08-10 PROCEDURE — 1160F RVW MEDS BY RX/DR IN RCRD: CPT | Mod: CPTII,,, | Performed by: STUDENT IN AN ORGANIZED HEALTH CARE EDUCATION/TRAINING PROGRAM

## 2022-08-10 PROCEDURE — 97140 MANUAL THERAPY 1/> REGIONS: CPT

## 2022-08-10 PROCEDURE — 1159F MED LIST DOCD IN RCRD: CPT | Mod: CPTII,,, | Performed by: STUDENT IN AN ORGANIZED HEALTH CARE EDUCATION/TRAINING PROGRAM

## 2022-08-10 PROCEDURE — 1159F PR MEDICATION LIST DOCUMENTED IN MEDICAL RECORD: ICD-10-PCS | Mod: CPTII,,, | Performed by: STUDENT IN AN ORGANIZED HEALTH CARE EDUCATION/TRAINING PROGRAM

## 2022-08-10 PROCEDURE — 99999 PR PBB SHADOW E&M-EST. PATIENT-LVL III: ICD-10-PCS | Mod: PBBFAC,,, | Performed by: STUDENT IN AN ORGANIZED HEALTH CARE EDUCATION/TRAINING PROGRAM

## 2022-08-10 PROCEDURE — 97110 THERAPEUTIC EXERCISES: CPT

## 2022-08-10 NOTE — PROGRESS NOTES
[facility]   Physical Therapy Treatment Note     Name: Laurel Calvo  Clinic Number: [id number]    Therapy Diagnosis:   Encounter Diagnoses   Name Primary?    Left hip pain Yes    Stiffness of left hip joint     Muscle wasting and atrophy, not elsewhere classified, left thigh     Encounter for other specified surgical aftercare      Physician: Wm Robbins    Visit Date: 8/10/2022    Physician Orders: PT Eval and Treat  Medical Diagnosis from Referral: Post-Operative state; Femoroacetabular Impingement of Left Hip.   Evaluation Date: 2022  Authorization Period Expiration: 2022  Plan of Care Expiration: 10/25/2022  Visit # / Visits authorized: 3/20 (+1 eval)  FOTO: 1/3     Progress Note Due: 2022     Precautions: Standard and Post-Surgical Precautions   Weeks 0-3 (22 - 22)  · Weight bearin% body weight  · ROM: Flexion - 90 degrees; ER - neutral  · No active hip flexion     Weeks 4-6 (22 - 22)  · Full Weight Bearing  · ROM: Flexion - 105; ER - 20  · Initiate AROM/resisted    PTA Visit #:      Time In: 1:30 pm  Time Out: 2:10 pm  Total Billable Time: 23 minutes - decreased due to staffing  Billing is according to Medicaid Guidelines    SUBJECTIVE     Pt reports: soreness but no significant pain in her hip. Patient states she had her follow-up appointment with her surgeon today who said everything appeared to be going well.     She was compliant with home exercise program.  Response to previous treatment: decreased hip pain.   Functional change: continued difficulty with sleeping at night.     Pain: 4-5/10  Location: Left hip    OBJECTIVE     Objective Measures updated at progress report unless specified.     Treatment   Laurel received the treatments listed below:        Laurel received Manual Therapy to improve pain and ROM for (8) minutes, including: Performed by Wayne Salomon, PT, DPT  Manual Intervention Performed Today    Celena    "  Long Axis Hip Distraction x    Hip Mobilizations    lateral and inferior    Hip Circumduction     Lower extremity rolling  x     Soft tissue mobilization x Left adductors, quads, hamstrings   Hip passive range of motion - per protocol x          Laurel received Therapeutic Exercises to improve strength, endurance, and ROM for (15 - decreased due to staffing) minutes including: Performed by Jc Echevarria, PT, DPT, SCS  Intervention Performed Today    Abdominal Bracing  x  3 minutes    Quad Set x  10" hold, 2x10   Supine Hip Abduction: belt x  3" hold x 3 minutes   Supine Hip Adduction: ball x  3" hold x 3 minutes   Bridging  x  3x10 - no arms for assistance   Prone Lying x  3 minutes   Prone Quad Stretch with Strap x  30"x3   Prone Hamstring Curl x 3x10 left   Gait Training   bilateral crutches, foot flat with 25-50% weight bearing x 10 minutes       Patient Education and Home Exercises     Home Exercises Provided and Patient Education Provided     Education provided:   - reviewed for understanding.     Written Home Exercises Provided: Patient instructed to cont prior HEP. Exercises were reviewed and Laurel was able to demonstrate them prior to the end of the session.  Laurel demonstrated good  understanding of the education provided. See EMR under Patient Instructions for exercises provided during therapy sessions    ASSESSMENT   Response to Manual Therapy: reduction in symptoms noted with manual long axis distraction. Hip flexion passive range of motion improving well within surgical protocol (Wayne Salomon PT, DPT, CSCS)  Response to Therapeutic Exercise: increased sets of Quad Sets and performed Bridging without upper extremity for assistance to improve lower extremity strength. Jc Echevarria, PT, DPT, SCS    Laurel Is progressing well towards her goals.   Pt prognosis is Excellent.     Pt will continue to benefit from skilled outpatient physical therapy to address the deficits listed in the problem list box on " initial evaluation, provide pt/family education and to maximize pt's level of independence in the home and community environment.     Pt's spiritual, cultural and educational needs considered and pt agreeable to plan of care and goals.     Anticipated barriers to physical therapy: none.    GOALS:    Short Term Goals:  6 weeks Progress   8/2/2022   1. Patient will report decreased pain to <3/10 at worst on VAS to progress toward Prior Level of Function. Progressing   2. Patient will report improved function by a functional limitation score of <50 out of 100 on FOTO.    3. Patient will improve AROM to 50% of stated goals in order to progress towards Prior Level of Function.    4. Patient will improve strength to 50% of stated goals in order to progress towards Prior Level of Function.      Long Term Goals:  12 weeks Progress  8/2/2022   1. Patient will report decreased pain to <1/10 at worst on VAS to progress toward Prior Level of Function.      2. Patient will report improved function by a functional limitation score of <20 out of 100 on FOTO.    3. Patient will improve ROM and Functional Mobility to stated goals to return to Prior Level of Function.    4. Patient will improve strength to stated goals in order to return to Prior Level of Function.          PLAN   Continue Plan of Care (POC) and progress per patient tolerance. See treatment section for details on planned progressions next session.  Plan of Care Due: 10/25/22      Jc Echevarria, PT, DPT, SCS  Wayne Salomon, PT, DPT

## 2022-08-10 NOTE — PROGRESS NOTES
Orthopaedics  Post-operative follow-up    Procedure Performed:   1. Left hip arthroscopic labral repair  2. Left hip arthroscopic femoroplasty  3. Left hip arthroscopic capsular closure    Date of Surgery: 7/28/2022    Subjective: Laurel Calvo is now almost 2 weeks out from her hip arthroscopy.  She is doing well with no specific complaints other than the expected post-operative pain and stiffness.  She states that physical therapy has been progressing well. She does had some tightness in her groin.    Exam:  Sutures removed, portal sites benign with no drainage or redness  Minimal soft tissue swelling  She tolerates gentle passive ROM of the hip  Calixto's sign negative and no calf tenderness  Motor and sensory intact distally    Impression:  S/p left hip arthroscopy labral repair, femoroplasty, capsular closure, initial post-operative visit - doing well    Plan:  She is doing well post-operatively and making progress with PT  Discussed surgical findings, operative procedure  Reviewed post-operative instructions, rehabilitation  Weight bearing status: 50% WB for 2 more weeks  Symptomatic treatment for pain / swelling  Instructed patient to call clinic if questions or concerns    Follow-up 4 weeks for 6 week post op exam      Wm Robbins MD    I, Sherly Mckeon, acted as a scribe for Wm Robbins MD for the duration of this office visit.

## 2022-08-15 ENCOUNTER — CLINICAL SUPPORT (OUTPATIENT)
Dept: REHABILITATION | Facility: HOSPITAL | Age: 52
End: 2022-08-15
Payer: MEDICAID

## 2022-08-15 DIAGNOSIS — Z48.89 ENCOUNTER FOR OTHER SPECIFIED SURGICAL AFTERCARE: ICD-10-CM

## 2022-08-15 DIAGNOSIS — M25.652 STIFFNESS OF LEFT HIP JOINT: ICD-10-CM

## 2022-08-15 DIAGNOSIS — M62.552 MUSCLE WASTING AND ATROPHY, NOT ELSEWHERE CLASSIFIED, LEFT THIGH: ICD-10-CM

## 2022-08-15 DIAGNOSIS — M25.552 LEFT HIP PAIN: Primary | ICD-10-CM

## 2022-08-15 PROCEDURE — 97140 MANUAL THERAPY 1/> REGIONS: CPT

## 2022-08-15 PROCEDURE — 97110 THERAPEUTIC EXERCISES: CPT

## 2022-08-15 NOTE — PROGRESS NOTES
[facility]   Physical Therapy Treatment Note     Name: Laurel Calvo  Clinic Number: [id number]    Therapy Diagnosis:   Encounter Diagnoses   Name Primary?    Left hip pain Yes    Stiffness of left hip joint     Muscle wasting and atrophy, not elsewhere classified, left thigh     Encounter for other specified surgical aftercare      Physician: Wm Robbins    Visit Date: 8/15/2022    Physician Orders: PT Eval and Treat  Medical Diagnosis from Referral: Post-Operative state; Femoroacetabular Impingement of Left Hip.   Evaluation Date: 2022  Authorization Period Expiration: 2022  Plan of Care Expiration: 10/25/2022  Visit # / Visits authorized: 3/20 (+1 eval)  FOTO: 1/3     Progress Note Due: 2022     Precautions: Standard and Post-Surgical Precautions   Weeks 0-3 (22 - 22)  · Weight bearin% body weight  · ROM: Flexion - 90 degrees; ER - neutral  · No active hip flexion     Weeks 4-6 (22 - 22)  · Full Weight Bearing  · ROM: Flexion - 105; ER - 20  · Initiate AROM/resisted    PTA Visit #: 0/5     Time In: 1:58 pm  Time Out: 2:53 pm  Total Billable Time: 53 minutes - denotes billable time   Billing is according to Medicaid Guidelines    SUBJECTIVE     Pt reports: that she is feeling okay today. Patient reports that she overdid it over the weekend moving around on her feet for about 2 hours straight which put her in a lot of pain. Patient reports that it took almost a day to recover.     She was compliant with home exercise program.  Response to previous treatment: decreased hip pain.   Functional change: continued difficulty with sleeping at night.     Pain: 2-3/10  Location: Left hip    OBJECTIVE     Objective Measures updated at progress report unless specified.     Treatment   Laurel received the treatments listed below:      Laurel received Manual Therapy to improve pain and ROM for (10) minutes, including:   Manual Intervention  "Performed Today    Astym     Long Axis Hip Distraction x    Hip Mobilizations    lateral and inferior    Hip Circumduction     Lower extremity rolling  x     Soft tissue mobilization x Left adductors, quads, hamstrings   Hip passive range of motion - per protocol x      Laurel received Therapeutic Exercises to improve strength, endurance, and ROM for (43) minutes including:  Intervention Performed Today    Abdominal Bracing  x  3 minutes    Quad Set x  10" hold, 3x10   Supine Hip Abduction: belt x  3" hold x 3 minutes   Supine Hip Adduction: ball x  3" hold x 3 minutes   Bridging  x  3x10 - no arms for assistance   Prone Lying on Elbows (progressed to elbows) x  3 minutes   Prone Quad Stretch with Strap x  30"x3   Prone Hamstring Curl x 3x10 left 3 lbs   Gait Training   bilateral crutches, foot flat with 25-50% weight bearing x 10 minutes       Patient Education and Home Exercises     Home Exercises Provided and Patient Education Provided     Education provided:   - reviewed for understanding.     Written Home Exercises Provided: Patient instructed to cont prior HEP. Exercises were reviewed and Laurel was able to demonstrate them prior to the end of the session.  Laurel demonstrated good  understanding of the education provided. See EMR under Patient Instructions for exercises provided during therapy sessions    ASSESSMENT     Patient tolerated treatment well today with no complaints of pain throughout the session. Mobility looked good within post operative precautions. Continued to work on light muscle activation and strengthening.     Laurel Is progressing well towards her goals.   Pt prognosis is Excellent.     Pt will continue to benefit from skilled outpatient physical therapy to address the deficits listed in the problem list box on initial evaluation, provide pt/family education and to maximize pt's level of independence in the home and community environment.     Pt's spiritual, cultural and educational needs " considered and pt agreeable to plan of care and goals.     Anticipated barriers to physical therapy: none.    GOALS:    Short Term Goals:  6 weeks Progress   8/2/2022   1. Patient will report decreased pain to <3/10 at worst on VAS to progress toward Prior Level of Function. Progressing   2. Patient will report improved function by a functional limitation score of <50 out of 100 on FOTO.    3. Patient will improve AROM to 50% of stated goals in order to progress towards Prior Level of Function.    4. Patient will improve strength to 50% of stated goals in order to progress towards Prior Level of Function.      Long Term Goals:  12 weeks Progress  8/2/2022   1. Patient will report decreased pain to <1/10 at worst on VAS to progress toward Prior Level of Function.      2. Patient will report improved function by a functional limitation score of <20 out of 100 on FOTO.    3. Patient will improve ROM and Functional Mobility to stated goals to return to Prior Level of Function.    4. Patient will improve strength to stated goals in order to return to Prior Level of Function.          PLAN   Continue Plan of Care (POC) and progress per patient tolerance. See treatment section for details on planned progressions next session.  Plan of Care Due: 10/25/2022      Leticia Donahue, PT, DPT

## 2022-08-19 ENCOUNTER — TELEPHONE (OUTPATIENT)
Dept: PAIN MEDICINE | Facility: CLINIC | Age: 52
End: 2022-08-19
Payer: MEDICAID

## 2022-08-19 ENCOUNTER — PATIENT MESSAGE (OUTPATIENT)
Dept: PAIN MEDICINE | Facility: CLINIC | Age: 52
End: 2022-08-19
Payer: MEDICAID

## 2022-08-22 ENCOUNTER — CLINICAL SUPPORT (OUTPATIENT)
Dept: REHABILITATION | Facility: HOSPITAL | Age: 52
End: 2022-08-22
Payer: MEDICAID

## 2022-08-22 DIAGNOSIS — Z48.89 ENCOUNTER FOR OTHER SPECIFIED SURGICAL AFTERCARE: ICD-10-CM

## 2022-08-22 DIAGNOSIS — M25.552 LEFT HIP PAIN: Primary | ICD-10-CM

## 2022-08-22 DIAGNOSIS — M25.652 STIFFNESS OF LEFT HIP JOINT: ICD-10-CM

## 2022-08-22 DIAGNOSIS — M62.552 MUSCLE WASTING AND ATROPHY, NOT ELSEWHERE CLASSIFIED, LEFT THIGH: ICD-10-CM

## 2022-08-22 PROCEDURE — 97110 THERAPEUTIC EXERCISES: CPT | Performed by: PHYSICAL THERAPIST

## 2022-08-22 NOTE — PROGRESS NOTES
[facility]   Physical Therapy Treatment Note     Name: Laurel Calvo  Clinic Number: [id number]    Therapy Diagnosis:   Encounter Diagnoses   Name Primary?    Left hip pain Yes    Stiffness of left hip joint     Muscle wasting and atrophy, not elsewhere classified, left thigh     Encounter for other specified surgical aftercare      Physician: Wm Robbins    Visit Date: 2022    Physician Orders: PT Eval and Treat  Medical Diagnosis from Referral: Post-Operative state; Femoroacetabular Impingement of Left Hip.   Evaluation Date: 2022  Authorization Period Expiration: 2022  Plan of Care Expiration: 10/25/2022  Visit # / Visits authorized:  (+1 eval)  FOTO: 1/3     Progress Note Due: 2022     Precautions: Standard and Post-Surgical Precautions   Weeks 0-3 (22 - 22)  · Weight bearin% body weight  · ROM: Flexion - 90 degrees; ER - neutral  · No active hip flexion     Weeks 4-6 (22 - 22)  · Full Weight Bearing  · ROM: Flexion - 105; ER - 20  · Initiate AROM/resisted    PTA Visit #: 0/5     Time In: 1000 am  Time Out: 1100 am  Total Billable Time: 55 minutes - denotes billable time   Billing is according to Medicaid Guidelines    SUBJECTIVE     Pt reports: left hip been doing well, trying to keep within protocol.     She was compliant with home exercise program.  Response to previous treatment: decreased hip pain.   Functional change: continued difficulty with sleeping at night.     Pain: 2-3/10  Location: Left hip    OBJECTIVE     Objective Measures updated at progress report unless specified.     Treatment   Laurel received the treatments listed below:      Laurel received Manual Therapy to improve pain and ROM for (10) minutes, including:   Manual Intervention Performed Today    Astym     Long Axis Hip Distraction x    Hip Mobilizations    lateral and inferior    Hip Circumduction     Lower extremity rolling  x     Soft tissue  "mobilization  Left adductors, quads, hamstrings   Hip passive range of motion - per protocol x      Laurel received Therapeutic Exercises to improve strength, endurance, and ROM for (45) minutes including:  Intervention Performed Today    Abdominal Bracing  x  3 minutes    Quad Set x  10" hold, 3x10   Supine Hip Abduction: belt x  3" hold x 3 minutes   Supine Hip Adduction: ball x  3" hold x 3 minutes   Bridging  x  3x10 - no arms for assistance   Prone Lying on Elbows (progressed to elbows) x  3 minutes   Prone Quad Stretch with Strap x  30"x3   Prone Hamstring Curl x 3x10 left 3 lbs   Prone planks x 20" hold, 3x   Gait Training   bilateral crutches, foot flat with 25-50% weight bearing x 10 minutes       Patient Education and Home Exercises     Home Exercises Provided and Patient Education Provided     Education provided:   - reviewed for understanding.     Written Home Exercises Provided: Patient instructed to cont prior HEP. Exercises were reviewed and Laurel was able to demonstrate them prior to the end of the session.  Laurel demonstrated good  understanding of the education provided. See EMR under Patient Instructions for exercises provided during therapy sessions    ASSESSMENT     Pt continues to improve with left hip discomfort following surgery. Added planks today for core work with no increase in pain, only fatigue. Pt will benefit from further strengthening overall to stabilize left hip during functional activity.     Laurel Is progressing well towards her goals.   Pt prognosis is Excellent.     Pt will continue to benefit from skilled outpatient physical therapy to address the deficits listed in the problem list box on initial evaluation, provide pt/family education and to maximize pt's level of independence in the home and community environment.     Pt's spiritual, cultural and educational needs considered and pt agreeable to plan of care and goals.     Anticipated barriers to physical therapy: " none.    GOALS:    Short Term Goals:  6 weeks Progress   8/2/2022   1. Patient will report decreased pain to <3/10 at worst on VAS to progress toward Prior Level of Function. Progressing   2. Patient will report improved function by a functional limitation score of <50 out of 100 on FOTO.    3. Patient will improve AROM to 50% of stated goals in order to progress towards Prior Level of Function.    4. Patient will improve strength to 50% of stated goals in order to progress towards Prior Level of Function.      Long Term Goals:  12 weeks Progress  8/2/2022   1. Patient will report decreased pain to <1/10 at worst on VAS to progress toward Prior Level of Function.      2. Patient will report improved function by a functional limitation score of <20 out of 100 on FOTO.    3. Patient will improve ROM and Functional Mobility to stated goals to return to Prior Level of Function.    4. Patient will improve strength to stated goals in order to return to Prior Level of Function.          PLAN   Continue Plan of Care (POC) and progress per patient tolerance. See treatment section for details on planned progressions next session.  Plan of Care Due: 10/25/2022      Shawn Torrez, PT, DPT

## 2022-08-26 ENCOUNTER — TELEPHONE (OUTPATIENT)
Dept: PAIN MEDICINE | Facility: CLINIC | Age: 52
End: 2022-08-26
Payer: MEDICAID

## 2022-08-29 ENCOUNTER — OFFICE VISIT (OUTPATIENT)
Dept: PAIN MEDICINE | Facility: CLINIC | Age: 52
End: 2022-08-29
Payer: MEDICAID

## 2022-08-29 ENCOUNTER — CLINICAL SUPPORT (OUTPATIENT)
Dept: REHABILITATION | Facility: HOSPITAL | Age: 52
End: 2022-08-29
Payer: MEDICAID

## 2022-08-29 DIAGNOSIS — Z48.89 ENCOUNTER FOR OTHER SPECIFIED SURGICAL AFTERCARE: ICD-10-CM

## 2022-08-29 DIAGNOSIS — M25.812 SHOULDER IMPINGEMENT, LEFT: Primary | ICD-10-CM

## 2022-08-29 DIAGNOSIS — M62.552 MUSCLE WASTING AND ATROPHY, NOT ELSEWHERE CLASSIFIED, LEFT THIGH: ICD-10-CM

## 2022-08-29 DIAGNOSIS — M25.652 STIFFNESS OF LEFT HIP JOINT: ICD-10-CM

## 2022-08-29 DIAGNOSIS — M25.552 LEFT HIP PAIN: Primary | ICD-10-CM

## 2022-08-29 PROCEDURE — 99214 PR OFFICE/OUTPT VISIT, EST, LEVL IV, 30-39 MIN: ICD-10-PCS | Mod: 95,,, | Performed by: PHYSICIAN ASSISTANT

## 2022-08-29 PROCEDURE — 1159F PR MEDICATION LIST DOCUMENTED IN MEDICAL RECORD: ICD-10-PCS | Mod: CPTII,95,, | Performed by: PHYSICIAN ASSISTANT

## 2022-08-29 PROCEDURE — 97140 MANUAL THERAPY 1/> REGIONS: CPT

## 2022-08-29 PROCEDURE — 99214 OFFICE O/P EST MOD 30 MIN: CPT | Mod: 95,,, | Performed by: PHYSICIAN ASSISTANT

## 2022-08-29 PROCEDURE — 1160F PR REVIEW ALL MEDS BY PRESCRIBER/CLIN PHARMACIST DOCUMENTED: ICD-10-PCS | Mod: CPTII,95,, | Performed by: PHYSICIAN ASSISTANT

## 2022-08-29 PROCEDURE — 1160F RVW MEDS BY RX/DR IN RCRD: CPT | Mod: CPTII,95,, | Performed by: PHYSICIAN ASSISTANT

## 2022-08-29 PROCEDURE — 97110 THERAPEUTIC EXERCISES: CPT

## 2022-08-29 PROCEDURE — 1159F MED LIST DOCD IN RCRD: CPT | Mod: CPTII,95,, | Performed by: PHYSICIAN ASSISTANT

## 2022-08-29 NOTE — PROGRESS NOTES
[facility]   Physical Therapy Treatment Note     Name: Laurel Calvo  Clinic Number: [id number]    Therapy Diagnosis:   Encounter Diagnoses   Name Primary?    Left hip pain Yes    Stiffness of left hip joint     Muscle wasting and atrophy, not elsewhere classified, left thigh     Encounter for other specified surgical aftercare      Physician: Wm Robbins    Visit Date: 2022    Physician Orders: PT Eval and Treat  Medical Diagnosis from Referral: Post-Operative state; Femoroacetabular Impingement of Left Hip.   Evaluation Date: 2022  Authorization Period Expiration: 2022  Plan of Care Expiration: 10/25/2022  Visit # / Visits authorized:  (+1 eval)  FOTO: 1/3     Progress Note Due: 2022     Precautions: Standard and Post-Surgical Precautions   Weeks 0-3 (22 - 22)  Weight bearin% body weight  ROM: Flexion - 90 degrees; ER - neutral  No active hip flexion     Weeks 4-6 (22 - 22)  Full Weight Bearing  ROM: Flexion - 105; ER - 20  Initiate AROM/resisted    PTA Visit #: 0/5     Time In: 0800 am  Time Out: 0901 am  Total Billable Time: 58 minutes - denotes billable time   Billing is according to Medicaid Guidelines    SUBJECTIVE     Pt reports: she is feeling good. States Dr. Robbins moved her down to one crutch when he saw her last week. She has accidentally walked with no crutches one night but states she felt fine. Has been noticing some medial knee pain and is unsure why     She was compliant with home exercise program.  Response to previous treatment: decreased hip pain.   Functional change: continued difficulty with sleeping at night.     Pain: 2/10  Location: Left hip    OBJECTIVE     Objective Measures updated at progress report unless specified.     Treatment   Laurel received the treatments listed below:      Laurel received Manual Therapy to improve pain and ROM for (13) minutes, including:   Manual Intervention Performed Today  "   STM x Pes anserine and associated musculature    Long Axis Hip Distraction x Left hip    Hip Mobilizations    lateral and inferior    Hip Circumduction     Lower extremity rolling       Soft tissue mobilization  Left adductors, quads, hamstrings   Hip passive range of motion - per protocol x      Laurel received Therapeutic Exercises to improve strength, endurance, and ROM for (45) minutes including:  Intervention Performed Today    Abdominal Bracing  x 3 minutes    Quad Set x 10" hold x 3 minutes    Belt Block (knees and ankles)  x 3" hold x 3 minutes each    Supine Hip Adduction: ball  3" hold x 3 minutes   Bridging  x 3x10 - no arms for assistance   Prone Lying on Elbows (progressed to elbows) x 3 minutes   Prone Quad Stretch with Strap (towel roll under knee)  x 30"x3   Prone Hamstring Curl  3x10 left 3 lbs   Prone planks x 20" hold, 3x   Gait Training x Unilateral crutch, foot flat with % weight bearing    Heel slides  x 3 minutes    Supine LAQ (hip at 0* extension)  x 3x10    Weight shifts  X  x Lateral: 3 minutes with 3s holds   Heel to toe: 30x    Calf raises  NEXT        Patient Education and Home Exercises     Home Exercises Provided and Patient Education Provided     Education provided:   - reviewed for understanding.     Written Home Exercises Provided: Patient instructed to cont prior HEP. Exercises were reviewed and Laurel was able to demonstrate them prior to the end of the session.  Laurel demonstrated good  understanding of the education provided. See EMR under Patient Instructions for exercises provided during therapy sessions    ASSESSMENT     Pt tolerated session well today. Attempted gait training with no crutch but pt has a difficult time with full weight bearing. Incorporated lateral and heel to toe weight shifts to work towards full weight bearing in the L lower extremity. Added supine LAQ to improve quadriceps strength    Laurel Is progressing well towards her goals.   Pt prognosis is " Excellent.     Pt will continue to benefit from skilled outpatient physical therapy to address the deficits listed in the problem list box on initial evaluation, provide pt/family education and to maximize pt's level of independence in the home and community environment.     Pt's spiritual, cultural and educational needs considered and pt agreeable to plan of care and goals.     Anticipated barriers to physical therapy: none.    GOALS:    Short Term Goals:  6 weeks Progress   8/2/2022   Patient will report decreased pain to <3/10 at worst on VAS to progress toward Prior Level of Function. Progressing   Patient will report improved function by a functional limitation score of <50 out of 100 on FOTO.    Patient will improve AROM to 50% of stated goals in order to progress towards Prior Level of Function.    Patient will improve strength to 50% of stated goals in order to progress towards Prior Level of Function.      Long Term Goals:  12 weeks Progress  8/2/2022   Patient will report decreased pain to <1/10 at worst on VAS to progress toward Prior Level of Function.      Patient will report improved function by a functional limitation score of <20 out of 100 on FOTO.    Patient will improve ROM and Functional Mobility to stated goals to return to Prior Level of Function.    Patient will improve strength to stated goals in order to return to Prior Level of Function.          PLAN   Continue Plan of Care (POC) and progress per patient tolerance. See treatment section for details on planned progressions next session.  Plan of Care Due: 10/25/2022      Keara Bellamy, PT, DPT

## 2022-08-29 NOTE — PROGRESS NOTES
Established Patient Chronic Pain Note (Follow up visit)    Chief Complaint:   No chief complaint on file.      SUBJECTIVE:  Interval History (8/29/2022):  Laurel Calvo presents today for follow-up visit.  Patient was last seen on 6/13/2022. Last injection bilateral SIJ + GT bursa injection on 05/12/2022. Patient reports pain as 2/10 today. Patient underwent left hip arthroplasty with labral repair, capsular closure, and femoroplasty secondary to left hip labral tear and left hip femoracetabular impingement. Was previously scheduled with Dr. Manley for repeat left subacromial bursa injection, but appointment was canceled by our dept and has not been rescheduled. She reports continued left shoulder pain. Currently enrolled in physical therapy for post-operative therapy. She reports that she is recovering well from her hip surgery. Has follow up with Itzel on 09/09      Interval History (6/13/2022): Laurel Calvo presents today for follow-up visit.  she underwent bilateral GT bursa injection + bilateral SIJ injection on 5/12/22.  she reports 30% pain relief.  Patient reports pain as 3/10 today. Saw Dr. Robbins on 05/13/2022. Per Dr. 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  She also has some component of lumbar radiculopathy and SI pain. Will consider hip arthroscopy and labral repair. Has follow up scheduled with Dr. Robbins on 06/17/2022. Patient reports she is now having right sided hip pain due to favoring left side due to pain. Reports she would like to move forward with hip repair with Dr. Robbins, then consider continued injections with IPM. Also reports shoulder pain beginning to flare up. Previously had left subacromial bursa Injection in clinic 11/9/2021 with good relief. Reports injections typically last about 6 months.      Interval History (4/28/2022): Laurel Calvo presents today for follow-up visit.  she  "underwent left intraarticular hip joint injection on 03/31/2022.  The patient reports she had relief x 5-6 days after the procedure, but once she returned to normal activity the pain returned and then worsened. Reports one week of severe pain 8/10, but has now decreased slightly to a constant 5/10. Has follow up appointment with Dr. Robbins in 2 weeks to discuss other treatment options for hip.  Reports she does experience pain in the lower lumbar region left > right and some radiation into the outer hips. Greatest pain continues to originate from groin. Patient reports pain as 5/10 today. Taking ibuprofen with temporary moderate relief.    Interval History (1/27/2022):  Laurel Calvo presents today for follow-up visit.  Patient was last seen on 11/9/2021. At that visit, she had a subacromial bursa injection, which she feels helped less than previous injection.  Patient reports pain as "0/10 today.    Interval HPI (11/9/2021):  Laurel Calvo is a 51 y.o. female who presents to the clinic for a follow-up appointment for lower back and left hip pain.  Her main complaint today is left shoulder pain.  At the last visit, she was provided with a left-sided greater trochanteric bursa injection in the clinic.  She reports that this resulted in significant relief.  Since the last visit, Laurel Calvo states the left shoulder pain has been worsening. Current pain intensity is 8/10.  She also locates pain to the left lower tailbone area.  Patient denies night fever/night sweats, urinary incontinence, bowel incontinence, significant weight loss, significant motor weakness and loss of sensations.    Interval HPI 09/02/2021:  Laurel Calvo is a 51 y.o. female who presents to the clinic for a follow-up appointment for lower back and left hip pain. Since the last visit, Laurel Calvo states the pain has been persistant. Current pain intensity is 3/10.  She does report that she had a fall while gardening a few weeks ago " and landed on her right hip, but this is somewhat improving.  Her left hip is certainly more aggravated currently.    Interval HPI 08/24/2021:  Laurel Calvo presents to tele-medicine appointment for a follow-up appointment for chronic shoulder, neck, low back, and hip pain.  She was last seen on 03/04/2021 where she underwent left-sided subacromial bursa and left-sided greater trochanteric bursa injections.  She states that these resulted in 80-85% relief for both sites.  She states that her left lower back pain has been worsening is having radiating pain down the left lower extremity posteriorly extending to the calf.  She was also referred to a chiropractor.  Since the last visit, Laurel Calvo states the pain has been worsening. Current pain intensity is 8/10.    Initial HPI 03/04/2021:  Laurel Calvo is a 50 y.o. female, right-hand dominant, who presents to the clinic for the evaluation of chronic neck and lower back pain.  She was referred by her primary care team for further evaluation and management of this pain.  She has past medical history of seizure disorder, restless leg syndrome, anxiety, lupus, migraines, GERD, and multiple other medical comorbidities as listed in her chart.  The pain started several years ago following pain being told that she had herniated disc following an MRI of the cervical and lumbar spine and symptoms have been unchanged.The pain is located in the cervical myofascial area and radiates to the left upper extremity extending into the hand and fingers..  The pain is described as sharp, stabbing and throbbing and is rated as 2/10. The pain is rated with a score of  1/10 on the BEST day and a score of 10/10 on the WORST day.  Symptoms interfere with daily activity. The pain is exacerbated by at night and with exercise.  The pain is mitigated by ice. The patient reports spending less than 2 hours per day reclining. The patient reports 4-6 hours of uninterrupted sleep per  night.     Pain Disability Index Review:  Last 3 PDI Scores 6/13/2022 4/28/2022 11/9/2021   Pain Disability Index (PDI) 48 [phone] 15       Non-Pharmacologic Treatments:  Physical Therapy/Home Exercise: yes, has been active with home exercises as she has previously learned from physical therapies targeting the lower back  Ice/Heat:yes  TENS: no  Acupuncture: no  Massage: yes  Chiropractic: yes    Other: no        Pain Medications:  - Opioids: Norco  - Adjuvant Medications: Valium, baclofen, Plaquenil, Lamictal, Requip, trazodone  - Anti-Coagulants: None          Pain Procedures:   -03/04/2021:  Left subacromial bursa + left greater trochanteric bursa injections, 80-85% relief for both  -09/02/2021:  Left greater trochanteric bursa injection, significant relief  - Left subacromial bursa Injection in clinic 11/9/2021 - less pain relief than previously   -left IA hip joint injection on 03/31/2022 with relief for only 5-6 days, then pain returned and worsened  -bilateral GT bursa injection + bilateral SIJ injection on 5/12/22 with 30% relief       Imaging (Reviewed on 8/29/2022):     MRI hip 03/24/2022  FINDINGS:  Osseous structures: Normal marrow signal.  No fracture.  No suspicious osseous lesion.     Hip and Sacroiliac joints: No effusion. Mild-moderate cartilage irregularity at the peripheral aspect of the acetabular roof with mild associated subchondral cystic change.  Probable partial-thickness chondral-labral separation at the anterior/superior labrum.  No labral detached tear or paralabral cyst.     Pubic symphysis: No osteitis pubis.     Bursae: Normal.     Soft tissues: Regional muscles and tendons are normal in appearance.     Miscellaneous: None.     Impression:     No acute abnormality.     Mild-moderate degenerative changes at the peripheral aspect of the acetabular roof.     Probable partial thickness chondral-labral separation at the anterior/superior labrum.    X-ray hip 02/21/2022  FINDINGS:  Hip joints are  symmetric in appearance.  Joint spaces are maintained.  No evidence of avascular necrosis.  There is a curvilinear lucency seen on multiple projections in the inter trochanteric crest of the left femur extending into the proximal diaphyseal region suspicious for nondisplaced fracture at this site.     This report was flagged in Epic as abnormal.    MRI lumbar spine 09/01/2021:  Levocurvature of the lumbar spine is noted.  No listhesis.  There is no acute fracture.  The vertebral bodies are normal in height without compression fractures. Posterior elements are intact. Mild disc desiccation at the L5-S1 level.  Lumbar spine disc heights are preserved.  Conus medullaris terminates at the T12-L1 level. Distal spinal cord intensity is normal.  There is no soft tissue abnormality.     T12-L1: No disc bulge.  There is no facet arthropathy.  There is no neural foraminal stenosis.  There is no spinal canal stenosis.     L1-L2: No disc bulge.  There is no facet arthropathy.  There is no neuroforaminal stenosis.  There is no spinal canal stenosis.     L2-L3: No disc bulge.  There is no facet arthropathy.  There is no neuroforaminal stenosis.  There is no spinal canal stenosis.     L3-L4: No disc bulge.  There is no facet arthropathy.  There is no neuroforaminal stenosis.  There is no spinal canal stenosis.     L4-L5: No disc bulge.  There is no facet arthropathy.  There is no neuroforaminal stenosis.  There is no spinal canal stenosis.     L5-S1: No disc bulge.  There is no facet arthropathy.  There is no neuroforaminal stenosis.  There is no spinal canal stenosis.       X-ray cervical spine 03/04/2021:  There is straightening of the normal cervical spine lordosis.  No listhesis.  No abnormal motion of the cervical spine with flexion and extension.  No fracture.  Intervertebral disc spaces of the cervical spine are maintained.  No significant uncovertebral or facet arthropathy.  Oblique views demonstrate no significant neural  foraminal stenosis.  No bony central canal stenosis identified.  Prevertebral soft tissues are within normal limits.     X-ray lumbar spine 03/04/2021:  There is mild levocurvature of the lumbar spine.  No listhesis.  No abnormal motion of the lumbar spine with flexion and extension.  No fracture or pars defect.  Intervertebral disc spaces of the lumbar spine are maintained.  No significant facet arthropathy.  No suspicious osseous lesion.  Sacroiliac joints appear normal.             REVIEW OF SYSTEMS:    GENERAL:  No weight loss, malaise or fevers.  HEENT:   No recent changes in vision or hearing  NECK:  Negative for lumps, no difficulty with swallowing.  RESPIRATORY:  Negative for cough, wheezing or shortness of breath, patient denies any recent URI.  CARDIOVASCULAR:  Negative for chest pain, leg swelling or palpitations.  GI:  Negative for abdominal discomfort, blood in stools or black stools or change in bowel habits.  MUSCULOSKELETAL:  See HPI.  SKIN:  Negative for lesions, rash, and itching.  PSYCH:  No mood disorder or recent psychosocial stressors.  Patients sleep is not disturbed secondary to pain.  HEMATOLOGY/LYMPHOLOGY:  Negative for prolonged bleeding, bruising easily or swollen nodes.  Patient is not currently taking any anti-coagulants  NEURO:   No history of headaches, syncope, paralysis, seizures or tremors.  All other reviewed and negative other than HPI.        OBJECTIVE:    PHYSICAL EXAMINATION:  Telemedicine Exam  There were no vitals filed for this visit.    There is no height or weight on file to calculate BMI.   (reviewed on 8/29/2022)       Physical Exam: last in clinic visit:  GENERAL: Well appearing, in no acute distress, alert and oriented x3.  PSYCH:  Mood and affect appropriate.  SKIN: Skin color, texture, turgor normal, no rashes or lesions.  HEAD/FACE:  Normocephalic, atraumatic. Cranial nerves grossly intact.  CV: RRR with palpation of the radial artery.  PULM: No evidence of  respiratory difficulty, symmetric chest rise.  GI:  Soft and non-tender.  BACK: Straight leg raising in the sitting and supine positions is negative to radicular pain. No pain to palpation over the facet joints of the lumbar spine or spinous processes. Normal range of motion without pain reproduction.  EXTREMITIES: Peripheral joint ROM is full and pain free without obvious instability or laxity in all four extremities. No deformities, edema, or skin discoloration. Good capillary refill.  MUSCULOSKELETAL:   positive shoulder impingement signs on the left shoulder with Merrill, Neer's , and empty can.  minimal tenderness to palpation over the left greater trochanteric bursa. .  There is moderate pain with palpation over the sacroiliac joint on the left.  FABERs test is equivocal on the left.  FADIRs test is negative.   Bilateral upper and lower extremity strength is normal and symmetric.  No atrophy or tone abnormalities are noted.  NEURO: Bilateral upper and lower extremity coordination and muscle stretch reflexes are physiologic and symmetric.  Plantar response are downgoing. No clonus.  No loss of sensation is noted.  GAIT: normal.      ASSESSMENT: 52 y.o. year old female with lower back and left hip pain, consistent with     1. Shoulder impingement, left                PLAN: Discussed repeat subacromial bursa injection as this has offered 6 months of relief in the past. Patient previously has received bursa injection in office  Patient has follow up with Dr. Robbins for post-op follow up  Next available with Dr. Manley is >3 months, recommend that patient reach out to Dr. Robbins to consider left shoulder injection at next appointment.   If not, patient will call back and we will schedule first available with Sindhu    S/p bilateral Left SIJ + Left GT bursa injection with 30% relief  S/p left intra-articular hip joint injection with limited relief  S/p Left SIJ + Left GT bursa injection.     2. Pharmacologic:   -  continue  gabapentin 600mg TID. Refill sent to pharmacy  - Continue Mobic 7.5 mg b.i.d PRN. OR Ibuprofen  -No muscle relaxants due to risk of decreased seizure threshold, patient voiced understanding  - Anticoagulation use: None.     3. Rehabilitative: Encouraged regular exercise. Continue exercises and activities as tolerated. Currently enrolled in physical therapy for post-op care    4. Diagnostic: Reviewed    5. Consults/referral: Follow up with Itzel as scheduled on 09/09    5. Follow up: PRN    - This condition does not require this patient to take time off of work, and the primary goal of our Pain Management services is to improve the patient's functional capacity.   - I discussed the risks, benefits, and alternatives to potential treatment options. All questions and concerns were fully addressed today in clinic.       Tia Hernández PA-C  Interventional Pain Medicine    Disclaimer:  This note was prepared using voice recognition system and is likely to have sound alike errors that may have been overlooked even after proof reading.  Please call me with any questions.

## 2022-09-08 ENCOUNTER — CLINICAL SUPPORT (OUTPATIENT)
Dept: REHABILITATION | Facility: HOSPITAL | Age: 52
End: 2022-09-08
Payer: MEDICAID

## 2022-09-08 DIAGNOSIS — M25.652 STIFFNESS OF LEFT HIP JOINT: ICD-10-CM

## 2022-09-08 DIAGNOSIS — M25.552 LEFT HIP PAIN: Primary | ICD-10-CM

## 2022-09-08 DIAGNOSIS — Z48.89 ENCOUNTER FOR OTHER SPECIFIED SURGICAL AFTERCARE: ICD-10-CM

## 2022-09-08 DIAGNOSIS — M62.552 MUSCLE WASTING AND ATROPHY, NOT ELSEWHERE CLASSIFIED, LEFT THIGH: ICD-10-CM

## 2022-09-08 PROCEDURE — 97140 MANUAL THERAPY 1/> REGIONS: CPT | Performed by: PHYSICAL THERAPIST

## 2022-09-08 PROCEDURE — 97110 THERAPEUTIC EXERCISES: CPT | Performed by: PHYSICAL THERAPIST

## 2022-09-08 NOTE — PROGRESS NOTES
[facility]   Physical Therapy Treatment Note     Name: Laurel Calvo  Clinic Number: [id number]    Therapy Diagnosis:   Encounter Diagnoses   Name Primary?    Left hip pain Yes    Stiffness of left hip joint     Muscle wasting and atrophy, not elsewhere classified, left thigh     Encounter for other specified surgical aftercare      Physician: Wm Robbins    Visit Date: 2022    Physician Orders: PT Eval and Treat  Medical Diagnosis from Referral: Post-Operative state; Femoroacetabular Impingement of Left Hip.   Evaluation Date: 2022  Authorization Period Expiration: 2022  Plan of Care Expiration: 10/25/2022  Visit # / Visits authorized:  (+1 eval)  FOTO: 1/3     Progress Note Due: 2022     Precautions: Standard and Post-Surgical Precautions   Weeks 0-3 (22 - 22)  Weight bearin% body weight  ROM: Flexion - 90 degrees; ER - neutral  No active hip flexion     Weeks 4-6 (22 - 22)  Full Weight Bearing  ROM: Flexion - 105; ER - 20  Initiate AROM/resisted    PTA Visit #: 0/5     Time In: 1305  Time Out: 1348  Total Billable Time: 43 minutes - denotes billable time   Billing is according to Medicaid Guidelines    SUBJECTIVE     Pt reports: that her hip has been feeling pretty good, and she has been keeping up with her exercises. She has her second follow up with Dr. Robbins tomorrow. Patient reports that she is a little tired from her Lupus. Patient reports that she has been walking for about a week without any crutches. She has started walking her dog for about 10 minutes at a time, and that seems to be okay. Patient reports that she has pain in her medial left knee that comes and goes occasionally.       She was compliant with home exercise program.  Response to previous treatment: decreased hip pain.   Functional change: continued difficulty with sleeping at night.     Pain: 10  Location: Left hip    OBJECTIVE     Objective Measures  "updated at progress report unless specified.     Treatment   Laurel received the treatments listed below:      Laurel received Manual Therapy to improve pain and ROM for (8) minutes, including:   Manual Intervention Performed Today    STM x Pes anserine and associated musculature    Long Axis Hip Distraction  Left hip    Hip Mobilizations    lateral and inferior    Hip Circumduction     Lower extremity rolling       Soft tissue mobilization  Left adductors, quads, hamstrings   Hip passive range of motion - per protocol       Laurel received Therapeutic Exercises to improve strength, endurance, and ROM for (35) minutes including:  Intervention Performed Today    Upright Bike (HIGH SEAT) NEXT  VISIT 5'   Abdominal Bracing  x 3 minutes    Quad Set x 10" hold x 3 minutes    Belt Block (knees and ankles)  x 3" hold x 3 minutes each    Supine Hip Adduction: ball  3" hold x 3 minutes   Bridging  x 3x10 - no arms for assistance   Prone Lying on Elbows (progressed to elbows)  3 minutes   Prone Quad Stretch with Strap (towel roll under knee)     Also discussed contralateral single knee to chest for (L hip flexor stretch in supine with knee hanging off of mat) in HEP X      x 30"x3   Prone Hamstring Curl  3x10 left 3 lbs   Prone planks  20" hold, 3x   Gait Training  Unilateral crutch, foot flat with % weight bearing    Heel slides   3 minutes    Seated LAQ (hip at 0* extension)  x 3x10    Weight shifts   Lateral: 3 minutes with 3s holds   Heel to toe: 30x    Calf raises (added) x 2 x 10   Standing hip abduction and extension (standing on R LE only) x 1 x 10 each direction (added)       Patient Education and Home Exercises     Home Exercises Provided and Patient Education Provided     Education provided:   - reviewed for understanding.     Written Home Exercises Provided: Patient instructed to cont prior HEP. Exercises were reviewed and Laurel was able to demonstrate them prior to the end of the session.  Laurel demonstrated " good  understanding of the education provided. See EMR under Patient Instructions for exercises provided during therapy sessions    ASSESSMENT     Pt did very well with treatment today. She completed exercises without increased complaint and was able to be progressed to additional standing strengthening activities. Patient completed exercise progressions without issue today and good form. Appropriate muscular fatigue noted. She is appropriate to attempt upright bike, starting at a high seat, next visit. This would have been attempted today, but held due to patient's fatigue from Lupus.     Laurel Is progressing well towards her goals.   Pt prognosis is Excellent.     Pt will continue to benefit from skilled outpatient physical therapy to address the deficits listed in the problem list box on initial evaluation, provide pt/family education and to maximize pt's level of independence in the home and community environment.     Pt's spiritual, cultural and educational needs considered and pt agreeable to plan of care and goals.     Anticipated barriers to physical therapy: none.    GOALS:    Short Term Goals:  6 weeks Progress   8/2/2022   Patient will report decreased pain to <3/10 at worst on VAS to progress toward Prior Level of Function. Progressing   Patient will report improved function by a functional limitation score of <50 out of 100 on FOTO.    Patient will improve AROM to 50% of stated goals in order to progress towards Prior Level of Function.    Patient will improve strength to 50% of stated goals in order to progress towards Prior Level of Function.      Long Term Goals:  12 weeks Progress  8/2/2022   Patient will report decreased pain to <1/10 at worst on VAS to progress toward Prior Level of Function.      Patient will report improved function by a functional limitation score of <20 out of 100 on FOTO.    Patient will improve ROM and Functional Mobility to stated goals to return to Prior Level of Function.     Patient will improve strength to stated goals in order to return to Prior Level of Function.          PLAN   Continue Plan of Care (POC) and progress per patient tolerance. See treatment section for details on planned progressions next session.  Plan of Care Due: 10/25/2022      Pao Lee, PT, DPT

## 2022-09-09 ENCOUNTER — OFFICE VISIT (OUTPATIENT)
Dept: ORTHOPEDICS | Facility: CLINIC | Age: 52
End: 2022-09-09
Payer: MEDICAID

## 2022-09-09 VITALS — WEIGHT: 127 LBS | HEIGHT: 64 IN | BODY MASS INDEX: 21.68 KG/M2

## 2022-09-09 DIAGNOSIS — M25.852 FEMOROACETABULAR IMPINGEMENT OF LEFT HIP: Primary | ICD-10-CM

## 2022-09-09 PROCEDURE — 1160F RVW MEDS BY RX/DR IN RCRD: CPT | Mod: CPTII,,, | Performed by: STUDENT IN AN ORGANIZED HEALTH CARE EDUCATION/TRAINING PROGRAM

## 2022-09-09 PROCEDURE — 1160F PR REVIEW ALL MEDS BY PRESCRIBER/CLIN PHARMACIST DOCUMENTED: ICD-10-PCS | Mod: CPTII,,, | Performed by: STUDENT IN AN ORGANIZED HEALTH CARE EDUCATION/TRAINING PROGRAM

## 2022-09-09 PROCEDURE — 3008F PR BODY MASS INDEX (BMI) DOCUMENTED: ICD-10-PCS | Mod: CPTII,,, | Performed by: STUDENT IN AN ORGANIZED HEALTH CARE EDUCATION/TRAINING PROGRAM

## 2022-09-09 PROCEDURE — 99024 POSTOP FOLLOW-UP VISIT: CPT | Mod: ,,, | Performed by: STUDENT IN AN ORGANIZED HEALTH CARE EDUCATION/TRAINING PROGRAM

## 2022-09-09 PROCEDURE — 3008F BODY MASS INDEX DOCD: CPT | Mod: CPTII,,, | Performed by: STUDENT IN AN ORGANIZED HEALTH CARE EDUCATION/TRAINING PROGRAM

## 2022-09-09 PROCEDURE — 99213 OFFICE O/P EST LOW 20 MIN: CPT | Mod: PBBFAC | Performed by: STUDENT IN AN ORGANIZED HEALTH CARE EDUCATION/TRAINING PROGRAM

## 2022-09-09 PROCEDURE — 99999 PR PBB SHADOW E&M-EST. PATIENT-LVL III: CPT | Mod: PBBFAC,,, | Performed by: STUDENT IN AN ORGANIZED HEALTH CARE EDUCATION/TRAINING PROGRAM

## 2022-09-09 PROCEDURE — 99024 PR POST-OP FOLLOW-UP VISIT: ICD-10-PCS | Mod: ,,, | Performed by: STUDENT IN AN ORGANIZED HEALTH CARE EDUCATION/TRAINING PROGRAM

## 2022-09-09 PROCEDURE — 99999 PR PBB SHADOW E&M-EST. PATIENT-LVL III: ICD-10-PCS | Mod: PBBFAC,,, | Performed by: STUDENT IN AN ORGANIZED HEALTH CARE EDUCATION/TRAINING PROGRAM

## 2022-09-09 PROCEDURE — 1159F MED LIST DOCD IN RCRD: CPT | Mod: CPTII,,, | Performed by: STUDENT IN AN ORGANIZED HEALTH CARE EDUCATION/TRAINING PROGRAM

## 2022-09-09 PROCEDURE — 1159F PR MEDICATION LIST DOCUMENTED IN MEDICAL RECORD: ICD-10-PCS | Mod: CPTII,,, | Performed by: STUDENT IN AN ORGANIZED HEALTH CARE EDUCATION/TRAINING PROGRAM

## 2022-09-09 NOTE — PROGRESS NOTES
Orthopaedics  Post-operative follow-up    Procedure Performed:   1. Left hip arthroscopic labral repair  2. Left hip arthroscopic femoroplasty  3. Left hip arthroscopic capsular closure    Date of Surgery: 7/28/2022    Subjective: Laurel Calvo is now almost 6 weeks out from her hip arthroscopy.  She is doing well with no specific complaints other than the expected post-operative pain and stiffness.  She states that physical therapy has been progressing well. She has been able to walk without crutches and no limp but does endorse some tightness in her groin.    Exam:  Incision sites benign with no drainage or redness  Minimal soft tissue swelling  She tolerates passive ROM of the hip with , ER 30, IR 20.   Calixto's sign negative and no calf tenderness  Motor and sensory intact distally    Impression:  S/p left hip arthroscopy labral repair, femoroplasty, capsular closure, initial post-operative visit - doing well    Plan:  She is making progress with physical therapy.  Continue to advance with labral repair protocol.  Weight bearing status: FWB  Progress PT as discussed today: Progress to full range of motion and begin working on strength of hip and core. May begin low-impact cardio (elliptical and stationary bike with increasing amount of hip flexion over the next few weeks) at this time.   Symptomatic treatment for pain / swelling  Instructed patient to call clinic if questions or concerns    Follow-up 6 weeks at 3 months post-op (around 10/28/22)      Wm Robbins MD    I, Federico Arevalo, acted as a scribe for Wm Robbins MD for the duration of this office visit.

## 2022-09-26 ENCOUNTER — CLINICAL SUPPORT (OUTPATIENT)
Dept: REHABILITATION | Facility: HOSPITAL | Age: 52
End: 2022-09-26
Payer: MEDICAID

## 2022-09-26 DIAGNOSIS — M25.652 STIFFNESS OF LEFT HIP JOINT: ICD-10-CM

## 2022-09-26 DIAGNOSIS — M62.552 MUSCLE WASTING AND ATROPHY, NOT ELSEWHERE CLASSIFIED, LEFT THIGH: ICD-10-CM

## 2022-09-26 DIAGNOSIS — M25.552 LEFT HIP PAIN: Primary | ICD-10-CM

## 2022-09-26 DIAGNOSIS — Z48.89 ENCOUNTER FOR OTHER SPECIFIED SURGICAL AFTERCARE: ICD-10-CM

## 2022-09-26 PROCEDURE — 97110 THERAPEUTIC EXERCISES: CPT

## 2022-09-26 NOTE — PROGRESS NOTES
[facility]   Physical Therapy Progress Note     Name: Laurel Calvo  Clinic Number: [id number]    Therapy Diagnosis:   Encounter Diagnoses   Name Primary?    Left hip pain Yes    Stiffness of left hip joint     Muscle wasting and atrophy, not elsewhere classified, left thigh     Encounter for other specified surgical aftercare      Physician: Wm Robbins    Visit Date: 9/26/2022    Physician Orders: PT Eval and Treat  Medical Diagnosis from Referral: Post-Operative state; Femoroacetabular Impingement of Left Hip.   Evaluation Date: 8/2/2022  Authorization Period Expiration: 12/31/2022  Plan of Care Expiration: 10/25/2022  Progress Note Done: 9/26/22  Visit # / Visits authorized: 7/20 (+1 eval)  FOTO: 1/3     Progress Note Due: 9/1/2022    PTA Visit #: 0/5     Time In: 900a  Time Out: 950a  Total Billable Time: 50 minutes - denotes billable time   Billing is according to Medicaid Guidelines    SUBJECTIVE     Pt reports: that her hip is doing well.  She hasn't been in therapy for a few weeks because she had Covid and has not been feeling well. She is still nauseous and weak a lot of times.  She states that her hip is still getting better and she is trying to keep up with her HEP.  She states that she feels worried about progressing back to squatting.  She would like to progress back to yoga and pilates soon.  She states that she is walking her dog and does not feel limited with stairs at home.  She states that she has not done much exercise or practice with single leg balance.     She was compliant with home exercise program.  Response to previous treatment: decreased hip pain.   Functional change: improving pain at night with sleep    Pain: 2/10  Location: Left hip    OBJECTIVE     RANGE OF MOTION:  *denotes pain      Hip  (degrees) Left Goal   Hip Flexion  104 120   Hip IR  20 (PROM) 30   Hip ER  48 (PROM) 45            STRENGTH:  *denotes pain     L/E MMT Left Goal   Hip  "Flexion  4/5 5/5   Hip Abduction  4/5 5/5   Knee Extension 5/5 5/5   Knee Flexion 5/5 5/5   Hip ER 4/5 5/5        Functional Mobility Observations noted Goal   Squat Limited depth and fearful, good symmetry Funcitonal Nonpainful    Step Down  Unsteady but functional Funcitonal Nonpainful    Single Leg Stance  Unsteady but functional Funcitonal Nonpainful         Treatment   Laurel received the treatments listed below:      Laurel received Manual Therapy to improve pain and ROM for (0) minutes, including:   Manual Intervention Performed Today    STM  Pes anserine and associated musculature    Long Axis Hip Distraction  Left hip    Hip Mobilizations    lateral and inferior    Hip Circumduction     Lower extremity rolling       Soft tissue mobilization  Left adductors, quads, hamstrings   Hip passive range of motion - per protocol       Laurel received Therapeutic Exercises to improve strength, endurance, and ROM for (45) minutes including:  Intervention Performed Today    Upright Bike (HIGH SEAT) x 6'   Abdominal Bracing   3 minutes    Quad Set  10" hold x 3 minutes    Belt Block (knees and ankles)   3" hold x 3 minutes each    Supine Hip Adduction: ball  3" hold x 3 minutes   Single leg bridge x 2x8, 3"   Side plank bent knees x 4x10"   Hip hinge at wall x   2x10   Lateral heel tap x 2x10, 6"   Chair squat with pillow x 2x8   Eccentric hamstrings extension with band x 2x10, blue   Heel slides   3 minutes    Seated LAQ (hip at 0* extension)   3x10    Weight shifts   Lateral: 3 minutes with 3s holds   Heel to toe: 30x    Calf raises (added)  2 x 10   Standing hip abduction and extension (standing on R LE only)  1 x 10 each direction (added)       Patient Education and Home Exercises     Home Exercises Provided and Patient Education Provided     Education provided:   - reviewed for understanding.     Written Home Exercises Provided: Patient received renewed HEP. Exercises were reviewed and Laurel was able to demonstrate " them prior to the end of the session.  Laurel demonstrated good  understanding of the education provided. See EMR under Patient Instructions for exercises provided during therapy sessions    ASSESSMENT     Laurel is a 52 y.o. female referred to outpatient Physical Therapy with a medical diagnosis of Post-Operative state; Femoroacetabular Impingement of Left Hip. Pt presents with improvements in affected hip P/AROM, LE strength, and symmetry of ambulation.  However, she is still limited in hip rotation strength and ROM, hip flexion strength and ROM, single leg balance that limits ability to descend stairs reciprocally, squat, lunge, sleep comfortably.    Laurel Is progressing well towards her goals.   Pt prognosis is Excellent.     Pt will continue to benefit from skilled outpatient physical therapy to address the deficits listed in the problem list box on initial evaluation, provide pt/family education and to maximize pt's level of independence in the home and community environment.     Pt's spiritual, cultural and educational needs considered and pt agreeable to plan of care and goals.     Anticipated barriers to physical therapy: none.    GOALS:    Short Term Goals:  6 weeks Progress   9/26/2022   Patient will report decreased pain to <3/10 at worst on VAS to progress toward Prior Level of Function. Met   Patient will report improved function by a functional limitation score of <50 out of 100 on FOTO. Progressing   Patient will improve AROM to 50% of stated goals in order to progress towards Prior Level of Function. Met   Patient will improve strength to 50% of stated goals in order to progress towards Prior Level of Function. Met     Long Term Goals:  12 weeks Progress  9/26/2022   Patient will report decreased pain to <1/10 at worst on VAS to progress toward Prior Level of Function.   Progressing   Patient will report improved function by a functional limitation score of <20 out of 100 on FOTO. Progressing    Patient will improve ROM and Functional Mobility to stated goals to return to Prior Level of Function. Progressing   Patient will improve strength to stated goals in order to return to Prior Level of Function. Progressing         PLAN   Plan of care Certification: 9/26/2022 to 11/21/22      Outpatient Physical Therapy 2 times weekly for 8 weeks to include any combination of the following interventions: virtual visits, dry needling, modalities, electrical stimulation (IFC, Pre-Mod, Attended with Functional Dry Needling), Cervical/Lumbar Traction, Gait Training, Manual Therapy, Neuromuscular Re-ed, Patient Education, Self Care, Therapeutic Activites, and Therapeutic Exercise      Thank you for this referral.     These services are reasonable and necessary for the conditions set forth above while under my care.      Jacky Weathers, PT, DPT

## 2022-09-26 NOTE — PLAN OF CARE
[facility]   Physical Therapy Progress Note     Name: Laurel Calvo  Clinic Number: [id number]    Therapy Diagnosis:   Encounter Diagnoses   Name Primary?    Left hip pain Yes    Stiffness of left hip joint     Muscle wasting and atrophy, not elsewhere classified, left thigh     Encounter for other specified surgical aftercare      Physician: Wm Robbins    Visit Date: 9/26/2022    Physician Orders: PT Eval and Treat  Medical Diagnosis from Referral: Post-Operative state; Femoroacetabular Impingement of Left Hip.   Evaluation Date: 8/2/2022  Authorization Period Expiration: 12/31/2022  Plan of Care Expiration: 10/25/2022  Progress Note Done: 9/26/22  Visit # / Visits authorized: 7/20 (+1 eval)  FOTO: 1/3     Progress Note Due: 9/1/2022    PTA Visit #: 0/5     Time In: 900a  Time Out: 950a  Total Billable Time: 50 minutes - denotes billable time   Billing is according to Medicaid Guidelines    SUBJECTIVE     Pt reports: that her hip is doing well.  She hasn't been in therapy for a few weeks because she had Covid and has not been feeling well. She is still nauseous and weak a lot of times.  She states that her hip is still getting better and she is trying to keep up with her HEP.  She states that she feels worried about progressing back to squatting.  She would like to progress back to yoga and pilates soon.  She states that she is walking her dog and does not feel limited with stairs at home.  She states that she has not done much exercise or practice with single leg balance.     She was compliant with home exercise program.  Response to previous treatment: decreased hip pain.   Functional change: improving pain at night with sleep    Pain: 2/10  Location: Left hip    OBJECTIVE     RANGE OF MOTION:  *denotes pain      Hip  (degrees) Left Goal   Hip Flexion  104 120   Hip IR  20 (PROM) 30   Hip ER  48 (PROM) 45            STRENGTH:  *denotes pain     L/E MMT Left Goal   Hip  "Flexion  4/5 5/5   Hip Abduction  4/5 5/5   Knee Extension 5/5 5/5   Knee Flexion 5/5 5/5   Hip ER 4/5 5/5        Functional Mobility Observations noted Goal   Squat Limited depth and fearful, good symmetry Funcitonal Nonpainful    Step Down  Unsteady but functional Funcitonal Nonpainful    Single Leg Stance  Unsteady but functional Funcitonal Nonpainful         Treatment   Laurel received the treatments listed below:      Laurel received Manual Therapy to improve pain and ROM for (0) minutes, including:   Manual Intervention Performed Today    STM  Pes anserine and associated musculature    Long Axis Hip Distraction  Left hip    Hip Mobilizations    lateral and inferior    Hip Circumduction     Lower extremity rolling       Soft tissue mobilization  Left adductors, quads, hamstrings   Hip passive range of motion - per protocol       Laurel received Therapeutic Exercises to improve strength, endurance, and ROM for (45) minutes including:  Intervention Performed Today    Upright Bike (HIGH SEAT) x 6'   Abdominal Bracing   3 minutes    Quad Set  10" hold x 3 minutes    Belt Block (knees and ankles)   3" hold x 3 minutes each    Supine Hip Adduction: ball  3" hold x 3 minutes   Single leg bridge x 2x8, 3"   Side plank bent knees x 4x10"   Hip hinge at wall x   2x10   Lateral heel tap x 2x10, 6"   Chair squat with pillow x 2x8   Eccentric hamstrings extension with band x 2x10, blue   Heel slides   3 minutes    Seated LAQ (hip at 0* extension)   3x10    Weight shifts   Lateral: 3 minutes with 3s holds   Heel to toe: 30x    Calf raises (added)  2 x 10   Standing hip abduction and extension (standing on R LE only)  1 x 10 each direction (added)       Patient Education and Home Exercises     Home Exercises Provided and Patient Education Provided     Education provided:   - reviewed for understanding.     Written Home Exercises Provided: Patient received renewed HEP. Exercises were reviewed and Laurel was able to demonstrate " them prior to the end of the session.  Laurel demonstrated good  understanding of the education provided. See EMR under Patient Instructions for exercises provided during therapy sessions    ASSESSMENT     Laurel is a 52 y.o. female referred to outpatient Physical Therapy with a medical diagnosis of Post-Operative state; Femoroacetabular Impingement of Left Hip. Pt presents with improvements in affected hip P/AROM, LE strength, and symmetry of ambulation.  However, she is still limited in hip rotation strength and ROM, hip flexion strength and ROM, single leg balance that limits ability to descend stairs reciprocally, squat, lunge, sleep comfortably.    Laurel Is progressing well towards her goals.   Pt prognosis is Excellent.     Pt will continue to benefit from skilled outpatient physical therapy to address the deficits listed in the problem list box on initial evaluation, provide pt/family education and to maximize pt's level of independence in the home and community environment.     Pt's spiritual, cultural and educational needs considered and pt agreeable to plan of care and goals.     Anticipated barriers to physical therapy: none.    GOALS:    Short Term Goals:  6 weeks Progress   9/26/2022   Patient will report decreased pain to <3/10 at worst on VAS to progress toward Prior Level of Function. Met   Patient will report improved function by a functional limitation score of <50 out of 100 on FOTO. Progressing   Patient will improve AROM to 50% of stated goals in order to progress towards Prior Level of Function. Met   Patient will improve strength to 50% of stated goals in order to progress towards Prior Level of Function. Met     Long Term Goals:  12 weeks Progress  9/26/2022   Patient will report decreased pain to <1/10 at worst on VAS to progress toward Prior Level of Function.   Progressing   Patient will report improved function by a functional limitation score of <20 out of 100 on FOTO. Progressing    Patient will improve ROM and Functional Mobility to stated goals to return to Prior Level of Function. Progressing   Patient will improve strength to stated goals in order to return to Prior Level of Function. Progressing         PLAN   Plan of care Certification: 9/26/2022 to 11/21/22      Outpatient Physical Therapy 2 times weekly for 8 weeks to include any combination of the following interventions: virtual visits, dry needling, modalities, electrical stimulation (IFC, Pre-Mod, Attended with Functional Dry Needling), Cervical/Lumbar Traction, Gait Training, Manual Therapy, Neuromuscular Re-ed, Patient Education, Self Care, Therapeutic Activites, and Therapeutic Exercise      Thank you for this referral.     These services are reasonable and necessary for the conditions set forth above while under my care.      Jacky Weathers, PT, DPT

## 2022-10-04 ENCOUNTER — PATIENT MESSAGE (OUTPATIENT)
Dept: ADMINISTRATIVE | Facility: HOSPITAL | Age: 52
End: 2022-10-04
Payer: MEDICAID

## 2022-10-06 ENCOUNTER — CLINICAL SUPPORT (OUTPATIENT)
Dept: REHABILITATION | Facility: HOSPITAL | Age: 52
End: 2022-10-06
Payer: MEDICAID

## 2022-10-06 DIAGNOSIS — M62.552 MUSCLE WASTING AND ATROPHY, NOT ELSEWHERE CLASSIFIED, LEFT THIGH: ICD-10-CM

## 2022-10-06 DIAGNOSIS — M25.552 LEFT HIP PAIN: Primary | ICD-10-CM

## 2022-10-06 DIAGNOSIS — Z48.89 ENCOUNTER FOR OTHER SPECIFIED SURGICAL AFTERCARE: ICD-10-CM

## 2022-10-06 DIAGNOSIS — M25.652 STIFFNESS OF LEFT HIP JOINT: ICD-10-CM

## 2022-10-06 PROCEDURE — 97110 THERAPEUTIC EXERCISES: CPT | Performed by: PHYSICAL THERAPIST

## 2022-10-06 NOTE — PROGRESS NOTES
[facility]   Physical Therapy Progress Note     Name: Laurel Calvo  Clinic Number: [id number]    Therapy Diagnosis:   Encounter Diagnoses   Name Primary?    Left hip pain Yes    Stiffness of left hip joint     Muscle wasting and atrophy, not elsewhere classified, left thigh     Encounter for other specified surgical aftercare      Physician: Wm Robbins    Visit Date: 10/6/2022    Physician Orders: PT Eval and Treat  Medical Diagnosis from Referral: Post-Operative state; Femoroacetabular Impingement of Left Hip.   Evaluation Date: 8/2/2022  Authorization Period Expiration: 12/31/2022  Plan of Care Expiration: 10/25/2022  Progress Note Done: 9/26/22  Visit # / Visits authorized: 8/20 (+1 eval)  FOTO: 1/3     Progress Note Due: 10/25/2022    PTA Visit #: 0/5     Time In: 1301  Time Out: 1342  Total Billable Time: 40 minutes - denotes billable time   Billing is according to Medicaid Guidelines    SUBJECTIVE     Pt reports: that her hip is doing great, it's just everything else she has been dealing with. Patient reports that she has lupus, and she just recently had Covid, so it is taking her a while to get completely over everything.     She was compliant with home exercise program.  Response to previous treatment: decreased hip pain.   Functional change: improving pain at night with sleep    Pain: 0/10  Location: Left hip    OBJECTIVE   Objective measurements taken at last progress note unless stated otherwise.     Treatment   Laurel received the treatments listed below:      Laurel received Manual Therapy to improve pain and ROM for (0) minutes, including:   Manual Intervention Performed Today    STM  Pes anserine and associated musculature    Long Axis Hip Distraction  Left hip    Hip Mobilizations    lateral and inferior    Hip Circumduction     Lower extremity rolling       Soft tissue mobilization  Left adductors, quads, hamstrings   Hip passive range of motion - per  "protocol       Laurel received Therapeutic Exercises to improve strength, endurance, and ROM for (40) minutes including:  Intervention Performed Today    Upright Bike (HIGH SEAT) x 6'   Single leg bridge x 2x8, 3"   Side plank bent knees x 4x15" (progressed time)   Hip hinge at wall x   2x10   Lateral heel tap x 2x10, 6"   Squat taps  x 2x10, 24" box (progressed)   Eccentric hamstrings extension with band x 2x10, blue   Seated LAQ (hip at 0* extension)   3x10    Standing hip abduction and extension (standing on R LE only)  1 x 10 each direction (added)   Quadruped AAROM flexion on sliding board x 2 x 10 in a pain free range (added)   Step down, eccentric lowering x 12" step, 1 x 15 each LE (added)   Lateral side stepping with resistance x Down and back along mirror on turf 3x, red theraband               Progression in future: standing mobo board, small length RDL, small length single leg RDL, BOSU squats, treadmill walking,     Patient Education and Home Exercises     Home Exercises Provided and Patient Education Provided     Education provided:   - reviewed for understanding.     Written Home Exercises Provided: Patient received renewed HEP. Exercises were reviewed and Laurel was able to demonstrate them prior to the end of the session.  Laurel demonstrated good  understanding of the education provided. See EMR under Patient Instructions for exercises provided during therapy sessions    ASSESSMENT     Laurel tolerated treatment very well. She presents today able to be progressed with many exercises as she has been out due to other medical issues but has been progressing herself to tolerance at home. Patient did well with progressions today but would benefit from continued functional strengthening and muscular endurance.     Laurel Is progressing well towards her goals.   Pt prognosis is Excellent.     Pt will continue to benefit from skilled outpatient physical therapy to address the deficits listed in the problem list " box on initial evaluation, provide pt/family education and to maximize pt's level of independence in the home and community environment.     Pt's spiritual, cultural and educational needs considered and pt agreeable to plan of care and goals.     Anticipated barriers to physical therapy: none.    GOALS:    Short Term Goals:  6 weeks Progress   9/26/2022   Patient will report decreased pain to <3/10 at worst on VAS to progress toward Prior Level of Function. Met   Patient will report improved function by a functional limitation score of <50 out of 100 on FOTO. Progressing   Patient will improve AROM to 50% of stated goals in order to progress towards Prior Level of Function. Met   Patient will improve strength to 50% of stated goals in order to progress towards Prior Level of Function. Met     Long Term Goals:  12 weeks Progress  9/26/2022   Patient will report decreased pain to <1/10 at worst on VAS to progress toward Prior Level of Function.   Progressing   Patient will report improved function by a functional limitation score of <20 out of 100 on FOTO. Progressing   Patient will improve ROM and Functional Mobility to stated goals to return to Prior Level of Function. Progressing   Patient will improve strength to stated goals in order to return to Prior Level of Function. Progressing         PLAN   Plan of care Certification: 9/26/2022 to 11/21/22      Outpatient Physical Therapy 2 times weekly for 8 weeks to include any combination of the following interventions: virtual visits, dry needling, modalities, electrical stimulation (IFC, Pre-Mod, Attended with Functional Dry Needling), Cervical/Lumbar Traction, Gait Training, Manual Therapy, Neuromuscular Re-ed, Patient Education, Self Care, Therapeutic Activites, and Therapeutic Exercise      Thank you for this referral.     These services are reasonable and necessary for the conditions set forth above while under my care.      Pao Lee, PT, DPT

## 2022-10-12 ENCOUNTER — CLINICAL SUPPORT (OUTPATIENT)
Dept: REHABILITATION | Facility: HOSPITAL | Age: 52
End: 2022-10-12
Payer: MEDICAID

## 2022-10-12 DIAGNOSIS — M62.552 MUSCLE WASTING AND ATROPHY, NOT ELSEWHERE CLASSIFIED, LEFT THIGH: ICD-10-CM

## 2022-10-12 DIAGNOSIS — Z48.89 ENCOUNTER FOR OTHER SPECIFIED SURGICAL AFTERCARE: ICD-10-CM

## 2022-10-12 DIAGNOSIS — M25.552 LEFT HIP PAIN: Primary | ICD-10-CM

## 2022-10-12 DIAGNOSIS — M25.652 STIFFNESS OF LEFT HIP JOINT: ICD-10-CM

## 2022-10-12 PROCEDURE — 97110 THERAPEUTIC EXERCISES: CPT | Performed by: PHYSICAL THERAPIST

## 2022-10-12 NOTE — PROGRESS NOTES
[facility]   Physical Therapy Progress Note     Name: Laurel Calvo  Clinic Number: [id number]    Therapy Diagnosis:   Encounter Diagnoses   Name Primary?    Left hip pain Yes    Stiffness of left hip joint     Muscle wasting and atrophy, not elsewhere classified, left thigh     Encounter for other specified surgical aftercare      Physician: Wm Robbins    Visit Date: 10/12/2022    Physician Orders: PT Eval and Treat  Medical Diagnosis from Referral: Post-Operative state; Femoroacetabular Impingement of Left Hip.   Evaluation Date: 8/2/2022  Authorization Period Expiration: 12/31/2022  Plan of Care Expiration: 10/25/2022  Visit # / Visits authorized: 9/20 (+1 eval)  FOTO: 1/3     Progress Note Due: 10/25/2022    PTA Visit #: 0/5     Time In: 1002  Time Out: 1040  Total Billable Time: 38 minutes - denotes billable time   Billing is according to Medicaid Guidelines    SUBJECTIVE     Pt reports: that she is still feeling very tired from COVID and lupus, but her hip is feeling great. She has been keeping up with her exercises at home.     She was compliant with home exercise program.  Response to previous treatment: decreased hip pain.   Functional change: improving pain at night with sleep    Pain: 0/10  Location: Left hip    OBJECTIVE   Objective measurements taken at last progress note unless stated otherwise.     Treatment   Laurel received the treatments listed below:      Laurel received Manual Therapy to improve pain and ROM for (0) minutes, including:   Manual Intervention Performed Today    STM  Pes anserine and associated musculature    Long Axis Hip Distraction  Left hip    Hip Mobilizations    lateral and inferior    Hip Circumduction     Lower extremity rolling       Soft tissue mobilization  Left adductors, quads, hamstrings   Hip passive range of motion - per protocol       Laurel received Therapeutic Exercises to improve strength, endurance, and ROM for (38) minutes  "including:  Intervention Performed Today    Upright Bike (HIGH SEAT) x 6'   Single leg bridge x 2x8, 3"   Side plank bent knees x 4x15" (progressed time)   Hip hinge at wall    2x10   Lateral heel tap  2x10, 6"   Squat taps   2x10, 24" box (progressed)   Eccentric hamstrings extension with band x 2x10, blue   Seated LAQ (hip at 0* extension)   3x10    Standing hip abduction and extension (standing on R LE only)  1 x 10 each direction (added)   Quadruped AAROM flexion on sliding board x 2 x 10 in a pain free range with pink band for resistance (progressed)   Step down, eccentric lowering  12" step, 1 x 15 each LE (added)   Lateral side stepping with resistance x Down and back along mirror on turf 3x, red theraband    Monster walks x Down and back along mirror on turf 3x, red theraband (added)   Small length RDL           Progression in future: standing mobo board, small length RDL, small length single leg RDL, BOSU squats, treadmill walking,     Patient Education and Home Exercises     Home Exercises Provided and Patient Education Provided     Education provided:   - reviewed for understanding.     Written Home Exercises Provided: Patient received renewed HEP. Exercises were reviewed and Laurel was able to demonstrate them prior to the end of the session.  Laurel demonstrated good  understanding of the education provided. See EMR under Patient Instructions for exercises provided during therapy sessions    ASSESSMENT     Laurel did well with treatment today. She continues to demonstrate improving ROM, strength, and stability of L LE. She was able to be progressed again this visit without issue. Patient is more limited at this time from fatigue and nausea due to recent COVID and Lupus. She requested to stop treatment today with exercises performed above due to fatigue but no issues with hip. It is evident that patient is also keeping up with exercises at home.     Laurel Is progressing well towards her goals.   Pt " prognosis is Excellent.     Pt will continue to benefit from skilled outpatient physical therapy to address the deficits listed in the problem list box on initial evaluation, provide pt/family education and to maximize pt's level of independence in the home and community environment.     Pt's spiritual, cultural and educational needs considered and pt agreeable to plan of care and goals.     Anticipated barriers to physical therapy: none.    GOALS:    Short Term Goals:  6 weeks Progress   9/26/2022   Patient will report decreased pain to <3/10 at worst on VAS to progress toward Prior Level of Function. Met   Patient will report improved function by a functional limitation score of <50 out of 100 on FOTO. Progressing   Patient will improve AROM to 50% of stated goals in order to progress towards Prior Level of Function. Met   Patient will improve strength to 50% of stated goals in order to progress towards Prior Level of Function. Met     Long Term Goals:  12 weeks Progress  9/26/2022   Patient will report decreased pain to <1/10 at worst on VAS to progress toward Prior Level of Function.   Progressing   Patient will report improved function by a functional limitation score of <20 out of 100 on FOTO. Progressing   Patient will improve ROM and Functional Mobility to stated goals to return to Prior Level of Function. Progressing   Patient will improve strength to stated goals in order to return to Prior Level of Function. Progressing         PLAN   Plan of care Certification: 9/26/2022 to 11/21/22      Outpatient Physical Therapy 2 times weekly for 8 weeks to include any combination of the following interventions: virtual visits, dry needling, modalities, electrical stimulation (IFC, Pre-Mod, Attended with Functional Dry Needling), Cervical/Lumbar Traction, Gait Training, Manual Therapy, Neuromuscular Re-ed, Patient Education, Self Care, Therapeutic Activites, and Therapeutic Exercise      Thank you for this  referral.     These services are reasonable and necessary for the conditions set forth above while under my care.      Pao Lee, PT, DPT

## 2022-10-19 ENCOUNTER — CLINICAL SUPPORT (OUTPATIENT)
Dept: REHABILITATION | Facility: HOSPITAL | Age: 52
End: 2022-10-19
Payer: MEDICAID

## 2022-10-19 DIAGNOSIS — M25.652 STIFFNESS OF LEFT HIP JOINT: ICD-10-CM

## 2022-10-19 DIAGNOSIS — M62.552 MUSCLE WASTING AND ATROPHY, NOT ELSEWHERE CLASSIFIED, LEFT THIGH: ICD-10-CM

## 2022-10-19 DIAGNOSIS — M25.552 LEFT HIP PAIN: Primary | ICD-10-CM

## 2022-10-19 DIAGNOSIS — Z48.89 ENCOUNTER FOR OTHER SPECIFIED SURGICAL AFTERCARE: ICD-10-CM

## 2022-10-19 PROCEDURE — 97110 THERAPEUTIC EXERCISES: CPT

## 2022-10-19 NOTE — PROGRESS NOTES
[facility]   Physical Therapy Progress Note     Name: Laurel Calvo  Clinic Number: [id number]    Therapy Diagnosis:   Encounter Diagnoses   Name Primary?    Left hip pain Yes    Stiffness of left hip joint     Muscle wasting and atrophy, not elsewhere classified, left thigh     Encounter for other specified surgical aftercare        Physician: Wm Robbins    Visit Date: 10/19/2022    Physician Orders: PT Eval and Treat  Medical Diagnosis from Referral: Post-Operative state; Femoroacetabular Impingement of Left Hip.   Evaluation Date: 8/2/2022  Authorization Period Expiration: 12/31/2022  Plan of Care Expiration: 10/25/2022  Visit # / Visits authorized: 12/20 (+1 eval)  FOTO: 1/3     Progress Note Due: 10/25/2022    PTA Visit #: 0/5     Time In: 1000  Time Out: 1043  Total Billable Time: 40 minutes - denotes billable time   Billing is according to Medicaid Guidelines    SUBJECTIVE     Pt reports: she is feeling good and notes that the hip is getting stronger. States that she is nauseous and continues to be fatigued following recent bout of COVID but that she will do as much as she can tolerate today     She was compliant with home exercise program.  Response to previous treatment: decreased hip pain.   Functional change: improving pain at night with sleep    Pain: 0/10  Location: Left hip    OBJECTIVE   Objective measurements taken at last progress note unless stated otherwise.     Treatment   Laurel received the treatments listed below:      Laurel received Manual Therapy to improve pain and ROM for (0) minutes, including:   Manual Intervention Performed Today    STM  Pes anserine and associated musculature    Long Axis Hip Distraction  Left hip    Hip Mobilizations    lateral and inferior    Hip Circumduction     Lower extremity rolling       Soft tissue mobilization  Left adductors, quads, hamstrings   Hip passive range of motion - per protocol       Laurel received  "Therapeutic Exercises to improve strength, endurance, and ROM for (40) minutes including:  Intervention Performed Today    Upright Bike (HIGH SEAT) x 8 minutes    Single leg bridge x 3x8    Side plank clamshells  x 3x10 B    Hip hinge at wall    2x10   Lateral heel tap  2x10, 6"   Squat taps   2x10, 24" box (progressed)   Ball Squats  x 2x10    Eccentric hamstrings extension with band x 2x10, blue   Supine LAQ + SLR Flexion  x 3x10 B    Standing hip abduction and extension (standing on R LE only)  1 x 10 each direction (added)   Quadruped AAROM flexion on sliding board x 2 x 10 in a pain free range with pink band for resistance (progressed)   Step down, eccentric lowering  12" step, 1 x 15 each LE (added)   Lateral side stepping with resistance x Down and back along mirror on turf 3x, red theraband    Monster walks x Down and back along mirror on turf 3x, red theraband (added)   Small length RDL     Hamstring curls with exercise ball  x 12x    Progression in future: standing mobo board, small length RDL, small length single leg RDL, BOSU squats, treadmill walking,     Patient Education and Home Exercises     Home Exercises Provided and Patient Education Provided     Education provided:   - reviewed for understanding.     Written Home Exercises Provided: Patient received renewed HEP. Exercises were reviewed and Laurel was able to demonstrate them prior to the end of the session.  Laurel demonstrated good  understanding of the education provided. See EMR under Patient Instructions for exercises provided during therapy sessions    ASSESSMENT     Laurel Calvo tolerated session well today with no complaints of pain or discomfort throughout session. Incorporated side plank clamshelles and ball squat hamstring curls to improve hip strength. Pt reports increased fatigue throughout session due to recent bout of COVID and therefore requests to leave session early. Patient to continue prior home exercise program and will " continue to progress independently until next follow-up.        Laurel Is progressing well towards her goals.   Pt prognosis is Excellent.     Pt will continue to benefit from skilled outpatient physical therapy to address the deficits listed in the problem list box on initial evaluation, provide pt/family education and to maximize pt's level of independence in the home and community environment.     Pt's spiritual, cultural and educational needs considered and pt agreeable to plan of care and goals.     Anticipated barriers to physical therapy: none.    GOALS:    Short Term Goals:  6 weeks Progress   9/26/2022   Patient will report decreased pain to <3/10 at worst on VAS to progress toward Prior Level of Function. Met   Patient will report improved function by a functional limitation score of <50 out of 100 on FOTO. Progressing   Patient will improve AROM to 50% of stated goals in order to progress towards Prior Level of Function. Met   Patient will improve strength to 50% of stated goals in order to progress towards Prior Level of Function. Met     Long Term Goals:  12 weeks Progress  9/26/2022   Patient will report decreased pain to <1/10 at worst on VAS to progress toward Prior Level of Function.   Progressing   Patient will report improved function by a functional limitation score of <20 out of 100 on FOTO. Progressing   Patient will improve ROM and Functional Mobility to stated goals to return to Prior Level of Function. Progressing   Patient will improve strength to stated goals in order to return to Prior Level of Function. Progressing         PLAN   Plan of care Certification: 9/26/2022 to 11/21/22      Outpatient Physical Therapy 2 times weekly for 8 weeks to include any combination of the following interventions: virtual visits, dry needling, modalities, electrical stimulation (IFC, Pre-Mod, Attended with Functional Dry Needling), Cervical/Lumbar Traction, Gait Training, Manual Therapy, Neuromuscular  Re-ed, Patient Education, Self Care, Therapeutic Activites, and Therapeutic Exercise      Thank you for this referral.     These services are reasonable and necessary for the conditions set forth above while under my care.      Keara Bellamy, PT, DPT

## 2022-10-28 ENCOUNTER — OFFICE VISIT (OUTPATIENT)
Dept: ORTHOPEDICS | Facility: CLINIC | Age: 52
End: 2022-10-28
Payer: MEDICAID

## 2022-10-28 VITALS — BODY MASS INDEX: 21.68 KG/M2 | HEIGHT: 64 IN | WEIGHT: 127 LBS

## 2022-10-28 DIAGNOSIS — S73.192D ACETABULAR LABRUM TEAR, LEFT, SUBSEQUENT ENCOUNTER: ICD-10-CM

## 2022-10-28 DIAGNOSIS — Z98.890 POST-OPERATIVE STATE: ICD-10-CM

## 2022-10-28 DIAGNOSIS — M25.852 FEMOROACETABULAR IMPINGEMENT OF LEFT HIP: Primary | ICD-10-CM

## 2022-10-28 PROCEDURE — 99024 PR POST-OP FOLLOW-UP VISIT: ICD-10-PCS | Mod: ,,, | Performed by: STUDENT IN AN ORGANIZED HEALTH CARE EDUCATION/TRAINING PROGRAM

## 2022-10-28 PROCEDURE — 1160F RVW MEDS BY RX/DR IN RCRD: CPT | Mod: CPTII,,, | Performed by: STUDENT IN AN ORGANIZED HEALTH CARE EDUCATION/TRAINING PROGRAM

## 2022-10-28 PROCEDURE — 1159F PR MEDICATION LIST DOCUMENTED IN MEDICAL RECORD: ICD-10-PCS | Mod: CPTII,,, | Performed by: STUDENT IN AN ORGANIZED HEALTH CARE EDUCATION/TRAINING PROGRAM

## 2022-10-28 PROCEDURE — 1160F PR REVIEW ALL MEDS BY PRESCRIBER/CLIN PHARMACIST DOCUMENTED: ICD-10-PCS | Mod: CPTII,,, | Performed by: STUDENT IN AN ORGANIZED HEALTH CARE EDUCATION/TRAINING PROGRAM

## 2022-10-28 PROCEDURE — 99999 PR PBB SHADOW E&M-EST. PATIENT-LVL III: CPT | Mod: PBBFAC,,, | Performed by: STUDENT IN AN ORGANIZED HEALTH CARE EDUCATION/TRAINING PROGRAM

## 2022-10-28 PROCEDURE — 99999 PR PBB SHADOW E&M-EST. PATIENT-LVL III: ICD-10-PCS | Mod: PBBFAC,,, | Performed by: STUDENT IN AN ORGANIZED HEALTH CARE EDUCATION/TRAINING PROGRAM

## 2022-10-28 PROCEDURE — 99213 OFFICE O/P EST LOW 20 MIN: CPT | Mod: PBBFAC | Performed by: STUDENT IN AN ORGANIZED HEALTH CARE EDUCATION/TRAINING PROGRAM

## 2022-10-28 PROCEDURE — 1159F MED LIST DOCD IN RCRD: CPT | Mod: CPTII,,, | Performed by: STUDENT IN AN ORGANIZED HEALTH CARE EDUCATION/TRAINING PROGRAM

## 2022-10-28 PROCEDURE — 99024 POSTOP FOLLOW-UP VISIT: CPT | Mod: ,,, | Performed by: STUDENT IN AN ORGANIZED HEALTH CARE EDUCATION/TRAINING PROGRAM

## 2022-10-28 NOTE — PROGRESS NOTES
Orthopaedics  Post-operative follow-up    Procedure Performed:   1. Left hip arthroscopic labral repair  2. Left hip arthroscopic femoroplasty  3. Left hip arthroscopic capsular closure    Date of Surgery: 7/28/2022    Subjective: Laurel Calvo is now almost 3 months out from her hip arthroscopy.  She is doing well with no specific complaints other than the expected post-operative pain and stiffness.  She reports that after the last visit she tested positive for COVID and that slowed down PT for a few weeks.  She reports pain with terminal IR/ER       Exam:  Incision sites healed  No soft tissue swelling  ROM of the hip , ER 45, IR 40.   Calixto's sign negative and no calf tenderness  Motor and sensory intact distally    Impression:  S/p left hip arthroscopy labral repair, femoroplasty, capsular closure, third post-operative visit - doing well    Plan:  Overall she is making good progress I am happy with her outcome at this point.  She still has some soreness with extremes of external rotation and internal rotation which is normal for this time.  Progress PT as discussed today: Discussed continuing PT for another 4-6 weeks, elliptical machine, and be mindful of seat height with stationary bike. Another 4-6 weeks with PT   Symptomatic treatment for pain / swelling  Instructed patient to call clinic if questions or concerns    Follow-up 2 months (5 months from surgery)       Wm Robbins MD    I, Federico Arevalo, acted as a scribe for Wm Robbins MD for the duration of this office visit.

## 2022-11-03 ENCOUNTER — PATIENT MESSAGE (OUTPATIENT)
Dept: INTERNAL MEDICINE | Facility: CLINIC | Age: 52
End: 2022-11-03
Payer: MEDICAID

## 2022-11-04 ENCOUNTER — PATIENT MESSAGE (OUTPATIENT)
Dept: RHEUMATOLOGY | Facility: CLINIC | Age: 52
End: 2022-11-04

## 2022-11-04 ENCOUNTER — LAB VISIT (OUTPATIENT)
Dept: LAB | Facility: HOSPITAL | Age: 52
End: 2022-11-04
Attending: INTERNAL MEDICINE
Payer: MEDICAID

## 2022-11-04 ENCOUNTER — OFFICE VISIT (OUTPATIENT)
Dept: RHEUMATOLOGY | Facility: CLINIC | Age: 52
End: 2022-11-04
Payer: MEDICAID

## 2022-11-04 VITALS
HEIGHT: 64 IN | DIASTOLIC BLOOD PRESSURE: 72 MMHG | SYSTOLIC BLOOD PRESSURE: 104 MMHG | BODY MASS INDEX: 21.56 KG/M2 | WEIGHT: 126.31 LBS | HEART RATE: 74 BPM

## 2022-11-04 DIAGNOSIS — L93.0 DISCOID LUPUS: Primary | ICD-10-CM

## 2022-11-04 DIAGNOSIS — L93.0 DISCOID LUPUS: ICD-10-CM

## 2022-11-04 DIAGNOSIS — H47.20 OPTIC NERVE ATROPHY: ICD-10-CM

## 2022-11-04 LAB
ALBUMIN SERPL BCP-MCNC: 4.7 G/DL (ref 3.5–5.2)
ALP SERPL-CCNC: 36 U/L (ref 55–135)
ALT SERPL W/O P-5'-P-CCNC: 15 U/L (ref 10–44)
ANION GAP SERPL CALC-SCNC: 10 MMOL/L (ref 8–16)
AST SERPL-CCNC: 18 U/L (ref 10–40)
BASOPHILS # BLD AUTO: 0.03 K/UL (ref 0–0.2)
BASOPHILS NFR BLD: 0.7 % (ref 0–1.9)
BILIRUB SERPL-MCNC: 1.4 MG/DL (ref 0.1–1)
BUN SERPL-MCNC: 14 MG/DL (ref 6–20)
C3 SERPL-MCNC: 107 MG/DL (ref 50–180)
C4 SERPL-MCNC: 22 MG/DL (ref 11–44)
CALCIUM SERPL-MCNC: 9.8 MG/DL (ref 8.7–10.5)
CHLORIDE SERPL-SCNC: 102 MMOL/L (ref 95–110)
CK SERPL-CCNC: 59 U/L (ref 20–180)
CO2 SERPL-SCNC: 29 MMOL/L (ref 23–29)
CREAT SERPL-MCNC: 0.9 MG/DL (ref 0.5–1.4)
DIFFERENTIAL METHOD: NORMAL
EOSINOPHIL # BLD AUTO: 0.1 K/UL (ref 0–0.5)
EOSINOPHIL NFR BLD: 1.9 % (ref 0–8)
ERYTHROCYTE [DISTWIDTH] IN BLOOD BY AUTOMATED COUNT: 12.6 % (ref 11.5–14.5)
EST. GFR  (NO RACE VARIABLE): >60 ML/MIN/1.73 M^2
GLUCOSE SERPL-MCNC: 89 MG/DL (ref 70–110)
HCT VFR BLD AUTO: 44.6 % (ref 37–48.5)
HGB BLD-MCNC: 14.5 G/DL (ref 12–16)
IMM GRANULOCYTES # BLD AUTO: 0.01 K/UL (ref 0–0.04)
IMM GRANULOCYTES NFR BLD AUTO: 0.2 % (ref 0–0.5)
LYMPHOCYTES # BLD AUTO: 1.4 K/UL (ref 1–4.8)
LYMPHOCYTES NFR BLD: 32.2 % (ref 18–48)
MCH RBC QN AUTO: 30 PG (ref 27–31)
MCHC RBC AUTO-ENTMCNC: 32.5 G/DL (ref 32–36)
MCV RBC AUTO: 92 FL (ref 82–98)
MONOCYTES # BLD AUTO: 0.3 K/UL (ref 0.3–1)
MONOCYTES NFR BLD: 6.9 % (ref 4–15)
NEUTROPHILS # BLD AUTO: 2.4 K/UL (ref 1.8–7.7)
NEUTROPHILS NFR BLD: 58.1 % (ref 38–73)
NRBC BLD-RTO: 0 /100 WBC
PLATELET # BLD AUTO: 204 K/UL (ref 150–450)
PMV BLD AUTO: 11.3 FL (ref 9.2–12.9)
POTASSIUM SERPL-SCNC: 4.6 MMOL/L (ref 3.5–5.1)
PROT SERPL-MCNC: 7.5 G/DL (ref 6–8.4)
RBC # BLD AUTO: 4.84 M/UL (ref 4–5.4)
SODIUM SERPL-SCNC: 141 MMOL/L (ref 136–145)
WBC # BLD AUTO: 4.19 K/UL (ref 3.9–12.7)

## 2022-11-04 PROCEDURE — 3074F SYST BP LT 130 MM HG: CPT | Mod: CPTII,,, | Performed by: INTERNAL MEDICINE

## 2022-11-04 PROCEDURE — 1159F PR MEDICATION LIST DOCUMENTED IN MEDICAL RECORD: ICD-10-PCS | Mod: CPTII,,, | Performed by: INTERNAL MEDICINE

## 2022-11-04 PROCEDURE — 3074F PR MOST RECENT SYSTOLIC BLOOD PRESSURE < 130 MM HG: ICD-10-PCS | Mod: CPTII,,, | Performed by: INTERNAL MEDICINE

## 2022-11-04 PROCEDURE — 1159F MED LIST DOCD IN RCRD: CPT | Mod: CPTII,,, | Performed by: INTERNAL MEDICINE

## 2022-11-04 PROCEDURE — 86160 COMPLEMENT ANTIGEN: CPT | Mod: 59 | Performed by: INTERNAL MEDICINE

## 2022-11-04 PROCEDURE — 3078F PR MOST RECENT DIASTOLIC BLOOD PRESSURE < 80 MM HG: ICD-10-PCS | Mod: CPTII,,, | Performed by: INTERNAL MEDICINE

## 2022-11-04 PROCEDURE — 99999 PR PBB SHADOW E&M-EST. PATIENT-LVL IV: ICD-10-PCS | Mod: PBBFAC,,, | Performed by: INTERNAL MEDICINE

## 2022-11-04 PROCEDURE — 85025 COMPLETE CBC W/AUTO DIFF WBC: CPT | Performed by: INTERNAL MEDICINE

## 2022-11-04 PROCEDURE — 3078F DIAST BP <80 MM HG: CPT | Mod: CPTII,,, | Performed by: INTERNAL MEDICINE

## 2022-11-04 PROCEDURE — 86235 NUCLEAR ANTIGEN ANTIBODY: CPT | Mod: 59 | Performed by: INTERNAL MEDICINE

## 2022-11-04 PROCEDURE — 86160 COMPLEMENT ANTIGEN: CPT | Performed by: INTERNAL MEDICINE

## 2022-11-04 PROCEDURE — 80053 COMPREHEN METABOLIC PANEL: CPT | Performed by: INTERNAL MEDICINE

## 2022-11-04 PROCEDURE — 3008F PR BODY MASS INDEX (BMI) DOCUMENTED: ICD-10-PCS | Mod: CPTII,,, | Performed by: INTERNAL MEDICINE

## 2022-11-04 PROCEDURE — 99999 PR PBB SHADOW E&M-EST. PATIENT-LVL IV: CPT | Mod: PBBFAC,,, | Performed by: INTERNAL MEDICINE

## 2022-11-04 PROCEDURE — 82085 ASSAY OF ALDOLASE: CPT | Performed by: INTERNAL MEDICINE

## 2022-11-04 PROCEDURE — 99204 OFFICE O/P NEW MOD 45 MIN: CPT | Mod: S$PBB,,, | Performed by: INTERNAL MEDICINE

## 2022-11-04 PROCEDURE — 3008F BODY MASS INDEX DOCD: CPT | Mod: CPTII,,, | Performed by: INTERNAL MEDICINE

## 2022-11-04 PROCEDURE — 82550 ASSAY OF CK (CPK): CPT | Performed by: INTERNAL MEDICINE

## 2022-11-04 PROCEDURE — 86038 ANTINUCLEAR ANTIBODIES: CPT | Performed by: INTERNAL MEDICINE

## 2022-11-04 PROCEDURE — 99214 OFFICE O/P EST MOD 30 MIN: CPT | Mod: PBBFAC | Performed by: INTERNAL MEDICINE

## 2022-11-04 PROCEDURE — 86053 AQAPRN-4 ANTB FLO CYTMTRY EA: CPT | Performed by: INTERNAL MEDICINE

## 2022-11-04 PROCEDURE — 99204 PR OFFICE/OUTPT VISIT, NEW, LEVL IV, 45-59 MIN: ICD-10-PCS | Mod: S$PBB,,, | Performed by: INTERNAL MEDICINE

## 2022-11-04 RX ORDER — METHYLPREDNISOLONE 4 MG/1
TABLET ORAL
Qty: 21 EACH | Refills: 0 | Status: SHIPPED | OUTPATIENT
Start: 2022-11-04 | End: 2022-11-22 | Stop reason: SDUPTHER

## 2022-11-04 NOTE — PROGRESS NOTES
RHEUMATOLOGY OUTPATIENT CLINIC NOTE    11/4/2022    Attending Rheumatologist: Robby Irene  Primary Care Provider/Physician Requesting Consultation: MARY LOU Antony MD   Chief Complaint/Reason For Consultation:  Lupus      Subjective:     Laurel Calvo is a 52 y.o. White female with history for discoid lupus for follow up encounter.    Main concern of new onset rash on posterior neck.  S/p contact dermatitis s/p co2 therapy for cosmetic procedure anterior neck area, successfully managed with systemic steroids per outside dermatologist.    Review of Systems   Constitutional:  Negative for fever.   HENT:  Negative for nosebleeds.         Denies xerostomia or xerophthalmia   Respiratory:  Negative for hemoptysis and shortness of breath.    Gastrointestinal:         Denies dysphagia.   Genitourinary:  Negative for dysuria, hematuria and urgency.   Musculoskeletal:  Negative for joint pain.   Skin:  Positive for rash.        Denies Raynaud's phenomena or ulcerative skin lesions   Neurological:  Negative for focal weakness and weakness.   Endo/Heme/Allergies:         No history of vascular thrombosis     Chronic comorbid conditions affecting medical decision making today:  Past Medical History:   Diagnosis Date    Anxiety     Arthritis     Chronic left-sided low back pain without sciatica 9/28/2018    Chronic pain of left knee 9/28/2018    XR KNEE COMP LEFT 4 OR MORE VIEWS - October 27, 2017 CLINICAL HISTORY:   Left knee pain FINDINGS:   No fracture, dislocation, or arthritic change of the knee. Dictated and Electronically Signed By: Jem Damon MD on October 27, 2017    GERD (gastroesophageal reflux disease)     Lupus     Migraine with aura and without status migrainosus, not intractable 8/8/2014    Seizures     Thyroid disease      Past Surgical History:   Procedure Laterality Date    ARTHROSCOPY, HIP Left 7/28/2022    Procedure: ARTHROSCOPY, HIP, WITH FEMOROPLASTY;  Surgeon: Wm Robbins MD;   Location: [facility] OR;  Service: Orthopedics;  Laterality: Left;  Left hip arthroscopic capsular closure    ARTHROSCOPY, HIP Left 7/28/2022    Procedure: ARTHROSCOPY, HIP, WITH LABRUM REPAIR;  Surgeon: Wm Robbins MD;  Location: [facility] OR;  Service: Orthopedics;  Laterality: Left;    HYSTERECTOMY  2016    INJECTION OF ANESTHETIC AGENT INTO SACROILIAC JOINT Left 3/31/2022    Procedure: Left intra-articular hip joint injection;  Surgeon: Donato Manley MD;  Location: [facility]IN [facility]GT;  Service: Pain Management;  Laterality: Left;    INJECTION OF ANESTHETIC AGENT INTO SACROILIAC JOINT  3/31/2022    Procedure: ;  Surgeon: Donato Manley MD;  Location: [facility];  Service: Pain Management;;    INJECTION OF ANESTHETIC AGENT INTO SACROILIAC JOINT Bilateral 5/12/2022    Procedure: Bilateral GT bursa + bilateral SIJ injection RN IV Sedation;  Surgeon: Donato Manley MD;  Location: [facility];  Service: Pain Management;  Laterality: Bilateral;    INJECTION OF JOINT Bilateral 5/12/2022    Procedure: Bilateral GT bursa + bilateral SIJ injection RN IV Sedation;  Surgeon: Donato Manley MD;  Location: [facility];  Service: Pain Management;  Laterality: Bilateral;    OOPHORECTOMY      TONSILLECTOMY      WISDOM TOOTH EXTRACTION      X 4     Family History   Problem Relation Age of Onset    No Known Problems Mother     No Known Problems Father     No Known Problems Sister     No Known Problems Brother     Stomach cancer Paternal Grandfather     Breast cancer Maternal Grandmother     Breast cancer Paternal Grandmother     Colon cancer Neg Hx      Social History     Tobacco Use   Smoking Status Never   Smokeless Tobacco Never       Current Outpatient Medications:     acetaminophen (TYLENOL) 500 MG tablet, Take 2 tablets (1,000 mg total) by mouth every 8 (eight) hours as needed for Pain., Disp: 60 tablet, Rfl: 0    EPINEPHrine (EPIPEN) 0.3 mg/0.3 mL AtIn, Inject 0.3 mg into the muscle daily as needed., Disp: ,  Rfl:     estradiol (ESTRACE) 2 MG tablet, TAKE 1 TABLET BY MOUTH ONE TIME DAILY, Disp: , Rfl: 1    lamoTRIgine (LAMICTAL) 100 MG tablet, TAKE ONE TABLET BY MOUTH TWICE DAILY, Disp: 180 tablet, Rfl: 0    levothyroxine (SYNTHROID) 50 MCG tablet, Take 1 tablet (50 mcg total) by mouth before breakfast., Disp: 90 tablet, Rfl: 3    multivitamin (THERAGRAN) per tablet, Take 1 tablet by mouth once daily. , Disp: , Rfl:     testosterone cypionate (DEPOTESTOTERONE CYPIONATE) 200 mg/mL injection, Inject into the muscle every 28 days. (Dose? - By Dr. Brennen Mercer at [facility]), Disp: , Rfl:     vitamin D 1000 units Tab, Take 1,000 Units by mouth once daily., Disp: , Rfl:     aspirin (ECOTRIN) 81 MG EC tablet, Take 1 tablet (81 mg total) by mouth 2 (two) times a day. for 14 days, Disp: 28 tablet, Rfl: 0    diazePAM (VALIUM) 5 MG tablet, Take 1 tablet (5 mg total) by mouth every 6 (six) hours as needed for Anxiety., Disp: 2 tablet, Rfl: 0    gabapentin (NEURONTIN) 300 MG capsule, Take 2 capsules (600 mg total) by mouth 3 (three) times daily., Disp: 90 capsule, Rfl: 2    oxyCODONE (ROXICODONE) 5 MG immediate release tablet, Take 1 tablet (5 mg total) by mouth every 4 (four) hours as needed for Pain (post-op pain). (Patient not taking: Reported on 9/9/2022), Disp: 42 tablet, Rfl: 0    pantoprazole (PROTONIX) 40 MG tablet, Take 1 tablet (40 mg total) by mouth 2 (two) times daily., Disp: 60 tablet, Rfl: 3    rOPINIRole (REQUIP) 1 MG tablet, TAKE ONE TABLET BY MOUTH EVERY EVENING AS NEEDED FOR FOR RESTLESSNESS LEGS (Patient not taking: Reported on 11/4/2022), Disp: 30 tablet, Rfl: 11    traZODone (DESYREL) 50 MG tablet, Take 1 tablet (50 mg total) by mouth every evening. (Patient not taking: Reported on 11/4/2022), Disp: 90 tablet, Rfl: 3  No current facility-administered medications for this visit.    Facility-Administered Medications Ordered in Other Visits:     lactated ringers infusion, , Intravenous,  Continuous, Selin Dobson MD, Last Rate: 200 mL/hr at 07/28/22 1024, Rate Change at 07/28/22 1024     Objective:     Vitals:    11/04/22 0855   BP: 104/72   Pulse: 74     Physical Exam   Eyes: Conjunctivae are normal.   Pulmonary/Chest: Effort normal. No respiratory distress.   Musculoskeletal:         General: No swelling or tenderness. Normal range of motion.   Neurological: She displays no weakness.     Reviewed available old and all outside pertinent medical records available.    All lab results personally reviewed and interpreted by me.       ASSESSMENT:     Discoid lupus    Subacute lupus    Optic nerve atrophy    PLAN:     Follow-up encounter for history of discoid lupus and refractory rash.  Onset after cosmetic procedure on frontal neck with subsequent contact dermatitis, resolved with course of systemic steroids.  Erythematous pruritic plaque on posterior neck with subacute cutaneous lupus appearance.  Review of systems negative for sicca symptoms, Raynaud's phenomena, or respiratory symptoms.  Exam without reproducible muscle weakness or active squeeze tenderness suggestive of subclinical synovitis.  Trophic changes not identified on finger pads.  Labs on file without evidence of hemolysis, significant cytopenias, or progressive kidney or liver dysfunction.  SU 1 in 160 negative on repeat.  RADHA panel negative.  Past imaging with notable degenerative disc disease findings.  No erosive changes or ankylosis.  Serositis not present on chest or abdominal imaging.  Impression of refractory lupus with cutaneous involvement, with no evidence of systemic damage a present.  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  If unable to proceed with Plaquenil therapy (took for several years, last taking on 2014) will utilize CellCept as immunomodulator instead if no contraindications on labs.  Medrol pack sent to pharmacy on  file.  If no response will provide short course of topical corticosteroids therapy and referr to Dermatology.  Repeat SU with myomarker panel for prognosis.  Monitor for development of end organ damage besides the skin for systemic rheumatic disease.    Disclaimer: This note is prepared using voice recognition software and as such is likely to have errors and has not been proof read. Please contact me for questions.       Robby Irene M.D.

## 2022-11-05 NOTE — TELEPHONE ENCOUNTER
I responded to her separately about the rash.    TO MY TEAM:   Schedule Laurel for annual wellness visit anytime Jan-Mar, 2023.

## 2022-11-06 LAB — ALDOLASE SERPL-CCNC: 3.8 U/L (ref 1.2–7.6)

## 2022-11-07 LAB — ANA SER QL IF: NORMAL

## 2022-11-08 DIAGNOSIS — Z79.899 HIGH RISK MEDICATION USE: ICD-10-CM

## 2022-11-08 DIAGNOSIS — L93.0 DISCOID LUPUS: Primary | ICD-10-CM

## 2022-11-08 RX ORDER — MYCOPHENOLATE MOFETIL 500 MG/1
500 TABLET ORAL 2 TIMES DAILY
Qty: 60 TABLET | Refills: 2 | Status: SHIPPED | OUTPATIENT
Start: 2022-11-08 | End: 2023-01-30 | Stop reason: SDUPTHER

## 2022-11-09 LAB — NMO/AQP4 FACS,S: NEGATIVE

## 2022-11-18 ENCOUNTER — PATIENT MESSAGE (OUTPATIENT)
Dept: RHEUMATOLOGY | Facility: CLINIC | Age: 52
End: 2022-11-18
Payer: MEDICAID

## 2022-11-18 LAB
ANTI-PM/SCL AB: <20 UNITS
ANTI-SS-A 52 KD AB, IGG: <20 UNITS
EJ AB SER QL: NEGATIVE
ENA JO1 AB SER IA-ACNC: <20 UNITS
ENA SM+RNP AB SER IA-ACNC: <20 UNITS
FIBRILLARIN (U3 RNP): NEGATIVE
KU AB SER QL: NEGATIVE
MDA-5 (P140): <20 UNITS
MI2 AB SER QL: NEGATIVE
NXP-2 (P140): <20 UNITS
OJ AB SER QL: NEGATIVE
PL12 AB SER QL: NEGATIVE
PL7 AB SER QL: NEGATIVE
SRP AB SERPL QL: NEGATIVE
TIF1 GAMMA (P155/140): <20 UNITS
U2 SNRNP: NEGATIVE

## 2022-11-22 DIAGNOSIS — L93.0 DISCOID LUPUS: ICD-10-CM

## 2022-11-22 RX ORDER — METHYLPREDNISOLONE 4 MG/1
TABLET ORAL
Qty: 21 EACH | Refills: 0 | Status: SHIPPED | OUTPATIENT
Start: 2022-11-22 | End: 2022-12-13

## 2022-12-28 ENCOUNTER — DOCUMENTATION ONLY (OUTPATIENT)
Dept: REHABILITATION | Facility: HOSPITAL | Age: 52
End: 2022-12-28
Payer: MEDICAID

## 2022-12-28 DIAGNOSIS — M25.552 LEFT HIP PAIN: Primary | ICD-10-CM

## 2022-12-28 DIAGNOSIS — Z48.89 ENCOUNTER FOR OTHER SPECIFIED SURGICAL AFTERCARE: ICD-10-CM

## 2022-12-28 DIAGNOSIS — M62.552 MUSCLE WASTING AND ATROPHY, NOT ELSEWHERE CLASSIFIED, LEFT THIGH: ICD-10-CM

## 2022-12-28 DIAGNOSIS — M25.652 STIFFNESS OF LEFT HIP JOINT: ICD-10-CM

## 2022-12-28 NOTE — PROGRESS NOTES
[facility]  Physical Therapy Discharge Note    Name: Laurel Calvo  Clinic Number: [id number]    Therapy Diagnosis:   Encounter Diagnoses   Name Primary?    Left hip pain Yes    Stiffness of left hip joint     Muscle wasting and atrophy, not elsewhere classified, left thigh     Encounter for other specified surgical aftercare      Physician: No ref. provider found    Physician Orders: PT Eval and Treat  Medical Diagnosis from Referral: Post-Operative state; Femoroacetabular Impingement of Left Hip.   Evaluation Date: 8/2/2022      Date of Last visit: 10/19/22  Total Visits Received: 13    ASSESSMENT      Patient progressed well with hip pain, ROM, and strength.     Discharge reason: Patient has not attended therapy since 10/19/22.     Discharge FOTO Score: 89% limitation     GOALS:     Short Term Goals:  6 weeks Progress   9/26/2022   Patient will report decreased pain to <3/10 at worst on VAS to progress toward Prior Level of Function. Met   Patient will report improved function by a functional limitation score of <50 out of 100 on FOTO. Progressing   Patient will improve AROM to 50% of stated goals in order to progress towards Prior Level of Function. Met   Patient will improve strength to 50% of stated goals in order to progress towards Prior Level of Function. Met      Long Term Goals:  12 weeks Progress  9/26/2022   Patient will report decreased pain to <1/10 at worst on VAS to progress toward Prior Level of Function.   Progressing   Patient will report improved function by a functional limitation score of <20 out of 100 on FOTO. Progressing   Patient will improve ROM and Functional Mobility to stated goals to return to Prior Level of Function. Progressing   Patient will improve strength to stated goals in order to return to Prior Level of Function. Progressing       PLAN   This patient is discharged from Physical Therapy.      Jc Echevarria, PT

## 2023-01-10 ENCOUNTER — PATIENT MESSAGE (OUTPATIENT)
Dept: ORTHOPEDICS | Facility: CLINIC | Age: 53
End: 2023-01-10
Payer: MEDICAID

## 2023-01-25 ENCOUNTER — PATIENT MESSAGE (OUTPATIENT)
Dept: ADMINISTRATIVE | Facility: HOSPITAL | Age: 53
End: 2023-01-25
Payer: MEDICAID

## 2023-01-30 DIAGNOSIS — Z79.899 HIGH RISK MEDICATION USE: ICD-10-CM

## 2023-01-30 DIAGNOSIS — L93.0 DISCOID LUPUS: ICD-10-CM

## 2023-01-31 RX ORDER — MYCOPHENOLATE MOFETIL 500 MG/1
500 TABLET ORAL 2 TIMES DAILY
Qty: 180 TABLET | Refills: 2 | Status: SHIPPED | OUTPATIENT
Start: 2023-01-31 | End: 2023-04-04

## 2023-02-14 ENCOUNTER — TELEPHONE (OUTPATIENT)
Dept: INTERNAL MEDICINE | Facility: CLINIC | Age: 53
End: 2023-02-14
Payer: MEDICAID

## 2023-02-28 ENCOUNTER — OFFICE VISIT (OUTPATIENT)
Dept: PAIN MEDICINE | Facility: CLINIC | Age: 53
End: 2023-02-28
Payer: MEDICAID

## 2023-02-28 ENCOUNTER — PATIENT MESSAGE (OUTPATIENT)
Dept: PAIN MEDICINE | Facility: CLINIC | Age: 53
End: 2023-02-28
Payer: MEDICAID

## 2023-02-28 DIAGNOSIS — R10.32 LEFT GROIN PAIN: ICD-10-CM

## 2023-02-28 PROCEDURE — 1160F PR REVIEW ALL MEDS BY PRESCRIBER/CLIN PHARMACIST DOCUMENTED: ICD-10-PCS | Mod: CPTII,95,, | Performed by: PHYSICIAN ASSISTANT

## 2023-02-28 PROCEDURE — 99214 OFFICE O/P EST MOD 30 MIN: CPT | Mod: 95,,, | Performed by: PHYSICIAN ASSISTANT

## 2023-02-28 PROCEDURE — 99214 PR OFFICE/OUTPT VISIT, EST, LEVL IV, 30-39 MIN: ICD-10-PCS | Mod: 95,,, | Performed by: PHYSICIAN ASSISTANT

## 2023-02-28 PROCEDURE — 1159F MED LIST DOCD IN RCRD: CPT | Mod: CPTII,95,, | Performed by: PHYSICIAN ASSISTANT

## 2023-02-28 PROCEDURE — 1159F PR MEDICATION LIST DOCUMENTED IN MEDICAL RECORD: ICD-10-PCS | Mod: CPTII,95,, | Performed by: PHYSICIAN ASSISTANT

## 2023-02-28 PROCEDURE — 1160F RVW MEDS BY RX/DR IN RCRD: CPT | Mod: CPTII,95,, | Performed by: PHYSICIAN ASSISTANT

## 2023-02-28 RX ORDER — GABAPENTIN 300 MG/1
600 CAPSULE ORAL 3 TIMES DAILY
Qty: 90 CAPSULE | Refills: 2 | Status: SHIPPED | OUTPATIENT
Start: 2023-02-28 | End: 2023-05-05 | Stop reason: SDUPTHER

## 2023-02-28 NOTE — PROGRESS NOTES
Established Patient Chronic Pain Note (Follow up visit)    Chief Complaint:   No chief complaint on file.    The patient location is: [place]  The chief complaint leading to consultation is: chronic pain     Visit type: audiovisual    Face to Face time with patient: 10-15 minutes  20 minutes of total time spent on the encounter, which includes face to face time and non-face to face time preparing to see the patient (eg, review of tests), Obtaining and/or reviewing separately obtained history, Documenting clinical information in the electronic or other health record, Independently interpreting results (not separately reported) and communicating results to the patient/family/caregiver, or Care coordination (not separately reported).     Each patient to whom he or she provides medical services by telemedicine is:  (1) informed of the relationship between the physician and patient and the respective role of any other health care provider with respect to management of the patient; and (2) notified that he or she may decline to receive medical services by telemedicine and may withdraw from such care at any time.        SUBJECTIVE:  Interval History (2/28/2023):  aLurel Calvo presents today via telemed for follow-up visit.  Patient was last seen on 08/29/2021.  She reports return of her chronic sciatica after working in her yard. Historically, Gabapentin has been very helpful for her nerve pain and she is requesting a refill of this medication. Ice and ibuprofen not effective. 1-2 weeks.Pain again is in her lower back with radiation down her left lower extremity.      Interval History (08/29/2022):  Laurel Calvo presents today for follow-up visit.  Patient was last seen on 6/13/2022. Last injection bilateral SIJ + GT bursa injection on 05/12/2022. Patient reports pain as 2/10 today. Patient underwent left hip arthroplasty with labral repair, capsular closure, and femoroplasty secondary to left hip labral tear and left hip  femoracetabular impingement. Was previously scheduled with Dr. Manley for repeat left subacromial bursa injection, but appointment was canceled by our dept and has not been rescheduled. She reports continued left shoulder pain. Currently enrolled in physical therapy for post-operative therapy. She reports that she is recovering well from her hip surgery. Has follow up with [facility] on 09/09.      Interval History (6/13/2022): Laurel Calvo presents today for follow-up visit.  she underwent bilateral GT bursa injection + bilateral SIJ injection on 5/12/22.  she reports 30% pain relief.  Patient reports pain as 3/10 today. Saw Dr. Robbins on 05/13/2022. Per Dr. 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  She also has some component of lumbar radiculopathy and SI pain. Will consider hip arthroscopy and labral repair. Has follow up scheduled with Dr. Robbins on 06/17/2022. Patient reports she is now having right sided hip pain due to favoring left side due to pain. Reports she would like to move forward with hip repair with Dr. Robbins, then consider continued injections with IPM. Also reports shoulder pain beginning to flare up. Previously had left subacromial bursa Injection in clinic 11/9/2021 with good relief. Reports injections typically last about 6 months.      Interval History (4/28/2022): Laurel JONAH Umesh presents today for follow-up visit.  she underwent left intraarticular hip joint injection on 03/31/2022.  The patient reports she had relief x 5-6 days after the procedure, but once she returned to normal activity the pain returned and then worsened. Reports one week of severe pain 8/10, but has now decreased slightly to a constant 5/10. Has follow up appointment with Dr. Robbins in 2 weeks to discuss other treatment options for hip.  Reports she does experience pain in the lower lumbar region left > right and some  "radiation into the outer hips. Greatest pain continues to originate from groin. Patient reports pain as 5/10 today. Taking ibuprofen with temporary moderate relief.    Interval History (1/27/2022):  Laurel Calvo presents today for follow-up visit.  Patient was last seen on 11/9/2021. At that visit, she had a subacromial bursa injection, which she feels helped less than previous injection.  Patient reports pain as "0/10 today.    Interval HPI (11/9/2021):  Laurel Calvo is a 51 y.o. female who presents to the clinic for a follow-up appointment for lower back and left hip pain.  Her main complaint today is left shoulder pain.  At the last visit, she was provided with a left-sided greater trochanteric bursa injection in the clinic.  She reports that this resulted in significant relief.  Since the last visit, Laurel Calvo states the left shoulder pain has been worsening. Current pain intensity is 8/10.  She also locates pain to the left lower tailbone area.  Patient denies night fever/night sweats, urinary incontinence, bowel incontinence, significant weight loss, significant motor weakness and loss of sensations.    Interval HPI 09/02/2021:  Laurel Calvo is a 51 y.o. female who presents to the clinic for a follow-up appointment for lower back and left hip pain. Since the last visit, Laurel Calvo states the pain has been persistant. Current pain intensity is 3/10.  She does report that she had a fall while gardening a few weeks ago and landed on her right hip, but this is somewhat improving.  Her left hip is certainly more aggravated currently.    Interval HPI 08/24/2021:  Laurel Calvo presents to tele-medicine appointment for a follow-up appointment for chronic shoulder, neck, low back, and hip pain.  She was last seen on 03/04/2021 where she underwent left-sided subacromial bursa and left-sided greater trochanteric bursa injections.  She states that these resulted in 80-85% relief for both sites.  She " states that her left lower back pain has been worsening is having radiating pain down the left lower extremity posteriorly extending to the calf.  She was also referred to a chiropractor.  Since the last visit, Laurel Calvo states the pain has been worsening. Current pain intensity is 8/10.    Initial HPI 03/04/2021:  Laurel Calvo is a 50 y.o. female, right-hand dominant, who presents to the clinic for the evaluation of chronic neck and lower back pain.  She was referred by her primary care team for further evaluation and management of this pain.  She has past medical history of seizure disorder, restless leg syndrome, anxiety, lupus, migraines, GERD, and multiple other medical comorbidities as listed in her chart.  The pain started several years ago following pain being told that she had herniated disc following an MRI of the cervical and lumbar spine and symptoms have been unchanged.The pain is located in the cervical myofascial area and radiates to the left upper extremity extending into the hand and fingers..  The pain is described as sharp, stabbing and throbbing and is rated as 2/10. The pain is rated with a score of  1/10 on the BEST day and a score of 10/10 on the WORST day.  Symptoms interfere with daily activity. The pain is exacerbated by at night and with exercise.  The pain is mitigated by ice. The patient reports spending less than 2 hours per day reclining. The patient reports 4-6 hours of uninterrupted sleep per night.     Pain Disability Index Review:  Last 3 PDI Scores 6/13/2022 4/28/2022 11/9/2021   Pain Disability Index (PDI) 48 [phone] 15       Non-Pharmacologic Treatments:  Physical Therapy/Home Exercise: yes, has been active with home exercises as she has previously learned from physical therapies targeting the lower back  Ice/Heat:yes  TENS: no  Acupuncture: no  Massage: yes  Chiropractic: yes    Other: no        Pain Medications:  - Opioids: Norco  - Adjuvant Medications: Valium, baclofen,  Plaquenil, Lamictal, Requip, trazodone  - Anti-Coagulants: None          Pain Procedures:   -03/04/2021:  Left subacromial bursa + left greater trochanteric bursa injections, 80-85% relief for both  -09/02/2021:  Left greater trochanteric bursa injection, significant relief  - Left subacromial bursa Injection in clinic 11/9/2021 - less pain relief than previously   -left IA hip joint injection on 03/31/2022 with relief for only 5-6 days, then pain returned and worsened  -bilateral GT bursa injection + bilateral SIJ injection on 5/12/22 with 30% relief       Imaging (Reviewed on 2/28/2023):     MRI hip 03/24/2022  FINDINGS:  Osseous structures: Normal marrow signal.  No fracture.  No suspicious osseous lesion.     Hip and Sacroiliac joints: No effusion. Mild-moderate cartilage irregularity at the peripheral aspect of the acetabular roof with mild associated subchondral cystic change.  Probable partial-thickness chondral-labral separation at the anterior/superior labrum.  No labral detached tear or paralabral cyst.     Pubic symphysis: No osteitis pubis.     Bursae: Normal.     Soft tissues: Regional muscles and tendons are normal in appearance.     Miscellaneous: None.     Impression:     No acute abnormality.     Mild-moderate degenerative changes at the peripheral aspect of the acetabular roof.     Probable partial thickness chondral-labral separation at the anterior/superior labrum.    X-ray hip 02/21/2022  FINDINGS:  Hip joints are symmetric in appearance.  Joint spaces are maintained.  No evidence of avascular necrosis.  There is a curvilinear lucency seen on multiple projections in the inter trochanteric crest of the left femur extending into the proximal diaphyseal region suspicious for nondisplaced fracture at this site.     This report was flagged in Epic as abnormal.    MRI lumbar spine 09/01/2021:  Levocurvature of the lumbar spine is noted.  No listhesis.  There is no acute fracture.  The vertebral bodies  are normal in height without compression fractures. Posterior elements are intact. Mild disc desiccation at the L5-S1 level.  Lumbar spine disc heights are preserved.  Conus medullaris terminates at the T12-L1 level. Distal spinal cord intensity is normal.  There is no soft tissue abnormality.     T12-L1: No disc bulge.  There is no facet arthropathy.  There is no neural foraminal stenosis.  There is no spinal canal stenosis.     L1-L2: No disc bulge.  There is no facet arthropathy.  There is no neuroforaminal stenosis.  There is no spinal canal stenosis.     L2-L3: No disc bulge.  There is no facet arthropathy.  There is no neuroforaminal stenosis.  There is no spinal canal stenosis.     L3-L4: No disc bulge.  There is no facet arthropathy.  There is no neuroforaminal stenosis.  There is no spinal canal stenosis.     L4-L5: No disc bulge.  There is no facet arthropathy.  There is no neuroforaminal stenosis.  There is no spinal canal stenosis.     L5-S1: No disc bulge.  There is no facet arthropathy.  There is no neuroforaminal stenosis.  There is no spinal canal stenosis.       X-ray cervical spine 03/04/2021:  There is straightening of the normal cervical spine lordosis.  No listhesis.  No abnormal motion of the cervical spine with flexion and extension.  No fracture.  Intervertebral disc spaces of the cervical spine are maintained.  No significant uncovertebral or facet arthropathy.  Oblique views demonstrate no significant neural foraminal stenosis.  No bony central canal stenosis identified.  Prevertebral soft tissues are within normal limits.     X-ray lumbar spine 03/04/2021:  There is mild levocurvature of the lumbar spine.  No listhesis.  No abnormal motion of the lumbar spine with flexion and extension.  No fracture or pars defect.  Intervertebral disc spaces of the lumbar spine are maintained.  No significant facet arthropathy.  No suspicious osseous lesion.  Sacroiliac joints appear normal.              REVIEW OF SYSTEMS:    GENERAL:  No weight loss, malaise or fevers.  HEENT:   No recent changes in vision or hearing  NECK:  Negative for lumps, no difficulty with swallowing.  RESPIRATORY:  Negative for cough, wheezing or shortness of breath, patient denies any recent URI.  CARDIOVASCULAR:  Negative for chest pain, leg swelling or palpitations.  GI:  Negative for abdominal discomfort, blood in stools or black stools or change in bowel habits.  MUSCULOSKELETAL:  See HPI.  SKIN:  Negative for lesions, rash, and itching.  PSYCH:  No mood disorder or recent psychosocial stressors.  Patients sleep is not disturbed secondary to pain.  HEMATOLOGY/LYMPHOLOGY:  Negative for prolonged bleeding, bruising easily or swollen nodes.  Patient is not currently taking any anti-coagulants  NEURO:   No history of headaches, syncope, paralysis, seizures or tremors.  All other reviewed and negative other than HPI.        OBJECTIVE:    PHYSICAL EXAMINATION:  Telemedicine Exam  There were no vitals filed for this visit.    There is no height or weight on file to calculate BMI.   (reviewed on 2/28/2023)       Physical Exam: last in clinic visit:  GENERAL: Well appearing, in no acute distress, alert and oriented x3.  PSYCH:  Mood and affect appropriate.  SKIN: Skin color, texture, turgor normal, no rashes or lesions.  HEAD/FACE:  Normocephalic, atraumatic. Cranial nerves grossly intact.  CV: RRR with palpation of the radial artery.  PULM: No evidence of respiratory difficulty, symmetric chest rise.  GI:  Soft and non-tender.  BACK: Straight leg raising in the sitting and supine positions is negative to radicular pain. No pain to palpation over the facet joints of the lumbar spine or spinous processes. Normal range of motion without pain reproduction.  EXTREMITIES: Peripheral joint ROM is full and pain free without obvious instability or laxity in all four extremities. No deformities, edema, or skin discoloration. Good capillary  refill.  MUSCULOSKELETAL:   positive shoulder impingement signs on the left shoulder with Merrill, Neer's , and empty can.  minimal tenderness to palpation over the left greater trochanteric bursa. .  There is moderate pain with palpation over the sacroiliac joint on the left.  FABERs test is equivocal on the left.  FADIRs test is negative.   Bilateral upper and lower extremity strength is normal and symmetric.  No atrophy or tone abnormalities are noted.  NEURO: Bilateral upper and lower extremity coordination and muscle stretch reflexes are physiologic and symmetric.  Plantar response are downgoing. No clonus.  No loss of sensation is noted.  GAIT: normal.      ASSESSMENT: 52 y.o. year old female with lower back and left hip pain, consistent with     No diagnosis found.          PLAN:   None at this time    S/p bilateral Left SIJ + Left GT bursa injection with 30% relief  S/p left intra-articular hip joint injection with limited relief  S/p Left SIJ + Left GT bursa injection.     2. Pharmacologic:   - Restart gabapentin following titration 300 mg QHS up to  600mg TID. Refill sent to pharmacy  - Continue Mobic 7.5 mg b.i.d PRN. OR Ibuprofen  -No muscle relaxants due to risk of decreased seizure threshold, patient voiced understanding  - Anticoagulation use: None.     3. Rehabilitative: Encouraged regular exercise. Continue exercises and activities as tolerated. Currently enrolled in physical therapy for post-op care    4. Diagnostic: Reviewed    5. Consults/referral: Follow up with Itzel as scheduled on 09/09    5. Follow up: PRN    - This condition does not require this patient to take time off of work, and the primary goal of our Pain Management services is to improve the patient's functional capacity.   - I discussed the risks, benefits, and alternatives to potential treatment options. All questions and concerns were fully addressed today in clinic.       Tia Hernández PA-C  Interventional Pain  Medicine    Disclaimer:  This note was prepared using voice recognition system and is likely to have sound alike errors that may have been overlooked even after proof reading.  Please call me with any questions.

## 2023-03-01 DIAGNOSIS — Z12.31 OTHER SCREENING MAMMOGRAM: ICD-10-CM

## 2023-03-15 ENCOUNTER — TELEPHONE (OUTPATIENT)
Dept: INTERNAL MEDICINE | Facility: CLINIC | Age: 53
End: 2023-03-15
Payer: MEDICAID

## 2023-03-15 NOTE — TELEPHONE ENCOUNTER
Called patient to notify of refill request being approved; and need to schedule appt./ No answer; LM/CS

## 2023-04-04 ENCOUNTER — OFFICE VISIT (OUTPATIENT)
Dept: INTERNAL MEDICINE | Facility: CLINIC | Age: 53
End: 2023-04-04
Payer: MEDICAID

## 2023-04-04 ENCOUNTER — HOSPITAL ENCOUNTER (OUTPATIENT)
Dept: RADIOLOGY | Facility: HOSPITAL | Age: 53
Discharge: HOME | End: 2023-04-04
Attending: FAMILY MEDICINE
Payer: MEDICAID

## 2023-04-04 VITALS — BODY MASS INDEX: 21.46 KG/M2 | WEIGHT: 125.69 LBS | HEIGHT: 64 IN

## 2023-04-04 DIAGNOSIS — Z12.31 OTHER SCREENING MAMMOGRAM: ICD-10-CM

## 2023-04-04 DIAGNOSIS — K21.9 LARYNGOPHARYNGEAL REFLUX (LPR): ICD-10-CM

## 2023-04-04 DIAGNOSIS — R06.83 SNORING: ICD-10-CM

## 2023-04-04 DIAGNOSIS — E03.9 ACQUIRED HYPOTHYROIDISM: Chronic | ICD-10-CM

## 2023-04-04 DIAGNOSIS — G40.209 PARTIAL SYMPTOMATIC EPILEPSY WITH COMPLEX PARTIAL SEIZURES, NOT INTRACTABLE, WITHOUT STATUS EPILEPTICUS: Chronic | ICD-10-CM

## 2023-04-04 DIAGNOSIS — Z12.11 SCREEN FOR COLON CANCER: Primary | ICD-10-CM

## 2023-04-04 DIAGNOSIS — Z23 NEED FOR STREPTOCOCCUS PNEUMONIAE VACCINATION: ICD-10-CM

## 2023-04-04 PROCEDURE — 99213 OFFICE O/P EST LOW 20 MIN: CPT | Mod: 95,,, | Performed by: PHYSICIAN ASSISTANT

## 2023-04-04 PROCEDURE — 77063 MAMMO DIGITAL SCREENING BILAT WITH TOMO: ICD-10-PCS | Mod: 26,,, | Performed by: RADIOLOGY

## 2023-04-04 PROCEDURE — 1159F PR MEDICATION LIST DOCUMENTED IN MEDICAL RECORD: ICD-10-PCS | Mod: CPTII,95,, | Performed by: PHYSICIAN ASSISTANT

## 2023-04-04 PROCEDURE — 77067 SCR MAMMO BI INCL CAD: CPT | Mod: 26,,, | Performed by: RADIOLOGY

## 2023-04-04 PROCEDURE — 77067 SCR MAMMO BI INCL CAD: CPT | Mod: TC

## 2023-04-04 PROCEDURE — 1160F PR REVIEW ALL MEDS BY PRESCRIBER/CLIN PHARMACIST DOCUMENTED: ICD-10-PCS | Mod: CPTII,95,, | Performed by: PHYSICIAN ASSISTANT

## 2023-04-04 PROCEDURE — 77067 MAMMO DIGITAL SCREENING BILAT WITH TOMO: ICD-10-PCS | Mod: 26,,, | Performed by: RADIOLOGY

## 2023-04-04 PROCEDURE — 1160F RVW MEDS BY RX/DR IN RCRD: CPT | Mod: CPTII,95,, | Performed by: PHYSICIAN ASSISTANT

## 2023-04-04 PROCEDURE — 77063 BREAST TOMOSYNTHESIS BI: CPT | Mod: 26,,, | Performed by: RADIOLOGY

## 2023-04-04 PROCEDURE — 1159F MED LIST DOCD IN RCRD: CPT | Mod: CPTII,95,, | Performed by: PHYSICIAN ASSISTANT

## 2023-04-04 PROCEDURE — 99213 PR OFFICE/OUTPT VISIT, EST, LEVL III, 20-29 MIN: ICD-10-PCS | Mod: 95,,, | Performed by: PHYSICIAN ASSISTANT

## 2023-04-04 RX ORDER — PANTOPRAZOLE SODIUM 40 MG/1
40 TABLET, DELAYED RELEASE ORAL DAILY
Qty: 90 TABLET | Refills: 3 | Status: SHIPPED | OUTPATIENT
Start: 2023-04-04 | End: 2023-08-14 | Stop reason: SDUPTHER

## 2023-04-04 RX ORDER — QUETIAPINE FUMARATE 100 MG/1
1 TABLET, FILM COATED ORAL NIGHTLY
COMMUNITY
Start: 2023-03-15

## 2023-04-04 RX ORDER — LAMOTRIGINE 100 MG/1
100 TABLET ORAL 2 TIMES DAILY
Qty: 180 TABLET | Refills: 1 | Status: SHIPPED | OUTPATIENT
Start: 2023-04-04 | End: 2023-10-06 | Stop reason: SDUPTHER

## 2023-04-04 RX ORDER — LEVOTHYROXINE SODIUM 50 UG/1
50 TABLET ORAL
Qty: 90 TABLET | Refills: 3 | Status: SHIPPED | OUTPATIENT
Start: 2023-04-04 | End: 2023-08-14 | Stop reason: SDUPTHER

## 2023-04-04 NOTE — PROGRESS NOTES
The patient location is: [place], [place]  The chief complaint leading to consultation is: routine follow up    Visit type: audiovisual    Face to Face time with patient: 14 min  18  minutes of total time spent on the encounter, which includes face to face time and non-face to face time preparing to see the patient (eg, review of tests), Obtaining and/or reviewing separately obtained history, Documenting clinical information in the electronic or other health record, Independently interpreting results (not separately reported) and communicating results to the patient/family/caregiver, or Care coordination (not separately reported).         Each patient to whom he or she provides medical services by telemedicine is:  (1) informed of the relationship between the physician and patient and the respective role of any other health care provider with respect to management of the patient; and (2) notified that he or she may decline to receive medical services by telemedicine and may withdraw from such care at any time.    Notes:    Subjective:      Patient ID: Laurel Calvo is a 52 y.o. female.    Chief Complaint: Follow-up    HPI  Routine follow up for her medical problems today.   Mammogram scheduled.   Colonoscopy overdue. Pt would like to complete that this year.   Needs refill on her levothyroxine and lamictal. Seizures stable and controlled on lamictal. Overdue to check tsh lab. No hypothyroid or hyperthyroid symptoms.   Started on seroquel at night and has been doing great. Psychiatrist with [facility]. Mood has been good/stable.   Up to date with ophthalmology.    Lipids checked in January were normal.     Very loud snoring at night and abnormal sounds (per partner). Wakes up with jaw pain and sore throat.   Worse with laying on her back. Dad has sleep apnea and he is normal weight. Pt concerned that she might have sleep apnea as well. She has not had a sleep study.     Lab Results   Component Value Date    TSH 2.121  12/20/2021       Patient Active Problem List   Diagnosis    Fear of flying    Optic atrophy, unspecified    Systemic lupus erythematosus    PLE (polymorphic light eruption)    Nonintractable epilepsy with partial to complex partial seizures    Anxiety    GERD (gastroesophageal reflux disease)    RLS (restless legs syndrome)    Discoid lupus erythematosus    Migraine with aura and without status migrainosus, not intractable    Dream anxiety disorder    Insomnia secondary to anxiety    Esophageal spasm    Other fatigue    Hyperbilirubinemia    Low testosterone level in female    Chronic pain of left knee    Chronic left-sided low back pain without sciatica    Elevated C-reactive protein (CRP)    Long-term use of Plaquenil    History of anxiolytic dependence    History of sedative, hypnotic or anxiolytic abuse    Posterior vitreous detachment    Rotator cuff syndrome, left (chronic, recurrent)    Sprain of medial collateral ligament of right knee (recurrent)    Acquired hypothyroidism    Postprandial hypotension    SOB (shortness of breath)    Preop cardiovascular exam    Femoroacetabular impingement of left hip    Legally blind         Current Outpatient Medications:     QUEtiapine (SEROQUEL) 100 MG Tab, Take 1 tablet by mouth every evening., Disp: , Rfl:     acetaminophen (TYLENOL) 500 MG tablet, Take 2 tablets (1,000 mg total) by mouth every 8 (eight) hours as needed for Pain., Disp: 60 tablet, Rfl: 0    EPINEPHrine (EPIPEN) 0.3 mg/0.3 mL AtIn, Inject 0.3 mg into the muscle daily as needed., Disp: , Rfl:     estradiol (ESTRACE) 2 MG tablet, TAKE 1 TABLET BY MOUTH ONE TIME DAILY, Disp: , Rfl: 1    gabapentin (NEURONTIN) 300 MG capsule, Take 2 capsules (600 mg total) by mouth 3 (three) times daily., Disp: 90 capsule, Rfl: 2    lamoTRIgine (LAMICTAL) 100 MG tablet, Take 1 tablet (100 mg total) by mouth 2 (two) times daily., Disp: 180 tablet, Rfl: 1    levothyroxine (SYNTHROID) 50 MCG tablet, Take 1 tablet (50 mcg  total) by mouth before breakfast. - Labs and Appointment required for more refills., Disp: 90 tablet, Rfl: 3    multivitamin (THERAGRAN) per tablet, Take 1 tablet by mouth once daily. , Disp: , Rfl:     pantoprazole (PROTONIX) 40 MG tablet, Take 1 tablet (40 mg total) by mouth once daily., Disp: 90 tablet, Rfl: 3    rOPINIRole (REQUIP) 1 MG tablet, TAKE ONE TABLET BY MOUTH EVERY EVENING AS NEEDED FOR FOR RESTLESSNESS LEGS (Patient not taking: Reported on 11/4/2022), Disp: 30 tablet, Rfl: 11    testosterone cypionate (DEPOTESTOTERONE CYPIONATE) 200 mg/mL injection, Inject into the muscle every 28 days. (Dose? - By Dr[facility] Brennen Mercer at [facility]), Disp: , Rfl:     vitamin D 1000 units Tab, Take 1,000 Units by mouth once daily., Disp: , Rfl:   No current facility-administered medications for this visit.    Facility-Administered Medications Ordered in Other Visits:     lactated ringers infusion, , Intravenous, Continuous, Selin Dobson MD, Last Rate: 200 mL/hr at 07/28/22 1024, Rate Change at 07/28/22 1024    Review of Systems   Constitutional:  Negative for activity change, appetite change, chills, diaphoresis, fatigue, fever and unexpected weight change.   HENT: Negative.  Negative for congestion, hearing loss, postnasal drip, rhinorrhea, sore throat, trouble swallowing and voice change.    Eyes: Negative.  Negative for visual disturbance.   Respiratory: Negative.  Negative for cough, choking, chest tightness and shortness of breath.    Cardiovascular:  Negative for chest pain, palpitations and leg swelling.   Gastrointestinal:  Negative for abdominal distention, abdominal pain, blood in stool, constipation, diarrhea, nausea and vomiting.   Endocrine: Negative for cold intolerance, heat intolerance, polydipsia and polyuria.   Genitourinary: Negative.  Negative for difficulty urinating and frequency.   Musculoskeletal:  Negative for arthralgias, back pain, gait problem, joint swelling and  myalgias.   Skin:  Negative for color change, pallor, rash and wound.   Neurological:  Negative for dizziness, tremors, weakness, light-headedness, numbness and headaches.   Hematological:  Negative for adenopathy.   Psychiatric/Behavioral:  Positive for sleep disturbance. Negative for behavioral problems, confusion, self-injury and suicidal ideas. The patient is not nervous/anxious.    Objective:   LMP 08/26/2013     Physical Exam  Vitals and nursing note reviewed.   Constitutional:       General: She is not in acute distress.     Appearance: Normal appearance. She is well-developed. She is not ill-appearing, toxic-appearing or diaphoretic.   HENT:      Head: Normocephalic and atraumatic.   Cardiovascular:      Rate and Rhythm: Normal rate and regular rhythm.   Pulmonary:      Effort: Pulmonary effort is normal. No respiratory distress.      Breath sounds: Normal breath sounds. No wheezing or rales.   Musculoskeletal:         General: Normal range of motion.   Skin:     General: Skin is warm.      Capillary Refill: Capillary refill takes less than 2 seconds.   Neurological:      Mental Status: She is alert and oriented to person, place, and time.   Psychiatric:         Mood and Affect: Mood normal.         Behavior: Behavior normal.         Thought Content: Thought content normal.         Judgment: Judgment normal.       Assessment:     1. Screen for colon cancer    2. Laryngopharyngeal reflux (LPR)    3. Acquired hypothyroidism    4. Partial symptomatic epilepsy with complex partial seizures, not intractable, without status epilepticus    5. Need for Streptococcus pneumoniae vaccination    6. Snoring      Plan:   Screen for colon cancer  -     Ambulatory referral/consult to Endo Procedure ; Future; Expected date: 04/05/2023    Laryngopharyngeal reflux (LPR)  -     pantoprazole (PROTONIX) 40 MG tablet; Take 1 tablet (40 mg total) by mouth once daily.  Dispense: 90 tablet; Refill: 3    Acquired  hypothyroidism  -     levothyroxine (SYNTHROID) 50 MCG tablet; Take 1 tablet (50 mcg total) by mouth before breakfast. - Labs and Appointment required for more refills.  Dispense: 90 tablet; Refill: 3  -     TSH; Future    Partial symptomatic epilepsy with complex partial seizures, not intractable, without status epilepticus  -     lamoTRIgine (LAMICTAL) 100 MG tablet; Take 1 tablet (100 mg total) by mouth 2 (two) times daily.  Dispense: 180 tablet; Refill: 1    Need for Streptococcus pneumoniae vaccination  -     (In Office Administered) Pneumococcal Conjugate Vaccine (20 Valent) (IM); Future    Snoring  -     Ambulatory referral/consult to Pulmonology; Future; Expected date: 04/11/2023    -nurses visit to do pcv 20.   -schedule lab visit for tsh    Follow up in about 6 months (around 10/4/2023), or if symptoms worsen or fail to improve.

## 2023-04-05 ENCOUNTER — PATIENT MESSAGE (OUTPATIENT)
Dept: PULMONOLOGY | Facility: CLINIC | Age: 53
End: 2023-04-05
Payer: MEDICAID

## 2023-04-05 NOTE — PROGRESS NOTES
Laurel Corbin.    I am happy to report that your recent breast imaging did NOT show evidence of cancer.    An annual mammogram is the best test to screen for breast cancer, but it is not perfect, and it can miss some cancers. So, even though your mammogram was normal, if you notice any lump or change in one of your breasts, please schedule an appointment with me for a proper evaluation.    Thanks for letting me care for you, and thanks for trusting [facility] with your healthcare needs.    Sincerely,    MICHAEL Antony MD

## 2023-04-24 ENCOUNTER — HOSPITAL ENCOUNTER (OUTPATIENT)
Dept: PREADMISSION TESTING | Facility: HOSPITAL | Age: 53
Discharge: HOME | End: 2023-04-24
Attending: FAMILY MEDICINE
Payer: MEDICAID

## 2023-04-24 DIAGNOSIS — Z12.11 SCREEN FOR COLON CANCER: Primary | ICD-10-CM

## 2023-05-04 ENCOUNTER — TELEPHONE (OUTPATIENT)
Dept: SLEEP MEDICINE | Facility: CLINIC | Age: 53
End: 2023-05-04

## 2023-05-04 ENCOUNTER — OFFICE VISIT (OUTPATIENT)
Dept: SLEEP MEDICINE | Facility: CLINIC | Age: 53
End: 2023-05-04
Payer: MEDICAID

## 2023-05-04 VITALS
DIASTOLIC BLOOD PRESSURE: 70 MMHG | BODY MASS INDEX: 21.51 KG/M2 | HEIGHT: 64 IN | SYSTOLIC BLOOD PRESSURE: 100 MMHG | WEIGHT: 126 LBS

## 2023-05-04 DIAGNOSIS — E03.9 ACQUIRED HYPOTHYROIDISM: Chronic | ICD-10-CM

## 2023-05-04 DIAGNOSIS — G25.81 RLS (RESTLESS LEGS SYNDROME): Chronic | ICD-10-CM

## 2023-05-04 DIAGNOSIS — R53.83 OTHER FATIGUE: Chronic | ICD-10-CM

## 2023-05-04 DIAGNOSIS — R06.83 SNORING: ICD-10-CM

## 2023-05-04 DIAGNOSIS — G47.33 OSA (OBSTRUCTIVE SLEEP APNEA): Primary | ICD-10-CM

## 2023-05-04 PROBLEM — R06.02 SOB (SHORTNESS OF BREATH): Status: RESOLVED | Noted: 2022-02-25 | Resolved: 2023-05-04

## 2023-05-04 PROCEDURE — 1159F PR MEDICATION LIST DOCUMENTED IN MEDICAL RECORD: ICD-10-PCS | Mod: CPTII,95,, | Performed by: INTERNAL MEDICINE

## 2023-05-04 PROCEDURE — 3008F BODY MASS INDEX DOCD: CPT | Mod: CPTII,95,, | Performed by: INTERNAL MEDICINE

## 2023-05-04 PROCEDURE — 3074F SYST BP LT 130 MM HG: CPT | Mod: CPTII,95,, | Performed by: INTERNAL MEDICINE

## 2023-05-04 PROCEDURE — 3074F PR MOST RECENT SYSTOLIC BLOOD PRESSURE < 130 MM HG: ICD-10-PCS | Mod: CPTII,95,, | Performed by: INTERNAL MEDICINE

## 2023-05-04 PROCEDURE — 1160F PR REVIEW ALL MEDS BY PRESCRIBER/CLIN PHARMACIST DOCUMENTED: ICD-10-PCS | Mod: CPTII,95,, | Performed by: INTERNAL MEDICINE

## 2023-05-04 PROCEDURE — 3008F PR BODY MASS INDEX (BMI) DOCUMENTED: ICD-10-PCS | Mod: CPTII,95,, | Performed by: INTERNAL MEDICINE

## 2023-05-04 PROCEDURE — 3078F PR MOST RECENT DIASTOLIC BLOOD PRESSURE < 80 MM HG: ICD-10-PCS | Mod: CPTII,95,, | Performed by: INTERNAL MEDICINE

## 2023-05-04 PROCEDURE — 3078F DIAST BP <80 MM HG: CPT | Mod: CPTII,95,, | Performed by: INTERNAL MEDICINE

## 2023-05-04 PROCEDURE — 1160F RVW MEDS BY RX/DR IN RCRD: CPT | Mod: CPTII,95,, | Performed by: INTERNAL MEDICINE

## 2023-05-04 PROCEDURE — 99203 OFFICE O/P NEW LOW 30 MIN: CPT | Mod: 95,,, | Performed by: INTERNAL MEDICINE

## 2023-05-04 PROCEDURE — 99203 PR OFFICE/OUTPT VISIT, NEW, LEVL III, 30-44 MIN: ICD-10-PCS | Mod: 95,,, | Performed by: INTERNAL MEDICINE

## 2023-05-04 PROCEDURE — 1159F MED LIST DOCD IN RCRD: CPT | Mod: CPTII,95,, | Performed by: INTERNAL MEDICINE

## 2023-05-04 NOTE — PATIENT INSTRUCTIONS
Your provider has scheduled you for a sleep study.   You should be receiving a phone call from the sleep lab shortly after your study has been approved by your insurance. Please make sure you have your current phone numbers in the [facility] system. If you do not hear from anyone in the next 10 business days, please call the sleep lab at [phone] to schedule your sleep study. The sleep studies are performed at [facility] seven nights a week.  When you are scheduling your sleep study, they will also make you a follow up appointment with your provider. This follow up appointment will be 10-14 days after your sleep study to review the results. If it is noted that you do have sleep apnea on your initial sleep study, you may receive a call back for a second night study with the CPAP before you come back to the office.

## 2023-05-04 NOTE — PROGRESS NOTES
The patient location is: [address] [place] [id number]      The chief complaint leading to consultation is: LIZETT    Visit type: audiovisual    Face to Face time with patient: 8 mins  32 minutes of total time spent on the encounter, which includes face to face time and non-face to face time preparing to see the patient (eg, review of tests), Obtaining and/or reviewing separately obtained history, Documenting clinical information in the electronic or other health record, Independently interpreting results (not separately reported) and communicating results to the patient/family/caregiver, or Care coordination (not separately reported).         Each patient to whom he or she provides medical services by telemedicine is:  (1) informed of the relationship between the physician and patient and the respective role of any other health care provider with respect to management of the patient; and (2) notified that he or she may decline to receive medical services by telemedicine and may withdraw from such care at any time.    Notes:                                                 Pulmonary Outpatient  Visit     Subjective:       Patient ID: Laurel Calvo is a 52 y.o. female.    Social History     Tobacco Use   Smoking Status Never   Smokeless Tobacco Never            Chief Complaint: Apnea      Laurel Calvo is 52 y.o.  Referred by Jacqueline Elizalde PA-C  [address] [place] [address]   Snoring/Apnea  BED time: 10 pm: No sleeping pills  Takes Seroquel since Dec 2022  Takes REQUIP for RLS about 18 years ago  Wake: 06:30 am  SOL: 5-10 mins  Father LIZETT  Partner concerns, worse 5-6 months  Better when on Side  Tired and exhausted in AM  Occupation: Special  Now Legally blind               Review of Systems   Constitutional:  Positive for fatigue.   Respiratory:  Positive for apnea, snoring and somnolence.    Psychiatric/Behavioral:  Positive for sleep disturbance.      Outpatient  Encounter Medications as of 5/4/2023   Medication Sig Dispense Refill    acetaminophen (TYLENOL) 500 MG tablet Take 2 tablets (1,000 mg total) by mouth every 8 (eight) hours as needed for Pain. 60 tablet 0    EPINEPHrine (EPIPEN) 0.3 mg/0.3 mL AtIn Inject 0.3 mg into the muscle daily as needed.      estradiol (ESTRACE) 2 MG tablet TAKE 1 TABLET BY MOUTH ONE TIME DAILY  1    lamoTRIgine (LAMICTAL) 100 MG tablet Take 1 tablet (100 mg total) by mouth 2 (two) times daily. 180 tablet 1    levothyroxine (SYNTHROID) 50 MCG tablet Take 1 tablet (50 mcg total) by mouth before breakfast. - Labs and Appointment required for more refills. 90 tablet 3    multivitamin (THERAGRAN) per tablet Take 1 tablet by mouth once daily.       pantoprazole (PROTONIX) 40 MG tablet Take 1 tablet (40 mg total) by mouth once daily. 90 tablet 3    QUEtiapine (SEROQUEL) 100 MG Tab Take 1 tablet by mouth every evening.      rOPINIRole (REQUIP) 1 MG tablet TAKE ONE TABLET BY MOUTH EVERY EVENING AS NEEDED FOR FOR RESTLESSNESS LEGS 30 tablet 11    testosterone cypionate (DEPOTESTOTERONE CYPIONATE) 200 mg/mL injection Inject into the muscle every 28 days. (Dose? - By Dr[facility] Brennen Mercer at [facility])      vitamin D 1000 units Tab Take 1,000 Units by mouth once daily.      gabapentin (NEURONTIN) 300 MG capsule Take 2 capsules (600 mg total) by mouth 3 (three) times daily. 90 capsule 2     Facility-Administered Encounter Medications as of 5/4/2023   Medication Dose Route Frequency Provider Last Rate Last Admin    lactated ringers infusion   Intravenous Continuous Selin Dobson  mL/hr at 07/28/22 1024 Rate Change at 07/28/22 1024       The following portions of the patient's history were reviewed and updated as appropriate: She  has a past medical history of Anxiety, Arthritis, Chronic left-sided low back pain without sciatica (9/28/2018), Chronic pain of left knee (9/28/2018), GERD (gastroesophageal reflux disease),  Lupus, Migraine with aura and without status migrainosus, not intractable (8/8/2014), Seizures, and Thyroid disease.  She does not have any pertinent problems on file.  She  has a past surgical history that includes Hysterectomy (2016); [place] tooth extraction; Oophorectomy; Injection of anesthetic agent into sacroiliac joint (Left, 3/31/2022); Injection of anesthetic agent into sacroiliac joint (3/31/2022); Injection of joint (Bilateral, 5/12/2022); Injection of anesthetic agent into sacroiliac joint (Bilateral, 5/12/2022); Tonsillectomy; arthroscopy, hip (Left, 7/28/2022); and arthroscopy, hip (Left, 7/28/2022).  Her family history includes Breast cancer in her maternal grandmother and paternal grandmother; No Known Problems in her brother, father, mother, and sister; Stomach cancer in her paternal grandfather.  She  reports that she has never smoked. She has never used smokeless tobacco. She reports current alcohol use. She reports current drug use. Drug: Marijuana.  She has a current medication list which includes the following prescription(s): acetaminophen, epinephrine, estradiol, lamotrigine, levothyroxine, multivitamin, pantoprazole, quetiapine, ropinirole, testosterone cypionate, vitamin d, and gabapentin, and the following Facility-Administered Medications: lactated ringers.  Current Outpatient Medications on File Prior to Visit   Medication Sig Dispense Refill    acetaminophen (TYLENOL) 500 MG tablet Take 2 tablets (1,000 mg total) by mouth every 8 (eight) hours as needed for Pain. 60 tablet 0    EPINEPHrine (EPIPEN) 0.3 mg/0.3 mL AtIn Inject 0.3 mg into the muscle daily as needed.      estradiol (ESTRACE) 2 MG tablet TAKE 1 TABLET BY MOUTH ONE TIME DAILY  1    lamoTRIgine (LAMICTAL) 100 MG tablet Take 1 tablet (100 mg total) by mouth 2 (two) times daily. 180 tablet 1    levothyroxine (SYNTHROID) 50 MCG tablet Take 1 tablet (50 mcg total) by mouth before breakfast. - Labs and Appointment required for more  refills. 90 tablet 3    multivitamin (THERAGRAN) per tablet Take 1 tablet by mouth once daily.       pantoprazole (PROTONIX) 40 MG tablet Take 1 tablet (40 mg total) by mouth once daily. 90 tablet 3    QUEtiapine (SEROQUEL) 100 MG Tab Take 1 tablet by mouth every evening.      rOPINIRole (REQUIP) 1 MG tablet TAKE ONE TABLET BY MOUTH EVERY EVENING AS NEEDED FOR FOR RESTLESSNESS LEGS 30 tablet 11    testosterone cypionate (DEPOTESTOTERONE CYPIONATE) 200 mg/mL injection Inject into the muscle every 28 days. (Dose? - By Dr. Brennen Mercer at [facility])      vitamin D 1000 units Tab Take 1,000 Units by mouth once daily.      gabapentin (NEURONTIN) 300 MG capsule Take 2 capsules (600 mg total) by mouth 3 (three) times daily. 90 capsule 2     Current Facility-Administered Medications on File Prior to Visit   Medication Dose Route Frequency Provider Last Rate Last Admin    lactated ringers infusion   Intravenous Continuous Selin Dobson  mL/hr at 07/28/22 1024 Rate Change at 07/28/22 1024     She is allergic to bactrim [sulfamethoxazole-trimethoprim] and ciprofloxacin..      BP Readings from Last 3 Encounters:   05/04/23 100/70   11/04/22 104/72   07/28/22 112/64     Snoring / Sleep:     Talk in Sleep    Tabiona Questionnaire (validated LIZETT screening questionnaire)    YES -- Snoring/apnea    YES -- Fatigue    Body mass index is 21.63 kg/m².  (>25 is overweight, >30 is obese)    Blood Pressure = normal blood pressure  (PreHTN 120-139/80-89, Stg1 140-159/90-99, Stg2 >160/>100)  Tabiona = 2 of three LIZETT categories are positive (high risk is 2-3 positive categories)     [place] Sleepiness Scale TOTAL =    EPWORTH SLEEPINESS SCALE 5/4/2023   Sitting and reading 1   Watching TV 3   Sitting, inactive in a public place (e.g. a theatre or a meeting) 0   As a passenger in a car for an hour without a break 2   Lying down to rest in the afternoon when circumstances permit 3   Sitting and talking to  "someone 0   Sitting quietly after a lunch without alcohol 2   In a car, while stopped for a few minutes in traffic 1   Total score 12       (validated sleepiness questionnaire with a higher score indicating greater sleepiness; range 0-24)  No flowsheet data found.    STOP-Bang Questionnaire (validated LIZETT screening questionnaire)  Negative unless checked off.  [x] Snoring    [x]  Tired/Fatigued/Sleepy  [] Obstruction (apneas/choking)  [] Pressure (HTN)  [] BMI >35  [x] Age >50  [] Neck >40 cm  [] Gender male   STOP-Bang = 3 (low risk 0-2,high risk 3-8)           MMRC Dyspnea Scale (4 is worst)     [x] MMRC 0: Dyspneic on strenuous excercise (0 points)    [] MMRC 1: Dyspneic on walking a slight hill (0 points)    [] MMRC 2: Dyspneic on walking level ground; must stop occasionally due to breathlessness (1 point)    [] MMRC 3: Must stop for breathlessness after walking 100 yards or after a few minutes (2 points)    [] MMRC 4: Cannot leave house; breathless on dressing/undressing (3 points)                     No flowsheet data found.              Objective:     Vital Signs (Most Recent)  Vital Signs  BP: 100/70  Height and Weight  Height: 5' 4" (162.6 cm)  Weight: 57.2 kg (126 lb)  BSA (Calculated - sq m): 1.61 sq meters  BMI (Calculated): 21.6  Weight in (lb) to have BMI = 25: 145.3]  Wt Readings from Last 2 Encounters:   05/04/23 57.2 kg (126 lb)   04/04/23 57 kg (125 lb 10.6 oz)       Physical Exam  Vitals and nursing note reviewed.   Constitutional:       Appearance: Normal appearance.   HENT:      Head: Normocephalic and atraumatic.   Eyes:      Pupils: Pupils are equal, round, and reactive to light.   Skin:     General: Skin is warm.   Neurological:      General: No focal deficit present.      Mental Status: She is alert.        Laboratory  Lab Results   Component Value Date    WBC 4.28 04/04/2023    RBC 4.70 04/04/2023    HGB 14.0 04/04/2023    HCT 42.9 04/04/2023    MCV 91 04/04/2023    MCH 29.8 04/04/2023    " MCHC 32.6 04/04/2023    RDW 12.3 04/04/2023     04/04/2023    MPV 11.2 04/04/2023    GRAN 2.3 04/04/2023    GRAN 54.5 04/04/2023    LYMPH 1.4 04/04/2023    LYMPH 32.5 04/04/2023    MONO 0.4 04/04/2023    MONO 9.3 04/04/2023    EOS 0.1 04/04/2023    BASO 0.03 04/04/2023    EOSINOPHIL 2.8 04/04/2023    BASOPHIL 0.7 04/04/2023       BMP  Lab Results   Component Value Date     04/04/2023    K 4.3 04/04/2023     04/04/2023    CO2 31 (H) 04/04/2023    BUN 19 04/04/2023    CREATININE 1.1 04/04/2023    CALCIUM 9.6 04/04/2023    ANIONGAP 6 (L) 04/04/2023    ESTGFRAFRICA >60 07/05/2022    EGFRNONAA >60 07/05/2022    AST 25 04/04/2023    ALT 20 04/04/2023    PROT 7.0 04/04/2023          No results found for: IGE     No results found for: ASPERGILLUS  No results found for: AFUMIGATUSCL     No results found for: ACE     Diagnostic Results:  I have personally reviewed today the following studies:    Mammo Digital Screening Bilat w/ Sergio  Narrative: Result:  Mammo Digital Screening Bilat w/ Sergio    History:  Patient is 52 y.o. and is seen for a screening mammogram.    Films Compared:  Compared to: 01/04/2021 Mammo Digital Screening Bilat w/ Sergio, 08/30/2018   Mammo Digital Screening Bilat with Tomosynthesis_CAD, and 06/14/2016 Mammo   Previous     Findings:   This procedure was performed using tomosynthesis.   Computer-aided detection was utilized in the interpretation of this   examination.    The breasts are heterogeneously dense, which may obscure small masses.   There is no evidence of suspicious masses, microcalcifications or   architectural distortion.  Impression:    No mammographic evidence of malignancy.    BI-RADS Category 1: Negative    Recommendation:  Routine screening mammogram in 1 year is recommended.    Your estimated lifetime risk of breast cancer (to age 85) based on   Tyrer-Cuzick risk assessment model is 15.51 %.  According to the American   Cancer Society, patients with a lifetime breast  cancer risk of 20% or   higher might benefit from supplemental screening tests. ??        Assessment/Plan:     Problem List Items Addressed This Visit       Other fatigue (Chronic)    RLS (restless legs syndrome) (Chronic)     RLS score   Stable on REQUIP  Check ferritin           Relevant Orders    FERRITIN    Acquired hypothyroidism (Chronic)     Stable on Sythroid           LIZETT (obstructive sleep apnea) - Primary     Snoring+ apnea  DTS  HSAT ordered           Relevant Orders    Home Sleep Study    X-Ray Chest PA And Lateral     Other Visit Diagnoses       Snoring                      Follow up in about 4 weeks (around 6/1/2023), or labs, CXR, HSAT, virtual.    This note was prepared using voice recognition system and is likely to have sound alike errors that may have been overlooked even after proof reading.  Please call me with any questions    Discussed diagnosis, its evaluation, treatment and usual course. All questions answered.    Thank you for the courtesy of participating in the care of this patient    Cj Recinos MD      Personal Diagnostic Review  []  CXR    []  ECHO    []  ONSAT    []  6MWD    []  LABS    []  CHEST CT    []  PET CT    []  Biopsy results

## 2023-05-05 ENCOUNTER — PATIENT MESSAGE (OUTPATIENT)
Dept: RHEUMATOLOGY | Facility: CLINIC | Age: 53
End: 2023-05-05
Payer: MEDICAID

## 2023-05-05 DIAGNOSIS — R10.32 LEFT GROIN PAIN: ICD-10-CM

## 2023-05-05 RX ORDER — GABAPENTIN 300 MG/1
600 CAPSULE ORAL 3 TIMES DAILY
Qty: 90 CAPSULE | Refills: 2 | Status: SHIPPED | OUTPATIENT
Start: 2023-05-05 | End: 2023-06-20 | Stop reason: SDUPTHER

## 2023-05-05 NOTE — TELEPHONE ENCOUNTER
Good Morning/Afternoon,     Pt is requesting for a refill of: Gabapentin 300 mg   Last filed: 2/28/23  Last encounter: 2/28/23  Up coming apt: N/A  Pharmacy: Pres[facility] to [facility]  -- [address] [place] [address] -- Anastasiia Roque  Is this something you can do?  Titrated up to 600 mg tid    Kenny Ramíerz  Medical Assistant

## 2023-05-06 ENCOUNTER — PATIENT MESSAGE (OUTPATIENT)
Dept: RHEUMATOLOGY | Facility: CLINIC | Age: 53
End: 2023-05-06
Payer: MEDICAID

## 2023-05-15 ENCOUNTER — ANESTHESIA EVENT (OUTPATIENT)
Dept: ENDOSCOPY | Facility: HOSPITAL | Age: 53
End: 2023-05-15
Payer: MEDICAID

## 2023-05-15 NOTE — ANESTHESIA PREPROCEDURE EVALUATION
05/15/2023  Laurel Calvo is a 52 y.o., female.  Past Medical History:   Diagnosis Date    Anxiety     Arthritis     Chronic left-sided low back pain without sciatica 9/28/2018    Chronic pain of left knee 9/28/2018    XR KNEE COMP LEFT 4 OR MORE VIEWS - October 27, 2017 CLINICAL HISTORY:   Left knee pain FINDINGS:   No fracture, dislocation, or arthritic change of the knee. Dictated and Electronically Signed By: Jem Damon MD on October 27, 2017    GERD (gastroesophageal reflux disease)     Lupus     Migraine with aura and without status migrainosus, not intractable 8/8/2014    Seizures     Thyroid disease      Past Surgical History:   Procedure Laterality Date    ARTHROSCOPY, HIP Left 7/28/2022    Procedure: ARTHROSCOPY, HIP, WITH FEMOROPLASTY;  Surgeon: Wm Robbins MD;  Location: [facility] OR;  Service: Orthopedics;  Laterality: Left;  Left hip arthroscopic capsular closure    ARTHROSCOPY, HIP Left 7/28/2022    Procedure: ARTHROSCOPY, HIP, WITH LABRUM REPAIR;  Surgeon: Wm Robbins MD;  Location: [facility] OR;  Service: Orthopedics;  Laterality: Left;    HYSTERECTOMY  2016    INJECTION OF ANESTHETIC AGENT INTO SACROILIAC JOINT Left 3/31/2022    Procedure: Left intra-articular hip joint injection;  Surgeon: Donato Manley MD;  Location: [facility] PAIN MGT;  Service: Pain Management;  Laterality: Left;    INJECTION OF ANESTHETIC AGENT INTO SACROILIAC JOINT  3/31/2022    Procedure: ;  Surgeon: Donato Manley MD;  Location: [facility] PAIN MGT;  Service: Pain Management;;    INJECTION OF ANESTHETIC AGENT INTO SACROILIAC JOINT Bilateral 5/12/2022    Procedure: Bilateral GT bursa + bilateral SIJ injection RN IV Sedation;  Surgeon: Donato Manley MD;  Location: [facility] PAIN MGT;  Service: Pain Management;  Laterality: Bilateral;    INJECTION OF JOINT Bilateral 5/12/2022    Procedure:  Bilateral GT bursa + bilateral SIJ injection RN IV Sedation;  Surgeon: Donato Manley MD;  [facility];  Service: Pain Management;  Laterality: Bilateral;    OOPHORECTOMY      TONSILLECTOMY      WISDOM TOOTH EXTRACTION      X 4         Pre-op Assessment    I have reviewed the Patient Summary Reports.     I have reviewed the Nursing Notes. I have reviewed the NPO Status.   I have reviewed the Medications.     Review of Systems  Anesthesia Hx:  No problems with previous Anesthesia  History of prior surgery of interest to airway management or planning:   Hematology/Oncology:        Hematology Comments: lupus   EENT/Dental:   Legally Blind   Pulmonary:   Sleep Apnea    Hepatic/GI:   GERD    Neurological:   Headaches Seizures RLS  Chronic Pain Syndrome  Peripheral Neuropathy    Endocrine:   Hypothyroidism    Psych:   Psychiatric History anxiety          Physical Exam  General: Well nourished and Cooperative    Airway:  Mallampati: II   Mouth Opening: Normal  TM Distance: 4 - 6 cm  Tongue: Normal  Neck ROM: Normal ROM    Dental:  Intact    Chest/Lungs:  Normal Respiratory Rate        Anesthesia Plan  Type of Anesthesia, risks & benefits discussed:    Anesthesia Type: Gen Natural Airway  Intra-op Monitoring Plan: Standard ASA Monitors  Post Op Pain Control Plan: multimodal analgesia  Induction:  IV  Informed Consent: Informed consent signed with the Patient and all parties understand the risks and agree with anesthesia plan.  All questions answered.   ASA Score: 3  Day of Surgery Review of History & Physical: H&P Update referred to the surgeon/provider.    Ready For Surgery From Anesthesia Perspective.     .

## 2023-05-17 ENCOUNTER — HOSPITAL ENCOUNTER (OUTPATIENT)
Facility: HOSPITAL | Age: 53
Discharge: HOME | End: 2023-05-17
Attending: INTERNAL MEDICINE | Admitting: INTERNAL MEDICINE
Payer: MEDICAID

## 2023-05-17 ENCOUNTER — ANESTHESIA (OUTPATIENT)
Dept: ENDOSCOPY | Facility: HOSPITAL | Age: 53
End: 2023-05-17
Payer: MEDICAID

## 2023-05-17 VITALS
SYSTOLIC BLOOD PRESSURE: 96 MMHG | HEART RATE: 69 BPM | TEMPERATURE: 98 F | RESPIRATION RATE: 21 BRPM | WEIGHT: 123.44 LBS | OXYGEN SATURATION: 98 % | BODY MASS INDEX: 21.07 KG/M2 | HEIGHT: 64 IN | DIASTOLIC BLOOD PRESSURE: 65 MMHG

## 2023-05-17 DIAGNOSIS — Z12.11 COLON CANCER SCREENING: Primary | ICD-10-CM

## 2023-05-17 PROCEDURE — 45378 DIAGNOSTIC COLONOSCOPY: CPT | Mod: ,,, | Performed by: INTERNAL MEDICINE

## 2023-05-17 PROCEDURE — 00811 ANES LWR INTST NDSC NOS: CPT | Performed by: INTERNAL MEDICINE

## 2023-05-17 PROCEDURE — 63600175 PHARM REV CODE 636 W HCPCS: Performed by: NURSE ANESTHETIST, CERTIFIED REGISTERED

## 2023-05-17 PROCEDURE — D9220A PRA ANESTHESIA: ICD-10-PCS | Mod: ,,, | Performed by: NURSE ANESTHETIST, CERTIFIED REGISTERED

## 2023-05-17 PROCEDURE — 63600175 PHARM REV CODE 636 W HCPCS: Performed by: INTERNAL MEDICINE

## 2023-05-17 PROCEDURE — 45378 DIAGNOSTIC COLONOSCOPY: CPT | Performed by: INTERNAL MEDICINE

## 2023-05-17 PROCEDURE — 45378 PR COLONOSCOPY,DIAGNOSTIC: ICD-10-PCS | Mod: ,,, | Performed by: INTERNAL MEDICINE

## 2023-05-17 PROCEDURE — 37000008 HC ANESTHESIA 1ST 15 MINUTES: Performed by: INTERNAL MEDICINE

## 2023-05-17 PROCEDURE — 37000009 HC ANESTHESIA EA ADD 15 MINS: Performed by: INTERNAL MEDICINE

## 2023-05-17 PROCEDURE — D9220A PRA ANESTHESIA: Mod: ,,, | Performed by: NURSE ANESTHETIST, CERTIFIED REGISTERED

## 2023-05-17 PROCEDURE — 25000003 PHARM REV CODE 250: Performed by: NURSE ANESTHETIST, CERTIFIED REGISTERED

## 2023-05-17 RX ORDER — PROPOFOL 10 MG/ML
INJECTION, EMULSION INTRAVENOUS
Status: DISCONTINUED | OUTPATIENT
Start: 2023-05-17 | End: 2023-05-17

## 2023-05-17 RX ORDER — LIDOCAINE HYDROCHLORIDE 20 MG/ML
INJECTION INTRAVENOUS
Status: DISCONTINUED | OUTPATIENT
Start: 2023-05-17 | End: 2023-05-17

## 2023-05-17 RX ORDER — SODIUM CHLORIDE, SODIUM LACTATE, POTASSIUM CHLORIDE, CALCIUM CHLORIDE 600; 310; 30; 20 MG/100ML; MG/100ML; MG/100ML; MG/100ML
INJECTION, SOLUTION INTRAVENOUS CONTINUOUS
Status: DISCONTINUED | OUTPATIENT
Start: 2023-05-17 | End: 2023-05-17 | Stop reason: HOSPADM

## 2023-05-17 RX ADMIN — PROPOFOL 30 MG: 10 INJECTION, EMULSION INTRAVENOUS at 09:05

## 2023-05-17 RX ADMIN — PROPOFOL 140 MG: 10 INJECTION, EMULSION INTRAVENOUS at 09:05

## 2023-05-17 RX ADMIN — LIDOCAINE HYDROCHLORIDE 50 MG: 20 INJECTION INTRAVENOUS at 09:05

## 2023-05-17 RX ADMIN — SODIUM CHLORIDE, SODIUM LACTATE, POTASSIUM CHLORIDE, AND CALCIUM CHLORIDE: 600; 310; 30; 20 INJECTION, SOLUTION INTRAVENOUS at 08:05

## 2023-05-17 NOTE — TRANSFER OF CARE
"Anesthesia Transfer of Care Note    Patient: Laurel Calvo    Procedure(s) Performed: Procedure(s) (LRB):  COLONOSCOPY (N/A)    Patient location: PACU    Anesthesia Type: general    Transport from OR: Transported from OR on room air with adequate spontaneous ventilation    Post pain: adequate analgesia    Post assessment: no apparent anesthetic complications and tolerated procedure well    Post vital signs: stable    Level of consciousness: awake    Nausea/Vomiting: no nausea/vomiting    Complications: none    Transfer of care protocol was followed      Last vitals:   Visit Vitals  /83 (BP Location: Right arm, Patient Position: Sitting)   Pulse 87   Temp 36.8 °C (98.2 °F) (Temporal)   Resp 18   Ht 5' 4" (1.626 m)   Wt 56 kg (123 lb 7.3 oz)   LMP 08/26/2013   SpO2 100%   Breastfeeding No   BMI 21.19 kg/m²     "

## 2023-05-17 NOTE — PROVATION PATIENT INSTRUCTIONS
Discharge Summary/Instructions after an Endoscopic Procedure  Patient Name: Laurel Calvo  Patient MRN: [id number]  Patient YOB: 1970  Wednesday, May 17, 2023  Janet Quigley MD  Dear patient,  As a result of recent federal legislation (The Federal Cures Act), you may   receive lab or pathology results from your procedure in your [facility]   account before your physician is able to contact you. Your physician or   their representative will relay the results to you with their   recommendations at their soonest availability.  Thank you,  RESTRICTIONS:  During your procedure today, you received medications for sedation.  These   medications may affect your judgment, balance and coordination.  Therefore,   for 24 hours, you have the following restrictions:   - DO NOT drive a car, operate machinery, make legal/financial decisions,   sign important papers or drink alcohol.    ACTIVITY:  Today: no heavy lifting, straining or running due to procedural   sedation/anesthesia.  The following day: return to full activity including work.  DIET:  Eat and drink normally unless instructed otherwise.     TREATMENT FOR COMMON SIDE EFFECTS:  - Mild abdominal pain, nausea, belching, bloating or excessive gas:  rest,   eat lightly and use a heating pad.  - Sore Throat: treat with throat lozenges and/or gargle with warm salt   water.  - Because air was used during the procedure, expelling large amounts of air   from your rectum or belching is normal.  - If a bowel prep was taken, you may not have a bowel movement for 1-3 days.    This is normal.  SYMPTOMS TO WATCH FOR AND REPORT TO YOUR PHYSICIAN:  1. Abdominal pain or bloating, other than gas cramps.  2. Chest pain.  3. Back pain.  4. Signs of infection such as: chills or fever occurring within 24 hours   after the procedure.  5. Rectal bleeding, which would show as bright red, maroon, or black stools.   (A tablespoon of blood from the rectum is not serious, especially if    hemorrhoids are present.)  6. Vomiting.  7. Weakness or dizziness.  GO DIRECTLY TO THE NEAREST EMERGENCY ROOM IF YOU HAVE ANY OF THE FOLLOWING:      Difficulty breathing              Chills and/or fever over 101 F   Persistent vomiting and/or vomiting blood   Severe abdominal pain   Severe chest pain   Black, tarry stools   Bleeding- more than one tablespoon   Any other symptom or condition that you feel may need urgent attention  Your doctor recommends these additional instructions:  If any biopsies were taken, your doctors clinic will contact you in 1 to 2   weeks with any results.  - Patient has a contact number available for emergencies.  The signs and   symptoms of potential delayed complications were discussed with the   patient.  Return to normal activities tomorrow.  Written discharge   instructions were provided to the patient.   - Discharge patient to home (via wheelchair).   - Resume previous diet today.   - Continue present medications.   - Repeat colonoscopy in 10 years for screening purposes.  For questions, problems or results please call your physician Janet Quigley MD at Work:  [phone]  If you have any questions about the above instructions, call the GI   department at [phone] or call the endoscopy unit at [phone]   from 7am until 3 pm.  [facility] [place], EMERGENCY ROOM PHONE NUMBER:   [phone]  IF A COMPLICATION OR EMERGENCY SITUATION ARISES AND YOU ARE UNABLE TO REACH   YOUR PHYSICIAN - GO DIRECTLY TO THE EMERGENCY ROOM.  I have read or have had read to me these discharge instructions for my   procedure and have received a written copy.  I understand these   instructions and will follow-up with my physician if I have any questions.     __________________________________       _____________________________________  Nurse Signature                                          Patient/Designated   Responsible Party Signature  MD Janet Leonardo  MD Soraida  5/17/2023 9:17:32 AM  This report has been verified and signed electronically.  Dear patient,  As a result of recent federal legislation (The Federal Cures Act), you may   receive lab or pathology results from your procedure in your [facility]   account before your physician is able to contact you. Your physician or   their representative will relay the results to you with their   recommendations at their soonest availability.  Thank you,  PROVATION

## 2023-05-17 NOTE — H&P
PRE PROCEDURE H&P    Patient Name: Laurel Calvo  MRN: [id number]  : 1970  Date of Procedure:  2023  Referring Physician: Jacqueline Elizalde*  Primary Physician: MARY LOU Antony MD  Procedure Physician: Janet Quigley MD       Planned Procedure: Colonoscopy  Diagnosis: screening for colon cancer  Chief Complaint: Same as above    HPI: Patient is an 52 y.o. female is here for the above.     Last colonoscopy: no prior   Family history: neg   Anticoagulation: none     Past Medical History:   Past Medical History:   Diagnosis Date    Anxiety     Arthritis     Chronic left-sided low back pain without sciatica 2018    Chronic pain of left knee 2018    XR KNEE COMP LEFT 4 OR MORE VIEWS - 2017 CLINICAL HISTORY:   Left knee pain FINDINGS:   No fracture, dislocation, or arthritic change of the knee. Dictated and Electronically Signed By: Jem Damon MD on 2017    GERD (gastroesophageal reflux disease)     Lupus     Migraine with aura and without status migrainosus, not intractable 2014    Seizures     Thyroid disease         Past Surgical History:  Past Surgical History:   Procedure Laterality Date    ARTHROSCOPY, HIP Left 2022    Procedure: ARTHROSCOPY, HIP, WITH FEMOROPLASTY;  Surgeon: Wm Robbins MD;  Location: [facility] OR;  Service: Orthopedics;  Laterality: Left;  Left hip arthroscopic capsular closure    ARTHROSCOPY, HIP Left 2022    Procedure: ARTHROSCOPY, HIP, WITH LABRUM REPAIR;  Surgeon: Wm Robbins MD;  Location: [facility] OR;  Service: Orthopedics;  Laterality: Left;    HYSTERECTOMY  2016    INJECTION OF ANESTHETIC AGENT INTO SACROILIAC JOINT Left 3/31/2022    Procedure: Left intra-articular hip joint injection;  Surgeon: Donato Manley MD;  Location: [facility] PAIN MGT;  Service: Pain Management;  Laterality: Left;    INJECTION OF ANESTHETIC AGENT INTO SACROILIAC JOINT  3/31/2022    Procedure: ;  Surgeon: Donato Manley MD;   Location: [facility] PAIN MGT;  Service: Pain Management;;    INJECTION OF ANESTHETIC AGENT INTO SACROILIAC JOINT Bilateral 5/12/2022    Procedure: Bilateral GT bursa + bilateral SIJ injection RN IV Sedation;  Surgeon: Donato Manley MD;  Location: [facility] PAIN MGT;  Service: Pain Management;  Laterality: Bilateral;    INJECTION OF JOINT Bilateral 5/12/2022    Procedure: Bilateral GT bursa + bilateral SIJ injection RN IV Sedation;  Surgeon: Donato Manley MD;  Location: [facility] PAIN MGT;  Service: Pain Management;  Laterality: Bilateral;    OOPHORECTOMY      TONSILLECTOMY      WISDOM TOOTH EXTRACTION      X 4        Home Medications:  Prior to Admission medications    Medication Sig Start Date End Date Taking? Authorizing Provider   acetaminophen (TYLENOL) 500 MG tablet Take 2 tablets (1,000 mg total) by mouth every 8 (eight) hours as needed for Pain. 7/28/22  Yes Wm Robbins MD   EPINEPHrine (EPIPEN) 0.3 mg/0.3 mL AtIn Inject 0.3 mg into the muscle daily as needed. 11/22/17  Yes Historical Provider   estradiol (ESTRACE) 2 MG tablet TAKE 1 TABLET BY MOUTH ONE TIME DAILY 7/13/18  Yes Historical Provider   gabapentin (NEURONTIN) 300 MG capsule Take 2 capsules (600 mg total) by mouth 3 (three) times daily. 5/5/23 6/4/23 Yes Tia Hernández PA-C   lamoTRIgine (LAMICTAL) 100 MG tablet Take 1 tablet (100 mg total) by mouth 2 (two) times daily. 4/4/23 7/3/23 Yes Jacqueline Elizalde PA-C   levothyroxine (SYNTHROID) 50 MCG tablet Take 1 tablet (50 mcg total) by mouth before breakfast. - Labs and Appointment required for more refills. 4/4/23  Yes Jacqueline Elizalde PA-C   multivitamin (THERAGRAN) per tablet Take 1 tablet by mouth once daily.  6/12/12  Yes Historical Provider   pantoprazole (PROTONIX) 40 MG tablet Take 1 tablet (40 mg total) by mouth once daily. 4/4/23 7/3/23 Yes Jacqueline Elizalde PA-C   QUEtiapine (SEROQUEL) 100 MG Tab Take 1 tablet by mouth every evening. 3/15/23  Yes Historical  "Provider   rOPINIRole (REQUIP) 1 MG tablet TAKE ONE TABLET BY MOUTH EVERY EVENING AS NEEDED FOR FOR RESTLESSNESS LEGS 4/6/22  Yes MARY LOU Antony MD   testosterone cypionate (DEPOTESTOTERONE CYPIONATE) 200 mg/mL injection Inject into the muscle every 28 days. (Dose? - By Dr. Brennen Mercer at [facility])   Yes Historical Provider   vitamin D 1000 units Tab Take 1,000 Units by mouth once daily.   Yes Historical Provider        Allergies:  Review of patient's allergies indicates:   Allergen Reactions    Bactrim [sulfamethoxazole-trimethoprim] Other (See Comments)     Lupus symptoms exacerbation, headache.    Ciprofloxacin Other (See Comments)     Lupus symptoms exacerbation, headaches.        Social History:   Social History     Socioeconomic History    Marital status:     Number of children: 0   Tobacco Use    Smoking status: Never    Smokeless tobacco: Never   Substance and Sexual Activity    Alcohol use: Yes     Comment: Occasional/Rare    Drug use: Yes     Types: Marijuana     Comment: synthetic marijuana - gummie - last 48hrs ago 25mg    Sexual activity: Yes     Partners: Male   Social History Narrative    No smokers in household, 1 dog.       Family History:  Family History   Problem Relation Age of Onset    No Known Problems Mother     No Known Problems Father     No Known Problems Sister     No Known Problems Brother     Stomach cancer Paternal Grandfather     Breast cancer Maternal Grandmother     Breast cancer Paternal Grandmother     Colon cancer Neg Hx        ROS: No acute cardiac events, no acute respiratory complaints.     Physical Exam (all patients):    /83 (BP Location: Right arm, Patient Position: Sitting)   Pulse 87   Temp 98.2 °F (36.8 °C) (Temporal)   Resp 18   Ht 5' 4" (1.626 m)   Wt 56 kg (123 lb 7.3 oz)   LMP 08/26/2013   SpO2 100%   Breastfeeding No   BMI 21.19 kg/m²   Lungs: Clear to auscultation bilaterally, respirations " unlabored  Heart: Regular rate and rhythm, S1 and S2 normal, no obvious murmurs  Abdomen:         Soft, non-tender, bowel sounds normal, no masses, no organomegaly    Lab Results   Component Value Date    WBC 4.28 04/04/2023    MCV 91 04/04/2023    RDW 12.3 04/04/2023     04/04/2023    GLU 78 04/04/2023    HGBA1C 5.6 01/25/2023    BUN 19 04/04/2023     04/04/2023    K 4.3 04/04/2023     04/04/2023        SEDATION PLAN: per anesthesia      History reviewed, vital signs satisfactory, cardiopulmonary status satisfactory, sedation options, risks and plans have been discussed with the patient  All their questions were answered and the patient agrees to the sedation procedures as planned and the patient is deemed an appropriate candidate for the sedation as planned.    Procedure explained to patient, informed consent obtained and placed in chart.    Janet Quigley  5/17/2023  8:56 AM

## 2023-05-17 NOTE — PLAN OF CARE
Discharge instructions reviewed with patient and visitor. Handouts given & verbalized understanding with no further questions at this time. Dr Quigley spoke to pt at bedside, reviewed procedure and findings, answered questions. MD telephone number provided per AVS sheet. VSS on RA, no pain or nausea noted, tolerating po fluids, no complaints noted. Fall precautions reviewed, consents in chart, PIV removed at this time.

## 2023-05-17 NOTE — ANESTHESIA POSTPROCEDURE EVALUATION
Anesthesia Post Evaluation    Patient: Laurel Calvo    Procedure(s) Performed: Procedure(s) (LRB):  COLONOSCOPY (N/A)    Final Anesthesia Type: general      Patient location during evaluation: PACU  Patient participation: Yes- Able to Participate  Level of consciousness: awake and alert and oriented  Post-procedure vital signs: reviewed and stable  Pain management: adequate  Airway patency: patent    PONV status at discharge: No PONV  Anesthetic complications: no      Cardiovascular status: blood pressure returned to baseline, stable and hemodynamically stable  Respiratory status: unassisted  Hydration status: euvolemic  Follow-up not needed.          Vitals Value Taken Time   /56 05/17/23 0921   Temp 36.6 °C (97.9 °F) 05/17/23 0918   Pulse 71 05/17/23 0921   Resp 16 05/17/23 0921   SpO2 98 % 05/17/23 0921   Vitals shown include unvalidated device data.      No case tracking events are documented in the log.      Pain/Bakari Score: Bakari Score: 9 (5/17/2023  9:18 AM)

## 2023-06-19 DIAGNOSIS — R10.32 LEFT GROIN PAIN: ICD-10-CM

## 2023-06-19 RX ORDER — GABAPENTIN 300 MG/1
600 CAPSULE ORAL 3 TIMES DAILY
Qty: 90 CAPSULE | Refills: 2 | OUTPATIENT
Start: 2023-06-19 | End: 2023-07-19

## 2023-06-19 NOTE — TELEPHONE ENCOUNTER
Patient requested refill on medication, her last visit was 2/28/23.  Scheduled a virtual with the patient for med refill.  Patient agreed with date and time.  Virtual appt 6/20/23 at 11:20 am

## 2023-06-20 ENCOUNTER — PATIENT MESSAGE (OUTPATIENT)
Dept: PAIN MEDICINE | Facility: CLINIC | Age: 53
End: 2023-06-20
Payer: MEDICAID

## 2023-06-20 RX ORDER — GABAPENTIN 300 MG/1
600 CAPSULE ORAL 3 TIMES DAILY
Qty: 90 CAPSULE | Refills: 2 | Status: SHIPPED | OUTPATIENT
Start: 2023-06-20 | End: 2023-07-18 | Stop reason: SDUPTHER

## 2023-06-20 NOTE — TELEPHONE ENCOUNTER
----- Message from Janet Salomon sent at 6/20/2023 11:13 AM CDT -----  Contact: irene Hoffmann  Pt is calling to speak with the nurse regarding appt. Report supposed to be scheduled for today 6/20/2023 at 11:20am but no appt is in pt chart, future or past. Pt reports speaking with someone Monday in office. Please give pt a call back at .[phone]. Thanks JESUS ALBERTO

## 2023-06-21 ENCOUNTER — OFFICE VISIT (OUTPATIENT)
Dept: PAIN MEDICINE | Facility: CLINIC | Age: 53
End: 2023-06-21
Payer: MEDICAID

## 2023-06-21 DIAGNOSIS — M54.16 LUMBAR RADICULOPATHY: Primary | ICD-10-CM

## 2023-06-21 PROCEDURE — 1159F PR MEDICATION LIST DOCUMENTED IN MEDICAL RECORD: ICD-10-PCS | Mod: CPTII,95,, | Performed by: PHYSICIAN ASSISTANT

## 2023-06-21 PROCEDURE — 1159F MED LIST DOCD IN RCRD: CPT | Mod: CPTII,95,, | Performed by: PHYSICIAN ASSISTANT

## 2023-06-21 PROCEDURE — 99214 PR OFFICE/OUTPT VISIT, EST, LEVL IV, 30-39 MIN: ICD-10-PCS | Mod: 95,,, | Performed by: PHYSICIAN ASSISTANT

## 2023-06-21 PROCEDURE — 1160F PR REVIEW ALL MEDS BY PRESCRIBER/CLIN PHARMACIST DOCUMENTED: ICD-10-PCS | Mod: CPTII,95,, | Performed by: PHYSICIAN ASSISTANT

## 2023-06-21 PROCEDURE — 1160F RVW MEDS BY RX/DR IN RCRD: CPT | Mod: CPTII,95,, | Performed by: PHYSICIAN ASSISTANT

## 2023-06-21 PROCEDURE — 99214 OFFICE O/P EST MOD 30 MIN: CPT | Mod: 95,,, | Performed by: PHYSICIAN ASSISTANT

## 2023-06-21 NOTE — PROGRESS NOTES
Established Patient Chronic Pain Note (Follow up visit)    Chief Complaint:   No chief complaint on file.    The patient location is: [place]  The chief complaint leading to consultation is: chronic pain     Visit type: audiovisual    Face to Face time with patient: 10-15 minutes  20 minutes of total time spent on the encounter, which includes face to face time and non-face to face time preparing to see the patient (eg, review of tests), Obtaining and/or reviewing separately obtained history, Documenting clinical information in the electronic or other health record, Independently interpreting results (not separately reported) and communicating results to the patient/family/caregiver, or Care coordination (not separately reported).     Each patient to whom he or she provides medical services by telemedicine is:  (1) informed of the relationship between the physician and patient and the respective role of any other health care provider with respect to management of the patient; and (2) notified that he or she may decline to receive medical services by telemedicine and may withdraw from such care at any time.        SUBJECTIVE:  Interval History (6/21/2023):  Laurel Calvo presents today via telemed for follow-up visit.  Patient was last seen on 2/28/2023. At that visit, the plan was to restart Gabapentin. She reports she gets excellent relief from her pain with this medication, taking 600 mg TID. She reports continued flare of left sided radiculopathy and current lupus flare causing a lot of daily pain,but well controlled with Gabapentin. Patient reports pain as 2/10 today. Needs refill of Gabapentin.      Interval History (2/28/2023):  Laurel Calvo presents today via telemed for follow-up visit.  Patient was last seen on 08/29/2021.  She reports return of her chronic sciatica after working in her yard. Historically, Gabapentin has been very helpful for her nerve pain and she is requesting a refill of this medication.  Ice and ibuprofen not effective. 1-2 weeks.Pain again is in her lower back with radiation down her left lower extremity.      Interval History (08/29/2022):  Laurel JONAH Umesh presents today for follow-up visit.  Patient was last seen on 6/13/2022. Last injection bilateral SIJ + GT bursa injection on 05/12/2022. Patient reports pain as 2/10 today. Patient underwent left hip arthroplasty with labral repair, capsular closure, and femoroplasty secondary to left hip labral tear and left hip femoracetabular impingement. Was previously scheduled with Dr. Manley for repeat left subacromial bursa injection, but appointment was canceled by our dept and has not been rescheduled. She reports continued left shoulder pain. Currently enrolled in physical therapy for post-operative therapy. She reports that she is recovering well from her hip surgery. Has follow up with [facility] on 09/09.      Interval History (6/13/2022): Laurel JONAH Umesh presents today for follow-up visit.  she underwent bilateral GT bursa injection + bilateral SIJ injection on 5/12/22.  she reports 30% pain relief.  Patient reports pain as 3/10 today. Saw Dr. Robbins on 05/13/2022. Per Dr. 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  She also has some component of lumbar radiculopathy and SI pain. Will consider hip arthroscopy and labral repair. Has follow up scheduled with Dr. Robbins on 06/17/2022. Patient reports she is now having right sided hip pain due to favoring left side due to pain. Reports she would like to move forward with hip repair with Dr. Robbins, then consider continued injections with IPM. Also reports shoulder pain beginning to flare up. Previously had left subacromial bursa Injection in clinic 11/9/2021 with good relief. Reports injections typically last about 6 months.      Interval History (4/28/2022): Laurel Calvo presents today for follow-up visit.   "she underwent left intraarticular hip joint injection on 03/31/2022.  The patient reports she had relief x 5-6 days after the procedure, but once she returned to normal activity the pain returned and then worsened. Reports one week of severe pain 8/10, but has now decreased slightly to a constant 5/10. Has follow up appointment with Dr. Robbins in 2 weeks to discuss other treatment options for hip.  Reports she does experience pain in the lower lumbar region left > right and some radiation into the outer hips. Greatest pain continues to originate from groin. Patient reports pain as 5/10 today. Taking ibuprofen with temporary moderate relief.    Interval History (1/27/2022):  Laurel Calvo presents today for follow-up visit.  Patient was last seen on 11/9/2021. At that visit, she had a subacromial bursa injection, which she feels helped less than previous injection.  Patient reports pain as "0/10 today.    Interval HPI (11/9/2021):  Laurel Calvo is a 51 y.o. female who presents to the clinic for a follow-up appointment for lower back and left hip pain.  Her main complaint today is left shoulder pain.  At the last visit, she was provided with a left-sided greater trochanteric bursa injection in the clinic.  She reports that this resulted in significant relief.  Since the last visit, Laurel Calvo states the left shoulder pain has been worsening. Current pain intensity is 8/10.  She also locates pain to the left lower tailbone area.  Patient denies night fever/night sweats, urinary incontinence, bowel incontinence, significant weight loss, significant motor weakness and loss of sensations.    Interval HPI 09/02/2021:  Laurel Calvo is a 51 y.o. female who presents to the clinic for a follow-up appointment for lower back and left hip pain. Since the last visit, Laurel Calvo states the pain has been persistant. Current pain intensity is 3/10.  She does report that she had a fall while gardening a few weeks " ago and landed on her right hip, but this is somewhat improving.  Her left hip is certainly more aggravated currently.    Interval HPI 08/24/2021:  Laurel Calvo presents to tele-medicine appointment for a follow-up appointment for chronic shoulder, neck, low back, and hip pain.  She was last seen on 03/04/2021 where she underwent left-sided subacromial bursa and left-sided greater trochanteric bursa injections.  She states that these resulted in 80-85% relief for both sites.  She states that her left lower back pain has been worsening is having radiating pain down the left lower extremity posteriorly extending to the calf.  She was also referred to a chiropractor.  Since the last visit, Laurel Calvo states the pain has been worsening. Current pain intensity is 8/10.    Initial HPI 03/04/2021:  Laurel Calvo is a 50 y.o. female, right-hand dominant, who presents to the clinic for the evaluation of chronic neck and lower back pain.  She was referred by her primary care team for further evaluation and management of this pain.  She has past medical history of seizure disorder, restless leg syndrome, anxiety, lupus, migraines, GERD, and multiple other medical comorbidities as listed in her chart.  The pain started several years ago following pain being told that she had herniated disc following an MRI of the cervical and lumbar spine and symptoms have been unchanged.The pain is located in the cervical myofascial area and radiates to the left upper extremity extending into the hand and fingers..  The pain is described as sharp, stabbing and throbbing and is rated as 2/10. The pain is rated with a score of  1/10 on the BEST day and a score of 10/10 on the WORST day.  Symptoms interfere with daily activity. The pain is exacerbated by at night and with exercise.  The pain is mitigated by ice. The patient reports spending less than 2 hours per day reclining. The patient reports 4-6 hours of uninterrupted sleep per  night.     Pain Disability Index Review:  Last 3 PDI Scores 6/13/2022 4/28/2022 11/9/2021   Pain Disability Index (PDI) 48 [phone] 15       Non-Pharmacologic Treatments:  Physical Therapy/Home Exercise: yes, has been active with home exercises as she has previously learned from physical therapies targeting the lower back  Ice/Heat:yes  TENS: no  Acupuncture: no  Massage: yes  Chiropractic: yes    Other: no        Pain Medications:  - Opioids: Norco  - Adjuvant Medications: Valium, baclofen, Plaquenil, Lamictal, Requip, trazodone  - Anti-Coagulants: None          Pain Procedures:   -03/04/2021:  Left subacromial bursa + left greater trochanteric bursa injections, 80-85% relief for both  -09/02/2021:  Left greater trochanteric bursa injection, significant relief  - Left subacromial bursa Injection in clinic 11/9/2021 - less pain relief than previously   -left IA hip joint injection on 03/31/2022 with relief for only 5-6 days, then pain returned and worsened  -bilateral GT bursa injection + bilateral SIJ injection on 5/12/22 with 30% relief       Imaging (Reviewed on 6/21/2023):     MRI hip 03/24/2022  FINDINGS:  Osseous structures: Normal marrow signal.  No fracture.  No suspicious osseous lesion.     Hip and Sacroiliac joints: No effusion. Mild-moderate cartilage irregularity at the peripheral aspect of the acetabular roof with mild associated subchondral cystic change.  Probable partial-thickness chondral-labral separation at the anterior/superior labrum.  No labral detached tear or paralabral cyst.     Pubic symphysis: No osteitis pubis.     Bursae: Normal.     Soft tissues: Regional muscles and tendons are normal in appearance.     Miscellaneous: None.     Impression:     No acute abnormality.     Mild-moderate degenerative changes at the peripheral aspect of the acetabular roof.     Probable partial thickness chondral-labral separation at the anterior/superior labrum.    X-ray hip 02/21/2022  FINDINGS:  Hip joints are  symmetric in appearance.  Joint spaces are maintained.  No evidence of avascular necrosis.  There is a curvilinear lucency seen on multiple projections in the inter trochanteric crest of the left femur extending into the proximal diaphyseal region suspicious for nondisplaced fracture at this site.     This report was flagged in Epic as abnormal.    MRI lumbar spine 09/01/2021:  Levocurvature of the lumbar spine is noted.  No listhesis.  There is no acute fracture.  The vertebral bodies are normal in height without compression fractures. Posterior elements are intact. Mild disc desiccation at the L5-S1 level.  Lumbar spine disc heights are preserved.  Conus medullaris terminates at the T12-L1 level. Distal spinal cord intensity is normal.  There is no soft tissue abnormality.     T12-L1: No disc bulge.  There is no facet arthropathy.  There is no neural foraminal stenosis.  There is no spinal canal stenosis.     L1-L2: No disc bulge.  There is no facet arthropathy.  There is no neuroforaminal stenosis.  There is no spinal canal stenosis.     L2-L3: No disc bulge.  There is no facet arthropathy.  There is no neuroforaminal stenosis.  There is no spinal canal stenosis.     L3-L4: No disc bulge.  There is no facet arthropathy.  There is no neuroforaminal stenosis.  There is no spinal canal stenosis.     L4-L5: No disc bulge.  There is no facet arthropathy.  There is no neuroforaminal stenosis.  There is no spinal canal stenosis.     L5-S1: No disc bulge.  There is no facet arthropathy.  There is no neuroforaminal stenosis.  There is no spinal canal stenosis.       X-ray cervical spine 03/04/2021:  There is straightening of the normal cervical spine lordosis.  No listhesis.  No abnormal motion of the cervical spine with flexion and extension.  No fracture.  Intervertebral disc spaces of the cervical spine are maintained.  No significant uncovertebral or facet arthropathy.  Oblique views demonstrate no significant neural  foraminal stenosis.  No bony central canal stenosis identified.  Prevertebral soft tissues are within normal limits.     X-ray lumbar spine 03/04/2021:  There is mild levocurvature of the lumbar spine.  No listhesis.  No abnormal motion of the lumbar spine with flexion and extension.  No fracture or pars defect.  Intervertebral disc spaces of the lumbar spine are maintained.  No significant facet arthropathy.  No suspicious osseous lesion.  Sacroiliac joints appear normal.             REVIEW OF SYSTEMS:    GENERAL:  No weight loss, malaise or fevers.  HEENT:   No recent changes in vision or hearing  NECK:  Negative for lumps, no difficulty with swallowing.  RESPIRATORY:  Negative for cough, wheezing or shortness of breath, patient denies any recent URI.  CARDIOVASCULAR:  Negative for chest pain, leg swelling or palpitations.  GI:  Negative for abdominal discomfort, blood in stools or black stools or change in bowel habits.  MUSCULOSKELETAL:  See HPI.  SKIN:  Negative for lesions, rash, and itching.  PSYCH:  No mood disorder or recent psychosocial stressors.  Patients sleep is not disturbed secondary to pain.  HEMATOLOGY/LYMPHOLOGY:  Negative for prolonged bleeding, bruising easily or swollen nodes.  Patient is not currently taking any anti-coagulants  NEURO:   No history of headaches, syncope, paralysis, seizures or tremors.  All other reviewed and negative other than HPI.        OBJECTIVE:    PHYSICAL EXAMINATION:  Telemedicine Exam  There were no vitals filed for this visit.    There is no height or weight on file to calculate BMI.   (reviewed on 6/21/2023)       Physical Exam: last in clinic visit:  GENERAL: Well appearing, in no acute distress, alert and oriented x3.  PSYCH:  Mood and affect appropriate.  SKIN: Skin color, texture, turgor normal, no rashes or lesions.  HEAD/FACE:  Normocephalic, atraumatic. Cranial nerves grossly intact.  CV: RRR with palpation of the radial artery.  PULM: No evidence of  respiratory difficulty, symmetric chest rise.  GI:  Soft and non-tender.  BACK: Straight leg raising in the sitting and supine positions is negative to radicular pain. No pain to palpation over the facet joints of the lumbar spine or spinous processes. Normal range of motion without pain reproduction.  EXTREMITIES: Peripheral joint ROM is full and pain free without obvious instability or laxity in all four extremities. No deformities, edema, or skin discoloration. Good capillary refill.  MUSCULOSKELETAL:   positive shoulder impingement signs on the left shoulder with Merrill, Neer's , and empty can.  minimal tenderness to palpation over the left greater trochanteric bursa. .  There is moderate pain with palpation over the sacroiliac joint on the left.  FABERs test is equivocal on the left.  FADIRs test is negative.   Bilateral upper and lower extremity strength is normal and symmetric.  No atrophy or tone abnormalities are noted.  NEURO: Bilateral upper and lower extremity coordination and muscle stretch reflexes are physiologic and symmetric.  Plantar response are downgoing. No clonus.  No loss of sensation is noted.  GAIT: normal.      ASSESSMENT: 53 y.o. year old female with lower back and left hip pain, consistent with     1. Lumbar radiculopathy                  PLAN:   None at this time    S/p bilateral Left SIJ + Left GT bursa injection with 30% relief  S/p left intra-articular hip joint injection with limited relief  S/p Left SIJ + Left GT bursa injection.     2. Pharmacologic:   - Continue gabapentin 600mg TID. Refill sent to pharmacy  - Continue Mobic 7.5 mg b.i.d PRN. OR Ibuprofen  -No muscle relaxants due to risk of decreased seizure threshold, patient voiced understanding  - Anticoagulation use: None.     3. Rehabilitative: Encouraged regular exercise. Continue exercises and activities as tolerated. Currently enrolled in physical therapy for post-op care    4. Diagnostic: Reviewed    5.  Consults/referral: Follow up with [facility] as scheduled     5. Follow up: PRN    - This condition does not require this patient to take time off of work, and the primary goal of our Pain Management services is to improve the patient's functional capacity.   - I discussed the risks, benefits, and alternatives to potential treatment options. All questions and concerns were fully addressed today in clinic.       Tia Hernández PA-C  Interventional Pain Medicine    Disclaimer:  This note was prepared using voice recognition system and is likely to have sound alike errors that may have been overlooked even after proof reading.  Please call me with any questions.

## 2023-07-17 ENCOUNTER — TELEPHONE (OUTPATIENT)
Dept: PAIN MEDICINE | Facility: CLINIC | Age: 53
End: 2023-07-17
Payer: MEDICAID

## 2023-07-17 NOTE — TELEPHONE ENCOUNTER
Called pt to regarding my chart message. Scheduled appointment for 7/18. Pt states she taking her gabapentin 600/600/900, states this helps a little. Informed her that I sent her message over to Dr. Mena staff regarding scheduling. All questions answered.

## 2023-07-18 ENCOUNTER — OFFICE VISIT (OUTPATIENT)
Dept: PAIN MEDICINE | Facility: CLINIC | Age: 53
End: 2023-07-18
Payer: MEDICAID

## 2023-07-18 DIAGNOSIS — R10.32 LEFT GROIN PAIN: ICD-10-CM

## 2023-07-18 PROCEDURE — 1159F PR MEDICATION LIST DOCUMENTED IN MEDICAL RECORD: ICD-10-PCS | Mod: CPTII,95,, | Performed by: PHYSICIAN ASSISTANT

## 2023-07-18 PROCEDURE — 1159F MED LIST DOCD IN RCRD: CPT | Mod: CPTII,95,, | Performed by: PHYSICIAN ASSISTANT

## 2023-07-18 PROCEDURE — 1160F RVW MEDS BY RX/DR IN RCRD: CPT | Mod: CPTII,95,, | Performed by: PHYSICIAN ASSISTANT

## 2023-07-18 PROCEDURE — 99214 PR OFFICE/OUTPT VISIT, EST, LEVL IV, 30-39 MIN: ICD-10-PCS | Mod: 95,,, | Performed by: PHYSICIAN ASSISTANT

## 2023-07-18 PROCEDURE — 1160F PR REVIEW ALL MEDS BY PRESCRIBER/CLIN PHARMACIST DOCUMENTED: ICD-10-PCS | Mod: CPTII,95,, | Performed by: PHYSICIAN ASSISTANT

## 2023-07-18 PROCEDURE — 99214 OFFICE O/P EST MOD 30 MIN: CPT | Mod: 95,,, | Performed by: PHYSICIAN ASSISTANT

## 2023-07-18 RX ORDER — GABAPENTIN 300 MG/1
CAPSULE ORAL
Qty: 210 CAPSULE | Refills: 2 | Status: SHIPPED | OUTPATIENT
Start: 2023-07-18 | End: 2023-07-24 | Stop reason: DRUGHIGH

## 2023-07-18 RX ORDER — METHYLPREDNISOLONE 4 MG/1
TABLET ORAL
Qty: 1 EACH | Refills: 0 | Status: SHIPPED | OUTPATIENT
Start: 2023-07-18 | End: 2023-08-14 | Stop reason: ALTCHOICE

## 2023-07-18 NOTE — PROGRESS NOTES
Established Patient Chronic Pain Note (Follow up visit)    Chief Complaint:   No chief complaint on file.    The patient location is: [place]  The chief complaint leading to consultation is: chronic pain     Visit type: audiovisual    Face to Face time with patient: 10-15 minutes  20 minutes of total time spent on the encounter, which includes face to face time and non-face to face time preparing to see the patient (eg, review of tests), Obtaining and/or reviewing separately obtained history, Documenting clinical information in the electronic or other health record, Independently interpreting results (not separately reported) and communicating results to the patient/family/caregiver, or Care coordination (not separately reported).     Each patient to whom he or she provides medical services by telemedicine is:  (1) informed of the relationship between the physician and patient and the respective role of any other health care provider with respect to management of the patient; and (2) notified that he or she may decline to receive medical services by telemedicine and may withdraw from such care at any time.        SUBJECTIVE:  Interval History (7/18/2023):  Laurel Calvo presents today via telemed for follow-up visit.  Patient was last seen on 6/21/2023. At that visit, the plan was to continue Gabapentin 600 mg TID. Patient reports pain as 9/10 today. Doing physical therapy 5-6 days per week, 2 weeks ago pinching in the groin, progressively worsened. 4 days ago increased to full blown severe constant pain, then returned to same symptoms before surgery. She reports her pain is a little better today. Dr. Robbins's office has reached out via [employer] to schedule a follow up, she reports she will check her mychart and respond.  Goes all the way down the left leg to knee and ankle, numb and tingly.    Interval History (6/21/2023):  Laurel Calvo presents today via telemed for follow-up visit.  Patient was last seen on  2/28/2023. At that visit, the plan was to restart Gabapentin. She reports she gets excellent relief from her pain with this medication, taking 600 mg TID. She reports continued flare of left sided radiculopathy and current lupus flare causing a lot of daily pain,but well controlled with Gabapentin. Patient reports pain as 2/10 today. Needs refill of Gabapentin.      Interval History (2/28/2023):  Laurel Calvo presents today via telemed for follow-up visit.  Patient was last seen on 08/29/2021.  She reports return of her chronic sciatica after working in her yard. Historically, Gabapentin has been very helpful for her nerve pain and she is requesting a refill of this medication. Ice and ibuprofen not effective. 1-2 weeks.Pain again is in her lower back with radiation down her left lower extremity.      Interval History (08/29/2022):  Laurel Calvo presents today for follow-up visit.  Patient was last seen on 6/13/2022. Last injection bilateral SIJ + GT bursa injection on 05/12/2022. Patient reports pain as 2/10 today. Patient underwent left hip arthroplasty with labral repair, capsular closure, and femoroplasty secondary to left hip labral tear and left hip femoracetabular impingement. Was previously scheduled with Dr. Manley for repeat left subacromial bursa injection, but appointment was canceled by our dept and has not been rescheduled. She reports continued left shoulder pain. Currently enrolled in physical therapy for post-operative therapy. She reports that she is recovering well from her hip surgery. Has follow up with [facility] on 09/09.      Interval History (6/13/2022): Laurel Calvo presents today for follow-up visit.  she underwent bilateral GT bursa injection + bilateral SIJ injection on 5/12/22.  she reports 30% pain relief.  Patient reports pain as 3/10 today. Saw Dr. Robbins on 05/13/2022. Per Dr. Robbins, she has a combination of left hip femoral acetabular impingement with labral tear as well  "as subchondral cyst formation.  She also has some component of lumbar radiculopathy and SI pain. Will consider hip arthroscopy and labral repair. Has follow up scheduled with Dr. Robbins on 06/17/2022. Patient reports she is now having right sided hip pain due to favoring left side due to pain. Reports she would like to move forward with hip repair with Dr. Robbins, then consider continued injections with IPM. Also reports shoulder pain beginning to flare up. Previously had left subacromial bursa Injection in clinic 11/9/2021 with good relief. Reports injections typically last about 6 months.      Interval History (4/28/2022): Laurel Calvo presents today for follow-up visit.  she underwent left intraarticular hip joint injection on 03/31/2022.  The patient reports she had relief x 5-6 days after the procedure, but once she returned to normal activity the pain returned and then worsened. Reports one week of severe pain 8/10, but has now decreased slightly to a constant 5/10. Has follow up appointment with Dr. Robbins in 2 weeks to discuss other treatment options for hip.  Reports she does experience pain in the lower lumbar region left > right and some radiation into the outer hips. Greatest pain continues to originate from groin. Patient reports pain as 5/10 today. Taking ibuprofen with temporary moderate relief.    Interval History (1/27/2022):  Laurel Calvo presents today for follow-up visit.  Patient was last seen on 11/9/2021. At that visit, she had a subacromial bursa injection, which she feels helped less than previous injection.  Patient reports pain as "0/10 today.    Interval HPI (11/9/2021):  Laurel Calvo is a 51 y.o. female who presents to the clinic for a follow-up appointment for lower back and left hip pain.  Her main complaint today is left shoulder pain.  At the last visit, she was provided with a left-sided greater trochanteric bursa injection in the clinic.  She reports that this " resulted in significant relief.  Since the last visit, Laurel Calvo states the left shoulder pain has been worsening. Current pain intensity is 8/10.  She also locates pain to the left lower tailbone area.  Patient denies night fever/night sweats, urinary incontinence, bowel incontinence, significant weight loss, significant motor weakness and loss of sensations.    Interval HPI 09/02/2021:  Laurel Calvo is a 51 y.o. female who presents to the clinic for a follow-up appointment for lower back and left hip pain. Since the last visit, Laurel Calvo states the pain has been persistant. Current pain intensity is 3/10.  She does report that she had a fall while gardening a few weeks ago and landed on her right hip, but this is somewhat improving.  Her left hip is certainly more aggravated currently.    Interval HPI 08/24/2021:  Laurel Calvo presents to tele-medicine appointment for a follow-up appointment for chronic shoulder, neck, low back, and hip pain.  She was last seen on 03/04/2021 where she underwent left-sided subacromial bursa and left-sided greater trochanteric bursa injections.  She states that these resulted in 80-85% relief for both sites.  She states that her left lower back pain has been worsening is having radiating pain down the left lower extremity posteriorly extending to the calf.  She was also referred to a chiropractor.  Since the last visit, Laurel Calvo states the pain has been worsening. Current pain intensity is 8/10.    Initial HPI 03/04/2021:  Laurel Calvo is a 50 y.o. female, right-hand dominant, who presents to the clinic for the evaluation of chronic neck and lower back pain.  She was referred by her primary care team for further evaluation and management of this pain.  She has past medical history of seizure disorder, restless leg syndrome, anxiety, lupus, migraines, GERD, and multiple other medical comorbidities as listed in her chart.  The pain started several years ago  following pain being told that she had herniated disc following an MRI of the cervical and lumbar spine and symptoms have been unchanged.The pain is located in the cervical myofascial area and radiates to the left upper extremity extending into the hand and fingers..  The pain is described as sharp, stabbing and throbbing and is rated as 2/10. The pain is rated with a score of  1/10 on the BEST day and a score of 10/10 on the WORST day.  Symptoms interfere with daily activity. The pain is exacerbated by at night and with exercise.  The pain is mitigated by ice. The patient reports spending less than 2 hours per day reclining. The patient reports 4-6 hours of uninterrupted sleep per night.     Pain Disability Index Review:  Last 3 PDI Scores 6/13/2022 4/28/2022 11/9/2021   Pain Disability Index (PDI) 48 [phone] 15       Non-Pharmacologic Treatments:  Physical Therapy/Home Exercise: yes, has been active with home exercises as she has previously learned from physical therapies targeting the lower back  Ice/Heat:yes  TENS: no  Acupuncture: no  Massage: yes  Chiropractic: yes    Other: no        Pain Medications:  - Opioids: Norco  - Adjuvant Medications: Valium, baclofen, Plaquenil, Lamictal, Requip, trazodone  - Anti-Coagulants: None          Pain Procedures:   -03/04/2021:  Left subacromial bursa + left greater trochanteric bursa injections, 80-85% relief for both  -09/02/2021:  Left greater trochanteric bursa injection, significant relief  - Left subacromial bursa Injection in clinic 11/9/2021 - less pain relief than previously   -left IA hip joint injection on 03/31/2022 with relief for only 5-6 days, then pain returned and worsened  -bilateral GT bursa injection + bilateral SIJ injection on 5/12/22 with 30% relief       Imaging (Reviewed on 7/20/2023):     MRI hip 03/24/2022  FINDINGS:  Osseous structures: Normal marrow signal.  No fracture.  No suspicious osseous lesion.     Hip and Sacroiliac joints: No effusion.  Mild-moderate cartilage irregularity at the peripheral aspect of the acetabular roof with mild associated subchondral cystic change.  Probable partial-thickness chondral-labral separation at the anterior/superior labrum.  No labral detached tear or paralabral cyst.     Pubic symphysis: No osteitis pubis.     Bursae: Normal.     Soft tissues: Regional muscles and tendons are normal in appearance.     Miscellaneous: None.     Impression:     No acute abnormality.     Mild-moderate degenerative changes at the peripheral aspect of the acetabular roof.     Probable partial thickness chondral-labral separation at the anterior/superior labrum.    X-ray hip 02/21/2022  FINDINGS:  Hip joints are symmetric in appearance.  Joint spaces are maintained.  No evidence of avascular necrosis.  There is a curvilinear lucency seen on multiple projections in the inter trochanteric crest of the left femur extending into the proximal diaphyseal region suspicious for nondisplaced fracture at this site.     This report was flagged in Epic as abnormal.    MRI lumbar spine 09/01/2021:  Levocurvature of the lumbar spine is noted.  No listhesis.  There is no acute fracture.  The vertebral bodies are normal in height without compression fractures. Posterior elements are intact. Mild disc desiccation at the L5-S1 level.  Lumbar spine disc heights are preserved.  Conus medullaris terminates at the T12-L1 level. Distal spinal cord intensity is normal.  There is no soft tissue abnormality.     T12-L1: No disc bulge.  There is no facet arthropathy.  There is no neural foraminal stenosis.  There is no spinal canal stenosis.     L1-L2: No disc bulge.  There is no facet arthropathy.  There is no neuroforaminal stenosis.  There is no spinal canal stenosis.     L2-L3: No disc bulge.  There is no facet arthropathy.  There is no neuroforaminal stenosis.  There is no spinal canal stenosis.     L3-L4: No disc bulge.  There is no facet arthropathy.  There is  no neuroforaminal stenosis.  There is no spinal canal stenosis.     L4-L5: No disc bulge.  There is no facet arthropathy.  There is no neuroforaminal stenosis.  There is no spinal canal stenosis.     L5-S1: No disc bulge.  There is no facet arthropathy.  There is no neuroforaminal stenosis.  There is no spinal canal stenosis.       X-ray cervical spine 03/04/2021:  There is straightening of the normal cervical spine lordosis.  No listhesis.  No abnormal motion of the cervical spine with flexion and extension.  No fracture.  Intervertebral disc spaces of the cervical spine are maintained.  No significant uncovertebral or facet arthropathy.  Oblique views demonstrate no significant neural foraminal stenosis.  No bony central canal stenosis identified.  Prevertebral soft tissues are within normal limits.     X-ray lumbar spine 03/04/2021:  There is mild levocurvature of the lumbar spine.  No listhesis.  No abnormal motion of the lumbar spine with flexion and extension.  No fracture or pars defect.  Intervertebral disc spaces of the lumbar spine are maintained.  No significant facet arthropathy.  No suspicious osseous lesion.  Sacroiliac joints appear normal.             REVIEW OF SYSTEMS:    GENERAL:  No weight loss, malaise or fevers.  HEENT:   No recent changes in vision or hearing  NECK:  Negative for lumps, no difficulty with swallowing.  RESPIRATORY:  Negative for cough, wheezing or shortness of breath, patient denies any recent URI.  CARDIOVASCULAR:  Negative for chest pain, leg swelling or palpitations.  GI:  Negative for abdominal discomfort, blood in stools or black stools or change in bowel habits.  MUSCULOSKELETAL:  See HPI.  SKIN:  Negative for lesions, rash, and itching.  PSYCH:  No mood disorder or recent psychosocial stressors.  Patients sleep is not disturbed secondary to pain.  HEMATOLOGY/LYMPHOLOGY:  Negative for prolonged bleeding, bruising easily or swollen nodes.  Patient is not currently taking  any anti-coagulants  NEURO:   No history of headaches, syncope, paralysis, seizures or tremors.  All other reviewed and negative other than HPI.        OBJECTIVE:    PHYSICAL EXAMINATION:  Telemedicine Exam  There were no vitals filed for this visit.    There is no height or weight on file to calculate BMI.   (reviewed on 7/20/2023)       Physical Exam: last in clinic visit:  GENERAL: Well appearing, in no acute distress, alert and oriented x3.  PSYCH:  Mood and affect appropriate.  SKIN: Skin color, texture, turgor normal, no rashes or lesions.  HEAD/FACE:  Normocephalic, atraumatic. Cranial nerves grossly intact.  CV: RRR with palpation of the radial artery.  PULM: No evidence of respiratory difficulty, symmetric chest rise.  GI:  Soft and non-tender.  BACK: Straight leg raising in the sitting and supine positions is negative to radicular pain. No pain to palpation over the facet joints of the lumbar spine or spinous processes. Normal range of motion without pain reproduction.  EXTREMITIES: Peripheral joint ROM is full and pain free without obvious instability or laxity in all four extremities. No deformities, edema, or skin discoloration. Good capillary refill.  MUSCULOSKELETAL:   positive shoulder impingement signs on the left shoulder with Merrill, Neer's , and empty can.  minimal tenderness to palpation over the left greater trochanteric bursa. .  There is moderate pain with palpation over the sacroiliac joint on the left.  FABERs test is equivocal on the left.  FADIRs test is negative.   Bilateral upper and lower extremity strength is normal and symmetric.  No atrophy or tone abnormalities are noted.  NEURO: Bilateral upper and lower extremity coordination and muscle stretch reflexes are physiologic and symmetric.  Plantar response are downgoing. No clonus.  No loss of sensation is noted.  GAIT: normal.      ASSESSMENT: 53 y.o. year old female with lower back and left hip pain, consistent with     1. Left  groin pain  gabapentin (NEURONTIN) 300 MG capsule                  PLAN:   None at this time    S/p bilateral Left SIJ + Left GT bursa injection with 30% relief  S/p left intra-articular hip joint injection with limited relief  S/p Left SIJ + Left GT bursa injection.     2. Pharmacologic:   - Increase Gabapentin from 600 mg TID to 600/600/900 mg  - Will prescribe Medrol Dose pack with instructions - for acute pain flare:  Day 1: 2 tablets before breakfast, 1 tablet after lunch, 1 tablet after dinner, 2 tablets at bedtime   Day 2: 1 tablet before breakfast, 1 tablet after lunch, 1 tablet after dinner, 2 tablets at bedtime   Day 3: 1 tablet before breakfast, 1 tablet after lunch, 1 tablet after dinner, 1 tablet at bedtime   Day 4: 1 tablet before breakfast, 1 tablet after lunch, 1 tablet at bedtime   Day 5: 1 tablet before breakfast, 1 tablet at bedtime   Day 6: 1 tablet before breakfast.    - Continue Mobic 7.5 mg b.i.d PRN. OR Ibuprofen  -No muscle relaxants due to risk of decreased seizure threshold, patient voiced understanding  - Anticoagulation use: None.     3. Rehabilitative: Encouraged regular exercise. Continue exercises and activities as tolerated. Currently enrolled in physical therapy for post-op care    4. Diagnostic: Reviewed    5. Consults/referral: Follow up with [facility] as scheduled     5. Follow up: PRN    - This condition does not require this patient to take time off of work, and the primary goal of our Pain Management services is to improve the patient's functional capacity.   - I discussed the risks, benefits, and alternatives to potential treatment options. All questions and concerns were fully addressed today in clinic.       Tia Hernández PA-C  Interventional Pain Medicine    Disclaimer:  This note was prepared using voice recognition system and is likely to have sound alike errors that may have been overlooked even after proof reading.  Please call me with any questions.

## 2023-07-19 ENCOUNTER — PATIENT MESSAGE (OUTPATIENT)
Dept: ORTHOPEDICS | Facility: CLINIC | Age: 53
End: 2023-07-19
Payer: MEDICAID

## 2023-07-24 ENCOUNTER — PATIENT MESSAGE (OUTPATIENT)
Dept: PAIN MEDICINE | Facility: CLINIC | Age: 53
End: 2023-07-24
Payer: MEDICAID

## 2023-07-24 RX ORDER — GABAPENTIN 300 MG/1
900 CAPSULE ORAL 3 TIMES DAILY
Qty: 270 CAPSULE | Refills: 3 | Status: SHIPPED | OUTPATIENT
Start: 2023-07-24 | End: 2023-11-20 | Stop reason: ALTCHOICE

## 2023-07-25 ENCOUNTER — PATIENT MESSAGE (OUTPATIENT)
Dept: PAIN MEDICINE | Facility: CLINIC | Age: 53
End: 2023-07-25
Payer: MEDICAID

## 2023-07-25 NOTE — PROGRESS NOTES
Orthopaedic Follow-Up Visit    Last Appointment: 10/28/22  Diagnosis: S/p left hip arthroscopy labral repair, femoroplasty, capsular closure (DOS 7/28/22)  Prior Procedure: None    Laurel Calvo is a 53 y.o. female who is here for f/u evaluation of her left hip. The patient was last seen here by me on 10/28/22 at which point she was progressing well following surgery but she still had some soreness with extremes of external rotation and internal rotation which we discussed was normal for that time. The plan was to continue with PT and follow-up in 2 months but patient missed her f/u appointment. The patient returns today reporting that the symptoms have returned over the last 1-2 weeks. She reports that her symptoms are now similar to before her surgery. She was prescribed a steroid dose pack last week that she finished yesterday.      To review her history, Laurel Calvo is a 53 y.o. female who is almost 1 year s/p left hip arthroscopy labral repair, femoroplasty, and capsular closure.     Patient's medications, allergies, past medical, surgical, social and family histories were reviewed and updated as appropriate.    Review of Systems   All systems reviewed were negative.  Specifically, the patient denies fever, chills, weight loss, chest pain, shortness of breath, or dyspnea on exertion.      Past Medical History:   Diagnosis Date    Anxiety     Arthritis     Chronic left-sided low back pain without sciatica 9/28/2018    Chronic pain of left knee 9/28/2018    XR KNEE COMP LEFT 4 OR MORE VIEWS - October 27, 2017 CLINICAL HISTORY:   Left knee pain FINDINGS:   No fracture, dislocation, or arthritic change of the knee. Dictated and Electronically Signed By: Jem Damon MD on October 27, 2017    GERD (gastroesophageal reflux disease)     Lupus     Migraine with aura and without status migrainosus, not intractable 8/8/2014    Seizures     Thyroid disease        Objective:      Physical Exam  Patient is alert and  oriented, no distress. Skin is intact. Neuro is normal with no focal motor or sensory findings.    Standing exam  stance: normal alignment, no significant leg-length discrepancy  gait: no limp      Left Hip:    Inspection:   Normal    Palpation tenderness: Hip flexor and proximal groin    Range of motion:  90 deg Flexion (120 on right)          20 deg IR (30 on right)          50 deg ER (60 on right)    Strength:   5/5 Extension     5/5 Flexion     5/5 Abduction     5/5 Adduction    Special Tests:   Negative Log Roll     Positive RIP     Positive FADIR     Negative Microinstability test     Negative Circumduction     Negative Edison's         N/V Exam:   Tibial:    Normal sensory (plantar foot)   Normal motor (FHL)     Sup Peroneal:  Normal sensory (dorsal foot)   Normal motor (Peroneals)             Deep Peroneal:  Normal sensory (1st web space)  Normal motor (EHL)     Sural:   Normal sensory (lateral foot)    Saphenous:   Normal sensory (medial lower leg)    Normal pedal pulses, warm and well perfused with capillary refill < 2 sec   Patella tendon reflex normal  Achilles tendon reflex normal      Neurovascular exam  - motor function grossly intact bilaterally to hip flexion, knee extension and flexion, ankle dorsiflexion and plantarflexion  - sensation intact to light touch bilaterally to femoral, tibial, tibial and peroneal distributions  - symmetrical pedal pulses    Imaging:  X-Ray Hip 4 or more views Left (with Pelvis when performed)  Narrative: EXAM: XR HIP WITH PELVIS WHEN PERFORMED, 4 OR MORE VIEWS LEFT    CLINICAL INDICATION:   Pelvis and perineal pain.  Pain in left hip    FINDINGS:  Comparisons are made to 02/21/2022.  AP view of the pelvis, as well as AP and lateral views of the right hip were submitted for interpretation.  The femoral heads are well centered on the acetabulum.  The hip joints are well-maintained.  Mild acetabular spurring noted on the left.  Left femoral head bone island  noted.    Alignment is satisfactory. No     fractures, dislocations, or erosive arthritic change.  Negative for radiopaque foreign bodies or air in the soft tissues.  Normal bowel gas pattern.  Phleboliths versus ureteroliths are present within the pelvis.  Impression: 1.  Negative for acute process involving the visualized osseous structures.  2.  Stable findings as noted above.    Finalized on: 7/26/2023 11:10 AM By:  Abdelrahman Rhodes MD  [facility]# [id number]      2023-07-26 11:12:[id number]    [facility]       Physician Read: I agree with the above impression.    Assessment/Plan:   Assessment:  Laurel Calvo is a 53 y.o. female with 1 year s/p left hip arthroscopy labral repair, femoroplasty, capsular closure (7/28/22)    Plan:    She has worsening pain in the hip without any new injury. New XR obtained today shows no evidence of acute or degenerative changes.  There has been no acute injury and XR appear stable.   I recommend proceeding with US guided intra-articular CSI of the left hip. The patient is in agreement with this plan.   After injection, we will gradually ramp up activities. Follow-up with me as needed.         Wm Robbins MD    I, Federico Arevalo, acted as a scribe for Wm Robbins MD for the duration of this office visit.

## 2023-07-26 ENCOUNTER — HOSPITAL ENCOUNTER (OUTPATIENT)
Dept: RADIOLOGY | Facility: HOSPITAL | Age: 53
Discharge: HOME | End: 2023-07-26
Attending: STUDENT IN AN ORGANIZED HEALTH CARE EDUCATION/TRAINING PROGRAM
Payer: MEDICAID

## 2023-07-26 ENCOUNTER — OFFICE VISIT (OUTPATIENT)
Dept: SPORTS MEDICINE | Facility: CLINIC | Age: 53
End: 2023-07-26
Payer: MEDICAID

## 2023-07-26 DIAGNOSIS — M25.552 LEFT HIP PAIN: Primary | ICD-10-CM

## 2023-07-26 DIAGNOSIS — M25.552 LEFT HIP PAIN: ICD-10-CM

## 2023-07-26 PROCEDURE — 73503 X-RAY EXAM HIP UNI 4/> VIEWS: CPT | Mod: TC,LT

## 2023-07-26 PROCEDURE — 99214 OFFICE O/P EST MOD 30 MIN: CPT | Mod: S$PBB,,, | Performed by: STUDENT IN AN ORGANIZED HEALTH CARE EDUCATION/TRAINING PROGRAM

## 2023-07-26 PROCEDURE — 1160F PR REVIEW ALL MEDS BY PRESCRIBER/CLIN PHARMACIST DOCUMENTED: ICD-10-PCS | Mod: CPTII,,, | Performed by: STUDENT IN AN ORGANIZED HEALTH CARE EDUCATION/TRAINING PROGRAM

## 2023-07-26 PROCEDURE — 99213 OFFICE O/P EST LOW 20 MIN: CPT | Mod: PBBFAC | Performed by: STUDENT IN AN ORGANIZED HEALTH CARE EDUCATION/TRAINING PROGRAM

## 2023-07-26 PROCEDURE — 99214 PR OFFICE/OUTPT VISIT, EST, LEVL IV, 30-39 MIN: ICD-10-PCS | Mod: S$PBB,,, | Performed by: STUDENT IN AN ORGANIZED HEALTH CARE EDUCATION/TRAINING PROGRAM

## 2023-07-26 PROCEDURE — 99999 PR PBB SHADOW E&M-EST. PATIENT-LVL III: ICD-10-PCS | Mod: PBBFAC,,, | Performed by: STUDENT IN AN ORGANIZED HEALTH CARE EDUCATION/TRAINING PROGRAM

## 2023-07-26 PROCEDURE — 1160F RVW MEDS BY RX/DR IN RCRD: CPT | Mod: CPTII,,, | Performed by: STUDENT IN AN ORGANIZED HEALTH CARE EDUCATION/TRAINING PROGRAM

## 2023-07-26 PROCEDURE — 73503 X-RAY EXAM HIP UNI 4/> VIEWS: CPT | Mod: 26,LT,, | Performed by: RADIOLOGY

## 2023-07-26 PROCEDURE — 73503 XR HIP WITH PELVIS WHEN PERFORMED, 4 OR MORE VIEWS LEFT: ICD-10-PCS | Mod: 26,LT,, | Performed by: RADIOLOGY

## 2023-07-26 PROCEDURE — 1159F PR MEDICATION LIST DOCUMENTED IN MEDICAL RECORD: ICD-10-PCS | Mod: CPTII,,, | Performed by: STUDENT IN AN ORGANIZED HEALTH CARE EDUCATION/TRAINING PROGRAM

## 2023-07-26 PROCEDURE — 99999 PR PBB SHADOW E&M-EST. PATIENT-LVL III: CPT | Mod: PBBFAC,,, | Performed by: STUDENT IN AN ORGANIZED HEALTH CARE EDUCATION/TRAINING PROGRAM

## 2023-07-26 PROCEDURE — 1159F MED LIST DOCD IN RCRD: CPT | Mod: CPTII,,, | Performed by: STUDENT IN AN ORGANIZED HEALTH CARE EDUCATION/TRAINING PROGRAM

## 2023-08-03 ENCOUNTER — TELEPHONE (OUTPATIENT)
Dept: SPORTS MEDICINE | Facility: CLINIC | Age: 53
End: 2023-08-03
Payer: MEDICARE

## 2023-08-03 NOTE — TELEPHONE ENCOUNTER
Returned pt's call in regards to getting rescheduled for appointment. Pt declined setting up new appointment and said she would keep in touch when she's ready to rescheduled it.   ----- Message from Shahla Damon sent at 8/3/2023  8:45 AM CDT -----  Contact: Laurel  Patient reports they will not be able to make it to their appointment due to transportation issues. Please call patient at [phone] if needed.

## 2023-08-10 ENCOUNTER — TELEPHONE (OUTPATIENT)
Dept: SPORTS MEDICINE | Facility: CLINIC | Age: 53
End: 2023-08-10
Payer: MEDICARE

## 2023-08-10 NOTE — TELEPHONE ENCOUNTER
Called and got pt rescheduled for appointment. 8/17/23 at 8:40 am. Pt verbally understood and accepted appointment.   ----- Message from Federico Arevalo sent at 8/10/2023 12:27 PM CDT -----  Regarding: FW: pt request  Can you reach out to this patient to get her r/s for US guided hip injection that she cancelled last week.     ----- Message -----  From: Jacqueline Rust  Sent: 8/10/2023  11:56 AM CDT  To: [facility] Staff  Subject: pt request                                       Name of Who is Calling:BRITTANY BILLINGS [[id number]]          What is the request in detail: pt would like to reschedule injection appt , pt has to give her ride a 72 hours notice           Can the clinic reply by [facility]: yes           What [facility] [phone] (home) [phone] (work)

## 2023-08-14 ENCOUNTER — OFFICE VISIT (OUTPATIENT)
Dept: INTERNAL MEDICINE | Facility: CLINIC | Age: 53
End: 2023-08-14
Payer: MEDICARE

## 2023-08-14 ENCOUNTER — LAB VISIT (OUTPATIENT)
Dept: LAB | Facility: HOSPITAL | Age: 53
End: 2023-08-14
Attending: FAMILY MEDICINE
Payer: MEDICARE

## 2023-08-14 VITALS
TEMPERATURE: 98 F | OXYGEN SATURATION: 99 % | DIASTOLIC BLOOD PRESSURE: 70 MMHG | WEIGHT: 123.44 LBS | SYSTOLIC BLOOD PRESSURE: 120 MMHG | HEART RATE: 109 BPM | HEIGHT: 64 IN | BODY MASS INDEX: 21.07 KG/M2

## 2023-08-14 DIAGNOSIS — K21.9 LARYNGOPHARYNGEAL REFLUX (LPR): ICD-10-CM

## 2023-08-14 DIAGNOSIS — F41.9 ANXIETY: ICD-10-CM

## 2023-08-14 DIAGNOSIS — F31.77 BIPOLAR DISORDER, IN PARTIAL REMISSION, MOST RECENT EPISODE MIXED: ICD-10-CM

## 2023-08-14 DIAGNOSIS — E03.9 ACQUIRED HYPOTHYROIDISM: Chronic | ICD-10-CM

## 2023-08-14 DIAGNOSIS — G40.209 PARTIAL SYMPTOMATIC EPILEPSY WITH COMPLEX PARTIAL SEIZURES, NOT INTRACTABLE, WITHOUT STATUS EPILEPTICUS: Chronic | ICD-10-CM

## 2023-08-14 DIAGNOSIS — M79.18 PAIN IN BUTTOCK: Primary | ICD-10-CM

## 2023-08-14 DIAGNOSIS — F11.20 UNCOMPLICATED OPIOID DEPENDENCE: Chronic | ICD-10-CM

## 2023-08-14 LAB — TSH SERPL DL<=0.005 MIU/L-ACNC: 3.54 UIU/ML (ref 0.4–4)

## 2023-08-14 PROCEDURE — 3074F SYST BP LT 130 MM HG: CPT | Mod: CPTII,S$GLB,, | Performed by: FAMILY MEDICINE

## 2023-08-14 PROCEDURE — 3074F PR MOST RECENT SYSTOLIC BLOOD PRESSURE < 130 MM HG: ICD-10-PCS | Mod: CPTII,S$GLB,, | Performed by: FAMILY MEDICINE

## 2023-08-14 PROCEDURE — 99999 PR PBB SHADOW E&M-EST. PATIENT-LVL IV: ICD-10-PCS | Mod: PBBFAC,,, | Performed by: FAMILY MEDICINE

## 2023-08-14 PROCEDURE — 99214 OFFICE O/P EST MOD 30 MIN: CPT | Mod: PBBFAC | Performed by: FAMILY MEDICINE

## 2023-08-14 PROCEDURE — 3078F DIAST BP <80 MM HG: CPT | Mod: CPTII,S$GLB,, | Performed by: FAMILY MEDICINE

## 2023-08-14 PROCEDURE — 99999 PR PBB SHADOW E&M-EST. PATIENT-LVL IV: CPT | Mod: PBBFAC,,, | Performed by: FAMILY MEDICINE

## 2023-08-14 PROCEDURE — 3078F PR MOST RECENT DIASTOLIC BLOOD PRESSURE < 80 MM HG: ICD-10-PCS | Mod: CPTII,S$GLB,, | Performed by: FAMILY MEDICINE

## 2023-08-14 PROCEDURE — 99214 PR OFFICE/OUTPT VISIT, EST, LEVL IV, 30-39 MIN: ICD-10-PCS | Mod: S$GLB,,, | Performed by: FAMILY MEDICINE

## 2023-08-14 PROCEDURE — 1160F PR REVIEW ALL MEDS BY PRESCRIBER/CLIN PHARMACIST DOCUMENTED: ICD-10-PCS | Mod: CPTII,S$GLB,, | Performed by: FAMILY MEDICINE

## 2023-08-14 PROCEDURE — 3008F PR BODY MASS INDEX (BMI) DOCUMENTED: ICD-10-PCS | Mod: CPTII,S$GLB,, | Performed by: FAMILY MEDICINE

## 2023-08-14 PROCEDURE — 3008F BODY MASS INDEX DOCD: CPT | Mod: CPTII,S$GLB,, | Performed by: FAMILY MEDICINE

## 2023-08-14 PROCEDURE — 99214 OFFICE O/P EST MOD 30 MIN: CPT | Mod: S$GLB,,, | Performed by: FAMILY MEDICINE

## 2023-08-14 PROCEDURE — 84443 ASSAY THYROID STIM HORMONE: CPT | Performed by: FAMILY MEDICINE

## 2023-08-14 PROCEDURE — 36415 COLL VENOUS BLD VENIPUNCTURE: CPT | Performed by: FAMILY MEDICINE

## 2023-08-14 PROCEDURE — 1159F PR MEDICATION LIST DOCUMENTED IN MEDICAL RECORD: ICD-10-PCS | Mod: CPTII,S$GLB,, | Performed by: FAMILY MEDICINE

## 2023-08-14 PROCEDURE — 1160F RVW MEDS BY RX/DR IN RCRD: CPT | Mod: CPTII,S$GLB,, | Performed by: FAMILY MEDICINE

## 2023-08-14 PROCEDURE — 1159F MED LIST DOCD IN RCRD: CPT | Mod: CPTII,S$GLB,, | Performed by: FAMILY MEDICINE

## 2023-08-14 RX ORDER — PANTOPRAZOLE SODIUM 40 MG/1
40 TABLET, DELAYED RELEASE ORAL DAILY
Qty: 90 TABLET | Refills: 3 | Status: SHIPPED | OUTPATIENT
Start: 2023-08-14 | End: 2024-02-20 | Stop reason: SDUPTHER

## 2023-08-14 RX ORDER — LEVOTHYROXINE SODIUM 50 UG/1
50 TABLET ORAL
Qty: 90 TABLET | Refills: 0 | Status: SHIPPED | OUTPATIENT
Start: 2023-08-14 | End: 2023-08-25 | Stop reason: SDUPTHER

## 2023-08-14 NOTE — PROGRESS NOTES
OFFICE VISIT 8/14/23  9:00 AM CDT    Subjective   CHIEF COMPLAINT: Pain in buttock    She comes in complaining of a few days' history of pain in her upper right buttock. She describes it as an aching pain. Exacerbating factors include pressure to the area, such as when she is sitting on a toilet. She identifies no recent relevant injury. The location is slightly inferior to the area of the right sacroiliac joint. An exam assisted by a Medical Assistant reveals no hyperthermia, erythema, or induration. She has tenderness in the area described. No appreciable deep palpable mass was detected during my exam, but she says she can perceive an area of swelling in the region. We discussed the differential diagnosis. She agreed to proceed with an evaluation using an ultrasound of the affected area.     She is due for a TSH lab to monitor her hypothyroidism.    She is doing well on pantoprazole for her laryngeal esophageal reflux.    She continues to follow with her psychiatrist for her bipolar disorder, anxiety, and opioid dependence.    She says that she has not had a seizure in so many years that she cannot remember when her last one was. She has not seen a neurologist in a long time either. It is unclear if the lamotrigine she is on is for the treatment of her seizures or for mood stabilization regarding her bipolar disorder. She agreed to discuss this with her psychiatrist. If her psychiatrist feels she needs it for bipolar disorder, he is to begin managing that medication. Else, I am going to check an EEG, and if it shows no seizure activity, then she agreed to a Neurology E-consult for consideration of weaning and discontinuing her lamotrigine.        Review of Systems   Constitutional:  Negative for chills, diaphoresis and fever.         Objective   Vitals:    08/14/23 0850   BP: 120/70   BP Location: Right arm   Patient Position: Sitting   BP Method: Medium (Manual)   Pulse: 109   Temp: 98 °F (36.7 °C)   TempSrc:  "Oral   SpO2: 99%   Weight: 56 kg (123 lb 7.3 oz)   Height: 5' 4" (1.626 m)   Physical Exam  Vitals reviewed.   Constitutional:       General: She is not in acute distress.     Appearance: Normal appearance. She is not ill-appearing, toxic-appearing or diaphoretic.   Cardiovascular:      Rate and Rhythm: Normal rate.   Pulmonary:      Effort: Pulmonary effort is normal.   Skin:     General: Skin is warm and dry.      Findings: No rash.   Neurological:      Mental Status: She is alert and oriented to person, place, and time. Mental status is at baseline.   Psychiatric:         Mood and Affect: Mood normal.         Behavior: Behavior normal.         Thought Content: Thought content normal.         Judgment: Judgment normal.          Assessment and Plan   1. Pain in buttock  -     US Pelvis Limited Non OB; Future; Expected date: 08/14/2023    2. Acquired hypothyroidism  Assessment & Plan:  Lab Results   Component Value Date    TSH 2.121 12/20/2021    TSH 2.887 11/22/2021    TSH 3.338 10/11/2021    TSH 3.338 10/11/2021    FREET4 0.87 12/14/2020     No results found for: "THYROIDSTIMI", "THYROPEROXID", "THYGLBTUM", "THGABSCRN", "THYRGINTERP"     Orders:  -     TSH; Future; Expected date: 08/14/2023  -     levothyroxine (SYNTHROID) 50 MCG tablet; Take 1 tablet (50 mcg total) by mouth before breakfast. - Labs and Appointment required for more refills.  Dispense: 90 tablet; Refill: 0    3. Laryngopharyngeal reflux (LPR)  -     pantoprazole (PROTONIX) 40 MG tablet; Take 1 tablet (40 mg total) by mouth once daily.  Dispense: 90 tablet; Refill: 3    4. Anxiety    5. Bipolar disorder, in partial remission, most recent episode mixed  Overview:  PSYCHIATRIST: Dr. Falcon ([facility])    Assessment & Plan:  This is a chronic problem that appears compensated/controlled and stable.      6. Uncomplicated opioid dependence  Overview:  PSYCHIATRIST: Dr. Falcon    Assessment & Plan:  Slowly weaning Suboxone.      7. Partial symptomatic " "epilepsy with complex partial seizures, not intractable, without status epilepticus  Overview:  She denies history of generalized seizures.    Orders:  -     EEG,w/awake & drowsy record; Future    Unless noted herein, any chronic conditions are represented as and appear stable, and no other significant complaints or concerns were reported.    Follow up in about 1 week (around 8/21/2023), or if symptoms worsen or fail to improve, for review test results after completion, discuss plan, re-evaluation.        Documentation entered by me for this encounter may have been done in part using speech-recognition technology. Although I have made an effort to ensure accuracy, "sound like" errors may exist and should be interpreted in context.  "

## 2023-08-14 NOTE — Clinical Note
Kilo Corbin.  She sees you on 8/17. See my note about pain her right buttock. If her ultrasound is negative, would you evaluate her further for possible piriformis syndrome?  Thank you for your help caring for our patients!  -LILA

## 2023-08-14 NOTE — ASSESSMENT & PLAN NOTE
"Lab Results   Component Value Date    TSH 2.121 12/20/2021    TSH 2.887 11/22/2021    TSH 3.338 10/11/2021    TSH 3.338 10/11/2021    FREET4 0.87 12/14/2020     No results found for: "THYROIDSTIMI", "THYROPEROXID", "THYGLBTUM", "THGABSCRN", "THYRGINTERP"   "

## 2023-08-17 ENCOUNTER — HOSPITAL ENCOUNTER (OUTPATIENT)
Dept: RADIOLOGY | Facility: HOSPITAL | Age: 53
Discharge: HOME | End: 2023-08-17
Attending: INTERNAL MEDICINE
Payer: MEDICARE

## 2023-08-17 ENCOUNTER — OFFICE VISIT (OUTPATIENT)
Dept: SPORTS MEDICINE | Facility: CLINIC | Age: 53
End: 2023-08-17
Payer: MEDICARE

## 2023-08-17 VITALS — WEIGHT: 123 LBS | HEIGHT: 64 IN | BODY MASS INDEX: 21 KG/M2

## 2023-08-17 DIAGNOSIS — S73.192D ACETABULAR LABRUM TEAR, LEFT, SUBSEQUENT ENCOUNTER: ICD-10-CM

## 2023-08-17 DIAGNOSIS — M25.552 LEFT HIP PAIN: ICD-10-CM

## 2023-08-17 DIAGNOSIS — G47.33 OSA (OBSTRUCTIVE SLEEP APNEA): ICD-10-CM

## 2023-08-17 DIAGNOSIS — M94.0 COSTOCHONDRITIS: ICD-10-CM

## 2023-08-17 DIAGNOSIS — M25.852 FEMOROACETABULAR IMPINGEMENT OF LEFT HIP: Primary | ICD-10-CM

## 2023-08-17 PROCEDURE — 99214 OFFICE O/P EST MOD 30 MIN: CPT | Mod: 25,S$GLB,, | Performed by: STUDENT IN AN ORGANIZED HEALTH CARE EDUCATION/TRAINING PROGRAM

## 2023-08-17 PROCEDURE — 99213 OFFICE O/P EST LOW 20 MIN: CPT | Mod: PBBFAC,25 | Performed by: STUDENT IN AN ORGANIZED HEALTH CARE EDUCATION/TRAINING PROGRAM

## 2023-08-17 PROCEDURE — 3008F PR BODY MASS INDEX (BMI) DOCUMENTED: ICD-10-PCS | Mod: CPTII,S$GLB,, | Performed by: STUDENT IN AN ORGANIZED HEALTH CARE EDUCATION/TRAINING PROGRAM

## 2023-08-17 PROCEDURE — 3008F BODY MASS INDEX DOCD: CPT | Mod: CPTII,S$GLB,, | Performed by: STUDENT IN AN ORGANIZED HEALTH CARE EDUCATION/TRAINING PROGRAM

## 2023-08-17 PROCEDURE — 99999 PR PBB SHADOW E&M-EST. PATIENT-LVL III: ICD-10-PCS | Mod: PBBFAC,,, | Performed by: STUDENT IN AN ORGANIZED HEALTH CARE EDUCATION/TRAINING PROGRAM

## 2023-08-17 PROCEDURE — 1159F MED LIST DOCD IN RCRD: CPT | Mod: CPTII,S$GLB,, | Performed by: STUDENT IN AN ORGANIZED HEALTH CARE EDUCATION/TRAINING PROGRAM

## 2023-08-17 PROCEDURE — 1159F PR MEDICATION LIST DOCUMENTED IN MEDICAL RECORD: ICD-10-PCS | Mod: CPTII,S$GLB,, | Performed by: STUDENT IN AN ORGANIZED HEALTH CARE EDUCATION/TRAINING PROGRAM

## 2023-08-17 PROCEDURE — 20611 LARGE JOINT ASPIRATION/INJECTION: L HIP JOINT: ICD-10-PCS | Mod: LT,S$GLB,, | Performed by: STUDENT IN AN ORGANIZED HEALTH CARE EDUCATION/TRAINING PROGRAM

## 2023-08-17 PROCEDURE — 99214 PR OFFICE/OUTPT VISIT, EST, LEVL IV, 30-39 MIN: ICD-10-PCS | Mod: 25,S$GLB,, | Performed by: STUDENT IN AN ORGANIZED HEALTH CARE EDUCATION/TRAINING PROGRAM

## 2023-08-17 PROCEDURE — 99999 PR PBB SHADOW E&M-EST. PATIENT-LVL III: CPT | Mod: PBBFAC,,, | Performed by: STUDENT IN AN ORGANIZED HEALTH CARE EDUCATION/TRAINING PROGRAM

## 2023-08-17 PROCEDURE — 71046 X-RAY EXAM CHEST 2 VIEWS: CPT | Mod: TC

## 2023-08-17 PROCEDURE — 71046 XR CHEST PA AND LATERAL: ICD-10-PCS | Mod: 26,,, | Performed by: RADIOLOGY

## 2023-08-17 PROCEDURE — 20611 DRAIN/INJ JOINT/BURSA W/US: CPT | Mod: PBBFAC | Performed by: STUDENT IN AN ORGANIZED HEALTH CARE EDUCATION/TRAINING PROGRAM

## 2023-08-17 PROCEDURE — 71046 X-RAY EXAM CHEST 2 VIEWS: CPT | Mod: 26,,, | Performed by: RADIOLOGY

## 2023-08-17 RX ORDER — MELOXICAM 15 MG/1
15 TABLET ORAL DAILY
Qty: 30 TABLET | Refills: 1 | Status: SHIPPED | OUTPATIENT
Start: 2023-08-17

## 2023-08-17 RX ORDER — TRIAMCINOLONE ACETONIDE 40 MG/ML
40 INJECTION, SUSPENSION INTRA-ARTICULAR; INTRAMUSCULAR
Status: DISCONTINUED | OUTPATIENT
Start: 2023-08-17 | End: 2023-08-17 | Stop reason: HOSPADM

## 2023-08-17 RX ADMIN — TRIAMCINOLONE ACETONIDE 40 MG: 40 INJECTION, SUSPENSION INTRA-ARTICULAR; INTRAMUSCULAR at 08:08

## 2023-08-17 NOTE — PROCEDURES
Large Joint Aspiration/Injection: L hip joint    Date/Time: 8/17/2023 8:40 AM    Performed by: Kilo Koroma MD  Authorized by: Kilo Koroma MD    Consent Done?:  Yes (Verbal)  Indications:  Pain  Site marked: the procedure site was marked    Timeout: prior to procedure the correct patient, procedure, and site was verified    Prep: patient was prepped and draped in usual sterile fashion      Local anesthesia used?: Yes    Local anesthetic:  Topical anesthetic    Details:  Needle Size:  22 G  Ultrasonic Guidance for needle placement?: Yes    Images are saved and documented.  Approach:  Anterior  Location:  Hip  Site:  L hip joint  Medications:  40 mg triamcinolone acetonide 40 mg/mL  Patient tolerance:  Patient tolerated the procedure well with no immediate complications     Ultrasound guidance was used for needle localization. Images were saved and stored for documentation. The appropriate structures were visualized. Dynamic visualization of the needle was continuous throughout the procedures and maintained good position.     We discussed the proper protocols after the injection such as no submerging pools, baths tubs, or hot tubs for 24 hr.  Showering is okay today.  We also discussed that blood sugars can be elevated after an injection and asked patient to properly checked her sugars over the next few days and contact their PCP if there are any concerns.  We discussed red flags such as fevers, chills, red, warm, tender joint at the area of injection to please seek medical care immediately.

## 2023-08-17 NOTE — PROGRESS NOTES
Patient ID: Laurel Calvo  YOB: 1970  MRN: [id number]    Chief Complaint: Pain of the Left Hip      Referred By:  Dr. Joby faulkner for left hip pain    History of Present Illness: Laurel Calvo is a 53-year-old female presenting today for postoperative hip pain following hip scope with Dr. Maria E faulkner 1 year ago.  Still having left hip joint pain in [place] her from moving forward with regular exercise and work.  Here for intra-articular injection.  Of note over last couple weeks has developed some left rib cage pain worse with specific movements pressure as well as lying down at night.  Denies any shortness of breath bowel or bladder changes.  Areas tender palpation and again worse with lying down she is currently taking ibuprofen in not seeing much relief.    Past Medical History:   Past Medical History:   Diagnosis Date    Anxiety     Arthritis     Chronic left-sided low back pain without sciatica 9/28/2018    Chronic pain of left knee 9/28/2018    XR KNEE COMP LEFT 4 OR MORE VIEWS - October 27, 2017 CLINICAL HISTORY:   Left knee pain FINDINGS:   No fracture, dislocation, or arthritic change of the knee. Dictated and Electronically Signed By: Jem Damon MD on October 27, 2017    GERD (gastroesophageal reflux disease)     Lupus     Migraine with aura and without status migrainosus, not intractable 8/8/2014    Seizures     Thyroid disease      Past Surgical History:   Procedure Laterality Date    ARTHROSCOPY, HIP Left 7/28/2022    Procedure: ARTHROSCOPY, HIP, WITH FEMOROPLASTY;  Surgeon: Wm Robbins MD;  Location: [facility] OR;  Service: Orthopedics;  Laterality: Left;  Left hip arthroscopic capsular closure    ARTHROSCOPY, HIP Left 7/28/2022    Procedure: ARTHROSCOPY, HIP, WITH LABRUM REPAIR;  Surgeon: Wm Robbins MD;  Location: [facility] OR;  Service: Orthopedics;  Laterality: Left;    COLONOSCOPY N/A 5/17/2023    Procedure: COLONOSCOPY;  Surgeon: Janet Quigley MD;   Location: [facility] ENDO;  Service: Endoscopy;  Laterality: N/A;    HYSTERECTOMY  2016    INJECTION OF ANESTHETIC AGENT INTO SACROILIAC JOINT Left 3/31/2022    Procedure: Left intra-articular hip joint injection;  Surgeon: Donato Manley MD;  Location: [facility] PAIN MGT;  Service: Pain Management;  Laterality: Left;    INJECTION OF ANESTHETIC AGENT INTO SACROILIAC JOINT  3/31/2022    Procedure: ;  Surgeon: Donato Manley MD;  Location: [facility] PAIN MGT;  Service: Pain Management;;    INJECTION OF ANESTHETIC AGENT INTO SACROILIAC JOINT Bilateral 5/12/2022    Procedure: Bilateral GT bursa + bilateral SIJ injection RN IV Sedation;  Surgeon: Donato Manley MD;  Location: [facility] PAIN MGT;  Service: Pain Management;  Laterality: Bilateral;    INJECTION OF JOINT Bilateral 5/12/2022    Procedure: Bilateral GT bursa + bilateral SIJ injection RN IV Sedation;  Surgeon: Donato Manley MD;  Location: [facility] PAIN MGT;  Service: Pain Management;  Laterality: Bilateral;    OOPHORECTOMY      TONSILLECTOMY      WISDOM TOOTH EXTRACTION      X 4     Family History   Problem Relation Age of Onset    No Known Problems Mother     No Known Problems Father     No Known Problems Sister     No Known Problems Brother     Stomach cancer Paternal Grandfather     Breast cancer Maternal Grandmother     Breast cancer Paternal Grandmother     Colon cancer Neg Hx      Social History     Socioeconomic History    Marital status:     Number of children: 0   Tobacco Use    Smoking status: Never    Smokeless tobacco: Never   Substance and Sexual Activity    Alcohol use: Yes     Comment: Occasional/Rare    Drug use: Yes     Types: Marijuana     Comment: synthetic marijuana - gummie - last 48hrs ago 25mg    Sexual activity: Yes     Partners: Male   Social History Narrative    No smokers in household, 1 dog.     Medication List with Changes/Refills   Current Medications    ACETAMINOPHEN (TYLENOL) 500 MG TABLET    Take 2 tablets (1,000 mg total) by mouth  every 8 (eight) hours as needed for Pain.    EPINEPHRINE (EPIPEN) 0.3 MG/0.3 ML ATIN    Inject 0.3 mg into the muscle daily as needed.    ESTRADIOL (ESTRACE) 2 MG TABLET    TAKE 1 TABLET BY MOUTH ONE TIME DAILY    GABAPENTIN (NEURONTIN) 300 MG CAPSULE    Take 3 capsules (900 mg total) by mouth 3 (three) times daily.    LAMOTRIGINE (LAMICTAL) 100 MG TABLET    Take 1 tablet (100 mg total) by mouth 2 (two) times daily.    LEVOTHYROXINE (SYNTHROID) 50 MCG TABLET    Take 1 tablet (50 mcg total) by mouth before breakfast. - Labs and Appointment required for more refills.    MULTIVITAMIN (THERAGRAN) PER TABLET    Take 1 tablet by mouth once daily.     PANTOPRAZOLE (PROTONIX) 40 MG TABLET    Take 1 tablet (40 mg total) by mouth once daily.    QUETIAPINE (SEROQUEL) 100 MG TAB    Take 1 tablet by mouth every evening.    ROPINIROLE (REQUIP) 1 MG TABLET    TAKE ONE TABLET BY MOUTH EVERY EVENING AS NEEDED FOR FOR RESTLESSNESS LEGS    TESTOSTERONE CYPIONATE (DEPOTESTOTERONE CYPIONATE) 200 MG/ML INJECTION    Inject into the muscle every 28 days. (Dose? - By Dr. Brennen Mercer at [facility])    VITAMIN D 1000 UNITS TAB    Take 1,000 Units by mouth once daily.     Review of patient's allergies indicates:   Allergen Reactions    Bactrim [sulfamethoxazole-trimethoprim] Other (See Comments)     Lupus symptoms exacerbation, headache.    Ciprofloxacin Other (See Comments)     Lupus symptoms exacerbation, headaches.       Physical Exam:   Body mass index is 21.11 kg/m².    GENERAL: Well appearing, in no acute distress.  HEAD: Normocephalic and atraumatic.  ENT: External ears and nose grossly normal.  EYES: EOMI bilaterally  PULMONARY: Respirations are grossly even and non-labored.  NEURO: Awake, alert, and oriented x 3.  SKIN: No obvious rashes appreciated.  PSYCH: Mood & affect are appropriate.    Detailed MSK exam:     Positive pain with rib compression.  Tenderness over the costochondral muscles between the  9th 10th and 11th rib.  No pain with deep breath or coughs.  Pain with lateral twisting noted over the lap left lateral chest as well.    Imaging:  X-Ray Hip 4 or more views Left (with Pelvis when performed)  Narrative: EXAM: XR HIP WITH PELVIS WHEN PERFORMED, 4 OR MORE VIEWS LEFT    CLINICAL INDICATION:   Pelvis and perineal pain.  Pain in left hip    FINDINGS:  Comparisons are made to 02/21/2022.  AP view of the pelvis, as well as AP and lateral views of the right hip were submitted for interpretation.  The femoral heads are well centered on the acetabulum.  The hip joints are well-maintained.  Mild acetabular spurring noted on the left.  Left femoral head bone island noted.    Alignment is satisfactory. No     fractures, dislocations, or erosive arthritic change.  Negative for radiopaque foreign bodies or air in the soft tissues.  Normal bowel gas pattern.  Phleboliths versus ureteroliths are present within the pelvis.  Impression: 1.  Negative for acute process involving the visualized osseous structures.  2.  Stable findings as noted above.    Finalized on: 7/26/2023 11:10 AM By:  Abdelrahman Rhodes MD  [facility]# [id number]      2023-07-26 11:12:36.143    [facility]      Relevant imaging results were reviewed and interpreted by me and per my read as above.  This was discussed with the patient and / or family today.     Assessment:  Laurel Calvo is a 53 y.o. female presents today for left anterior hip pain most consistent with femoral task acetabular impingement status post his scope 1 year ago with persistent symptoms.  Please refer to procedure note for the details for intra-articular injection.  As for her left lateral chest wall pain.  Likely costochondral versus oblique strain.  Oral anti-inflammatories discussed relative rest can take weeks to improve.  Follow-up with me as needed in the future.    Femoroacetabular impingement of left hip    Left hip pain  -     Large Joint Aspiration/Injection: L hip joint  -     Sports  Medicine US - Guidance for Needle Placement    Acetabular labrum tear, left, subsequent encounter    Costochondritis  -     meloxicam (MOBIC) 15 MG tablet; Take 1 tablet (15 mg total) by mouth once daily.  Dispense: 30 tablet; Refill: 1         A copy of today's visit note has been sent to the referring provider.       Kilo Koroma MD    Disclaimer: This note was prepared using a voice recognition system and is likely to have sound alike errors within the text.

## 2023-08-17 NOTE — PATIENT INSTRUCTIONS
Assessment:  Laurel Calvo is a 53 y.o. female   Chief Complaint   Patient presents with    Left Hip - Pain       Encounter Diagnoses   Name Primary?    Left hip pain     Femoroacetabular impingement of left hip Yes    Acetabular labrum tear, left, subsequent encounter     Costochondritis         Plan:  We discussed the proper protocols after the injection such as no submerging pools, baths tubs, or hot tubs for 24 hr.  Showering is okay today.  We also discussed that blood sugars can be elevated after an injection and asked patient to properly checked her sugars over the next few days and contact their PCP if there are any concerns.  We discussed red flags such as fevers, chills, red, warm, tender joint at the area of injection to please seek medical care immediately.    You were prescribed meloxicam today as an anti-inflammatory medicine for the pain you are having.  Please take this medicine once a day with food for 2 weeks, and then as needed for days with significant recurrent pain.  Please do not take any other anti-inflammatories such as naproxen, ibuprofen, Aleve at the same time you are taking this medicine.   Over the counter lidocaine patches for rib pain     Follow-up: With Dr Robbins's team or sooner if there are any problems between now and then.    Thank you for choosing [facility] [place] and Dr. Kilo Koroma for your orthopedic & sports medicine care. It is our goal to provide you with exceptional care that will help keep you healthy, active, and get you back in the game.    Please do not hesitate to reach out to us via email, phone, or MyChart with any questions, concerns, or feedback.    If you felt that you received exemplary care today, please consider leaving us feedback on [facility] at:  [email or web address]    If you are experiencing pain/discomfort ,or have questions after 5pm and would like to be connected to the [facility] [place] on-call team, please call this number and specify which Sports Medicine provider is treating you: [phone]

## 2023-08-18 ENCOUNTER — HOSPITAL ENCOUNTER (OUTPATIENT)
Dept: RADIOLOGY | Facility: HOSPITAL | Age: 53
Discharge: HOME | End: 2023-08-18
Attending: FAMILY MEDICINE
Payer: MEDICARE

## 2023-08-18 DIAGNOSIS — M79.18 PAIN IN BUTTOCK: ICD-10-CM

## 2023-08-18 PROCEDURE — 76857 US EXAM PELVIC LIMITED: CPT | Mod: TC

## 2023-08-18 PROCEDURE — 76857 US EXAM PELVIC LIMITED: CPT | Mod: 26,,, | Performed by: RADIOLOGY

## 2023-08-18 PROCEDURE — 76857 US SOFT TISSUE BUTTOCKS: ICD-10-PCS | Mod: 26,,, | Performed by: RADIOLOGY

## 2023-08-23 ENCOUNTER — PATIENT MESSAGE (OUTPATIENT)
Dept: INTERNAL MEDICINE | Facility: CLINIC | Age: 53
End: 2023-08-23
Payer: MEDICARE

## 2023-08-25 ENCOUNTER — HOSPITAL ENCOUNTER (OUTPATIENT)
Dept: CARDIOLOGY | Facility: HOSPITAL | Age: 53
Discharge: HOME | End: 2023-08-25
Attending: FAMILY MEDICINE
Payer: MEDICARE

## 2023-08-25 ENCOUNTER — OFFICE VISIT (OUTPATIENT)
Dept: RHEUMATOLOGY | Facility: CLINIC | Age: 53
End: 2023-08-25
Payer: MEDICARE

## 2023-08-25 ENCOUNTER — HOSPITAL ENCOUNTER (OUTPATIENT)
Dept: RADIOLOGY | Facility: HOSPITAL | Age: 53
Discharge: HOME | End: 2023-08-25
Attending: FAMILY MEDICINE
Payer: MEDICARE

## 2023-08-25 ENCOUNTER — OFFICE VISIT (OUTPATIENT)
Dept: INTERNAL MEDICINE | Facility: CLINIC | Age: 53
End: 2023-08-25
Payer: MEDICARE

## 2023-08-25 VITALS
DIASTOLIC BLOOD PRESSURE: 77 MMHG | RESPIRATION RATE: 16 BRPM | HEART RATE: 75 BPM | BODY MASS INDEX: 20.43 KG/M2 | WEIGHT: 119.69 LBS | HEIGHT: 64 IN | SYSTOLIC BLOOD PRESSURE: 114 MMHG

## 2023-08-25 DIAGNOSIS — E03.9 ACQUIRED HYPOTHYROIDISM: Chronic | ICD-10-CM

## 2023-08-25 DIAGNOSIS — L65.9 HAIR LOSS: ICD-10-CM

## 2023-08-25 DIAGNOSIS — R63.4 WEIGHT LOSS: ICD-10-CM

## 2023-08-25 DIAGNOSIS — R07.81 PLEURITIC CHEST PAIN: ICD-10-CM

## 2023-08-25 DIAGNOSIS — R07.81 PLEURITIC CHEST PAIN: Primary | ICD-10-CM

## 2023-08-25 DIAGNOSIS — D84.9 IMMUNOSUPPRESSED STATUS: ICD-10-CM

## 2023-08-25 DIAGNOSIS — L93.0 DISCOID LUPUS: ICD-10-CM

## 2023-08-25 DIAGNOSIS — Z79.899 HIGH RISK MEDICATION USE: ICD-10-CM

## 2023-08-25 DIAGNOSIS — R79.89 ELEVATED LFTS: Primary | ICD-10-CM

## 2023-08-25 PROCEDURE — 99215 OFFICE O/P EST HI 40 MIN: CPT | Mod: S$GLB,,,

## 2023-08-25 PROCEDURE — 3008F PR BODY MASS INDEX (BMI) DOCUMENTED: ICD-10-PCS | Mod: CPTII,S$GLB,,

## 2023-08-25 PROCEDURE — 1160F RVW MEDS BY RX/DR IN RCRD: CPT | Mod: CPTII,S$GLB,,

## 2023-08-25 PROCEDURE — 71046 XR CHEST PA AND LATERAL: ICD-10-PCS | Mod: 26,,, | Performed by: RADIOLOGY

## 2023-08-25 PROCEDURE — 99213 OFFICE O/P EST LOW 20 MIN: CPT | Mod: 95,,, | Performed by: FAMILY MEDICINE

## 2023-08-25 PROCEDURE — 3078F DIAST BP <80 MM HG: CPT | Mod: CPTII,S$GLB,,

## 2023-08-25 PROCEDURE — 1159F PR MEDICATION LIST DOCUMENTED IN MEDICAL RECORD: ICD-10-PCS | Mod: CPTII,S$GLB,,

## 2023-08-25 PROCEDURE — 1160F PR REVIEW ALL MEDS BY PRESCRIBER/CLIN PHARMACIST DOCUMENTED: ICD-10-PCS | Mod: CPTII,S$GLB,,

## 2023-08-25 PROCEDURE — 71046 X-RAY EXAM CHEST 2 VIEWS: CPT | Mod: 26,,, | Performed by: RADIOLOGY

## 2023-08-25 PROCEDURE — 93005 ELECTROCARDIOGRAM TRACING: CPT

## 2023-08-25 PROCEDURE — 99999 PR PBB SHADOW E&M-EST. PATIENT-LVL V: CPT | Mod: PBBFAC,,,

## 2023-08-25 PROCEDURE — 3074F PR MOST RECENT SYSTOLIC BLOOD PRESSURE < 130 MM HG: ICD-10-PCS | Mod: CPTII,S$GLB,,

## 2023-08-25 PROCEDURE — 3008F BODY MASS INDEX DOCD: CPT | Mod: CPTII,S$GLB,,

## 2023-08-25 PROCEDURE — 3074F SYST BP LT 130 MM HG: CPT | Mod: CPTII,S$GLB,,

## 2023-08-25 PROCEDURE — 1159F MED LIST DOCD IN RCRD: CPT | Mod: CPTII,S$GLB,,

## 2023-08-25 PROCEDURE — 71046 X-RAY EXAM CHEST 2 VIEWS: CPT | Mod: TC

## 2023-08-25 PROCEDURE — 99215 PR OFFICE/OUTPT VISIT, EST, LEVL V, 40-54 MIN: ICD-10-PCS | Mod: S$GLB,,,

## 2023-08-25 PROCEDURE — 93010 EKG 12-LEAD: ICD-10-PCS | Mod: S$GLB,,, | Performed by: INTERNAL MEDICINE

## 2023-08-25 PROCEDURE — 99213 PR OFFICE/OUTPT VISIT, EST, LEVL III, 20-29 MIN: ICD-10-PCS | Mod: 95,,, | Performed by: FAMILY MEDICINE

## 2023-08-25 PROCEDURE — 93010 ELECTROCARDIOGRAM REPORT: CPT | Mod: S$GLB,,, | Performed by: INTERNAL MEDICINE

## 2023-08-25 PROCEDURE — 99999 PR PBB SHADOW E&M-EST. PATIENT-LVL V: ICD-10-PCS | Mod: PBBFAC,,,

## 2023-08-25 PROCEDURE — 3078F PR MOST RECENT DIASTOLIC BLOOD PRESSURE < 80 MM HG: ICD-10-PCS | Mod: CPTII,S$GLB,,

## 2023-08-25 PROCEDURE — 99215 OFFICE O/P EST HI 40 MIN: CPT | Mod: PBBFAC,25

## 2023-08-25 RX ORDER — LEVOTHYROXINE SODIUM 50 UG/1
50 TABLET ORAL
Qty: 90 TABLET | Refills: 0 | Status: SHIPPED | OUTPATIENT
Start: 2023-08-25 | End: 2023-10-06 | Stop reason: SDUPTHER

## 2023-08-25 RX ORDER — MYCOPHENOLATE MOFETIL 500 MG/1
500 TABLET ORAL 2 TIMES DAILY
Qty: 180 TABLET | Refills: 1 | Status: SHIPPED | OUTPATIENT
Start: 2023-08-25 | End: 2024-08-24

## 2023-08-25 RX ORDER — NAPROXEN 500 MG/1
500 TABLET ORAL 2 TIMES DAILY
Qty: 28 TABLET | Refills: 0 | Status: SHIPPED | OUTPATIENT
Start: 2023-08-25

## 2023-08-25 NOTE — PROGRESS NOTES
RHEUMATOLOGY OUTPATIENT CLINIC NOTE    08/25/2023    Subjective:     Patient ID: Laurel Calvo is a 53 y.o. female.    Chief Complaint: Lupus      Laurel Calvo is a 53 y.o. pleasant female here for rheumatology follow up for pleuritic chest pain.  Followed in the clinic for discoid lupus.  Previously seen by my colleagues, 1st time seeing me.    Main concern today is sharp stabbing pain on her left chest which started 3 weeks ago, worse in the last 48 hours.  Worse with lying flat on her back, deep breaths.  Improved with leaning forward.  Denies any shortness of breath.  Denies any fever.  No significant improvement with ibuprofen 400 mg twice a day    She also has other concerns over the last few months:   -reports swelling in her DIPs, stiffness in her PIP use worse in the middle of the night and improved movement,   + hair falling out.  No improvement with Rogaine x multiple months  + oral ulcers.  Both painful, 1 in lower lip, another in posterior mouth.  Resolved on their own.  +weight loss.  4 lb within the last few weeks.  Has an appetite and is eating consistently.  + painful tender point right buttocks.  No rash, no erythema in the area      Denies fever, no cough, no dyspnea, no hot hemoptysis.  No photosensitivity, no sunsensitive rashes, no nodules or other skin lesions. No triphasic discoloration of digits upon cold exposure.  No   unexplained fevers,  no syncopal or near syncopal episodes, no recurrent infections.  No hematuria or dysuria, no flank pain.   No redness, warmth   to any of her peripheral joints, no muscle weakness. No lower extremity edema.           Current therapy:  CellCept 500 mg b.i.d.    Prior Therapy:  Hydroxychloroquine - discontinued per Ophthalmology    Serologies:  Most recent SU negative, MyoMarker panel negative  Normal complements    Physical Exam:  No tachycardia or tachypnea.  Tender to left upper medial buttocks, no rash, no erythema, no increased warmth,  "no mass in that area.   No obvious synovitis, no erythema, no increased warmth to any joints deo UE/LE.    Strength 5/5 bilateral UE/LE.   Full ROM deo UE/LE. 100% fist formation. Negative MTP squeeze.  No malar rash. No active Raynauds. No digital ulcers. No psoriasis.   No oral ulcers. Good salivary production.        Objective:     /77 (BP Location: Left arm, Patient Position: Sitting, BP Method: Medium (Automatic))   Pulse 75   Resp 16   Ht 5' 4" (1.626 m)   Wt 54.3 kg (119 lb 11.4 oz)   LMP 08/26/2013   BMI 20.55 kg/m²         Recent Results (from the past 672 hour(s))   TSH    Collection Time: 08/14/23 10:08 AM   Result Value Ref Range    TSH 3.545 0.400 - 4.000 uIU/mL       No results found for: "TBGOLDPLUS"   Lab Results   Component Value Date    HEPCAB Negative 12/20/2021        Assessment:       1. Pleuritic chest pain    2. Discoid lupus    3. Weight loss    4. Hair loss    5. Immunosuppressed status    6. High risk medication use          Impression:   Pleuritic chest pain:   Pleuritic chest pain x3 weeks unrelieved with ibuprofen at home.  Symptoms worse with lying supine, deep breaths.  Improved with leaning forward.  Denies any shortness of breath, no hemoptysis.  Blood pressure stable, no tachypnea or tachycardia.  Differential include pericarditis, lupus flare, heart failure.    Weight loss:   Patient concerned regarding weight loss.  Has lost 4 lb in the last 11 days.  Prior weight is as follows:  05/17/2023 123 lb   08/14/2023 123 lb   08/25/2023 119 lb  Denies any fever, no decreased appetite    Discoid lupus:  Currently on CellCept 500 mg b.i.d..  Various symptoms within last few months of + morning stiffness, +joint swelling, + hair loss, + oral ulcers, + pleuritic chest pain.  Most recent SU, complements, MyoMarker panel negative.    Hair loss:  Patient reports hair loss.  Unimproved with Rogaine.    Counseling on health promotion and disease prevention  Over 10 minutes spent " regarding below topics:  - Nutrition and exercise counseling.  - Medication counseling provided.      Drug therapy requiring intensive monitoring for toxicity  - High Risk Medication Monitoring encounter  -No current medication related issues, no evidence of toxicity  -I ordered lab results, reviewed and interpreted results as well as discussed findings with the patient     Immunosuppressed Status  - Compromised immune system secondary to autoimmune disease and use of immunosuppressive drugs.   - Monitor carefully for infections and toxicities- Currently denies issues with recurrent infections      - Advised to get immediate medical care if any infection.   - Also advised strict adherence to age appropriate vaccinations and cancer screenings with PCP.  - Recommend keeping Up to date on all recommended Vaccinations suni. ShingRx, Pneumovax, Prevnar, yearly flu vaccine   - Remember to hold immunosuppressive therapy for any infection or vaccination.   Plan:     Pleuritic chest pain   EKG  Labs today  Medication   Start naproxen 500 mg every 12 hours  We will call patient on Monday to see how she is doing.    If no improvement with naproxen, consider prednisone 20 mg daily followed by taper over 2-3 weeks.  Counseling  Advised patient if any shortness of breath, worsening chest pain, other new or worsening symptoms arise, seek urgent medical care  Lupus   Medication  Continue CellCept 500 mg b.i.d.  Continue off of Plaquenil due to ocular history  Update labs today  Weight loss  At this time no concern regarding weight loss.  Will continue to monitor.  Patient to let primary care know if greater than 10 lb weight loss within 1 month, fever, chills  Hair loss  If worsening hair loss, refer to dermatology for evaluation    Labs, EKG today   Staff to call patient Monday to see how she is doing  Return to clinic 3 months at the [facility] with Dr. Skaggs with reg4, lupus labs early      Sherice Carey PA-C  [facility]       40 minutes of total time spent on the encounter, which includes face to face time and non-face to face time preparing to see the patient (eg, review of tests), Obtaining and/or reviewing separately obtained history, Documenting clinical information in the electronic or other health record, Independently interpreting results (not separately reported) and communicating results to the patient/family/caregiver, or Care coordination (not separately reported).     Disclaimer: This note was prepared using voice recognition system and is likely to have sound alike errors and is not proof read.  Please call me with any questions

## 2023-08-25 NOTE — PATIENT INSTRUCTIONS
Apply to buttocks tender point 3-4 times daily for a few days, then as needed  If worsening, consider sending for tender point injection

## 2023-08-25 NOTE — PROGRESS NOTES
TELEMEDICINE VIRTUAL VIDEO VISIT  8/25/23  7:20 AM CDT    Visit Type: Audiovisual    Patient's Location: Laurel represents that they are located within the state of [place].    CHIEF COMPLAINT: Chest pain    Left sided sharp pleuritic pain starting substernal and radiating to under left breast, started 3 weeks ago. Worse by deep inspiration and lying flat. She has rheumatology OV today.  We discussed differential diagnosis and risks and benefits of treatment options. She agreed to discuss this during in-person rheumatology OV and get CXR.    1. Pleuritic chest pain  -     X-Ray Chest PA And Lateral; Future; Expected date: 08/25/2023    2. Acquired hypothyroidism  Assessment & Plan:  Lab Results   Component Value Date    TSH 3.545 08/14/2023    TSH 2.121 12/20/2021    TSH 2.887 11/22/2021    FREET4 0.87 12/14/2020       Orders:  -     levothyroxine (SYNTHROID) 50 MCG tablet; Take 1 tablet (50 mcg total) by mouth before breakfast. - Labs and Appointment required for more refills.  Dispense: 90 tablet; Refill: 0    Unless noted herein, any chronic conditions are represented as and appear stable, and no other significant complaints or concerns were reported.    Review of Systems   Constitutional:  Negative for activity change.   HENT:  Negative for hearing loss, rhinorrhea and trouble swallowing.    Eyes:  Negative for discharge and visual disturbance.   Respiratory:  Negative for chest tightness and wheezing.    Cardiovascular:  Negative for palpitations.   Gastrointestinal:  Negative for blood in stool, constipation, diarrhea and vomiting.   Endocrine: Negative for polydipsia and polyuria.   Genitourinary:  Negative for difficulty urinating, dysuria, hematuria and menstrual problem.   Musculoskeletal:  Negative for arthralgias, joint swelling and neck pain.   Neurological:  Negative for weakness and headaches.   Psychiatric/Behavioral:  Negative for confusion and dysphoric mood.        Physical Exam  Constitutional:   "     General: She is not in acute distress.     Appearance: Normal appearance. She is not ill-appearing.   Pulmonary:      Effort: Pulmonary effort is normal. No respiratory distress.   Skin:     Coloration: Skin is not jaundiced.   Neurological:      Mental Status: She is alert. Mental status is at baseline.   Psychiatric:         Mood and Affect: Mood normal.         Behavior: Behavior normal.         Thought Content: Thought content normal.       Follow up in about 6 months (around 2/25/2024).     Documentation entered by me for this encounter may have been done in part using speech-recognition technology. Although I have made an effort to ensure accuracy, "sound like" errors may exist and should be interpreted in context.   TOTAL TIME evaluating and managing this patient for this encounter was greater than or equal to 28 minutes. This time was exclusive of any separately billable procedures for this patient and exclusive of time spent treating any other patient.    Documentation entered by me for this encounter may have been done in part using speech-recognition technology. Although I have made an effort to ensure accuracy, "sound like" errors may exist and should be interpreted in context.    Each patient to whom medical services are provided by telemedicine is: (1) informed of the relationship between the physician and patient and the respective role of any other health care provider with respect to management of the patient; and (2) notified that he or she may decline to receive medical services by telemedicine and may withdraw from such care at any time.  "

## 2023-08-28 ENCOUNTER — PATIENT MESSAGE (OUTPATIENT)
Dept: RHEUMATOLOGY | Facility: CLINIC | Age: 53
End: 2023-08-28
Payer: MEDICARE

## 2023-08-28 ENCOUNTER — TELEPHONE (OUTPATIENT)
Dept: RHEUMATOLOGY | Facility: CLINIC | Age: 53
End: 2023-08-28
Payer: MEDICARE

## 2023-08-28 NOTE — PROGRESS NOTES
Low voltage of the QRS. Pericarditis can possibly cause this. If no improvement in symptoms with therapy as we discussed I can send a referral to cardiology for further evaluation of possible causes of low voltage QRS and your current symptoms.

## 2023-08-28 NOTE — TELEPHONE ENCOUNTER
----- Message from Sherice Carey PA-C sent at 8/25/2023  3:55 PM CDT -----  Hepatic function panel in 2 weeks please

## 2023-09-02 NOTE — ASSESSMENT & PLAN NOTE
Lab Results   Component Value Date    TSH 3.545 08/14/2023    TSH 2.121 12/20/2021    TSH 2.887 11/22/2021    FREET4 0.87 12/14/2020

## 2023-09-10 ENCOUNTER — PATIENT MESSAGE (OUTPATIENT)
Dept: SPORTS MEDICINE | Facility: CLINIC | Age: 53
End: 2023-09-10
Payer: MEDICARE

## 2023-09-29 ENCOUNTER — OFFICE VISIT (OUTPATIENT)
Dept: SPORTS MEDICINE | Facility: CLINIC | Age: 53
End: 2023-09-29
Payer: MEDICARE

## 2023-09-29 VITALS — HEIGHT: 64 IN | BODY MASS INDEX: 20.43 KG/M2 | WEIGHT: 119.69 LBS

## 2023-09-29 DIAGNOSIS — M25.852 FEMOROACETABULAR IMPINGEMENT OF LEFT HIP: ICD-10-CM

## 2023-09-29 DIAGNOSIS — M25.552 PAIN OF LEFT HIP: Primary | ICD-10-CM

## 2023-09-29 DIAGNOSIS — S73.192D ACETABULAR LABRUM TEAR, LEFT, SUBSEQUENT ENCOUNTER: ICD-10-CM

## 2023-09-29 PROCEDURE — 3008F BODY MASS INDEX DOCD: CPT | Mod: CPTII,S$GLB,, | Performed by: STUDENT IN AN ORGANIZED HEALTH CARE EDUCATION/TRAINING PROGRAM

## 2023-09-29 PROCEDURE — 99214 PR OFFICE/OUTPT VISIT, EST, LEVL IV, 30-39 MIN: ICD-10-PCS | Mod: S$GLB,,, | Performed by: STUDENT IN AN ORGANIZED HEALTH CARE EDUCATION/TRAINING PROGRAM

## 2023-09-29 PROCEDURE — 1160F PR REVIEW ALL MEDS BY PRESCRIBER/CLIN PHARMACIST DOCUMENTED: ICD-10-PCS | Mod: CPTII,S$GLB,, | Performed by: STUDENT IN AN ORGANIZED HEALTH CARE EDUCATION/TRAINING PROGRAM

## 2023-09-29 PROCEDURE — 1159F MED LIST DOCD IN RCRD: CPT | Mod: CPTII,S$GLB,, | Performed by: STUDENT IN AN ORGANIZED HEALTH CARE EDUCATION/TRAINING PROGRAM

## 2023-09-29 PROCEDURE — 3008F PR BODY MASS INDEX (BMI) DOCUMENTED: ICD-10-PCS | Mod: CPTII,S$GLB,, | Performed by: STUDENT IN AN ORGANIZED HEALTH CARE EDUCATION/TRAINING PROGRAM

## 2023-09-29 PROCEDURE — 99999 PR PBB SHADOW E&M-EST. PATIENT-LVL III: ICD-10-PCS | Mod: PBBFAC,,, | Performed by: STUDENT IN AN ORGANIZED HEALTH CARE EDUCATION/TRAINING PROGRAM

## 2023-09-29 PROCEDURE — 1159F PR MEDICATION LIST DOCUMENTED IN MEDICAL RECORD: ICD-10-PCS | Mod: CPTII,S$GLB,, | Performed by: STUDENT IN AN ORGANIZED HEALTH CARE EDUCATION/TRAINING PROGRAM

## 2023-09-29 PROCEDURE — 99999 PR PBB SHADOW E&M-EST. PATIENT-LVL III: CPT | Mod: PBBFAC,,, | Performed by: STUDENT IN AN ORGANIZED HEALTH CARE EDUCATION/TRAINING PROGRAM

## 2023-09-29 PROCEDURE — 1160F RVW MEDS BY RX/DR IN RCRD: CPT | Mod: CPTII,S$GLB,, | Performed by: STUDENT IN AN ORGANIZED HEALTH CARE EDUCATION/TRAINING PROGRAM

## 2023-09-29 PROCEDURE — 99214 OFFICE O/P EST MOD 30 MIN: CPT | Mod: S$GLB,,, | Performed by: STUDENT IN AN ORGANIZED HEALTH CARE EDUCATION/TRAINING PROGRAM

## 2023-09-29 NOTE — PROGRESS NOTES
Orthopaedic Follow-Up Visit    Last Appointment: 7/26/23  Diagnosis: S/p left hip arthroscopy labral repair, femoroplasty, capsular closure (DOS 7/28/22)  Prior Procedure: None    Laurel Calvo is a 53 y.o. female who is here for f/u evaluation of her left hip. The patient was last seen here by me on 7/26/23 at which point the symptoms had returned over the last 1-2 weeks. She reported that her symptoms were now similar to before her surgery. She had been prescribed a steroid dose pack the week prior that she finished the day before her visit with me. New XR obtained showed no evidence of acute or degenerative changes. I recommended proceeding with US guided intra-articular CSI of the left hip which was performed by Dr. Koroma on 8/17/23.  The patient returns today reporting some relief after the injection with Dr. Koroma.    To review her history, Laurel Calvo is a 53 y.o. female who is s/p left hip arthroscopy labral repair, femoroplasty, capsular closure (DOS 7/28/22). She followed up on 10/28/22 at which point she was progressing well following surgery but she still had some soreness with extremes of external rotation and internal rotation which we discussed was normal for that time. The plan was to continue with PT and follow-up in 2 months but patient missed her f/u appointment.     Patient's medications, allergies, past medical, surgical, social and family histories were reviewed and updated as appropriate.    Review of Systems   All systems reviewed were negative.  Specifically, the patient denies fever, chills, weight loss, chest pain, shortness of breath, or dyspnea on exertion.      Past Medical History:   Diagnosis Date    Anxiety     Arthritis     Chronic left-sided low back pain without sciatica 9/28/2018    Chronic pain of left knee 9/28/2018    XR KNEE COMP LEFT 4 OR MORE VIEWS - October 27, 2017 CLINICAL HISTORY:   Left knee pain FINDINGS:   No fracture, dislocation, or arthritic change of  the knee. Dictated and Electronically Signed By: Jem Damon MD on October 27, 2017    GERD (gastroesophageal reflux disease)     Lupus     Migraine with aura and without status migrainosus, not intractable 8/8/2014    Seizures     Thyroid disease        Objective:      Physical Exam  Patient is alert and oriented, no distress. Skin is intact. Neuro is normal with no focal motor or sensory findings.    Standing exam  stance: normal alignment, no significant leg-length discrepancy  gait: no limp      Left Hip:    Inspection:   Normal    Palpation tenderness: Hip flexor and proximal groin    Range of motion:  90 deg Flexion (120 on right)          30 deg IR (30 on right)          50 deg ER (60 on right)    Strength:   5/5 Extension     5/5 Flexion     5/5 Abduction     5/5 Adduction    Special Tests:   Negative Log Roll     Positive RIP - 40 cm height compared to 15cm on right     Positive FADIR     Negative Microinstability test     Negative Circumduction     Negative Edison's         N/V Exam:   Tibial:    Normal sensory (plantar foot)   Normal motor (FHL)     Sup Peroneal:  Normal sensory (dorsal foot)   Normal motor (Peroneals)             Deep Peroneal:  Normal sensory (1st web space)  Normal motor (EHL)     Sural:   Normal sensory (lateral foot)    Saphenous:   Normal sensory (medial lower leg)    Normal pedal pulses, warm and well perfused with capillary refill < 2 sec   Patella tendon reflex normal  Achilles tendon reflex normal      Neurovascular exam  - motor function grossly intact bilaterally to hip flexion, knee extension and flexion, ankle dorsiflexion and plantarflexion  - sensation intact to light touch bilaterally to femoral, tibial, tibial and peroneal distributions  - symmetrical pedal pulses    Imaging:    XR Results  EXAM: XR HIP WITH PELVIS WHEN PERFORMED, 4 OR MORE VIEWS LEFT     CLINICAL INDICATION:   Pelvis and perineal pain.  Pain in left hip     FINDINGS:  Comparisons are made to 02/21/2022.   AP view of the pelvis, as well as AP and lateral views of the right hip were submitted for interpretation.  The femoral heads are well centered on the acetabulum.  The hip joints are well-maintained.  Mild acetabular spurring noted on the left.  Left femoral head bone island noted.     Alignment is satisfactory. No     fractures, dislocations, or erosive arthritic change.  Negative for radiopaque foreign bodies or air in the soft tissues.  Normal bowel gas pattern.  Phleboliths versus ureteroliths are present within the pelvis.        Impression:     1.  Negative for acute process involving the visualized osseous structures.  2.  Stable findings as noted above.        Finalized on: 7/26/2023 11:10 AM By:  Abdelrahman Rhodes MD  [facility]# [id number]      2023-07-26 11:12:[id number]    [facility]      MRI Results:  No results found for this or any previous visit.    CT Results:  No results found for this or any previous visit.         Physician Read: I agree with the above impression.    Assessment/Plan:   Assessment:  Laurel Calvo is a 53 y.o. female with s/p left hip arthroscopy labral repair, femoroplasty, capsular closure (7/28/22)     Plan:    She continues to be symptomatic with her left hip.  She is over a year out now from her hip arthroscopy.  She has tried rest, activity modification, corticosteroid injection and continues to be symptomatic.  I recommend an MRI of the left hip to evaluate for intra-articular pathology.  I would also like her to start PT at the [facility] to increase ROM.  She has significant tightness in the hip and I suspect that improvements in range of motion will result in improvement in symptoms.  Follow-up with me after MRI        Wm Robbins MD    I, Lois Harding, acted as a scribe for Wm Robbins MD for the duration of this office visit.

## 2023-10-04 ENCOUNTER — TELEPHONE (OUTPATIENT)
Dept: SPORTS MEDICINE | Facility: CLINIC | Age: 53
End: 2023-10-04
Payer: MEDICARE

## 2023-10-04 NOTE — TELEPHONE ENCOUNTER
Reached out to pt about her message to R/S MRI, I provided her with the Radiology departments number so she can get it rescheduled

## 2023-10-04 NOTE — TELEPHONE ENCOUNTER
----- Message from Andreia Wei sent at 10/4/2023 10:05 AM CDT -----  Contact: Laurel [phone]  Would like to receive medical advice.      Would they like a call back or a response via [facility]:Call back       Additional information:  Laurel is calling to reschedule her MRI appt. Laurel states she was suppose to have an MRI this Friday 10/06/23, Laurel states she will leaving town for her sister's surgery & would like someone from the provider's office to give her a call to discuss on rescheduling the MRI appt.     Please call Laurel back for advice.

## 2023-10-06 ENCOUNTER — OFFICE VISIT (OUTPATIENT)
Dept: INTERNAL MEDICINE | Facility: CLINIC | Age: 53
End: 2023-10-06
Payer: MEDICARE

## 2023-10-06 DIAGNOSIS — G40.209 NONINTRACTABLE EPILEPSY WITH COMPLEX PARTIAL SEIZURES: Chronic | ICD-10-CM

## 2023-10-06 DIAGNOSIS — R05.9 COUGH, UNSPECIFIED TYPE: ICD-10-CM

## 2023-10-06 DIAGNOSIS — E03.9 ACQUIRED HYPOTHYROIDISM: Chronic | ICD-10-CM

## 2023-10-06 DIAGNOSIS — J06.9 VIRAL UPPER RESPIRATORY TRACT INFECTION WITH COUGH: Primary | ICD-10-CM

## 2023-10-06 PROCEDURE — 99214 OFFICE O/P EST MOD 30 MIN: CPT | Mod: 95,,, | Performed by: FAMILY MEDICINE

## 2023-10-06 PROCEDURE — 1159F PR MEDICATION LIST DOCUMENTED IN MEDICAL RECORD: ICD-10-PCS | Mod: CPTII,95,, | Performed by: FAMILY MEDICINE

## 2023-10-06 PROCEDURE — 3044F HG A1C LEVEL LT 7.0%: CPT | Mod: CPTII,95,, | Performed by: FAMILY MEDICINE

## 2023-10-06 PROCEDURE — 1160F RVW MEDS BY RX/DR IN RCRD: CPT | Mod: CPTII,95,, | Performed by: FAMILY MEDICINE

## 2023-10-06 PROCEDURE — 99214 PR OFFICE/OUTPT VISIT, EST, LEVL IV, 30-39 MIN: ICD-10-PCS | Mod: 95,,, | Performed by: FAMILY MEDICINE

## 2023-10-06 PROCEDURE — 1160F PR REVIEW ALL MEDS BY PRESCRIBER/CLIN PHARMACIST DOCUMENTED: ICD-10-PCS | Mod: CPTII,95,, | Performed by: FAMILY MEDICINE

## 2023-10-06 PROCEDURE — 1159F MED LIST DOCD IN RCRD: CPT | Mod: CPTII,95,, | Performed by: FAMILY MEDICINE

## 2023-10-06 PROCEDURE — 3044F PR MOST RECENT HEMOGLOBIN A1C LEVEL <7.0%: ICD-10-PCS | Mod: CPTII,95,, | Performed by: FAMILY MEDICINE

## 2023-10-06 RX ORDER — BENZONATATE 200 MG/1
200 CAPSULE ORAL 3 TIMES DAILY PRN
Qty: 30 CAPSULE | Refills: 2 | Status: SHIPPED | OUTPATIENT
Start: 2023-10-06 | End: 2023-10-25

## 2023-10-06 RX ORDER — LEVOTHYROXINE SODIUM 50 UG/1
50 TABLET ORAL
Qty: 90 TABLET | Refills: 3 | Status: SHIPPED | OUTPATIENT
Start: 2023-10-06 | End: 2024-02-20 | Stop reason: SDUPTHER

## 2023-10-06 RX ORDER — BUPRENORPHINE AND NALOXONE 4; 1 MG/1; MG/1
1 FILM, SOLUBLE BUCCAL; SUBLINGUAL DAILY
COMMUNITY
Start: 2023-09-11

## 2023-10-06 RX ORDER — LAMOTRIGINE 100 MG/1
100 TABLET ORAL 2 TIMES DAILY
Qty: 180 TABLET | Refills: 1 | Status: SHIPPED | OUTPATIENT
Start: 2023-10-06 | End: 2024-02-20 | Stop reason: SDUPTHER

## 2023-10-06 RX ORDER — ALBUTEROL SULFATE 90 UG/1
2 AEROSOL, METERED RESPIRATORY (INHALATION) EVERY 4 HOURS PRN
COMMUNITY
Start: 2023-09-11

## 2023-10-06 NOTE — ASSESSMENT & PLAN NOTE
This is a chronic problem that appears compensated/controlled and stable. Laurel reports that they are doing well on lamotrigine, they perceive no adverse medication side-effects and they want to continue current treatment.

## 2023-10-06 NOTE — PROGRESS NOTES
TELEMEDICINE VIRTUAL VIDEO VISIT  10/6/23  2:00 PM CDT    Visit Type: Audiovisual    Patient's Location: Laurel represents that they are located within the state [facility].    CHIEF COMPLAINT: Cough    1. Viral respiratory tract infection with cough  2. Cough, unspecified type  She had an episode of pleuritic pain in late August. Not long thereafter she developed bronchitis symptoms characterized as cough, chest congestion, and wheezing. She says that about 4-5 weeks ago she went to [facility] urgent care clinic for this problem, and was treated in the clinic with nebulized albuterol, and discharged home with albuterol inhaler and prednisone. She has completed the prednisone. She says that she is no longer wheezing, but she still has frequent nonproductive cough. No fever. No chest pain. No hemoptysis. No difficulty breathing. No history of asthma, bronchiectasis, bronchitis, COPD, environmental allergies or pneumonia. We discussed differential diagnosis. It was agreed to evaluate with chest x-ray and treat symptomatically. I told her to expect gradual improvement and resolution of symptoms over the next 1-2 months.  -     X-Ray Chest PA And Lateral; Future; Expected date: 10/06/2023  -     benzonatate (TESSALON) 200 MG capsule; Take 1 capsule (200 mg total) by mouth 3 (three) times daily as needed for Cough.  Dispense: 30 capsule; Refill: 2    3. Acquired hypothyroidism  Assessment & Plan:  Lab Results   Component Value Date    TSH 3.545 08/14/2023    TSH 2.121 12/20/2021    TSH 2.887 11/22/2021    FREET4 0.87 12/14/2020       Orders:  -     levothyroxine (SYNTHROID) 50 MCG tablet; Take 1 tablet (50 mcg total) by mouth before breakfast.  Dispense: 90 tablet; Refill: 3    4. Nonintractable epilepsy with partial to complex partial seizures  Overview:  She denies history of generalized seizures.    Assessment & Plan:  This is a chronic problem that appears compensated/controlled and stable. Laurel reports that  "they are doing well on lamotrigine, they perceive no adverse medication side-effects and they want to continue current treatment.     Orders:  -     lamoTRIgine (LAMICTAL) 100 MG tablet; Take 1 tablet (100 mg total) by mouth 2 (two) times daily.  Dispense: 180 tablet; Refill: 1Unless noted herein, any chronic conditions are represented as and appear stable, and no other significant complaints or concerns were reported.    Review of Systems   Constitutional:  Negative for chills and fever.   HENT:  Negative for ear pain, postnasal drip, rhinorrhea and sore throat.    Respiratory:  Positive for cough and wheezing. Negative for chest tightness and shortness of breath.    Cardiovascular:  Negative for chest pain.   Musculoskeletal:  Negative for myalgias.   Integumentary:  Negative for rash.   Allergic/Immunologic: Negative for environmental allergies.   Neurological:  Negative for headaches.       Physical Exam  Constitutional:       General: She is not in acute distress.     Appearance: Normal appearance. She is not ill-appearing.   Pulmonary:      Effort: Pulmonary effort is normal. No respiratory distress.   Skin:     Coloration: Skin is not jaundiced.   Neurological:      Mental Status: She is alert. Mental status is at baseline.   Psychiatric:         Mood and Affect: Mood normal.         Behavior: Behavior normal.         Thought Content: Thought content normal.       Future Appointments   Date Time Provider Department Center   10/13/2023  5:00 PM [facility] MRI [facility] MRI [facility]   11/16/2023 11:45 AM LABORATORY, [facility] LAB [facility]   11/22/2023  1:30 PM Abdelrahman Skaggs MD [facility] [place] [facility]   2/27/2024  9:40 AM MARY LOU Antony MD [facility]       Documentation entered by me for this encounter may have been done in part using speech-recognition technology. Although I have made an effort to ensure accuracy, "sound like" errors may exist and should be interpreted in context.   TOTAL TIME evaluating " "and managing this patient for this encounter was greater than or equal to 31 minutes. This time was exclusive of any separately billable procedures for this patient and exclusive of time spent treating any other patient.    Documentation entered by me for this encounter may have been done in part using speech-recognition technology. Although I have made an effort to ensure accuracy, "sound like" errors may exist and should be interpreted in context.    Each patient to whom medical services are provided by telemedicine is: (1) informed of the relationship between the physician and patient and the respective role of any other health care provider with respect to management of the patient; and (2) notified that he or she may decline to receive medical services by telemedicine and may withdraw from such care at any time.  "

## 2023-10-23 DIAGNOSIS — R05.9 COUGH, UNSPECIFIED TYPE: ICD-10-CM

## 2023-10-23 DIAGNOSIS — J06.9 VIRAL UPPER RESPIRATORY TRACT INFECTION WITH COUGH: ICD-10-CM

## 2023-10-23 NOTE — TELEPHONE ENCOUNTER
Refill Routing Note   Medication(s) are not appropriate for processing by [facility] for the following reason(s):      Medication outside of protocol    [facility] action(s):  Route Care Due:  None identified            Appointments  past 12m or future 3m with PCP    Date Provider   Last Visit   10/6/2023 MARY LOU Antony MD   Next Visit   2/27/2024 MARY LOU Antony MD   ED visits in past 90 days: 0        Note composed:11:14 AM 10/23/2023

## 2023-10-25 RX ORDER — BENZONATATE 200 MG/1
200 CAPSULE ORAL
Qty: 30 CAPSULE | Refills: 0 | Status: SHIPPED | OUTPATIENT
Start: 2023-10-25 | End: 2023-11-01

## 2023-10-25 NOTE — TELEPHONE ENCOUNTER
REFILL APPROVED. Will address further refills at upcoming appointment with me listed below.  #LMRX   --------------------------------  Future Appointments   Date Time Provider Department Center   10/27/2023 11:15 AM [facility] XR1 [facility] XRAY [facility]   10/27/2023 11:40 AM Maureen Alvarado NP [facility]   11/16/2023 11:45 AM LABORATORY, [facility] LAB [facility]   11/22/2023  1:30 PM Abdelrahman Skaggs MD [facility]   2/27/2024  9:40 AM MARY LOU Antony MD [facility]

## 2023-11-01 DIAGNOSIS — R05.9 COUGH, UNSPECIFIED TYPE: ICD-10-CM

## 2023-11-01 DIAGNOSIS — J06.9 VIRAL UPPER RESPIRATORY TRACT INFECTION WITH COUGH: ICD-10-CM

## 2023-11-01 RX ORDER — BENZONATATE 200 MG/1
200 CAPSULE ORAL
Qty: 30 CAPSULE | Refills: 0 | Status: SHIPPED | OUTPATIENT
Start: 2023-11-01

## 2023-11-01 NOTE — TELEPHONE ENCOUNTER
REFILL REQUEST APPROVED.  Requested Prescriptions   Pending Prescriptions Disp Refills    benzonatate (TESSALON) 200 MG capsule [Pharmacy Med Name: benzonatate 200 mg capsule] 30 capsule 0     Sig: TAKE ONE CAPSULE BY MOUTH THREE TIMES DAILY AS NEEDED FOR cough

## 2023-11-01 NOTE — TELEPHONE ENCOUNTER
Refill Routing Note   Medication(s) are not appropriate for processing by [facility] for the following reason(s):      Medication outside of protocol    [facility] action(s):  Route Care Due:  None identified              Appointments  past 12m or future 3m with PCP    Date Provider   Last Visit   10/6/2023 MARY LOU Antony MD   Next Visit   2/27/2024 MARY LOU Antony MD   ED visits in past 90 days: 0        Note composed:9:14 AM 11/01/2023

## 2023-11-06 ENCOUNTER — PATIENT MESSAGE (OUTPATIENT)
Dept: SPORTS MEDICINE | Facility: CLINIC | Age: 53
End: 2023-11-06
Payer: MEDICARE

## 2023-11-06 ENCOUNTER — PATIENT MESSAGE (OUTPATIENT)
Dept: INTERNAL MEDICINE | Facility: CLINIC | Age: 53
End: 2023-11-06
Payer: MEDICARE

## 2023-11-20 ENCOUNTER — OFFICE VISIT (OUTPATIENT)
Dept: PAIN MEDICINE | Facility: CLINIC | Age: 53
End: 2023-11-20
Payer: MEDICARE

## 2023-11-20 ENCOUNTER — TELEPHONE (OUTPATIENT)
Dept: PAIN MEDICINE | Facility: CLINIC | Age: 53
End: 2023-11-20
Payer: MEDICARE

## 2023-11-20 ENCOUNTER — PATIENT MESSAGE (OUTPATIENT)
Dept: PAIN MEDICINE | Facility: CLINIC | Age: 53
End: 2023-11-20

## 2023-11-20 DIAGNOSIS — M54.16 LUMBAR RADICULOPATHY: ICD-10-CM

## 2023-11-20 DIAGNOSIS — R10.32 LEFT GROIN PAIN: ICD-10-CM

## 2023-11-20 DIAGNOSIS — Z98.890 HISTORY OF HIP SURGERY: Primary | ICD-10-CM

## 2023-11-20 PROCEDURE — 3044F HG A1C LEVEL LT 7.0%: CPT | Mod: CPTII,95,, | Performed by: PHYSICIAN ASSISTANT

## 2023-11-20 PROCEDURE — 3044F PR MOST RECENT HEMOGLOBIN A1C LEVEL <7.0%: ICD-10-PCS | Mod: CPTII,95,, | Performed by: PHYSICIAN ASSISTANT

## 2023-11-20 PROCEDURE — 99213 PR OFFICE/OUTPT VISIT, EST, LEVL III, 20-29 MIN: ICD-10-PCS | Mod: 95,,, | Performed by: PHYSICIAN ASSISTANT

## 2023-11-20 PROCEDURE — 99213 OFFICE O/P EST LOW 20 MIN: CPT | Mod: 95,,, | Performed by: PHYSICIAN ASSISTANT

## 2023-11-20 RX ORDER — GABAPENTIN 400 MG/1
CAPSULE ORAL
Qty: 270 CAPSULE | Refills: 1 | Status: SHIPPED | OUTPATIENT
Start: 2023-11-20 | End: 2023-12-14 | Stop reason: SDUPTHER

## 2023-11-20 NOTE — TELEPHONE ENCOUNTER
Reached out to patient to schedule appointment from messages pt just need a refill of Dilip. Apt has been made.   Pt understand. All questions answered.     Jorden Lopez  Medical Assistant

## 2023-11-20 NOTE — TELEPHONE ENCOUNTER
----- Message from Ghassan Albrecht sent at 11/20/2023  9:16 AM CST -----  Contact: [phone]  Patient Is calling in regards to seeing if she needs a follow up appt. And pt needs a refill on her gabapentin medication. Please call her back at  [phone].        Prescriptions to [facility] [address]  [address] [place]  [place] [address]  Phone: [phone] Fax: [phone]        Thanks KB

## 2023-11-20 NOTE — PROGRESS NOTES
"Established Patient Chronic Pain Note (Follow up visit)    Chief Complaint:   Left hip pain, discuss increasing Gabapentin    The patient location is: [place]  The chief complaint leading to consultation is: chronic pain     Visit type: audiovisual    Face to Face time with patient: 10-15 minutes  20 minutes of total time spent on the encounter, which includes face to face time and non-face to face time preparing to see the patient (eg, review of tests), Obtaining and/or reviewing separately obtained history, Documenting clinical information in the electronic or other health record, Independently interpreting results (not separately reported) and communicating results to the patient/family/caregiver, or Care coordination (not separately reported).     Each patient to whom he or she provides medical services by telemedicine is:  (1) informed of the relationship between the physician and patient and the respective role of any other health care provider with respect to management of the patient; and (2) notified that he or she may decline to receive medical services by telemedicine and may withdraw from such care at any time.        SUBJECTIVE:  Interval History (11/20/2023): Laurel Calvo presents today for follow-up visit.  Patient was last seen on 7/18/2023. At that visit, the plan was to continue conservative treatment and follow up as needed. Patient reports pain as 3/10 today. She has chronic LLE and gabapentin has been effective for her but over the past 2 months she has been experiencing numbness, pain and itching in L thigh. "Feels like a hot towel" is being applied to the area. These symptoms have been disruptive to her sleep. Patient is currently in physical therapy and scheduled to have MRI of her hip and follow up with Dr. Itzel samayoa.  She is S/p left hip ATS from 7/28/22.     Interval History (7/18/2023):  Laurel Calvo presents today via telemed for follow-up visit.  Patient was last seen on 6/21/2023. " At that visit, the plan was to continue Gabapentin 600 mg TID. Patient reports pain as 9/10 today. Doing physical therapy 5-6 days per week, 2 weeks ago pinching in the groin, progressively worsened. 4 days ago increased to full blown severe constant pain, then returned to same symptoms before surgery. She reports her pain is a little better today. Dr. Robbins's office has reached out via [email or web address] to schedule a follow up, she reports she will check her mychart and respond.  Goes all the way down the left leg to knee and ankle, numb and tingly.    Interval History (6/21/2023):  Laurel JONAH Umesh presents today via telemed for follow-up visit.  Patient was last seen on 2/28/2023. At that visit, the plan was to restart Gabapentin. She reports she gets excellent relief from her pain with this medication, taking 600 mg TID. She reports continued flare of left sided radiculopathy and current lupus flare causing a lot of daily pain,but well controlled with Gabapentin. Patient reports pain as 2/10 today. Needs refill of Gabapentin.      Interval History (2/28/2023):  Laurel MCKENZIE Calvo presents today via telemed for follow-up visit.  Patient was last seen on 08/29/2021.  She reports return of her chronic sciatica after working in her yard. Historically, Gabapentin has been very helpful for her nerve pain and she is requesting a refill of this medication. Ice and ibuprofen not effective. 1-2 weeks.Pain again is in her lower back with radiation down her left lower extremity.      Interval History (08/29/2022):  Laurel Calvo presents today for follow-up visit.  Patient was last seen on 6/13/2022. Last injection bilateral SIJ + GT bursa injection on 05/12/2022. Patient reports pain as 2/10 today. Patient underwent left hip arthroplasty with labral repair, capsular closure, and femoroplasty secondary to left hip labral tear and left hip femoracetabular impingement. Was previously scheduled with Dr. Manley for repeat left  subacromial bursa injection, but appointment was canceled by our dept and has not been rescheduled. She reports continued left shoulder pain. Currently enrolled in physical therapy for post-operative therapy. She reports that she is recovering well from her hip surgery. Has follow up with Itzel on 09/09.      Interval History (6/13/2022): Laurel Calvo presents today for follow-up visit.  she underwent bilateral GT bursa injection + bilateral SIJ injection on 5/12/22.  she reports 30% pain relief.  Patient reports pain as 3/10 today. Saw Dr. Robbins on 05/13/2022. Per Dr. 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  She also has some component of lumbar radiculopathy and SI pain. Will consider hip arthroscopy and labral repair. Has follow up scheduled with Dr. Robbins on 06/17/2022. Patient reports she is now having right sided hip pain due to favoring left side due to pain. Reports she would like to move forward with hip repair with Dr. Robbins, then consider continued injections with IPM. Also reports shoulder pain beginning to flare up. Previously had left subacromial bursa Injection in clinic 11/9/2021 with good relief. Reports injections typically last about 6 months.      Interval History (4/28/2022): Laurel Calvo presents today for follow-up visit.  she underwent left intraarticular hip joint injection on 03/31/2022.  The patient reports she had relief x 5-6 days after the procedure, but once she returned to normal activity the pain returned and then worsened. Reports one week of severe pain 8/10, but has now decreased slightly to a constant 5/10. Has follow up appointment with Dr. Robbins in 2 weeks to discuss other treatment options for hip.  Reports she does experience pain in the lower lumbar region left > right and some radiation into the outer hips. Greatest pain continues to originate from groin. Patient  "reports pain as 5/10 today. Taking ibuprofen with temporary moderate relief.    Interval History (1/27/2022):  Laurel Calvo presents today for follow-up visit.  Patient was last seen on 11/9/2021. At that visit, she had a subacromial bursa injection, which she feels helped less than previous injection.  Patient reports pain as "0/10 today.    Interval HPI (11/9/2021):  Laurel Calvo is a 51 y.o. female who presents to the clinic for a follow-up appointment for lower back and left hip pain.  Her main complaint today is left shoulder pain.  At the last visit, she was provided with a left-sided greater trochanteric bursa injection in the clinic.  She reports that this resulted in significant relief.  Since the last visit, Laurel Calvo states the left shoulder pain has been worsening. Current pain intensity is 8/10.  She also locates pain to the left lower tailbone area.  Patient denies night fever/night sweats, urinary incontinence, bowel incontinence, significant weight loss, significant motor weakness and loss of sensations.    Interval HPI 09/02/2021:  Laurel Calvo is a 51 y.o. female who presents to the clinic for a follow-up appointment for lower back and left hip pain. Since the last visit, Laurel Calvo states the pain has been persistant. Current pain intensity is 3/10.  She does report that she had a fall while gardening a few weeks ago and landed on her right hip, but this is somewhat improving.  Her left hip is certainly more aggravated currently.    Interval HPI 08/24/2021:  Laurel Calvo presents to tele-medicine appointment for a follow-up appointment for chronic shoulder, neck, low back, and hip pain.  She was last seen on 03/04/2021 where she underwent left-sided subacromial bursa and left-sided greater trochanteric bursa injections.  She states that these resulted in 80-85% relief for both sites.  She states that her left lower back pain has been worsening is having radiating pain down " the left lower extremity posteriorly extending to the calf.  She was also referred to a chiropractor.  Since the last visit, Laurel Calvo states the pain has been worsening. Current pain intensity is 8/10.    Initial HPI 03/04/2021:  Laurel Calvo is a 50 y.o. female, right-hand dominant, who presents to the clinic for the evaluation of chronic neck and lower back pain.  She was referred by her primary care team for further evaluation and management of this pain.  She has past medical history of seizure disorder, restless leg syndrome, anxiety, lupus, migraines, GERD, and multiple other medical comorbidities as listed in her chart.  The pain started several years ago following pain being told that she had herniated disc following an MRI of the cervical and lumbar spine and symptoms have been unchanged.The pain is located in the cervical myofascial area and radiates to the left upper extremity extending into the hand and fingers..  The pain is described as sharp, stabbing and throbbing and is rated as 2/10. The pain is rated with a score of  1/10 on the BEST day and a score of 10/10 on the WORST day.  Symptoms interfere with daily activity. The pain is exacerbated by at night and with exercise.  The pain is mitigated by ice. The patient reports spending less than 2 hours per day reclining. The patient reports 4-6 hours of uninterrupted sleep per night.     Pain Disability Index Review:      6/13/2022    10:04 AM 4/28/2022    10:42 AM 11/9/2021     9:42 AM   Last 3 PDI Scores   Pain Disability Index (PDI) 48 [phone] 15       Non-Pharmacologic Treatments:  Physical Therapy/Home Exercise: yes, has been active with home exercises as she has previously learned from physical therapies targeting the lower back  Ice/Heat:yes  TENS: no  Acupuncture: no  Massage: yes  Chiropractic: yes    Other: no        Pain Medications:  - Opioids: Norco  - Adjuvant Medications: Valium, baclofen, Plaquenil, Lamictal, Requip, trazodone  -  Anti-Coagulants: None          Pain Procedures:   -03/04/2021:  Left subacromial bursa + left greater trochanteric bursa injections, 80-85% relief for both  -09/02/2021:  Left greater trochanteric bursa injection, significant relief  - Left subacromial bursa Injection in clinic 11/9/2021 - less pain relief than previously   -left IA hip joint injection on 03/31/2022 with relief for only 5-6 days, then pain returned and worsened  -bilateral GT bursa injection + bilateral SIJ injection on 5/12/22 with 30% relief       Imaging (Reviewed on 11/20/2023):     Hip X-rays 7/26/23  EXAM: XR HIP WITH PELVIS WHEN PERFORMED, 4 OR MORE VIEWS LEFT     CLINICAL INDICATION:   Pelvis and perineal pain.  Pain in left hip     FINDINGS:  Comparisons are made to 02/21/2022.  AP view of the pelvis, as well as AP and lateral views of the right hip were submitted for interpretation.  The femoral heads are well centered on the acetabulum.  The hip joints are well-maintained.  Mild acetabular spurring noted on the left.  Left femoral head bone island noted.   Alignment is satisfactory. No     fractures, dislocations, or erosive arthritic change.  Negative for radiopaque foreign bodies or air in the soft tissues.  Normal bowel gas pattern.  Phleboliths versus ureteroliths are present within the pelvis.      Impression:     1.  Negative for acute process involving the visualized osseous structures.  2.  Stable findings as noted above.    MRI hip 03/24/2022  FINDINGS:  Osseous structures: Normal marrow signal.  No fracture.  No suspicious osseous lesion.     Hip and Sacroiliac joints: No effusion. Mild-moderate cartilage irregularity at the peripheral aspect of the acetabular roof with mild associated subchondral cystic change.  Probable partial-thickness chondral-labral separation at the anterior/superior labrum.  No labral detached tear or paralabral cyst.     Pubic symphysis: No osteitis pubis.     Bursae: Normal.     Soft tissues: Regional  muscles and tendons are normal in appearance.     Miscellaneous: None.     Impression:     No acute abnormality.     Mild-moderate degenerative changes at the peripheral aspect of the acetabular roof.     Probable partial thickness chondral-labral separation at the anterior/superior labrum.    X-ray hip 02/21/2022  FINDINGS:  Hip joints are symmetric in appearance.  Joint spaces are maintained.  No evidence of avascular necrosis.  There is a curvilinear lucency seen on multiple projections in the inter trochanteric crest of the left femur extending into the proximal diaphyseal region suspicious for nondisplaced fracture at this site.     This report was flagged in Epic as abnormal.    MRI lumbar spine 09/01/2021:  Levocurvature of the lumbar spine is noted.  No listhesis.  There is no acute fracture.  The vertebral bodies are normal in height without compression fractures. Posterior elements are intact. Mild disc desiccation at the L5-S1 level.  Lumbar spine disc heights are preserved.  Conus medullaris terminates at the T12-L1 level. Distal spinal cord intensity is normal.  There is no soft tissue abnormality.     T12-L1: No disc bulge.  There is no facet arthropathy.  There is no neural foraminal stenosis.  There is no spinal canal stenosis.     L1-L2: No disc bulge.  There is no facet arthropathy.  There is no neuroforaminal stenosis.  There is no spinal canal stenosis.     L2-L3: No disc bulge.  There is no facet arthropathy.  There is no neuroforaminal stenosis.  There is no spinal canal stenosis.     L3-L4: No disc bulge.  There is no facet arthropathy.  There is no neuroforaminal stenosis.  There is no spinal canal stenosis.     L4-L5: No disc bulge.  There is no facet arthropathy.  There is no neuroforaminal stenosis.  There is no spinal canal stenosis.     L5-S1: No disc bulge.  There is no facet arthropathy.  There is no neuroforaminal stenosis.  There is no spinal canal stenosis.       X-ray cervical  spine 03/04/2021:  There is straightening of the normal cervical spine lordosis.  No listhesis.  No abnormal motion of the cervical spine with flexion and extension.  No fracture.  Intervertebral disc spaces of the cervical spine are maintained.  No significant uncovertebral or facet arthropathy.  Oblique views demonstrate no significant neural foraminal stenosis.  No bony central canal stenosis identified.  Prevertebral soft tissues are within normal limits.     X-ray lumbar spine 03/04/2021:  There is mild levocurvature of the lumbar spine.  No listhesis.  No abnormal motion of the lumbar spine with flexion and extension.  No fracture or pars defect.  Intervertebral disc spaces of the lumbar spine are maintained.  No significant facet arthropathy.  No suspicious osseous lesion.  Sacroiliac joints appear normal.             REVIEW OF SYSTEMS:    GENERAL:  No weight loss, malaise or fevers.  HEENT:   No recent changes in vision or hearing  NECK:  Negative for lumps, no difficulty with swallowing.  RESPIRATORY:  Negative for cough, wheezing or shortness of breath, patient denies any recent URI.  CARDIOVASCULAR:  Negative for chest pain, leg swelling or palpitations.  GI:  Negative for abdominal discomfort, blood in stools or black stools or change in bowel habits.  MUSCULOSKELETAL:  See HPI.  SKIN:  Negative for lesions, rash, and itching.  PSYCH:  No mood disorder or recent psychosocial stressors.  Patients sleep is not disturbed secondary to pain.  HEMATOLOGY/LYMPHOLOGY:  Negative for prolonged bleeding, bruising easily or swollen nodes.  Patient is not currently taking any anti-coagulants  NEURO:   No history of headaches, syncope, paralysis, seizures or tremors.  All other reviewed and negative other than HPI.        OBJECTIVE:    PHYSICAL EXAMINATION:  Telemedicine Exam  There were no vitals filed for this visit.    There is no height or weight on file to calculate BMI.   (reviewed on 11/20/2023)       Physical  Exam: last in clinic visit:  GENERAL: Well appearing, in no acute distress, alert and oriented x3.  PSYCH:  Mood and affect appropriate.  SKIN: Skin color, texture, turgor normal, no rashes or lesions.  HEAD/FACE:  Normocephalic, atraumatic. Cranial nerves grossly intact.  CV: RRR with palpation of the radial artery.  PULM: No evidence of respiratory difficulty, symmetric chest rise.  GI:  Soft and non-tender.  BACK: Straight leg raising in the sitting and supine positions is negative to radicular pain. No pain to palpation over the facet joints of the lumbar spine or spinous processes. Normal range of motion without pain reproduction.  EXTREMITIES: Peripheral joint ROM is full and pain free without obvious instability or laxity in all four extremities. No deformities, edema, or skin discoloration. Good capillary refill.  MUSCULOSKELETAL:   positive shoulder impingement signs on the left shoulder with Merrlil, Neer's , and empty can.  minimal tenderness to palpation over the left greater trochanteric bursa. .  There is moderate pain with palpation over the sacroiliac joint on the left.  FABERs test is equivocal on the left.  FADIRs test is negative.   Bilateral upper and lower extremity strength is normal and symmetric.  No atrophy or tone abnormalities are noted.  NEURO: Bilateral upper and lower extremity coordination and muscle stretch reflexes are physiologic and symmetric.  Plantar response are downgoing. No clonus.  No loss of sensation is noted.  GAIT: normal.      ASSESSMENT: 53 y.o. year old female with lower back and left hip pain, consistent with     1. History of hip surgery  gabapentin (NEURONTIN) 400 MG capsule      2. Left groin pain  gabapentin (NEURONTIN) 400 MG capsule      3. Lumbar radiculopathy  gabapentin (NEURONTIN) 400 MG capsule              PLAN:   Interventions: None at this time    S/p bilateral Left SIJ + Left GT bursa injection with 30% relief  S/p left intra-articular hip joint  injection with limited relief  S/p Left SIJ + Left GT bursa injection.     2. Pharmacologic:   - Increase Gabapentin from 900mg TID to 3-400 mg TID PRN, a total of 3, 600 mg daily. Patient made aware this is the max dosing and if needing an increase, we have to consider Lyrica next time.  --No muscle relaxants due to risk of decreased seizure threshold, patient voiced understanding    - Anticoagulation use: None.     3. Rehabilitative: Encouraged regular exercise. Continue exercises and activities as tolerated. Currently enrolled in physical therapy for post-op care    4. Diagnostic: Reviewed. She is scheduled for a MRI of Left hip next week.    5. Consults/referral: Follow up with Itzel as scheduled    5. Follow up: PRN or 3 months if needed for medication renewal.    - This condition does not require this patient to take time off of work, and the primary goal of our Pain Management services is to improve the patient's functional capacity.   - I discussed the risks, benefits, and alternatives to potential treatment options. All questions and concerns were fully addressed today in clinic.       Torito Mclaughlin PA-C  Interventional Pain Medicine    Disclaimer:  This note was prepared using voice recognition system and is likely to have sound alike errors that may have been overlooked even after proof reading.  Please call me with any questions.

## 2023-11-21 ENCOUNTER — TELEPHONE (OUTPATIENT)
Dept: RHEUMATOLOGY | Facility: CLINIC | Age: 53
End: 2023-11-21
Payer: MEDICARE

## 2023-11-21 DIAGNOSIS — D84.9 IMMUNOSUPPRESSED STATUS: ICD-10-CM

## 2023-11-21 DIAGNOSIS — L93.0 DISCOID LUPUS ERYTHEMATOSUS: Primary | Chronic | ICD-10-CM

## 2023-11-21 DIAGNOSIS — Z79.899 HIGH RISK MEDICATION USE: ICD-10-CM

## 2023-11-27 ENCOUNTER — HOSPITAL ENCOUNTER (OUTPATIENT)
Dept: RADIOLOGY | Facility: HOSPITAL | Age: 53
Discharge: HOME | End: 2023-11-27
Attending: STUDENT IN AN ORGANIZED HEALTH CARE EDUCATION/TRAINING PROGRAM
Payer: MEDICARE

## 2023-11-27 DIAGNOSIS — M25.552 PAIN OF LEFT HIP: ICD-10-CM

## 2023-11-27 PROCEDURE — 73721 MRI HIP WITHOUT CONTRAST LEFT: ICD-10-PCS | Mod: 26,LT,, | Performed by: RADIOLOGY

## 2023-11-27 PROCEDURE — 73721 MRI JNT OF LWR EXTRE W/O DYE: CPT | Mod: 26,LT,, | Performed by: RADIOLOGY

## 2023-11-27 PROCEDURE — 73721 MRI JNT OF LWR EXTRE W/O DYE: CPT | Mod: TC,LT

## 2023-12-14 ENCOUNTER — PATIENT MESSAGE (OUTPATIENT)
Dept: PAIN MEDICINE | Facility: CLINIC | Age: 53
End: 2023-12-14
Payer: MEDICARE

## 2023-12-14 ENCOUNTER — PATIENT MESSAGE (OUTPATIENT)
Dept: INTERNAL MEDICINE | Facility: CLINIC | Age: 53
End: 2023-12-14
Payer: MEDICARE

## 2023-12-14 ENCOUNTER — TELEPHONE (OUTPATIENT)
Dept: PAIN MEDICINE | Facility: CLINIC | Age: 53
End: 2023-12-14
Payer: MEDICARE

## 2023-12-14 DIAGNOSIS — M54.16 LUMBAR RADICULOPATHY: ICD-10-CM

## 2023-12-14 DIAGNOSIS — Z98.890 HISTORY OF HIP SURGERY: ICD-10-CM

## 2023-12-14 DIAGNOSIS — R10.32 LEFT GROIN PAIN: ICD-10-CM

## 2023-12-14 RX ORDER — GABAPENTIN 400 MG/1
CAPSULE ORAL
Qty: 270 CAPSULE | Refills: 1 | OUTPATIENT
Start: 2023-12-14 | End: 2024-01-12

## 2023-12-14 NOTE — TELEPHONE ENCOUNTER
Called pt and informed pt that she will be able to get a refill on her gabapentin on Monday per pharmacist. Pt verbalized understanding.    Canelo CASAS

## 2023-12-14 NOTE — TELEPHONE ENCOUNTER
Called pt and informed her that she has a refill waiting on her at the pharmacy. Pt verbalized understanding.    Canelo CASAS

## 2023-12-15 RX ORDER — GABAPENTIN 400 MG/1
CAPSULE ORAL
Qty: 270 CAPSULE | Refills: 1 | Status: SHIPPED | OUTPATIENT
Start: 2023-12-15 | End: 2024-02-06 | Stop reason: SDUPTHER

## 2023-12-15 NOTE — TELEPHONE ENCOUNTER
I received this refill request for a medicine I believe you are managing.    To prevent duplication of treatment, I'm deferring to you.    Thank you for your help caring for my patients!    -LM    Requested Prescriptions     Pending Prescriptions Disp Refills    gabapentin (NEURONTIN) 400 MG capsule 270 capsule 1     Sig: Take 3 capsules (1,200 mg total)  by mouth three times daily as needed.

## 2024-01-30 ENCOUNTER — PATIENT MESSAGE (OUTPATIENT)
Dept: SPORTS MEDICINE | Facility: CLINIC | Age: 54
End: 2024-01-30

## 2024-02-05 ENCOUNTER — TELEPHONE (OUTPATIENT)
Dept: PAIN MEDICINE | Facility: CLINIC | Age: 54
End: 2024-02-05
Payer: MEDICARE

## 2024-02-06 ENCOUNTER — TELEPHONE (OUTPATIENT)
Dept: PAIN MEDICINE | Facility: CLINIC | Age: 54
End: 2024-02-06
Payer: MEDICARE

## 2024-02-06 ENCOUNTER — OFFICE VISIT (OUTPATIENT)
Dept: PAIN MEDICINE | Facility: CLINIC | Age: 54
End: 2024-02-06
Payer: MEDICARE

## 2024-02-06 DIAGNOSIS — M75.51 SUBACROMIAL BURSITIS OF BOTH SHOULDERS: Primary | ICD-10-CM

## 2024-02-06 DIAGNOSIS — R10.32 LEFT GROIN PAIN: ICD-10-CM

## 2024-02-06 DIAGNOSIS — M54.16 LUMBAR RADICULOPATHY: ICD-10-CM

## 2024-02-06 DIAGNOSIS — Z98.890 HISTORY OF HIP SURGERY: ICD-10-CM

## 2024-02-06 DIAGNOSIS — M75.52 SUBACROMIAL BURSITIS OF BOTH SHOULDERS: Primary | ICD-10-CM

## 2024-02-06 DIAGNOSIS — M46.1 SACROILIITIS: ICD-10-CM

## 2024-02-06 PROCEDURE — 99213 OFFICE O/P EST LOW 20 MIN: CPT | Mod: 95,,, | Performed by: PHYSICIAN ASSISTANT

## 2024-02-06 RX ORDER — GABAPENTIN 400 MG/1
CAPSULE ORAL
Qty: 270 CAPSULE | Refills: 3 | Status: SHIPPED | OUTPATIENT
Start: 2024-02-06 | End: 2024-04-17 | Stop reason: SDUPTHER

## 2024-02-06 NOTE — PROGRESS NOTES
Established Patient Chronic Pain Note (Follow up visit)    Chief Complaint:     The patient location is: [place]  The chief complaint leading to consultation is: chronic pain, shoulder pain    Visit type: audiovisual    Face to Face time with patient: 5-10 minutes of total time spent on the encounter, which includes face to face time and non-face to face time preparing to see the patient (eg, review of tests), Obtaining and/or reviewing separately obtained history, Documenting clinical information in the electronic or other health record, Independently interpreting results (not separately reported) and communicating results to the patient/family/caregiver, or Care coordination (not separately reported).     Each patient to whom he or she provides medical services by telemedicine is:  (1) informed of the relationship between the physician and patient and the respective role of any other health care provider with respect to management of the patient; and (2) notified that he or she may decline to receive medical services by telemedicine and may withdraw from such care at any time.        SUBJECTIVE:      Interval History (2/6/2024):  Patient  presents today for follow-up visit.  Patient was last seen on 11/20/2023. Patient reports pain as 3/10 today. She is requesting refills on Gabapentin. Patient also reports bilateral shoulder pain, worse with L shoulder.   She reports having Left subacromial bursa injection with Dr. Manley (11/2021) which provided great relief. Requests to return in clinic for injections for both shoulders.  Patient continues to stay active with yoga and weights.    Interval History (11/20/23): Laurel Calvo presents today for follow-up visit.  Patient was last seen on 7/18/2023. At that visit, the plan was to continue conservative treatment and follow up as needed. Patient reports pain as 3/10 today. She has chronic LLE and gabapentin has been effective for her but over the past 2 months she has  "been experiencing numbness, pain and itching in L thigh. "Feels like a hot towel" is being applied to the area. These symptoms have been disruptive to her sleep. Patient is currently in physical therapy and scheduled to have MRI of her hip and follow up with Dr. Robbins soon.  She is S/p left hip ATS from 7/28/22.     Interval History (7/18/2023):  Laurel MCKENZIE Calvo presents today via telemed for follow-up visit.  Patient was last seen on 6/21/2023. At that visit, the plan was to continue Gabapentin 600 mg TID. Patient reports pain as 9/10 today. Doing physical therapy 5-6 days per week, 2 weeks ago pinching in the groin, progressively worsened. 4 days ago increased to full blown severe constant pain, then returned to same symptoms before surgery. She reports her pain is a little better today. Dr. Robbins's office has reached out via [employer] to schedule a follow up, she reports she will check her mychart and respond.  Goes all the way down the left leg to knee and ankle, numb and tingly.    Interval History (6/21/2023):  Laurel JONAH Calvo presents today via telemed for follow-up visit.  Patient was last seen on 2/28/2023. At that visit, the plan was to restart Gabapentin. She reports she gets excellent relief from her pain with this medication, taking 600 mg TID. She reports continued flare of left sided radiculopathy and current lupus flare causing a lot of daily pain,but well controlled with Gabapentin. Patient reports pain as 2/10 today. Needs refill of Gabapentin.      Interval History (2/28/2023):  Laurel MCKENZIE Umesh presents today via telemed for follow-up visit.  Patient was last seen on 08/29/2021.  She reports return of her chronic sciatica after working in her yard. Historically, Gabapentin has been very helpful for her nerve pain and she is requesting a refill of this medication. Ice and ibuprofen not effective. 1-2 weeks.Pain again is in her lower back with radiation down her left lower " extremity.      Interval History (08/29/2022):  Laurel Calvo presents today for follow-up visit.  Patient was last seen on 6/13/2022. Last injection bilateral SIJ + GT bursa injection on 05/12/2022. Patient reports pain as 2/10 today. Patient underwent left hip arthroplasty with labral repair, capsular closure, and femoroplasty secondary to left hip labral tear and left hip femoracetabular impingement. Was previously scheduled with Dr. Manley for repeat left subacromial bursa injection, but appointment was canceled by our dept and has not been rescheduled. She reports continued left shoulder pain. Currently enrolled in physical therapy for post-operative therapy. She reports that she is recovering well from her hip surgery. Has follow up with [facility] on 09/09.      Interval History (6/13/2022): Laurel Calvo presents today for follow-up visit.  she underwent bilateral GT bursa injection + bilateral SIJ injection on 5/12/22.  she reports 30% pain relief.  Patient reports pain as 3/10 today. Saw Dr. Robbins on 05/13/2022. Per Dr. 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  She also has some component of lumbar radiculopathy and SI pain. Will consider hip arthroscopy and labral repair. Has follow up scheduled with Dr. Robbins on 06/17/2022. Patient reports she is now having right sided hip pain due to favoring left side due to pain. Reports she would like to move forward with hip repair with Dr. Robbins, then consider continued injections with IPM. Also reports shoulder pain beginning to flare up. Previously had left subacromial bursa Injection in clinic 11/9/2021 with good relief. Reports injections typically last about 6 months.      Interval History (4/28/2022): Laurel Calvo presents today for follow-up visit.  she underwent left intraarticular hip joint injection on 03/31/2022.  The patient reports she had relief x  "5-6 days after the procedure, but once she returned to normal activity the pain returned and then worsened. Reports one week of severe pain 8/10, but has now decreased slightly to a constant 5/10. Has follow up appointment with Dr. Robbins in 2 weeks to discuss other treatment options for hip.  Reports she does experience pain in the lower lumbar region left > right and some radiation into the outer hips. Greatest pain continues to originate from groin. Patient reports pain as 5/10 today. Taking ibuprofen with temporary moderate relief.    Interval History (1/27/2022):  Laurel Calvo presents today for follow-up visit.  Patient was last seen on 11/9/2021. At that visit, she had a subacromial bursa injection, which she feels helped less than previous injection.  Patient reports pain as "0/10 today.    Interval HPI (11/9/2021):  Laurel Calvo is a 51 y.o. female who presents to the clinic for a follow-up appointment for lower back and left hip pain.  Her main complaint today is left shoulder pain.  At the last visit, she was provided with a left-sided greater trochanteric bursa injection in the clinic.  She reports that this resulted in significant relief.  Since the last visit, Laurel Calvo states the left shoulder pain has been worsening. Current pain intensity is 8/10.  She also locates pain to the left lower tailbone area.  Patient denies night fever/night sweats, urinary incontinence, bowel incontinence, significant weight loss, significant motor weakness and loss of sensations.    Interval HPI 09/02/2021:  Laurel Calvo is a 51 y.o. female who presents to the clinic for a follow-up appointment for lower back and left hip pain. Since the last visit, Laurel Calvo states the pain has been persistant. Current pain intensity is 3/10.  She does report that she had a fall while gardening a few weeks ago and landed on her right hip, but this is somewhat improving.  Her left hip is certainly more aggravated " currently.    Interval HPI 08/24/2021:  Laurel Calvo presents to tele-medicine appointment for a follow-up appointment for chronic shoulder, neck, low back, and hip pain.  She was last seen on 03/04/2021 where she underwent left-sided subacromial bursa and left-sided greater trochanteric bursa injections.  She states that these resulted in 80-85% relief for both sites.  She states that her left lower back pain has been worsening is having radiating pain down the left lower extremity posteriorly extending to the calf.  She was also referred to a chiropractor.  Since the last visit, Laurel Calvo states the pain has been worsening. Current pain intensity is 8/10.    Initial HPI 03/04/2021:  Laurel Calvo is a 50 y.o. female, right-hand dominant, who presents to the clinic for the evaluation of chronic neck and lower back pain.  She was referred by her primary care team for further evaluation and management of this pain.  She has past medical history of seizure disorder, restless leg syndrome, anxiety, lupus, migraines, GERD, and multiple other medical comorbidities as listed in her chart.  The pain started several years ago following pain being told that she had herniated disc following an MRI of the cervical and lumbar spine and symptoms have been unchanged.The pain is located in the cervical myofascial area and radiates to the left upper extremity extending into the hand and fingers..  The pain is described as sharp, stabbing and throbbing and is rated as 2/10. The pain is rated with a score of  1/10 on the BEST day and a score of 10/10 on the WORST day.  Symptoms interfere with daily activity. The pain is exacerbated by at night and with exercise.  The pain is mitigated by ice. The patient reports spending less than 2 hours per day reclining. The patient reports 4-6 hours of uninterrupted sleep per night.     Pain Disability Index Review:      6/13/2022    10:04 AM 4/28/2022    10:42 AM 11/9/2021     9:42 AM    Last 3 PDI Scores   Pain Disability Index (PDI) 48 [phone] 15       Non-Pharmacologic Treatments:  Physical Therapy/Home Exercise: yes, has been active with home exercises as she has previously learned from physical therapies targeting the lower back  Ice/Heat:yes  TENS: no  Acupuncture: no  Massage: yes  Chiropractic: yes    Other: no        Pain Medications:  - Opioids: Norco  - Adjuvant Medications: Valium, baclofen, Plaquenil, Lamictal, Requip, trazodone  - Anti-Coagulants: None    Surgery:  07/28/2022: Left hip ATS, capsular closure with Dr. Robbins        Pain Procedures:   -03/04/2021:  Left subacromial bursa + left greater trochanteric bursa injections, 80-85% relief for both  -09/02/2021:  Left greater trochanteric bursa injection, significant relief  - Left subacromial bursa Injection in clinic 11/9/2021 - less pain relief than previously   -left IA hip joint injection on 03/31/2022 with relief for only 5-6 days, then pain returned and worsened  -bilateral GT bursa injection + bilateral SIJ injection on 5/12/22 with 30% relief       Imaging (Reviewed on 2/6/2024):       MRI Left Hip  11/27/2023  Narrative & Impression  EXAM: MRI HIP WITHOUT CONTRAST LEFT     CLINICAL HISTORY:  Chronic left hip pain.     TECHNIQUE: Standard multiplanar pulse sequences without IV or intra-articular contrast.     COMPARISON: No prior MRI.  Previous x-rays 07/26/2023 have been reviewed.     FINDINGS:     Bones of the left hip intact.  No fracture.  No suspicious osseous lesion.  Evidence of mild dysplasia.  No acetabular over coverage.  No pincer lesion detected.  Negative for evidence of dysplasia or acetabular retroversion.  No CAM lesion identified.   Left hip articular cartilage is well-preserved.  Focal fluid dissects into the base of the mid superior labrum compatible with a focal, nondisplaced superior labral tear/partial detachment (series 7 images 21-22).  Remaining labrum intact.  Normal, physiological joint fluid.   Negative for intra-articular loose body.   Mild tendinosis of the left gluteus medius minimus and medius tendons.  Minimal soft tissue edema and trace fluid abuts the greater trochanter.  Mild tendinosis left common hamstring tendon.  Left iliopsoas tendon and remaining supporting soft tissues left hip are normal.    Intact bony pelvis with normal marrow signal.  SI joints and pubic symphysis are unremarkable.  Right hip joint grossly well preserved.  Intrapelvic contents are unremarkable.        Impression:      1.     Evidence of mild dysplasia left hip.  Articular cartilage well-preserved.   2.    Focal nondisplaced tear mid superior labrum of the left hip with fluid undercutting the base of the labrum.   3.    Mild left gluteal tendinopathy.  Minimal soft tissue edema and trace fluid abuts the left greater trochanter.     Hip X-rays 7/26/23  EXAM: XR HIP WITH PELVIS WHEN PERFORMED, 4 OR MORE VIEWS LEFT     CLINICAL INDICATION:   Pelvis and perineal pain.  Pain in left hip     FINDINGS:  Comparisons are made to 02/21/2022.  AP view of the pelvis, as well as AP and lateral views of the right hip were submitted for interpretation.  The femoral heads are well centered on the acetabulum.  The hip joints are well-maintained.  Mild acetabular spurring noted on the left.  Left femoral head bone island noted.   Alignment is satisfactory. No     fractures, dislocations, or erosive arthritic change.  Negative for radiopaque foreign bodies or air in the soft tissues.  Normal bowel gas pattern.  Phleboliths versus ureteroliths are present within the pelvis.      Impression:     1.  Negative for acute process involving the visualized osseous structures.  2.  Stable findings as noted above.    MRI hip 03/24/2022  FINDINGS:  Osseous structures: Normal marrow signal.  No fracture.  No suspicious osseous lesion.     Hip and Sacroiliac joints: No effusion. Mild-moderate cartilage irregularity at the peripheral aspect of the  acetabular roof with mild associated subchondral cystic change.  Probable partial-thickness chondral-labral separation at the anterior/superior labrum.  No labral detached tear or paralabral cyst.     Pubic symphysis: No osteitis pubis.     Bursae: Normal.     Soft tissues: Regional muscles and tendons are normal in appearance.     Miscellaneous: None.     Impression:     No acute abnormality.     Mild-moderate degenerative changes at the peripheral aspect of the acetabular roof.     Probable partial thickness chondral-labral separation at the anterior/superior labrum.    X-ray hip 02/21/2022  FINDINGS:  Hip joints are symmetric in appearance.  Joint spaces are maintained.  No evidence of avascular necrosis.  There is a curvilinear lucency seen on multiple projections in the inter trochanteric crest of the left femur extending into the proximal diaphyseal region suspicious for nondisplaced fracture at this site.     This report was flagged in [employer] as abnormal.    MRI lumbar spine 09/01/2021:  Levocurvature of the lumbar spine is noted.  No listhesis.  There is no acute fracture.  The vertebral bodies are normal in height without compression fractures. Posterior elements are intact. Mild disc desiccation at the L5-S1 level.  Lumbar spine disc heights are preserved.  Conus medullaris terminates at the T12-L1 level. Distal spinal cord intensity is normal.  There is no soft tissue abnormality.     T12-L1: No disc bulge.  There is no facet arthropathy.  There is no neural foraminal stenosis.  There is no spinal canal stenosis.     L1-L2: No disc bulge.  There is no facet arthropathy.  There is no neuroforaminal stenosis.  There is no spinal canal stenosis.     L2-L3: No disc bulge.  There is no facet arthropathy.  There is no neuroforaminal stenosis.  There is no spinal canal stenosis.     L3-L4: No disc bulge.  There is no facet arthropathy.  There is no neuroforaminal stenosis.  There is no spinal canal stenosis.      L4-L5: No disc bulge.  There is no facet arthropathy.  There is no neuroforaminal stenosis.  There is no spinal canal stenosis.     L5-S1: No disc bulge.  There is no facet arthropathy.  There is no neuroforaminal stenosis.  There is no spinal canal stenosis.       X-ray cervical spine 03/04/2021:  There is straightening of the normal cervical spine lordosis.  No listhesis.  No abnormal motion of the cervical spine with flexion and extension.  No fracture.  Intervertebral disc spaces of the cervical spine are maintained.  No significant uncovertebral or facet arthropathy.  Oblique views demonstrate no significant neural foraminal stenosis.  No bony central canal stenosis identified.  Prevertebral soft tissues are within normal limits.     X-ray lumbar spine 03/04/2021:  There is mild levocurvature of the lumbar spine.  No listhesis.  No abnormal motion of the lumbar spine with flexion and extension.  No fracture or pars defect.  Intervertebral disc spaces of the lumbar spine are maintained.  No significant facet arthropathy.  No suspicious osseous lesion.  Sacroiliac joints appear normal.             REVIEW OF SYSTEMS:    GENERAL:  No weight loss, malaise or fevers.  HEENT:   No recent changes in vision or hearing  NECK:  Negative for lumps, no difficulty with swallowing.  RESPIRATORY:  Negative for cough, wheezing or shortness of breath, patient denies any recent URI.  CARDIOVASCULAR:  Negative for chest pain, leg swelling or palpitations.  GI:  Negative for abdominal discomfort, blood in stools or black stools or change in bowel habits.  MUSCULOSKELETAL:  See HPI.  SKIN:  Negative for lesions, rash, and itching.  PSYCH:  No mood disorder or recent psychosocial stressors.  Patients sleep is not disturbed secondary to pain.  HEMATOLOGY/LYMPHOLOGY:  Negative for prolonged bleeding, bruising easily or swollen nodes.  Patient is not currently taking any anti-coagulants  NEURO:   No history of headaches, syncope,  paralysis, seizures or tremors.  All other reviewed and negative other than HPI.        OBJECTIVE:  PHYSICAL EXAMINATION:  Telemedicine Exam  There were no vitals filed for this visit.    There is no height or weight on file to calculate BMI.   (reviewed on 2/6/2024)       Physical Exam: last in clinic visit:  GENERAL: Well appearing, in no acute distress, alert and oriented x3.  PSYCH:  Mood and affect appropriate.  SKIN: Skin color, texture, turgor normal, no rashes or lesions.  HEAD/FACE:  Normocephalic, atraumatic. Cranial nerves grossly intact.  CV: RRR with palpation of the radial artery.  PULM: No evidence of respiratory difficulty, symmetric chest rise.  GI:  Soft and non-tender.  BACK: Straight leg raising in the sitting and supine positions is negative to radicular pain. No pain to palpation over the facet joints of the lumbar spine or spinous processes. Normal range of motion without pain reproduction.  EXTREMITIES: Peripheral joint ROM is full and pain free without obvious instability or laxity in all four extremities. No deformities, edema, or skin discoloration. Good capillary refill.  MUSCULOSKELETAL:   positive shoulder impingement signs on the left shoulder with Merrill, Neer's , and empty can.  minimal tenderness to palpation over the left greater trochanteric bursa. .  There is moderate pain with palpation over the sacroiliac joint on the left.  FABERs test is equivocal on the left.  FADIRs test is negative.   Bilateral upper and lower extremity strength is normal and symmetric.  No atrophy or tone abnormalities are noted.  NEURO: Bilateral upper and lower extremity coordination and muscle stretch reflexes are physiologic and symmetric.  Plantar response are downgoing. No clonus.  No loss of sensation is noted.  GAIT: normal.      ASSESSMENT: 53 y.o. year old female with lower back and left hip pain, consistent with     1. Subacromial bursitis of both shoulders  gabapentin (NEURONTIN) 400 MG  capsule      2. Sacroiliitis  gabapentin (NEURONTIN) 400 MG capsule      3. History of hip surgery  gabapentin (NEURONTIN) 400 MG capsule      4. Left groin pain  gabapentin (NEURONTIN) 400 MG capsule      5. Lumbar radiculopathy  gabapentin (NEURONTIN) 400 MG capsule                PLAN:   Interventions: None at this time. Will return for in-clinic shoulder injections with either me or Dr. Manley (next available- Subacromial bursa injections).    S/p bilateral Left SIJ + Left GT bursa injection with 30% relief  S/p left intra-articular hip joint injection with limited relief  S/p Left SIJ + Left GT bursa injection.     2. Pharmacologic:   - Continue  Gabapentin 1200 TID PRN, a total of 3, 600 mg daily. Patient made aware this is the max dosing and if needing an increase, we have to consider Lyrica next time. Refills given today.    --No muscle relaxants due to risk of decreased seizure threshold, patient voiced understanding       report:  Reviewed and consistent with medication use as prescribed.        - Anticoagulation use: None.     3. Rehabilitative: Encouraged regular exercise. Continue exercises and activities as tolerated.     4. Diagnostic: Reviewed    5. Consults/referral: Follow up with Orthopedics (Dr. Robbins )as scheduled    5. Follow up: next available for Subacromial bursa injections    - This condition does not require this patient to take time off of work, and the primary goal of our Pain Management services is to improve the patient's functional capacity.   - I discussed the risks, benefits, and alternatives to potential treatment options. All questions and concerns were fully addressed today in clinic.       Torito Mclauglhin PA-C  Interventional Pain Medicine

## 2024-02-13 ENCOUNTER — PATIENT MESSAGE (OUTPATIENT)
Dept: INTERNAL MEDICINE | Facility: CLINIC | Age: 54
End: 2024-02-13
Payer: MEDICARE

## 2024-02-15 NOTE — PROGRESS NOTES
Orthopaedic Follow-Up Visit    Last Appointment: 9/29/23  Diagnosis: S/p left hip arthroscopy labral repair, femoroplasty, capsular closure (DOS 7/28/22)  Prior Procedure: MRI    Laurel Calvo is a 53 y.o. female who is here for f/u evaluation of her left hip. The patient was last seen here by me on 7/26/23 at which point she was over a year out from her hip arthroscopy and had tried rest, activity modification, corticosteroid injection but continued to be symptomatic. I recommended an MRI of the left hip to evaluate for intra-articular pathology. I also wanted her to start PT at the [facility] to increase ROM.  She had significant tightness in the hip and I suspected that improvements in range of motion would result in improvement in symptoms. The patient returns today reporting ***    To review her history, Laurel Calvo is a 53 y.o. female who is s/p left hip arthroscopy labral repair, femoroplasty, capsular closure (DOS 7/28/22). She followed up on 10/28/22 at which point she was progressing well following surgery but she still had some soreness with extremes of external rotation and internal rotation which we discussed was normal for that time. The plan was to continue with PT and follow-up in 2 months but patient missed her f/u appointment. She returned on 7/26/23 and the symptoms had returned over the last 1-2 weeks. She reported that her symptoms were now similar to before her surgery. She had been prescribed a steroid dose pack the week prior that she finished the day before her visit with me. New XR obtained showed no evidence of acute or degenerative changes. I recommended proceeding with US guided intra-articular CSI of the left hip which was performed by Dr. Koroma on 8/17/23.      Patient's medications, allergies, past medical, surgical, social and family histories were reviewed and updated as appropriate.    Review of Systems   All systems reviewed were negative.  Specifically, the patient denies  fever, chills, weight loss, chest pain, shortness of breath, or dyspnea on exertion.      Past Medical History:   Diagnosis Date    Anxiety     Arthritis     Chronic left-sided low back pain without sciatica 9/28/2018    Chronic pain of left knee 9/28/2018    XR KNEE COMP LEFT 4 OR MORE VIEWS - October 27, 2017 CLINICAL HISTORY:   Left knee pain FINDINGS:   No fracture, dislocation, or arthritic change of the knee. Dictated and Electronically Signed By: Jem Damon MD on October 27, 2017    GERD (gastroesophageal reflux disease)     Lupus     Migraine with aura and without status migrainosus, not intractable 8/8/2014    Seizures     Thyroid disease        Objective:      Physical Exam  Patient is alert and oriented, no distress. Skin is intact. Neuro is normal with no focal motor or sensory findings.    Standing exam  stance: normal alignment, no significant leg-length discrepancy  gait: no limp      Left Hip:    Inspection:   Normal    Palpation tenderness: Hip flexor and proximal groin    Range of motion:  90 deg Flexion (120 on right)          30 deg IR (30 on right)          50 deg ER (60 on right)    Strength:   5/5 Extension     5/5 Flexion     5/5 Abduction     5/5 Adduction    Special Tests:   Negative Log Roll     Positive RIP - 40 cm height compared to 15cm on right     Positive FADIR     Negative Microinstability test     Negative Circumduction     Negative Edison's         N/V Exam:   Tibial:    Normal sensory (plantar foot)   Normal motor (FHL)     Sup Peroneal:  Normal sensory (dorsal foot)   Normal motor (Peroneals)             Deep Peroneal:  Normal sensory (1st web space)  Normal motor (EHL)     Sural:   Normal sensory (lateral foot)    Saphenous:   Normal sensory (medial lower leg)    Normal pedal pulses, warm and well perfused with capillary refill < 2 sec   Patella tendon reflex normal  Achilles tendon reflex normal      Neurovascular exam  - motor function grossly intact bilaterally to hip  flexion, knee extension and flexion, ankle dorsiflexion and plantarflexion  - sensation intact to light touch bilaterally to femoral, tibial, tibial and peroneal distributions  - symmetrical pedal pulses    Imaging:    XR Results  EXAM: XR HIP WITH PELVIS WHEN PERFORMED, 4 OR MORE VIEWS LEFT     CLINICAL INDICATION:   Pelvis and perineal pain.  Pain in left hip     FINDINGS:  Comparisons are made to 02/21/2022.  AP view of the pelvis, as well as AP and lateral views of the right hip were submitted for interpretation.  The femoral heads are well centered on the acetabulum.  The hip joints are well-maintained.  Mild acetabular spurring noted on the left.  Left femoral head bone island noted.     Alignment is satisfactory. No     fractures, dislocations, or erosive arthritic change.  Negative for radiopaque foreign bodies or air in the soft tissues.  Normal bowel gas pattern.  Phleboliths versus ureteroliths are present within the pelvis.        Impression:     1.  Negative for acute process involving the visualized osseous structures.  2.  Stable findings as noted above.        Finalized on: 7/26/2023 11:10 AM By:  Abdelrahman Rhodes MD  [facility]# [id number]      2023-07-26 11:12:36.143    [facility]      MRI Results:  MRI Hip Without Contrast Left  Narrative: EXAM: MRI HIP WITHOUT CONTRAST LEFT    CLINICAL HISTORY:  Chronic left hip pain.    TECHNIQUE: Standard multiplanar pulse sequences without IV or intra-articular contrast.    COMPARISON: No prior MRI.  Previous x-rays 07/26/2023 have been reviewed.    FINDINGS:    Bones of the left hip intact.  No fracture.  No suspicious osseous lesion.  Evidence of mild dysplasia.  No acetabular over coverage.  No pincer lesion detected.  Negative for evidence of dysplasia or acetabular retroversion.  No CAM lesion identified.    Left hip articular cartilage is well-preserved.  Focal fluid dissects into the base of the mid superior labrum compatible with a focal, nondisplaced superior labral  tear/partial detachment (series 7 images 21-22).  Remaining labrum intact.  Normal, physiological joint fluid.  Negative for intra-articular loose body.    Mild tendinosis of the left gluteus medius minimus and medius tendons.  Minimal soft tissue edema and trace fluid abuts the greater trochanter.  Mild tendinosis left common hamstring tendon.  Left iliopsoas tendon and remaining supporting soft tissues left hip are normal.     Intact bony pelvis with normal marrow signal.  SI joints and pubic symphysis are unremarkable.  Right hip joint grossly well preserved.  Intrapelvic contents are unremarkable.  Impression: 1.     Evidence of mild dysplasia left hip.  Articular cartilage well-preserved.    2.    Focal nondisplaced tear mid superior labrum of the left hip with fluid undercutting the base of the labrum.    3.    Mild left gluteal tendinopathy.  Minimal soft tissue edema and trace fluid abuts the left greater trochanter.    Finalized on: 11/27/2023 2:27 PM By:  Jay Pugh MD  [facility]# [id number]      2023-11-27 14:29:41.395    [facility]         CT Results:  No results found for this or any previous visit.         Physician Read: I agree with the above impression.    Assessment/Plan:   Assessment:  Laurel Calvo is a 53 y.o. female with s/p left hip arthroscopy labral repair, femoroplasty, capsular closure (7/28/22) ***    Plan:    Reviewed MRI with the patient today. ***  Follow-up with me ***        Wm Robbins MD    I, Federico Arevalo, acted as a scribe for Wm Robbins MD for the duration of this office visit.

## 2024-02-19 ENCOUNTER — PATIENT MESSAGE (OUTPATIENT)
Dept: INTERNAL MEDICINE | Facility: CLINIC | Age: 54
End: 2024-02-19
Payer: MEDICARE

## 2024-02-20 ENCOUNTER — TELEPHONE (OUTPATIENT)
Dept: INTERNAL MEDICINE | Facility: CLINIC | Age: 54
End: 2024-02-20
Payer: MEDICARE

## 2024-02-20 ENCOUNTER — OFFICE VISIT (OUTPATIENT)
Dept: INTERNAL MEDICINE | Facility: CLINIC | Age: 54
End: 2024-02-20
Payer: MEDICARE

## 2024-02-20 DIAGNOSIS — E03.9 ACQUIRED HYPOTHYROIDISM: Chronic | ICD-10-CM

## 2024-02-20 DIAGNOSIS — K21.9 LARYNGOPHARYNGEAL REFLUX (LPR): ICD-10-CM

## 2024-02-20 DIAGNOSIS — K21.9 GASTROESOPHAGEAL REFLUX DISEASE WITHOUT ESOPHAGITIS: ICD-10-CM

## 2024-02-20 DIAGNOSIS — G40.209 NONINTRACTABLE EPILEPSY WITH COMPLEX PARTIAL SEIZURES: Chronic | ICD-10-CM

## 2024-02-20 DIAGNOSIS — Z12.31 BREAST CANCER SCREENING BY MAMMOGRAM: ICD-10-CM

## 2024-02-20 DIAGNOSIS — L65.9 ALOPECIA: Primary | ICD-10-CM

## 2024-02-20 PROCEDURE — 99214 OFFICE O/P EST MOD 30 MIN: CPT | Mod: 95,,, | Performed by: FAMILY MEDICINE

## 2024-02-20 RX ORDER — LEVOTHYROXINE SODIUM 50 UG/1
50 TABLET ORAL
Qty: 90 TABLET | Refills: 1 | Status: SHIPPED | OUTPATIENT
Start: 2024-02-20

## 2024-02-20 RX ORDER — LAMOTRIGINE 100 MG/1
100 TABLET ORAL 2 TIMES DAILY
Qty: 180 TABLET | Refills: 1 | Status: SHIPPED | OUTPATIENT
Start: 2024-02-20

## 2024-02-20 RX ORDER — PANTOPRAZOLE SODIUM 40 MG/1
40 TABLET, DELAYED RELEASE ORAL DAILY
Qty: 90 TABLET | Refills: 1 | Status: SHIPPED | OUTPATIENT
Start: 2024-02-20

## 2024-02-20 NOTE — Clinical Note
Cancel her 2/27/24 appointment with me. Schedule annual wellness visit with me in July or early August. Last mammogram was 4/4/23, so next mammogram needs to be scheduled on or after 4/4/24. Schedule derm.

## 2024-02-20 NOTE — ASSESSMENT & PLAN NOTE
"Lab Results   Component Value Date    TSH 3.545 08/14/2023    TSH 2.121 12/20/2021    TSH 2.887 11/22/2021    FREET4 0.87 12/14/2020    K3HGHXF 122 11/20/2009     No results found for: "THYROIDSTIMI", "THYROPEROXID", "THYGLBTUM", "THGABSCRN", "THYRGINTERP"   "

## 2024-02-20 NOTE — TELEPHONE ENCOUNTER
----- Message from MARY LOU Antony MD sent at 2/20/2024  2:14 PM CST -----  Cancel her 2/27/24 appointment with me.  Schedule annual wellness visit with me in July or early August.  Last mammogram was 4/4/23, so next mammogram needs to be scheduled on or after 4/4/24.  Schedule derm.

## 2024-02-20 NOTE — PROGRESS NOTES
TELEMEDICINE VIRTUAL VIDEO VISIT  2/20/24  1:40 PM CST    Visit Type: Audiovisual    Patient's Location: Laurel represents that they are located within the state [facility].    History of Present Illness    CHIEF COMPLAINT:  Laurel presents today concerned about alopecia.    ALOPECIA AND MEDICAL HISTORY:  Laurel reports a history of thinning hair intermittently since her 20s, with most notable hair loss occurring when washing. An improvement in hair health was observed after a hysterectomy eight years ago, but about a year and a half ago during a period of significant stress, her hair started falling again and it has not grown back despite the resolution of stress. She has tried Rogaine for women for a period of six months without any apparent effect. She also has managed medical conditions including non-intractable epilepsy with partial to complex partial seizures, acquired hypothyroidism, and gastroesophageal reflux disease and laryngoesophageal reflux; her seizures are well managed by lamotrigine and her reflux is effectively managed by pantoprazole.    MEDICATIONS:  She is currently not using ropinirole and she is not taking gabapentin for severe sciatica at this time. Her ongoing medication regimen includes lamotrigine, levothyroxine, and pantoprazole (Protonix).  ROS:  ROS is negative unless otherwise indicated in HPI.         Assessment & Plan    ALOPECIA:   Reviewed the patient's medical history, considering alopecia as a potential cause of the reported hair loss.   Identified stress levels and hormonal changes as possible contributing factors.   Referred the patient to a dermatologist for further evaluation and treatment of the hair loss.   Educated the patient about telogen effluvium, a form of hair loss typically occurring after significant stress or trauma, and explained the potential hormonal influence on hair growth and loss.   Reassured the patient that stress-induced hair loss can improve with stress  "reduction.  EPILEPSY, HYPOTHYROIDISM, AND GASTROESOPHAGEAL REFLUX DISEASE:   Continue the existing treatment plan for the patient's epilepsy, hypothyroidism, and gastroesophageal reflux disease.   Removed ropinirole (Requip) from medication list due to the patient's report of no longer needing it for restless leg syndrome.   Noted that the patient found gabapentin, prescribed for severe sciatica, also helpful for the restless leg syndrome.  MAMMOGRAM AND ANNUAL WELLNESS VISIT:   Ordered a mammogram for the patient, due in April.   Cancelled the patient's appointment scheduled in a week and advised scheduling an annual wellness visit in July or August.   Informed the patient that a nurse will contact them to arrange this.       1. Alopecia  -     Ambulatory referral/consult to Dermatology; Future; Expected date: 02/27/2024    2. Breast cancer screening by mammogram  -     Cancel: Mammo Digital Screening Bilat; Future; Expected date: 02/20/2024  -     Mammo Digital Screening Bilat; Future; Expected date: 04/05/2024    3. Nonintractable epilepsy with partial to complex partial seizures  Overview:  She denies history of generalized seizures.    Orders:  -     lamoTRIgine (LAMICTAL) 100 MG tablet; Take 1 tablet (100 mg total) by mouth 2 (two) times daily.  Dispense: 180 tablet; Refill: 1    4. Acquired hypothyroidism  Assessment & Plan:  Lab Results   Component Value Date    TSH 3.545 08/14/2023    TSH 2.121 12/20/2021    TSH 2.887 11/22/2021    FREET4 0.87 12/14/2020    D6MRGRT 122 11/20/2009     No results found for: "THYROIDSTIMI", "THYROPEROXID", "THYGLBTUM", "THGABSCRN", "THYRGINTERP"     Orders:  -     levothyroxine (SYNTHROID) 50 MCG tablet; Take 1 tablet (50 mcg total) by mouth before breakfast.  Dispense: 90 tablet; Refill: 1    5. Laryngopharyngeal reflux (LPR)  -     pantoprazole (PROTONIX) 40 MG tablet; Take 1 tablet (40 mg total) by mouth once daily.  Dispense: 90 tablet; Refill: 1    6. Gastroesophageal " reflux disease without esophagitis  -     pantoprazole (PROTONIX) 40 MG tablet; Take 1 tablet (40 mg total) by mouth once daily.  Dispense: 90 tablet; Refill: 1       Unless noted herein, any chronic conditions are represented as and appear stable, and no other significant complaints or concerns were reported.    Physical Exam  Constitutional:       General: She is not in acute distress.     Appearance: Normal appearance. She is not ill-appearing.   Pulmonary:      Effort: Pulmonary effort is normal. No respiratory distress.   Skin:     Coloration: Skin is not jaundiced.   Neurological:      Mental Status: She is alert. Mental status is at baseline.   Psychiatric:         Mood and Affect: Mood normal.         Behavior: Behavior normal.         Thought Content: Thought content normal.         Review of Systems   Constitutional:  Negative for activity change and unexpected weight change.   HENT:  Negative for hearing loss, rhinorrhea and trouble swallowing.    Eyes:  Negative for discharge and visual disturbance.   Respiratory:  Negative for chest tightness and wheezing.    Cardiovascular:  Negative for chest pain and palpitations.   Gastrointestinal:  Negative for blood in stool, constipation, diarrhea and vomiting.   Endocrine: Negative for polydipsia and polyuria.   Genitourinary:  Negative for difficulty urinating, dysuria, hematuria and menstrual problem.   Musculoskeletal:  Negative for arthralgias, joint swelling and neck pain.   Neurological:  Negative for weakness and headaches.   Psychiatric/Behavioral:  Negative for confusion and dysphoric mood.        This note was generated with the assistance of ambient listening technology. Verbal consent was obtained by the patient and accompanying visitor(s) for the recording of patient appointment to facilitate this note. I attest to having reviewed and edited the generated note for accuracy, though some syntax or spelling errors may persist. Please contact the  "author of this note for any clarification.      TOTAL TIME evaluating and managing this patient for this encounter was greater than or equal to 24 minutes.  This time was exclusive of any separately billable procedures for this patient and exclusive of time spent treating any other patient.    Documentation entered by me for this encounter may have been done in part using speech-recognition technology. Although I have made an effort to ensure accuracy, "sound like" errors may exist and should be interpreted in context.    Each patient to whom medical services are provided by telemedicine is: (1) informed of the relationship between the physician and patient and the respective role of any other health care provider with respect to management of the patient; and (2) notified that he or she may decline to receive medical services by telemedicine and may withdraw from such care at any time.   "

## 2024-02-21 ENCOUNTER — OFFICE VISIT (OUTPATIENT)
Dept: SPORTS MEDICINE | Facility: CLINIC | Age: 54
End: 2024-02-21
Payer: MEDICARE

## 2024-02-21 ENCOUNTER — TELEPHONE (OUTPATIENT)
Dept: PAIN MEDICINE | Facility: CLINIC | Age: 54
End: 2024-02-21
Payer: MEDICARE

## 2024-02-21 ENCOUNTER — HOSPITAL ENCOUNTER (OUTPATIENT)
Dept: RADIOLOGY | Facility: HOSPITAL | Age: 54
Discharge: HOME | End: 2024-02-21
Attending: FAMILY MEDICINE
Payer: MEDICARE

## 2024-02-21 VITALS — HEIGHT: 64 IN | BODY MASS INDEX: 20.43 KG/M2 | RESPIRATION RATE: 17 BRPM | WEIGHT: 119.69 LBS

## 2024-02-21 DIAGNOSIS — R05.9 COUGH, UNSPECIFIED TYPE: ICD-10-CM

## 2024-02-21 DIAGNOSIS — M54.16 LUMBAR BACK PAIN WITH RADICULOPATHY AFFECTING LEFT LOWER EXTREMITY: Primary | ICD-10-CM

## 2024-02-21 DIAGNOSIS — M76.892 HAMSTRING TENDINITIS OF LEFT THIGH: ICD-10-CM

## 2024-02-21 DIAGNOSIS — M75.42 SUBACROMIAL IMPINGEMENT OF LEFT SHOULDER: ICD-10-CM

## 2024-02-21 PROCEDURE — 3008F BODY MASS INDEX DOCD: CPT | Mod: CPTII,S$GLB,, | Performed by: STUDENT IN AN ORGANIZED HEALTH CARE EDUCATION/TRAINING PROGRAM

## 2024-02-21 PROCEDURE — 1159F MED LIST DOCD IN RCRD: CPT | Mod: CPTII,S$GLB,, | Performed by: STUDENT IN AN ORGANIZED HEALTH CARE EDUCATION/TRAINING PROGRAM

## 2024-02-21 PROCEDURE — 20610 DRAIN/INJ JOINT/BURSA W/O US: CPT | Mod: LT,S$GLB,, | Performed by: STUDENT IN AN ORGANIZED HEALTH CARE EDUCATION/TRAINING PROGRAM

## 2024-02-21 PROCEDURE — 99999 PR PBB SHADOW E&M-EST. PATIENT-LVL V: CPT | Mod: PBBFAC,,, | Performed by: STUDENT IN AN ORGANIZED HEALTH CARE EDUCATION/TRAINING PROGRAM

## 2024-02-21 PROCEDURE — 1160F RVW MEDS BY RX/DR IN RCRD: CPT | Mod: CPTII,S$GLB,, | Performed by: STUDENT IN AN ORGANIZED HEALTH CARE EDUCATION/TRAINING PROGRAM

## 2024-02-21 PROCEDURE — 99214 OFFICE O/P EST MOD 30 MIN: CPT | Mod: 25,S$GLB,, | Performed by: STUDENT IN AN ORGANIZED HEALTH CARE EDUCATION/TRAINING PROGRAM

## 2024-02-21 PROCEDURE — 71046 X-RAY EXAM CHEST 2 VIEWS: CPT | Mod: 26,,, | Performed by: RADIOLOGY

## 2024-02-21 PROCEDURE — 71046 X-RAY EXAM CHEST 2 VIEWS: CPT | Mod: TC

## 2024-02-21 RX ADMIN — TRIAMCINOLONE ACETONIDE 40 MG: 40 INJECTION, SUSPENSION INTRA-ARTICULAR; INTRAMUSCULAR at 10:02

## 2024-02-21 NOTE — PROGRESS NOTES
Orthopaedic Follow-Up Visit    Last Appointment: 9/29/23  Diagnosis: S/p left hip arthroscopy labral repair, femoroplasty, capsular closure (DOS 7/28/22)  Prior Procedure: MRI    Laurel Calvo is a 53 y.o. female who is here for f/u evaluation of her left hip. The patient was last seen here by me on 7/26/23 at which point she was over a year out from her hip arthroscopy and had tried rest, activity modification, corticosteroid injection but continued to be symptomatic. I recommended an MRI of the left hip to evaluate for intra-articular pathology. I also wanted her to start PT at the [facility] to increase ROM.  She had significant tightness in the hip and I suspected that improvements in range of motion would result in improvement in symptoms. The patient returns today reporting that she had seen improvements in her hip, with only minor soreness in her posterior hip/ischium. She also reports pain and numbness on the left distal quad.     To review her history, Laurel Calvo is a 53 y.o. female who is s/p left hip arthroscopy labral repair, femoroplasty, capsular closure (DOS 7/28/22). She followed up on 10/28/22 at which point she was progressing well following surgery but she still had some soreness with extremes of external rotation and internal rotation which we discussed was normal for that time. The plan was to continue with PT and follow-up in 2 months but patient missed her f/u appointment. She returned on 7/26/23 and the symptoms had returned over the last 1-2 weeks. She reported that her symptoms were now similar to before her surgery. She had been prescribed a steroid dose pack the week prior that she finished the day before her visit with me. New XR obtained showed no evidence of acute or degenerative changes. I recommended proceeding with US guided intra-articular CSI of the left hip which was performed by Dr. Koroma on 8/17/23.      Patient's medications, allergies, past medical, surgical, social and  family histories were reviewed and updated as appropriate.    Review of Systems   All systems reviewed were negative.  Specifically, the patient denies fever, chills, weight loss, chest pain, shortness of breath, or dyspnea on exertion.      Past Medical History:   Diagnosis Date    Anxiety     Arthritis     Chronic left-sided low back pain without sciatica 09/28/2018    Chronic pain of left knee 09/28/2018    XR KNEE COMP LEFT 4 OR MORE VIEWS - October 27, 2017 CLINICAL HISTORY:   Left knee pain FINDINGS:   No fracture, dislocation, or arthritic change of the knee. Dictated and Electronically Signed By: Jem Damon MD on October 27, 2017    Gastroesophageal reflux disease without esophagitis 08/20/2015    GERD (gastroesophageal reflux disease)     Lupus     Migraine with aura and without status migrainosus, not intractable 08/08/2014    Seizures     Thyroid disease        Objective:      Physical Exam  Patient is alert and oriented, no distress. Skin is intact. Neuro is normal with no focal motor or sensory findings.    Standing exam  stance: normal alignment, no significant leg-length discrepancy  gait: no limp      Left Hip:    Inspection:   Normal    Palpation tenderness: TTP on left PSIS    Range of motion:  110deg Flexion (120 on right)          40 deg IR (30 on right)          50 deg ER (60 on right)    Strength:   5/5 Extension     5/5 Flexion     5/5 Abduction     5/5 Adduction     Hamstring 5/5 at 90, 45, and 20 degrees flexion. Slight discomfort with resisted knee flexion at 90    Special Tests:   Negative Log Roll     Negative RIP      Negative FADIR     Negative Microinstability test     Negative Circumduction     Negative Edison's         N/V Exam:   Paresthesia on Left lateral quad    Tibial:    Normal sensory (plantar foot)   Normal motor (FHL)     Sup Peroneal:  Normal sensory (dorsal foot)   Normal motor (Peroneals)             Deep Peroneal:  Normal sensory (1st web space)  Normal motor  (EHL)     Sural:   Normal sensory (lateral foot)    Saphenous:   Normal sensory (medial lower leg)    Normal pedal pulses, warm and well perfused with capillary refill < 2 sec   Patella tendon reflex normal  Achilles tendon reflex normal      Neurovascular exam  - motor function grossly intact bilaterally to hip flexion, knee extension and flexion, ankle dorsiflexion and plantarflexion  - sensation intact to light touch bilaterally to femoral, tibial, tibial and peroneal distributions  - symmetrical pedal pulses    Imaging:    XR Results  EXAM: XR HIP WITH PELVIS WHEN PERFORMED, 4 OR MORE VIEWS LEFT     CLINICAL INDICATION:   Pelvis and perineal pain.  Pain in left hip     FINDINGS:  Comparisons are made to 02/21/2022.  AP view of the pelvis, as well as AP and lateral views of the right hip were submitted for interpretation.  The femoral heads are well centered on the acetabulum.  The hip joints are well-maintained.  Mild acetabular spurring noted on the left.  Left femoral head bone island noted.     Alignment is satisfactory. No     fractures, dislocations, or erosive arthritic change.  Negative for radiopaque foreign bodies or air in the soft tissues.  Normal bowel gas pattern.  Phleboliths versus ureteroliths are present within the pelvis.        Impression:     1.  Negative for acute process involving the visualized osseous structures.  2.  Stable findings as noted above.        Finalized on: 7/26/2023 11:10 AM By:  Abdelrahman Rhodes MD  [facility]# [id number]      2023-07-26 11:12:36.143    [facility]      MRI Results:  EXAM: MRI HIP WITHOUT CONTRAST LEFT     CLINICAL HISTORY:  Chronic left hip pain.     TECHNIQUE: Standard multiplanar pulse sequences without IV or intra-articular contrast.     COMPARISON: No prior MRI.  Previous x-rays 07/26/2023 have been reviewed.     FINDINGS:     Bones of the left hip intact.  No fracture.  No suspicious osseous lesion.  Evidence of mild dysplasia.  No acetabular over coverage.  No pincer  lesion detected.  Negative for evidence of dysplasia or acetabular retroversion.  No CAM lesion identified.     Left hip articular cartilage is well-preserved.  Focal fluid dissects into the base of the mid superior labrum compatible with a focal, nondisplaced superior labral tear/partial detachment (series 7 images 21-22).  Remaining labrum intact.  Normal, physiological joint fluid.  Negative for intra-articular loose body.     Mild tendinosis of the left gluteus medius minimus and medius tendons.  Minimal soft tissue edema and trace fluid abuts the greater trochanter.  Mild tendinosis left common hamstring tendon.  Left iliopsoas tendon and remaining supporting soft tissues left hip are normal.      Intact bony pelvis with normal marrow signal.  SI joints and pubic symphysis are unremarkable.  Right hip joint grossly well preserved.  Intrapelvic contents are unremarkable.        Impression:        1.     Evidence of mild dysplasia left hip.  Articular cartilage well-preserved.     2.    Focal nondisplaced tear mid superior labrum of the left hip with fluid undercutting the base of the labrum.     3.    Mild left gluteal tendinopathy.  Minimal soft tissue edema and trace fluid abuts the left greater trochanter.     Finalized on: 11/27/2023 2:27 PM By:  Jay Pugh MD         CT Results:  No results found for this or any previous visit.         Physician Read: I agree with the above impression.    Assessment/Plan:   Assessment:  Laurel Calvo is a 53 y.o. female with s/p left hip arthroscopy labral repair, femoroplasty, capsular closure (7/28/22)     Plan:    Discussed diagnosis and treatment options with her today. We also reviewed the MRI which shows cleft in left labrum. However, her symptoms are not focused in the groin but rather over the ischium, indicating an extra-articular source, most likely hamstring insertion.  I will place a referral for her to see Dr. Koroma in 6 weeks. If symptoms have  persisted they can consider US guided injection intra-articular or at the site of tenderness.  I believe the numbness in her leg is related to a nerve compression in her lumbar spine. She has seen Dr. Manley in the past and we will place a referral for her to return.  I discussed with patient trying a back injection with back and spine to see if it relieves her symptoms.   I recommend proceeding with left shoulder corticosteroid injection into the left shoulder subacromial space. The patient is in agreement with this plan.   Procedure performed today and patient tolerated the procedure well with no immediate complications.   Follow-up with me as needed        Wm Robbins MD    I, Elvis Moreno, acted as a scribe for Wm Robbins MD for the duration of this office visit.

## 2024-02-22 RX ORDER — TRIAMCINOLONE ACETONIDE 40 MG/ML
40 INJECTION, SUSPENSION INTRA-ARTICULAR; INTRAMUSCULAR
Status: DISCONTINUED | OUTPATIENT
Start: 2024-02-21 | End: 2024-02-22 | Stop reason: HOSPADM

## 2024-03-12 ENCOUNTER — TELEPHONE (OUTPATIENT)
Dept: PAIN MEDICINE | Facility: CLINIC | Age: 54
End: 2024-03-12
Payer: MEDICARE

## 2024-03-13 ENCOUNTER — PATIENT MESSAGE (OUTPATIENT)
Dept: PAIN MEDICINE | Facility: CLINIC | Age: 54
End: 2024-03-13
Payer: MEDICARE

## 2024-03-13 ENCOUNTER — TELEPHONE (OUTPATIENT)
Dept: PAIN MEDICINE | Facility: CLINIC | Age: 54
End: 2024-03-13
Payer: MEDICARE

## 2024-04-03 ENCOUNTER — OFFICE VISIT (OUTPATIENT)
Dept: SPORTS MEDICINE | Facility: CLINIC | Age: 54
End: 2024-04-03
Payer: MEDICARE

## 2024-04-03 VITALS — HEIGHT: 64 IN | BODY MASS INDEX: 21.68 KG/M2 | WEIGHT: 127 LBS

## 2024-04-03 DIAGNOSIS — M54.16 LUMBAR BACK PAIN WITH RADICULOPATHY AFFECTING LEFT LOWER EXTREMITY: ICD-10-CM

## 2024-04-03 DIAGNOSIS — M25.552 LEFT HIP PAIN: Primary | ICD-10-CM

## 2024-04-03 DIAGNOSIS — M25.852 FEMOROACETABULAR IMPINGEMENT OF LEFT HIP: ICD-10-CM

## 2024-04-03 DIAGNOSIS — S73.192D ACETABULAR LABRUM TEAR, LEFT, SUBSEQUENT ENCOUNTER: ICD-10-CM

## 2024-04-03 PROCEDURE — 99999 PR PBB SHADOW E&M-EST. PATIENT-LVL III: CPT | Mod: PBBFAC,,, | Performed by: STUDENT IN AN ORGANIZED HEALTH CARE EDUCATION/TRAINING PROGRAM

## 2024-04-03 PROCEDURE — 3008F BODY MASS INDEX DOCD: CPT | Mod: CPTII,S$GLB,, | Performed by: STUDENT IN AN ORGANIZED HEALTH CARE EDUCATION/TRAINING PROGRAM

## 2024-04-03 PROCEDURE — 20611 DRAIN/INJ JOINT/BURSA W/US: CPT | Mod: LT,S$GLB,, | Performed by: STUDENT IN AN ORGANIZED HEALTH CARE EDUCATION/TRAINING PROGRAM

## 2024-04-03 PROCEDURE — 1159F MED LIST DOCD IN RCRD: CPT | Mod: CPTII,S$GLB,, | Performed by: STUDENT IN AN ORGANIZED HEALTH CARE EDUCATION/TRAINING PROGRAM

## 2024-04-03 PROCEDURE — 99214 OFFICE O/P EST MOD 30 MIN: CPT | Mod: 25,S$GLB,, | Performed by: STUDENT IN AN ORGANIZED HEALTH CARE EDUCATION/TRAINING PROGRAM

## 2024-04-03 RX ORDER — TRIAMCINOLONE ACETONIDE 40 MG/ML
40 INJECTION, SUSPENSION INTRA-ARTICULAR; INTRAMUSCULAR
Status: DISCONTINUED | OUTPATIENT
Start: 2024-04-03 | End: 2024-04-03 | Stop reason: HOSPADM

## 2024-04-03 RX ADMIN — TRIAMCINOLONE ACETONIDE 40 MG: 40 INJECTION, SUSPENSION INTRA-ARTICULAR; INTRAMUSCULAR at 11:04

## 2024-04-03 NOTE — PATIENT INSTRUCTIONS
Assessment:  Laurel Calvo is a 53 y.o. female   Chief Complaint   Patient presents with    Left Hip - Pain       Encounter Diagnoses   Name Primary?    Lumbar back pain with radiculopathy affecting left lower extremity     Left hip pain Yes        Plan:  Left hip US guided intra-articular injection performed today  Discussed options with patient celi as HA injection and PRP to reduce pain and preserve joint    Follow-up: as needed or sooner if there are any problems between now and then.    Thank you for choosing [facility] and Dr. Kilo Koroma for your orthopedic & sports medicine care. It is our goal to provide you with exceptional care that will help keep you healthy, active, and get you back in the game.    Please do not hesitate to reach out to us via email, phone, or MyChart with any questions, concerns, or feedback.    If you felt that you received exemplary care today, please consider leaving us feedback on [employer] at:  [email or web address]    If you are experiencing pain/discomfort ,or have questions after 5pm and would like to be connected to the [facility] on-call team, please call this number and specify which Sports Medicine provider is treating you: [phone]      It was nice seeing you today   Here is a a summary of what we discussed -  Your Blood pressure is at goal today. Please continue your current medications. Please take a low salt diet and exercise atleast for 150min/week  We have ordered some labs for you. Please proceed to the our lab at [address] to give these labs. Please ensure these labs are given in a fasting state.       Please call the scheduling line below to set up the appointment    Once all the results are obtained we will call you with abnormal results if any and discuss necessity medication/lifestyle changes/further evaluation.  Please feel free to call our office at [phone] with any questions  Please do not hesitate to call us for medication refills.  In case of emergency please proceed to the nearest emergency room or call 911.    Please follow-up with me in 2 weeks.  You can set your appointment by stopping by at the  or calling our office at [phone] or logging onto [employer] account.

## 2024-04-03 NOTE — PROCEDURES
Large Joint Aspiration/Injection: L hip joint    Date/Time: 4/3/2024 11:00 AM    Performed by: Kilo Koroma MD  Authorized by: Kilo Koroma MD    Consent Done?:  Yes (Verbal)  Indications:  Pain  Site marked: the procedure site was marked    Timeout: prior to procedure the correct patient, procedure, and site was verified    Prep: patient was prepped and draped in usual sterile fashion      Local anesthesia used?: Yes    Local anesthetic:  Topical anesthetic    Details:  Needle Size:  22 G  Ultrasonic Guidance for needle placement?: Yes    Images are saved and documented.  Approach:  Anterior  Location:  Hip  Site:  L hip joint  Medications:  40 mg triamcinolone acetonide 40 mg/mL  Patient tolerance:  Patient tolerated the procedure well with no immediate complications     Ultrasound guidance was used for needle localization. Images were saved and stored for documentation. The appropriate structures were visualized. Dynamic visualization of the needle was continuous throughout the procedures and maintained good position.     We discussed the proper protocols after the injection such as no submerging pools, baths tubs, or hot tubs for 24 hr.  Showering is okay today.  We also discussed that blood sugars can be elevated after an injection and asked patient to properly checked her sugars over the next few days and contact their PCP if there are any concerns.  We discussed red flags such as fevers, chills, red, warm, tender joint at the area of injection to please seek medical care immediately.

## 2024-04-03 NOTE — PROGRESS NOTES
Patient ID: Laurel Calvo  YOB: 1970  MRN: [id number]    Chief Complaint: Pain of the Left Hip      Referred By: Joby Robbins MD for L hip US guided injection    History of Present Illness: Laurel Calvo is a 53 y.o. female who presents today with left hip pain. She was previously seen by  for follow-up of her XAVIER surgery which she reported she was still having some discomfort in her left hip, as well as her proximal hamstring. She states today that her proxmial hamstring has resolved but is still having pain and discomfort in her groin with certain movements. She reports today for possibel US guided intra-articular hip injection at 's recommendation.      The patient is active in  walking, golf .  Occupation: [employer]      Past Medical History:   Past Medical History:   Diagnosis Date    Anxiety     Arthritis     Chronic left-sided low back pain without sciatica 09/28/2018    Chronic pain of left knee 09/28/2018    XR KNEE COMP LEFT 4 OR MORE VIEWS - October 27, 2017 CLINICAL HISTORY:   Left knee pain FINDINGS:   No fracture, dislocation, or arthritic change of the knee. Dictated and Electronically Signed By: Jem Damon MD on October 27, 2017    Gastroesophageal reflux disease without esophagitis 08/20/2015    GERD (gastroesophageal reflux disease)     Lupus     Migraine with aura and without status migrainosus, not intractable 08/08/2014    Seizures     Thyroid disease      Past Surgical History:   Procedure Laterality Date    ARTHROSCOPY, HIP Left 7/28/2022    Procedure: ARTHROSCOPY, HIP, WITH FEMOROPLASTY;  Surgeon: Wm Robbisn MD;  Location: [facility] OR;  Service: Orthopedics;  Laterality: Left;  Left hip arthroscopic capsular closure    ARTHROSCOPY, HIP Left 7/28/2022    Procedure: ARTHROSCOPY, HIP, WITH LABRUM REPAIR;  Surgeon: Wm Robbins MD;  Location: [facility] OR;  Service: Orthopedics;  Laterality: Left;    COLONOSCOPY N/A  5/17/2023    Procedure: COLONOSCOPY;  Surgeon: Janet Quigley MD;  Location: [facility] ENDO;  Service: Endoscopy;  Laterality: N/A;    HYSTERECTOMY  2016    INJECTION OF ANESTHETIC AGENT INTO SACROILIAC JOINT Left 3/31/2022    Procedure: Left intra-articular hip joint injection;  Surgeon: Donato Manley MD;  Location: [facility] PAIN MGT;  Service: Pain Management;  Laterality: Left;    INJECTION OF ANESTHETIC AGENT INTO SACROILIAC JOINT  3/31/2022    Procedure: ;  Surgeon: Donato Manley MD;  Location: [facility] PAIN MGT;  Service: Pain Management;;    INJECTION OF ANESTHETIC AGENT INTO SACROILIAC JOINT Bilateral 5/12/2022    Procedure: Bilateral GT bursa + bilateral SIJ injection RN IV Sedation;  Surgeon: Donato Manley MD;  Location: [facility] PAIN MGT;  Service: Pain Management;  Laterality: Bilateral;    INJECTION OF JOINT Bilateral 5/12/2022    Procedure: Bilateral GT bursa + bilateral SIJ injection RN IV Sedation;  Surgeon: Donato Manley MD;  Location: [facility] PAIN MGT;  Service: Pain Management;  Laterality: Bilateral;    OOPHORECTOMY      TONSILLECTOMY      WISDOM TOOTH EXTRACTION      X 4     Family History   Problem Relation Age of Onset    No Known Problems Mother     No Known Problems Father     No Known Problems Sister     No Known Problems Brother     Stomach cancer Paternal Grandfather     Breast cancer Maternal Grandmother     Breast cancer Paternal Grandmother     Colon cancer Neg Hx      Social History     Socioeconomic History    Marital status:     Number of children: 0   Tobacco Use    Smoking status: Never    Smokeless tobacco: Never   Substance and Sexual Activity    Alcohol use: Yes     Comment: Occasional/Rare    Drug use: Yes     Types: Marijuana     Comment: synthetic marijuana - gummie - last 48hrs ago 25mg    Sexual activity: Yes     Partners: Male   Social History Narrative    No smokers in household, 1 dog.     Social Determinants of Health     Financial Resource Strain: Patient  Declined (2/20/2024)    Overall Financial Resource Strain (CARDIA)     Difficulty of Paying Living Expenses: Patient declined   Food Insecurity: Patient Declined (2/20/2024)    Hunger Vital Sign     Worried About Running Out of Food in the Last Year: Patient declined     Ran Out of Food in the Last Year: Patient declined   Transportation Needs: Patient Declined (2/20/2024)    PRAPARE - Transportation     Lack of Transportation (Medical): Patient declined     Lack of Transportation (Non-Medical): Patient declined   Physical Activity: Sufficiently Active (2/20/2024)    Exercise Vital Sign     Days of Exercise per Week: 5 days     Minutes of Exercise per Session: 40 min   Stress: No Stress Concern Present (2/20/2024)    Citizen of the [place] [employer] Occupational Health - Occupational Stress Questionnaire     Feeling of Stress : Only a little   Social Connections: Unknown (2/20/2024)    Social Connection and Isolation Panel [NHANES]     Frequency of Communication with Friends and Family: Patient declined     Frequency of Social Gatherings with Friends and Family: Patient declined     Active Member of Clubs or Organizations: No     Attends Club or Organization Meetings: Patient declined     Marital Status: Patient declined   Housing Stability: Patient Declined (2/20/2024)    Housing Stability Vital Sign     Unable to Pay for Housing in the Last Year: Patient declined     Unstable Housing in the Last Year: Patient declined     Medication List with Changes/Refills   Current Medications    ACETAMINOPHEN (TYLENOL) 500 MG TABLET    Take 2 tablets (1,000 mg total) by mouth every 8 (eight) hours as needed for Pain.    ALBUTEROL (PROVENTIL/VENTOLIN HFA) 90 MCG/ACTUATION INHALER    Inhale 2 puffs into the lungs every 4 (four) hours as needed.    BENZONATATE (TESSALON) 200 MG CAPSULE    TAKE ONE CAPSULE BY MOUTH THREE TIMES DAILY AS NEEDED FOR cough    BUPRENORPHINE-NALOXONE 4-1 MG (SUBOXONE) 4-1 MG FILM    Place 1 Film under the tongue  once daily.    EPINEPHRINE (EPIPEN) 0.3 MG/0.3 ML ATIN    Inject 0.3 mg into the muscle daily as needed.    ESTRADIOL (ESTRACE) 2 MG TABLET    TAKE 1 TABLET BY MOUTH ONE TIME DAILY    GABAPENTIN (NEURONTIN) 400 MG CAPSULE    Take 3 capsules (1,200 mg total)  by mouth three times daily as needed.    LAMOTRIGINE (LAMICTAL) 100 MG TABLET    Take 1 tablet (100 mg total) by mouth 2 (two) times daily.    LEVOTHYROXINE (SYNTHROID) 50 MCG TABLET    Take 1 tablet (50 mcg total) by mouth before breakfast.    MELOXICAM (MOBIC) 15 MG TABLET    Take 1 tablet (15 mg total) by mouth once daily.    MULTIVITAMIN (THERAGRAN) PER TABLET    Take 1 tablet by mouth once daily.     MYCOPHENOLATE (CELLCEPT) 500 MG TAB    Take 1 tablet (500 mg total) by mouth 2 (two) times daily.    NAPROXEN (NAPROSYN) 500 MG TABLET    Take 1 tablet (500 mg total) by mouth 2 (two) times daily.    PANTOPRAZOLE (PROTONIX) 40 MG TABLET    Take 1 tablet (40 mg total) by mouth once daily.    QUETIAPINE (SEROQUEL) 100 MG TAB    Take 1 tablet by mouth every evening.    TESTOSTERONE CYPIONATE (DEPOTESTOTERONE CYPIONATE) 200 MG/ML INJECTION    Inject into the muscle every 28 days. (Dose? - By Dr[facility] Brennen Mercer at [facility])    VITAMIN D 1000 UNITS TAB    Take 1,000 Units by mouth once daily.     Review of patient's allergies indicates:   Allergen Reactions    Bactrim [sulfamethoxazole-trimethoprim] Other (See Comments)     Lupus symptoms exacerbation, headache.    Ciprofloxacin Other (See Comments)     Lupus symptoms exacerbation, headaches.       Physical Exam:   Body mass index is 21.8 kg/m².    GENERAL: Well appearing, in no acute distress.  HEAD: Normocephalic and atraumatic.  ENT: External ears and nose grossly normal.  EYES: EOMI bilaterally  PULMONARY: Respirations are grossly even and non-labored.  NEURO: Awake, alert, and oriented x 3.  SKIN: No obvious rashes appreciated.  PSYCH: Mood & affect are appropriate.    Detailed MSK  exam: Left Hip    TTP anterior joint line    ROM:   Flexion 120  IR 70  ER 50    MMT: Hip flexion 5/5  Hamstring 5/5    Special tests:   Log roll +  RIP +   FADIR +    Imaging:  X-Ray Chest PA And Lateral  Narrative: EXAMINATION:  XR CHEST PA AND LATERAL    CLINICAL HISTORY:  Cough, unspecified    TECHNIQUE:  PA and lateral views of the chest were performed.    COMPARISON:  08/25/2023    FINDINGS:  The lungs are clear and free of infiltrate.  No pleural effusion or pneumothorax. The heart is not enlarged.  Impression: 1.  No acute cardiopulmonary process.    Electronically signed by: Luis A Watkins DO  Date:    02/21/2024  Time:    10:12      Relevant imaging results were reviewed and interpreted by me and per my read as above.  This was discussed with the patient and / or family today.     Assessment:  Laurel Calvo is a 53 y.o. female presents today for persistent symptoms left hip joint, post hip arthroscopy.  Had an injection in her hip back in August of last year having recurrent symptoms.  Discussed repeating today for repeat steroid injection but will prefer PRP in the future.  Discussed the out-of-pocket cost and benefits and she is interested in potentially moving forward in the future when or if cortisone is not successful after today.  Please refer to procedure note for the details.  Follow-up as needed consider PRP intra-articular persistent symptoms.    Left hip pain  -     ULS US Guidance for Needle Placement    Lumbar back pain with radiculopathy affecting left lower extremity  -     Ambulatory referral/consult to Orthopedics  -     ULS US Guidance for Needle Placement         A copy of today's visit note has been sent to the referring provider.       Kilo Koroma MD    Disclaimer: This note was prepared using a voice recognition system and is likely to have sound alike errors within the text.     I, Elvis Moreno, acted as a scribe for Kilo Koroma MD for the duration of this office  visit.

## 2024-04-17 ENCOUNTER — PATIENT MESSAGE (OUTPATIENT)
Dept: PAIN MEDICINE | Facility: CLINIC | Age: 54
End: 2024-04-17
Payer: MEDICARE

## 2024-04-17 DIAGNOSIS — M46.1 SACROILIITIS: ICD-10-CM

## 2024-04-17 DIAGNOSIS — M75.51 SUBACROMIAL BURSITIS OF BOTH SHOULDERS: ICD-10-CM

## 2024-04-17 DIAGNOSIS — Z98.890 HISTORY OF HIP SURGERY: ICD-10-CM

## 2024-04-17 DIAGNOSIS — M54.16 LUMBAR RADICULOPATHY: ICD-10-CM

## 2024-04-17 DIAGNOSIS — R10.32 LEFT GROIN PAIN: ICD-10-CM

## 2024-04-17 DIAGNOSIS — M75.52 SUBACROMIAL BURSITIS OF BOTH SHOULDERS: ICD-10-CM

## 2024-04-17 RX ORDER — GABAPENTIN 400 MG/1
CAPSULE ORAL
Qty: 270 CAPSULE | Refills: 3 | Status: SHIPPED | OUTPATIENT
Start: 2024-04-17 | End: 2024-05-13 | Stop reason: SDUPTHER

## 2024-04-17 NOTE — TELEPHONE ENCOUNTER
Can you refill? Gabapentin 400MG    Last Visit: 02/06/24  Next Visit: 04/24/24  Last refill: 02/06/24  How patient is currently taking medication requested: 3 capsules PO TID PRN  [facility]
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
## 2024-04-23 ENCOUNTER — TELEPHONE (OUTPATIENT)
Dept: PAIN MEDICINE | Facility: CLINIC | Age: 54
End: 2024-04-23
Payer: MEDICARE

## 2024-05-13 ENCOUNTER — PATIENT MESSAGE (OUTPATIENT)
Dept: PAIN MEDICINE | Facility: CLINIC | Age: 54
End: 2024-05-13
Payer: MEDICARE

## 2024-05-13 DIAGNOSIS — M54.16 LUMBAR RADICULOPATHY: ICD-10-CM

## 2024-05-13 DIAGNOSIS — Z98.890 HISTORY OF HIP SURGERY: ICD-10-CM

## 2024-05-13 DIAGNOSIS — M75.51 SUBACROMIAL BURSITIS OF BOTH SHOULDERS: ICD-10-CM

## 2024-05-13 DIAGNOSIS — R10.32 LEFT GROIN PAIN: ICD-10-CM

## 2024-05-13 DIAGNOSIS — M75.52 SUBACROMIAL BURSITIS OF BOTH SHOULDERS: ICD-10-CM

## 2024-05-13 DIAGNOSIS — M46.1 SACROILIITIS: ICD-10-CM

## 2024-05-13 RX ORDER — GABAPENTIN 400 MG/1
CAPSULE ORAL
Qty: 270 CAPSULE | Refills: 3 | Status: CANCELLED | OUTPATIENT
Start: 2024-05-13 | End: 2024-06-10

## 2024-05-13 RX ORDER — GABAPENTIN 400 MG/1
CAPSULE ORAL
Qty: 270 CAPSULE | Refills: 3 | Status: SHIPPED | OUTPATIENT
Start: 2024-05-15 | End: 2024-06-17 | Stop reason: SDUPTHER

## 2024-05-13 NOTE — TELEPHONE ENCOUNTER
Can you refill? Gabapentin 400MG    Last Visit: 02/06/24  Next Visit: none  Last refill:04/17/24  How pt patient is currently taking medication requested: : Take 3 capsules PO TID PRN  Pharmacy: [facility] to [facility] [address] [place] [address] Street

## 2024-06-17 ENCOUNTER — OFFICE VISIT (OUTPATIENT)
Dept: PAIN MEDICINE | Facility: CLINIC | Age: 54
End: 2024-06-17
Payer: MEDICARE

## 2024-06-17 DIAGNOSIS — M75.52 SUBACROMIAL BURSITIS OF BOTH SHOULDERS: ICD-10-CM

## 2024-06-17 DIAGNOSIS — R10.32 LEFT GROIN PAIN: ICD-10-CM

## 2024-06-17 DIAGNOSIS — Z98.890 HISTORY OF HIP SURGERY: ICD-10-CM

## 2024-06-17 DIAGNOSIS — M54.16 LUMBAR RADICULOPATHY: ICD-10-CM

## 2024-06-17 DIAGNOSIS — M46.1 SACROILIITIS: ICD-10-CM

## 2024-06-17 DIAGNOSIS — M75.51 SUBACROMIAL BURSITIS OF BOTH SHOULDERS: ICD-10-CM

## 2024-06-17 PROCEDURE — 99213 OFFICE O/P EST LOW 20 MIN: CPT | Mod: 95,,, | Performed by: PHYSICAL MEDICINE & REHABILITATION

## 2024-06-17 RX ORDER — GABAPENTIN 400 MG/1
CAPSULE ORAL
Qty: 270 CAPSULE | Refills: 3 | Status: SHIPPED | OUTPATIENT
Start: 2024-06-17 | End: 2024-07-13

## 2024-06-17 NOTE — PROGRESS NOTES
Established Patient Chronic Pain Note (Follow up visit)    Chief Complaint:     The patient location is: [place]  The chief complaint leading to consultation is: chronic pain, shoulder pain    Visit type: audiovisual    Face to Face time with patient: 5-10 minutes of total time spent on the encounter, which includes face to face time and non-face to face time preparing to see the patient (eg, review of tests), Obtaining and/or reviewing separately obtained history, Documenting clinical information in the electronic or other health record, Independently interpreting results (not separately reported) and communicating results to the patient/family/caregiver, or Care coordination (not separately reported).     Each patient to whom he or she provides medical services by telemedicine is:  (1) informed of the relationship between the physician and patient and the respective role of any other health care provider with respect to management of the patient; and (2) notified that he or she may decline to receive medical services by telemedicine and may withdraw from such care at any time.        SUBJECTIVE:    Interval History (6/17/2024):    Laurel Calvo is a 54 y.o. female presents today for follow-up chronic back and left hip pain.  She reports he has a newer symptom with anterior distal thigh burning/pins and needles that occurred without any injury or trauma.  Current pain intensity is 2/10, but states that her flares can be severe at times.  She continues to use gabapentin 1200 mg t.i.d..        Interval HPI 02/06/2024:  Patient  presents today for follow-up visit.  Patient was last seen on 11/20/2023. Patient reports pain as 3/10 today. She is requesting refills on Gabapentin. Patient also reports bilateral shoulder pain, worse with L shoulder.   She reports having Left subacromial bursa injection with Dr. Manley (11/2021) which provided great relief. Requests to return in clinic for injections for both  "shoulders.  Patient continues to stay active with yoga and weights.    Interval History (11/20/23):   Laurel Calvo presents today for follow-up visit.  Patient was last seen on 7/18/2023. At that visit, the plan was to continue conservative treatment and follow up as needed. Patient reports pain as 3/10 today. She has chronic LLE and gabapentin has been effective for her but over the past 2 months she has been experiencing numbness, pain and itching in L thigh. "Feels like a hot towel" is being applied to the area. These symptoms have been disruptive to her sleep. Patient is currently in physical therapy and scheduled to have MRI of her hip and follow up with Dr. Robbins soon.  She is S/p left hip ATS from 7/28/22.     Interval History (7/18/2023):    Laurel Calvo presents today via telemed for follow-up visit.  Patient was last seen on 6/21/2023. At that visit, the plan was to continue Gabapentin 600 mg TID. Patient reports pain as 9/10 today. Doing physical therapy 5-6 days per week, 2 weeks ago pinching in the groin, progressively worsened. 4 days ago increased to full blown severe constant pain, then returned to same symptoms before surgery. She reports her pain is a little better today. Dr. Robbins's office has reached out via [employer] to schedule a follow up, she reports she will check her mychart and respond.  Goes all the way down the left leg to knee and ankle, numb and tingly.    Interval History (6/21/2023):    Laurel Calvo presents today via telemed for follow-up visit.  Patient was last seen on 2/28/2023. At that visit, the plan was to restart Gabapentin. She reports she gets excellent relief from her pain with this medication, taking 600 mg TID. She reports continued flare of left sided radiculopathy and current lupus flare causing a lot of daily pain,but well controlled with Gabapentin. Patient reports pain as 2/10 today. Needs refill of Gabapentin.      Interval History (2/28/2023):  "   Laurel Calvo presents today via telemed for follow-up visit.  Patient was last seen on 08/29/2021.  She reports return of her chronic sciatica after working in her yard. Historically, Gabapentin has been very helpful for her nerve pain and she is requesting a refill of this medication. Ice and ibuprofen not effective. 1-2 weeks.Pain again is in her lower back with radiation down her left lower extremity.      Interval History (08/29/2022):    Laurel Calvo presents today for follow-up visit.  Patient was last seen on 6/13/2022. Last injection bilateral SIJ + GT bursa injection on 05/12/2022. Patient reports pain as 2/10 today. Patient underwent left hip arthroplasty with labral repair, capsular closure, and femoroplasty secondary to left hip labral tear and left hip femoracetabular impingement. Was previously scheduled with Dr. Manley for repeat left subacromial bursa injection, but appointment was canceled by our dept and has not been rescheduled. She reports continued left shoulder pain. Currently enrolled in physical therapy for post-operative therapy. She reports that she is recovering well from her hip surgery. Has follow up with [facility] on 09/09.      Interval History (6/13/2022):   Laurel Calvo presents today for follow-up visit.  she underwent bilateral GT bursa injection + bilateral SIJ injection on 5/12/22.  she reports 30% pain relief.  Patient reports pain as 3/10 today. Saw Dr. Robbins on 05/13/2022. Per Dr. 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  She also has some component of lumbar radiculopathy and SI pain. Will consider hip arthroscopy and labral repair. Has follow up scheduled with Dr. Robbins on 06/17/2022. Patient reports she is now having right sided hip pain due to favoring left side due to pain. Reports she would like to move forward with hip repair with Dr. Robbins, then consider  "continued injections with IPM. Also reports shoulder pain beginning to flare up. Previously had left subacromial bursa Injection in clinic 11/9/2021 with good relief. Reports injections typically last about 6 months.      Interval History (4/28/2022):   Laurel Calvo presents today for follow-up visit.  she underwent left intraarticular hip joint injection on 03/31/2022.  The patient reports she had relief x 5-6 days after the procedure, but once she returned to normal activity the pain returned and then worsened. Reports one week of severe pain 8/10, but has now decreased slightly to a constant 5/10. Has follow up appointment with Dr. Robbins in 2 weeks to discuss other treatment options for hip.  Reports she does experience pain in the lower lumbar region left > right and some radiation into the outer hips. Greatest pain continues to originate from groin. Patient reports pain as 5/10 today. Taking ibuprofen with temporary moderate relief.    Interval History (1/27/2022):    Laurel Calvo presents today for follow-up visit.  Patient was last seen on 11/9/2021. At that visit, she had a subacromial bursa injection, which she feels helped less than previous injection.  Patient reports pain as "0/10 today.    Interval HPI (11/9/2021):  Laurel Calvo is a 51 y.o. female who presents to the clinic for a follow-up appointment for lower back and left hip pain.  Her main complaint today is left shoulder pain.  At the last visit, she was provided with a left-sided greater trochanteric bursa injection in the clinic.  She reports that this resulted in significant relief.  Since the last visit, Laurel Calvo states the left shoulder pain has been worsening. Current pain intensity is 8/10.  She also locates pain to the left lower tailbone area.  Patient denies night fever/night sweats, urinary incontinence, bowel incontinence, significant weight loss, significant motor weakness and loss of sensations.    Interval HPI " 09/02/2021:  Laurel Calvo is a 51 y.o. female who presents to the clinic for a follow-up appointment for lower back and left hip pain. Since the last visit, Laurle Calvo states the pain has been persistant. Current pain intensity is 3/10.  She does report that she had a fall while gardening a few weeks ago and landed on her right hip, but this is somewhat improving.  Her left hip is certainly more aggravated currently.    Interval HPI 08/24/2021:  Laurel Calvo presents to tele-medicine appointment for a follow-up appointment for chronic shoulder, neck, low back, and hip pain.  She was last seen on 03/04/2021 where she underwent left-sided subacromial bursa and left-sided greater trochanteric bursa injections.  She states that these resulted in 80-85% relief for both sites.  She states that her left lower back pain has been worsening is having radiating pain down the left lower extremity posteriorly extending to the calf.  She was also referred to a chiropractor.  Since the last visit, Laurel Calvo states the pain has been worsening. Current pain intensity is 8/10.    Initial HPI 03/04/2021:  Laurel Calvo is a 50 y.o. female, right-hand dominant, who presents to the clinic for the evaluation of chronic neck and lower back pain.  She was referred by her primary care team for further evaluation and management of this pain.  She has past medical history of seizure disorder, restless leg syndrome, anxiety, lupus, migraines, GERD, and multiple other medical comorbidities as listed in her chart.  The pain started several years ago following pain being told that she had herniated disc following an MRI of the cervical and lumbar spine and symptoms have been unchanged.The pain is located in the cervical myofascial area and radiates to the left upper extremity extending into the hand and fingers..  The pain is described as sharp, stabbing and throbbing and is rated as 2/10. The pain is rated with a score of   1/10 on the BEST day and a score of 10/10 on the WORST day.  Symptoms interfere with daily activity. The pain is exacerbated by at night and with exercise.  The pain is mitigated by ice. The patient reports spending less than 2 hours per day reclining. The patient reports 4-6 hours of uninterrupted sleep per night.         Non-Pharmacologic Treatments:  Physical Therapy/Home Exercise: yes, has been active with home exercises as she has previously learned from physical therapies targeting the lower back  Ice/Heat:yes  TENS: no  Acupuncture: no  Massage: yes  Chiropractic: yes    Other: no        Pain Medications:  - Opioids: Norco  - Adjuvant Medications: Valium, baclofen, Plaquenil, Lamictal, Requip, trazodone  - Anti-Coagulants: None    Surgery:  07/28/2022: Left hip ATS, capsular closure with Dr. Robbins        Pain Procedures:   -03/04/2021:  Left subacromial bursa + left greater trochanteric bursa injections, 80-85% relief for both  -09/02/2021:  Left greater trochanteric bursa injection, significant relief  - Left subacromial bursa Injection in clinic 11/9/2021 - less pain relief than previously   -left IA hip joint injection on 03/31/2022 with relief for only 5-6 days, then pain returned and worsened  -bilateral GT bursa injection + bilateral SIJ injection on 5/12/22 with 30% relief       Imaging (Reviewed on 6/17/2024):       MRI Left Hip  11/27/2023  Narrative & Impression  EXAM: MRI HIP WITHOUT CONTRAST LEFT     CLINICAL HISTORY:  Chronic left hip pain.     TECHNIQUE: Standard multiplanar pulse sequences without IV or intra-articular contrast.     COMPARISON: No prior MRI.  Previous x-rays 07/26/2023 have been reviewed.     FINDINGS:     Bones of the left hip intact.  No fracture.  No suspicious osseous lesion.  Evidence of mild dysplasia.  No acetabular over coverage.  No pincer lesion detected.  Negative for evidence of dysplasia or acetabular retroversion.  No CAM lesion identified.   Left hip articular  cartilage is well-preserved.  Focal fluid dissects into the base of the mid superior labrum compatible with a focal, nondisplaced superior labral tear/partial detachment (series 7 images 21-22).  Remaining labrum intact.  Normal, physiological joint fluid.  Negative for intra-articular loose body.   Mild tendinosis of the left gluteus medius minimus and medius tendons.  Minimal soft tissue edema and trace fluid abuts the greater trochanter.  Mild tendinosis left common hamstring tendon.  Left iliopsoas tendon and remaining supporting soft tissues left hip are normal.    Intact bony pelvis with normal marrow signal.  SI joints and pubic symphysis are unremarkable.  Right hip joint grossly well preserved.  Intrapelvic contents are unremarkable.        Impression:      1.     Evidence of mild dysplasia left hip.  Articular cartilage well-preserved.   2.    Focal nondisplaced tear mid superior labrum of the left hip with fluid undercutting the base of the labrum.   3.    Mild left gluteal tendinopathy.  Minimal soft tissue edema and trace fluid abuts the left greater trochanter.     Hip X-rays 7/26/23  EXAM: XR HIP WITH PELVIS WHEN PERFORMED, 4 OR MORE VIEWS LEFT     CLINICAL INDICATION:   Pelvis and perineal pain.  Pain in left hip     FINDINGS:  Comparisons are made to 02/21/2022.  AP view of the pelvis, as well as AP and lateral views of the right hip were submitted for interpretation.  The femoral heads are well centered on the acetabulum.  The hip joints are well-maintained.  Mild acetabular spurring noted on the left.  Left femoral head bone island noted.   Alignment is satisfactory. No     fractures, dislocations, or erosive arthritic change.  Negative for radiopaque foreign bodies or air in the soft tissues.  Normal bowel gas pattern.  Phleboliths versus ureteroliths are present within the pelvis.      Impression:     1.  Negative for acute process involving the visualized osseous structures.  2.  Stable  findings as noted above.    MRI hip 03/24/2022  FINDINGS:  Osseous structures: Normal marrow signal.  No fracture.  No suspicious osseous lesion.     Hip and Sacroiliac joints: No effusion. Mild-moderate cartilage irregularity at the peripheral aspect of the acetabular roof with mild associated subchondral cystic change.  Probable partial-thickness chondral-labral separation at the anterior/superior labrum.  No labral detached tear or paralabral cyst.     Pubic symphysis: No osteitis pubis.     Bursae: Normal.     Soft tissues: Regional muscles and tendons are normal in appearance.     Miscellaneous: None.     Impression:     No acute abnormality.     Mild-moderate degenerative changes at the peripheral aspect of the acetabular roof.     Probable partial thickness chondral-labral separation at the anterior/superior labrum.    X-ray hip 02/21/2022  FINDINGS:  Hip joints are symmetric in appearance.  Joint spaces are maintained.  No evidence of avascular necrosis.  There is a curvilinear lucency seen on multiple projections in the inter trochanteric crest of the left femur extending into the proximal diaphyseal region suspicious for nondisplaced fracture at this site.     This report was flagged in Epic as abnormal.    MRI lumbar spine 09/01/2021:  Levocurvature of the lumbar spine is noted.  No listhesis.  There is no acute fracture.  The vertebral bodies are normal in height without compression fractures. Posterior elements are intact. Mild disc desiccation at the L5-S1 level.  Lumbar spine disc heights are preserved.  Conus medullaris terminates at the T12-L1 level. Distal spinal cord intensity is normal.  There is no soft tissue abnormality.     T12-L1: No disc bulge.  There is no facet arthropathy.  There is no neural foraminal stenosis.  There is no spinal canal stenosis.     L1-L2: No disc bulge.  There is no facet arthropathy.  There is no neuroforaminal stenosis.  There is no spinal canal stenosis.     L2-L3:  No disc bulge.  There is no facet arthropathy.  There is no neuroforaminal stenosis.  There is no spinal canal stenosis.     L3-L4: No disc bulge.  There is no facet arthropathy.  There is no neuroforaminal stenosis.  There is no spinal canal stenosis.     L4-L5: No disc bulge.  There is no facet arthropathy.  There is no neuroforaminal stenosis.  There is no spinal canal stenosis.     L5-S1: No disc bulge.  There is no facet arthropathy.  There is no neuroforaminal stenosis.  There is no spinal canal stenosis.       X-ray cervical spine 03/04/2021:  There is straightening of the normal cervical spine lordosis.  No listhesis.  No abnormal motion of the cervical spine with flexion and extension.  No fracture.  Intervertebral disc spaces of the cervical spine are maintained.  No significant uncovertebral or facet arthropathy.  Oblique views demonstrate no significant neural foraminal stenosis.  No bony central canal stenosis identified.  Prevertebral soft tissues are within normal limits.     X-ray lumbar spine 03/04/2021:  There is mild levocurvature of the lumbar spine.  No listhesis.  No abnormal motion of the lumbar spine with flexion and extension.  No fracture or pars defect.  Intervertebral disc spaces of the lumbar spine are maintained.  No significant facet arthropathy.  No suspicious osseous lesion.  Sacroiliac joints appear normal.             REVIEW OF SYSTEMS:    GENERAL:  No weight loss, malaise or fevers.  HEENT:   No recent changes in vision or hearing  NECK:  Negative for lumps, no difficulty with swallowing.  RESPIRATORY:  Negative for cough, wheezing or shortness of breath, patient denies any recent URI.  CARDIOVASCULAR:  Negative for chest pain, leg swelling or palpitations.  GI:  Negative for abdominal discomfort, blood in stools or black stools or change in bowel habits.  MUSCULOSKELETAL:  See HPI.  SKIN:  Negative for lesions, rash, and itching.  PSYCH:  No mood disorder or recent psychosocial  stressors.  Patients sleep is not disturbed secondary to pain.  HEMATOLOGY/LYMPHOLOGY:  Negative for prolonged bleeding, bruising easily or swollen nodes.  Patient is not currently taking any anti-coagulants  NEURO:   No history of headaches, syncope, paralysis, seizures or tremors.  All other reviewed and negative other than HPI.        OBJECTIVE:  PHYSICAL EXAMINATION:  Telemedicine Exam  There were no vitals filed for this visit.    There is no height or weight on file to calculate BMI.   (reviewed on 6/17/2024)       Physical Exam: last in clinic visit:  GENERAL: Well appearing, in no acute distress, alert and oriented x3.  PSYCH:  Mood and affect appropriate.  SKIN: Skin color, texture, turgor normal, no rashes or lesions.  HEAD/FACE:  Normocephalic, atraumatic. Cranial nerves grossly intact.  CV: RRR with palpation of the radial artery.  PULM: No evidence of respiratory difficulty, symmetric chest rise.  GI:  Soft and non-tender.  BACK: Straight leg raising in the sitting and supine positions is negative to radicular pain. No pain to palpation over the facet joints of the lumbar spine or spinous processes. Normal range of motion without pain reproduction.  EXTREMITIES: Peripheral joint ROM is full and pain free without obvious instability or laxity in all four extremities. No deformities, edema, or skin discoloration. Good capillary refill.  MUSCULOSKELETAL:   positive shoulder impingement signs on the left shoulder with Merrill, Neer's , and empty can.  minimal tenderness to palpation over the left greater trochanteric bursa. .  There is moderate pain with palpation over the sacroiliac joint on the left.  FABERs test is equivocal on the left.  FADIRs test is negative.   Bilateral upper and lower extremity strength is normal and symmetric.  No atrophy or tone abnormalities are noted.  NEURO: Bilateral upper and lower extremity coordination and muscle stretch reflexes are physiologic and symmetric.  Plantar  response are downgoing. No clonus.  No loss of sensation is noted.  GAIT: normal.      ASSESSMENT: 54 y.o. year old female with lower back and left hip pain, consistent with     1. Sacroiliitis  gabapentin (NEURONTIN) 400 MG capsule      2. History of hip surgery  gabapentin (NEURONTIN) 400 MG capsule      3. Subacromial bursitis of both shoulders  gabapentin (NEURONTIN) 400 MG capsule      4. Left groin pain  gabapentin (NEURONTIN) 400 MG capsule      5. Lumbar radiculopathy  gabapentin (NEURONTIN) 400 MG capsule                  PLAN:   Interventions: None at this time.    S/p bilateral Left SIJ + Left GT bursa injection with 30% relief  S/p left intra-articular hip joint injection with limited relief  S/p Left SIJ + Left GT bursa injection.     2. Pharmacologic:   - Continue  Gabapentin 1200 TID PRN, a total of 3, 600 mg daily. Patient made aware this is the max dosing and if needing an increase, we have to consider Lyrica next time. Refills given today.    Advised patient to consider use of topical creams to apply over left anterior hip scar to help with potential nerve entrapment syndrome.    --No muscle relaxants due to risk of decreased seizure threshold, patient voiced understanding       report:  Reviewed and consistent with medication use as prescribed.        - Anticoagulation use: None.     3. Rehabilitative: Encouraged regular exercise. Continue exercises and activities as tolerated.     4. Diagnostic: Reviewed    5. Consults/referral: Follow up with Orthopedics as needed    5. Follow up:  3-4 months or as needed    - This condition does not require this patient to take time off of work, and the primary goal of our Pain Management services is to improve the patient's functional capacity.     - I discussed the risks, benefits, and alternatives to potential treatment options. All questions and concerns were fully addressed today in clinic.       Torito Mclaughlin PA-C  Interventional Pain Medicine

## 2024-08-27 ENCOUNTER — TELEPHONE (OUTPATIENT)
Dept: INTERNAL MEDICINE | Facility: CLINIC | Age: 54
End: 2024-08-27
Payer: MEDICARE

## 2024-08-27 DIAGNOSIS — M75.52 SUBACROMIAL BURSITIS OF BOTH SHOULDERS: ICD-10-CM

## 2024-08-27 DIAGNOSIS — M75.51 SUBACROMIAL BURSITIS OF BOTH SHOULDERS: ICD-10-CM

## 2024-08-27 DIAGNOSIS — Z98.890 HISTORY OF HIP SURGERY: ICD-10-CM

## 2024-08-27 DIAGNOSIS — R10.32 LEFT GROIN PAIN: ICD-10-CM

## 2024-08-27 DIAGNOSIS — M54.16 LUMBAR RADICULOPATHY: ICD-10-CM

## 2024-08-27 DIAGNOSIS — M46.1 SACROILIITIS: ICD-10-CM

## 2024-08-27 RX ORDER — GABAPENTIN 400 MG/1
CAPSULE ORAL
Qty: 270 CAPSULE | Refills: 3 | Status: SHIPPED | OUTPATIENT
Start: 2024-08-27 | End: 2024-09-22

## 2024-08-27 NOTE — TELEPHONE ENCOUNTER
Pt is requesting for a refill of: gabapentin (NEURONTIN) 400 MG capsule   Last filed:8/17/24  Last encounter: 6/17/24  Up coming apt:10/16/24   Pharmacy:[facility] [place], [place] - [facility]   Is this something you can do?

## 2024-08-27 NOTE — TELEPHONE ENCOUNTER
----- Message from Ezequiel Ma MA sent at 8/27/2024  1:20 PM CDT -----  Contact: [email or web address]@ [phone]  Pt called                Pt is requesting a call back to speak with staff or provider in regards to discuss insurance plan.

## 2024-08-30 DIAGNOSIS — G40.209 NONINTRACTABLE EPILEPSY WITH COMPLEX PARTIAL SEIZURES: Chronic | ICD-10-CM

## 2024-08-30 NOTE — TELEPHONE ENCOUNTER
Refill Routing Note   Medication(s) are not appropriate for processing by [facility] for the following reason(s):        Outside of protocol    [facility] action(s):  Route               Appointments  past 12m or future 3m with PCP    Date Provider   Last Visit   2/20/2024 MARY LOU Antony MD   Next Visit   Visit date not found MARY LOU Antony MD   ED visits in past 90 days: 0        Note composed:3:51 PM 08/30/2024

## 2024-09-10 RX ORDER — LAMOTRIGINE 100 MG/1
100 TABLET ORAL 2 TIMES DAILY
Qty: 180 TABLET | Refills: 0 | Status: SHIPPED | OUTPATIENT
Start: 2024-09-10

## 2024-09-10 NOTE — TELEPHONE ENCOUNTER
LIMITED REFILL APPROVED. Appointment required for more refills.  Schedule OV with GUILLERMINA Tracy before she will need additional refills.

## 2024-09-22 ENCOUNTER — PATIENT MESSAGE (OUTPATIENT)
Dept: INTERNAL MEDICINE | Facility: CLINIC | Age: 54
End: 2024-09-22
Payer: MEDICARE

## 2024-09-24 ENCOUNTER — OFFICE VISIT (OUTPATIENT)
Dept: INTERNAL MEDICINE | Facility: CLINIC | Age: 54
End: 2024-09-24
Payer: MEDICARE

## 2024-09-24 VITALS
BODY MASS INDEX: 22.17 KG/M2 | OXYGEN SATURATION: 93 % | HEIGHT: 64 IN | WEIGHT: 129.88 LBS | SYSTOLIC BLOOD PRESSURE: 110 MMHG | HEART RATE: 63 BPM | DIASTOLIC BLOOD PRESSURE: 78 MMHG | TEMPERATURE: 97 F

## 2024-09-24 DIAGNOSIS — L30.4 INTERTRIGO: ICD-10-CM

## 2024-09-24 DIAGNOSIS — N61.0 CELLULITIS OF LEFT BREAST: Primary | ICD-10-CM

## 2024-09-24 PROCEDURE — 1159F MED LIST DOCD IN RCRD: CPT | Mod: CPTII,S$GLB,, | Performed by: NURSE PRACTITIONER

## 2024-09-24 PROCEDURE — 3008F BODY MASS INDEX DOCD: CPT | Mod: CPTII,S$GLB,, | Performed by: NURSE PRACTITIONER

## 2024-09-24 PROCEDURE — 99214 OFFICE O/P EST MOD 30 MIN: CPT | Mod: S$GLB,,, | Performed by: NURSE PRACTITIONER

## 2024-09-24 PROCEDURE — 3078F DIAST BP <80 MM HG: CPT | Mod: CPTII,S$GLB,, | Performed by: NURSE PRACTITIONER

## 2024-09-24 PROCEDURE — 1160F RVW MEDS BY RX/DR IN RCRD: CPT | Mod: CPTII,S$GLB,, | Performed by: NURSE PRACTITIONER

## 2024-09-24 PROCEDURE — 99999 PR PBB SHADOW E&M-EST. PATIENT-LVL V: CPT | Mod: PBBFAC,,, | Performed by: NURSE PRACTITIONER

## 2024-09-24 PROCEDURE — G2211 COMPLEX E/M VISIT ADD ON: HCPCS | Mod: S$GLB,,, | Performed by: NURSE PRACTITIONER

## 2024-09-24 PROCEDURE — 3074F SYST BP LT 130 MM HG: CPT | Mod: CPTII,S$GLB,, | Performed by: NURSE PRACTITIONER

## 2024-09-24 RX ORDER — CEPHALEXIN 500 MG/1
500 CAPSULE ORAL EVERY 6 HOURS
Qty: 28 CAPSULE | Refills: 0 | Status: SHIPPED | OUTPATIENT
Start: 2024-09-24 | End: 2024-09-27

## 2024-09-24 RX ORDER — TRIAMCINOLONE ACETONIDE 1 MG/G
OINTMENT TOPICAL 2 TIMES DAILY
Qty: 30 G | Refills: 1 | Status: SHIPPED | OUTPATIENT
Start: 2024-09-24 | End: 2024-09-30 | Stop reason: SDUPTHER

## 2024-09-24 NOTE — PROGRESS NOTES
"  Subjective:      Patient ID: Laurel Calvo is a 54 y.o. female.    Chief Complaint: Physical Exam    History of Present Illness    CHIEF COMPLAINT:  Chief Complaint    BREAST RASH:  She presents with a bilateral breast rash that started a few months ago, initially attributed to heat from exercise. The left breast is more severely affected. The affected area feels like it's on fire, wakes her up at night, is pruritic, and waxes and wanes in severity. The left breast specifically shows swelling, tenderness, and feels hot to the touch with redness and raised prickly areas. She denies any history of nipple piercings or recent breast trauma. Over-the-counter cortisone cream, Benadryl, and witch hazel have not provided significant improvement.    ASSOCIATED SYMPTOMS:  She reports experiencing cold chills on multiple occasions, including one morning when she woke up with her breast feeling hot and swollen. She describes having cold chills "out of the blue" during the day on two or three separate instances over the past few weeks. These episodes may have been associated with low-grade fever, though temperature was not measured. She also notes a loss of appetite associated with these episodes, particularly on days with more severe symptoms.    MEDICAL HISTORY:  She has a history of lupus and breast implants. She underwent a hysterectomy years ago.    FAMILY HISTORY:  She reports a family history of breast cancer in both grandmothers. She denies any history of breast cancer in her immediate family, including parents and sister.    ALLERGIES:  She denies any known allergies.      ROS:  General: -fever, +chills, -fatigue, -weight gain, -weight loss, +loss of appetite  Eyes: -vision changes, -redness, -discharge  ENT: -ear pain, -nasal congestion, -sore throat  Cardiovascular: -chest pain, -palpitations, -lower extremity edema  Respiratory: -cough, -shortness of breath  Gastrointestinal: -abdominal pain, -nausea, -vomiting, " -diarrhea, -constipation, -blood in stool  Genitourinary: -dysuria, -hematuria, -frequency  Musculoskeletal: -joint pain, -muscle pain  Skin: +rash, -lesion  Neurological: -headache, -dizziness, -numbness, -tingling  Psychiatric: -anxiety, -depression, -sleep difficulty          Patient Active Problem List   Diagnosis    Fear of flying    Optic atrophy, unspecified    Systemic lupus erythematosus    PLE (polymorphic light eruption)    Nonintractable epilepsy with partial to complex partial seizures    Anxiety    Gastroesophageal reflux disease without esophagitis    RLS (restless legs syndrome)    Discoid lupus erythematosus    Migraine with aura and without status migrainosus, not intractable    Dream anxiety disorder    Insomnia secondary to anxiety    Esophageal spasm    Other fatigue    Hyperbilirubinemia    Low testosterone level in female    Chronic pain of left knee    Chronic left-sided low back pain without sciatica    Elevated C-reactive protein (CRP)    Long-term use of Plaquenil    History of anxiolytic dependence    History of sedative, hypnotic or anxiolytic abuse    Posterior vitreous detachment    Rotator cuff syndrome, left (chronic, recurrent)    Sprain of medial collateral ligament of right knee (recurrent)    Acquired hypothyroidism    Postprandial hypotension    Colon cancer screening    Femoroacetabular impingement of left hip    Legally blind    LIZETT (obstructive sleep apnea)    Bipolar disorder, in partial remission, most recent episode mixed    Uncomplicated opioid dependence    Alopecia    Laryngopharyngeal reflux (LPR)         Current Outpatient Medications:     acetaminophen (TYLENOL) 500 MG tablet, Take 2 tablets (1,000 mg total) by mouth every 8 (eight) hours as needed for Pain., Disp: 60 tablet, Rfl: 0    buprenorphine-naloxone 4-1 mg (SUBOXONE) 4-1 mg Film, Place 1 Film under the tongue once daily., Disp: , Rfl:     estradiol (ESTRACE) 2 MG tablet, TAKE 1 TABLET BY MOUTH ONE TIME  DAILY, Disp: , Rfl: 1    gabapentin (NEURONTIN) 400 MG capsule, Take 3 capsules (1,200 mg total)  by mouth three times daily as needed., Disp: 270 capsule, Rfl: 3    lamoTRIgine (LAMICTAL) 100 MG tablet, TAKE ONE TABLET BY MOUTH TWICE DAILY, Disp: 180 tablet, Rfl: 0    levothyroxine (SYNTHROID) 50 MCG tablet, Take 1 tablet (50 mcg total) by mouth before breakfast., Disp: 90 tablet, Rfl: 1    multivitamin (THERAGRAN) per tablet, Take 1 tablet by mouth once daily. , Disp: , Rfl:     QUEtiapine (SEROQUEL) 100 MG Tab, Take 1 tablet by mouth every evening., Disp: , Rfl:     testosterone cypionate (DEPOTESTOTERONE CYPIONATE) 200 mg/mL injection, Inject into the muscle every 28 days. (Dose? - By Dr. Brennen Mercer at [facility]), Disp: , Rfl:     vitamin D 1000 units Tab, Take 1,000 Units by mouth once daily., Disp: , Rfl:     albuterol (PROVENTIL/VENTOLIN HFA) 90 mcg/actuation inhaler, Inhale 2 puffs into the lungs every 4 (four) hours as needed. (Patient not taking: Reported on 9/24/2024), Disp: , Rfl:     benzonatate (TESSALON) 200 MG capsule, TAKE ONE CAPSULE BY MOUTH THREE TIMES DAILY AS NEEDED FOR cough (Patient not taking: Reported on 9/24/2024), Disp: 30 capsule, Rfl: 0    dicloxacillin (DYNAPEN) 500 MG capsule, Take 1 capsule (500 mg total) by mouth 4 (four) times daily. for 10 days, Disp: 40 capsule, Rfl: 0    EPINEPHrine (EPIPEN) 0.3 mg/0.3 mL AtIn, Inject 0.3 mg into the muscle daily as needed. (Patient not taking: Reported on 9/24/2024), Disp: , Rfl:     meloxicam (MOBIC) 15 MG tablet, Take 1 tablet (15 mg total) by mouth once daily. (Patient not taking: Reported on 9/24/2024), Disp: 30 tablet, Rfl: 1    mycophenolate (CELLCEPT) 500 mg Tab, Take 1 tablet (500 mg total) by mouth 2 (two) times daily. (Patient not taking: Reported on 9/24/2024), Disp: 180 tablet, Rfl: 1    naproxen (NAPROSYN) 500 MG tablet, Take 1 tablet (500 mg total) by mouth 2 (two) times daily. (Patient not taking:  "Reported on 9/24/2024), Disp: 28 tablet, Rfl: 0    pantoprazole (PROTONIX) 40 MG tablet, Take 1 tablet (40 mg total) by mouth once daily. (Patient not taking: Reported on 9/24/2024), Disp: 90 tablet, Rfl: 1    triamcinolone acetonide 0.1% (KENALOG) 0.1 % ointment, Apply topically 2 (two) times daily. for 7 days, Disp: 30 g, Rfl: 1      Objective:   /78 (BP Location: Left arm, Patient Position: Sitting, BP Method: Medium (Manual))   Pulse 63   Temp 97.2 °F (36.2 °C) (Tympanic)   Ht 5' 4" (1.626 m)   Wt 58.9 kg (129 lb 13.6 oz)   LMP 08/26/2013   SpO2 (!) 93%   BMI 22.29 kg/m²     Physical Exam             Physical Exam  Vitals and nursing note reviewed.   Constitutional:       General: She is awake. She is not in acute distress.     Appearance: Normal appearance. She is well-developed and well-groomed. She is not ill-appearing, toxic-appearing or diaphoretic.   HENT:      Head: Normocephalic and atraumatic.      Right Ear: External ear normal.      Left Ear: External ear normal.      Nose: No congestion or rhinorrhea.      Mouth/Throat:      Pharynx: No oropharyngeal exudate or posterior oropharyngeal erythema.   Eyes:      Conjunctiva/sclera: Conjunctivae normal.      Pupils: Pupils are equal, round, and reactive to light.   Cardiovascular:      Rate and Rhythm: Normal rate and regular rhythm.      Heart sounds: Normal heart sounds.   Pulmonary:      Effort: Pulmonary effort is normal. No tachypnea, bradypnea, accessory muscle usage, prolonged expiration, respiratory distress or retractions.      Breath sounds: Normal breath sounds. No stridor, decreased air movement or transmitted upper airway sounds. No decreased breath sounds, wheezing, rhonchi or rales.   Abdominal:      General: Abdomen is flat. Bowel sounds are normal.      Palpations: Abdomen is soft.   Musculoskeletal:         General: No swelling, tenderness, deformity or signs of injury.      Cervical back: Normal range of motion and neck " supple.      Right lower leg: No edema.      Left lower leg: No edema.   Skin:     General: Skin is warm.      Capillary Refill: Capillary refill takes less than 2 seconds.   Neurological:      General: No focal deficit present.      Mental Status: She is alert and oriented to person, place, and time.      Gait: Gait normal.   Psychiatric:         Attention and Perception: Attention and perception normal.         Mood and Affect: Mood and affect normal.         Speech: Speech normal.         Behavior: Behavior normal. Behavior is cooperative.         Cognition and Memory: Cognition normal.                Assessment:     1. Cellulitis of left breast    2. Intertrigo      Plan:   Assessment & Plan    Diagnosed mastitis/cellulitis of the left breast based on reported symptoms of swelling, tenderness, and intermittent fever  Identified fungal infection under right breast likely due to heat and sweating  Considered possibility of lupus rash but focused on infectious etiology  Planned antibiotic treatment for left breast infection and antifungal for right breast  If symptoms persist or worsen after antibiotic treatment, will consider mammogram for further evaluation    CELLULITIS:  - Explained cellulitis as inflammation of the skin and subcutaneous tissue, typically entering through small cuts.    MASTITIS:  - Discussed that mastitis can occur in non-breastfeeding women.  - Laurel to avoid applying anything to the left breast.  - Laurel to leave breasts alone, discontinue use of witch hazel or other home remedies.  - Started Keflex (cephalexin) 500 mg orally every 6 hours for 7 days for left breast infection.    FUNGAL INFECTION:  - Started antifungal steroid cream to be applied only underneath the right breast for fungal infection.    FOLLOW UP:  - Contact the office if no improvement in symptoms after 3 days of antibiotic treatment.  - Send a message if symptoms worsen, persist, or if there are any concerns.  - Follow up  if infection is not resolved after completing antibiotic course.          No follow-ups on file.

## 2024-09-27 ENCOUNTER — PATIENT MESSAGE (OUTPATIENT)
Dept: INTERNAL MEDICINE | Facility: CLINIC | Age: 54
End: 2024-09-27
Payer: MEDICARE

## 2024-09-27 ENCOUNTER — TELEPHONE (OUTPATIENT)
Dept: INTERNAL MEDICINE | Facility: CLINIC | Age: 54
End: 2024-09-27

## 2024-09-27 ENCOUNTER — TELEPHONE (OUTPATIENT)
Dept: INTERNAL MEDICINE | Facility: CLINIC | Age: 54
End: 2024-09-27
Payer: MEDICARE

## 2024-09-27 DIAGNOSIS — Z12.31 ENCOUNTER FOR SCREENING MAMMOGRAM FOR MALIGNANT NEOPLASM OF BREAST: ICD-10-CM

## 2024-09-27 DIAGNOSIS — N61.0 CELLULITIS OF LEFT BREAST: Primary | ICD-10-CM

## 2024-09-27 RX ORDER — DICLOXACILLIN SODIUM 500 MG/1
500 CAPSULE ORAL 4 TIMES DAILY
Qty: 40 CAPSULE | Refills: 0 | Status: SHIPPED | OUTPATIENT
Start: 2024-09-27 | End: 2024-10-07

## 2024-09-30 ENCOUNTER — PATIENT MESSAGE (OUTPATIENT)
Dept: INTERNAL MEDICINE | Facility: CLINIC | Age: 54
End: 2024-09-30
Payer: MEDICARE

## 2024-09-30 ENCOUNTER — TELEPHONE (OUTPATIENT)
Dept: INTERNAL MEDICINE | Facility: CLINIC | Age: 54
End: 2024-09-30
Payer: MEDICARE

## 2024-09-30 DIAGNOSIS — L30.4 INTERTRIGO: ICD-10-CM

## 2024-09-30 RX ORDER — TRIAMCINOLONE ACETONIDE 1 MG/G
OINTMENT TOPICAL 2 TIMES DAILY
Qty: 30 G | Refills: 1 | Status: SHIPPED | OUTPATIENT
Start: 2024-09-30 | End: 2024-10-07

## 2024-09-30 NOTE — TELEPHONE ENCOUNTER
----- Message from Carol sent at 9/30/2024  4:18 PM CDT -----  Contact: self   .Type:  Patient Returning Call    Who Called:patient   Who Left Message for Patient:  Does the patient know what this is regarding?:  Would the patient rather a call back or a response via [facility]? call  Best Call Back Number:.[phone]    Additional Information:    c

## 2024-10-15 ENCOUNTER — TELEPHONE (OUTPATIENT)
Dept: PAIN MEDICINE | Facility: CLINIC | Age: 54
End: 2024-10-15
Payer: MEDICARE

## 2024-10-15 NOTE — TELEPHONE ENCOUNTER
Reach out to pt from the message, pt said that she have to switch pain providers because the pt insurance is outside of [facility]. Pt will give us a call if we need to send any office notes.

## 2024-11-04 ENCOUNTER — PATIENT MESSAGE (OUTPATIENT)
Dept: GASTROENTEROLOGY | Facility: CLINIC | Age: 54
End: 2024-11-04
Payer: MEDICARE

## 2024-11-14 ENCOUNTER — PATIENT MESSAGE (OUTPATIENT)
Dept: PAIN MEDICINE | Facility: CLINIC | Age: 54
End: 2024-11-14
Payer: MEDICARE

## 2024-12-06 DIAGNOSIS — G40.209 NONINTRACTABLE EPILEPSY WITH COMPLEX PARTIAL SEIZURES: Chronic | ICD-10-CM

## 2024-12-07 RX ORDER — LAMOTRIGINE 100 MG/1
100 TABLET ORAL 2 TIMES DAILY
Qty: 180 TABLET | Refills: 0 | Status: SHIPPED | OUTPATIENT
Start: 2024-12-07

## 2024-12-07 NOTE — TELEPHONE ENCOUNTER
Refill Routing Note   Medication(s) are not appropriate for processing by [facility] for the following reason(s):        Outside of protocol    [facility] action(s):  Route             Appointments  past 12m or future 3m with PCP    Date Provider   Last Visit   2/20/2024 MARY LOU Antony MD   Next Visit   Visit date not found MARY LOU Antony MD   ED visits in past 90 days: 0        Note composed:9:13 PM 12/06/2024

## 2024-12-08 NOTE — TELEPHONE ENCOUNTER
Limited refill approved.  Appointment (virtual or in-office) required for more refills.    TO MY TEAM: Please help Laurel schedule appointment before they will run out of their medicine.

## 2025-06-25 NOTE — PROCEDURES
Large Joint Aspiration/Injection: L subacromial bursa    Date/Time: 2/21/2024 10:45 AM    Performed by: Wm Robbins MD  Authorized by: Wm Robbins MD    Consent Done?:  Yes (Verbal)  Indications:  Pain  Site marked: the procedure site was marked    Timeout: prior to procedure the correct patient, procedure, and site was verified    Prep: patient was prepped and draped in usual sterile fashion      Local anesthesia used?: Yes    Anesthesia:  Local infiltration  Local anesthetic:  Lidocaine 1% without epinephrine    Details:  Needle Size:  22 G  Ultrasonic Guidance for needle placement?: No    Approach:  Posterior  Location:  Shoulder  Site:  L subacromial bursa  Medications:  40 mg triamcinolone acetonide 40 mg/mL  Patient tolerance:  Patient tolerated the procedure well with no immediate complications    
Hypertension, PONV (postoperative nausea and vomiting), and Sleep apnea.  Ms. Adams  has a past surgical history that includes Hammer toe surgery (Left); Hysterectomy; Total knee arthroplasty (Left); Skin cancer excision (Left); and Total knee arthroplasty (Right, 3/31/2025).         OBJECTIVE   Observation:  Gait:  right toe out gait pattern, trace trendelenburg gait without straight cane                           Incision  well healed  Palpation: Good patella mobility    ROM:   Date:  25    Date:  25     Right Left Right Left   Knee ext -10 0 -10 -7   Knee ext 94 110 115 123             Strength:   Date:  25    Date:  6/3/25  Date:  25     Right Left Right Left Right Left   Hip flex 4- 5- 4 5- 4+ 5-   Hip abd 4- 4- 4- 4- 4- 5   Hip ext 4 4+ 4 4+ 4+ 5-   Knee ext 4 5 5- 5 5 5   Knee flex 4+ 5 5- 5 5 5   Andkle DF 5 5 5 5 5 5   Gastroc 4 5         Special Tests:  TU.51 with cane            6/3/25   8.96 sec without cane      Outcome Measure:   Tool Used: Knee injury and Osteoarthritis Outcome Score for Joint Replacement (KOOS, JR)  Score:  Initial: 10 (Interval: 61.583) 2025      9 (interval)  6/3/25 Most Recent: 5 (Interval: 73.342) Date: 25   Interpretation of Score:  The KOOS, JR contains 7 items from the original KOOS survey. Items are coded from 0 to 4, none to extreme respectively.   KOOS, JR is scored by summing the raw response (range 0-28) and then converting it to an interval score using the table provided below. The interval score ranges from 0 to 100 where 0 represents total knee disability and 100 represents perfect knee health.    ASSESSMENT   Current Assessment:  On re examination patient continues to show improvement in ROM, strength and function.  Still has some strength deficit in right hip. Patient is independent and safe with HEP and remains compliant with HEP and aquatic exercises  Goals: (Goals have been discussed and agreed upon with patient.)  Short-Term

## (undated) DEVICE — PACK BASIC SETUP SC BR

## (undated) DEVICE — GLOVE BIOGEL ORTHOPEDIC 7.5

## (undated) DEVICE — DRAPE STERI U-SHAPED 47X51IN

## (undated) DEVICE — Device

## (undated) DEVICE — DRAPE THREE-QTR REINF 53X77IN

## (undated) DEVICE — CANNULA FLOWPORT OBTURATOR

## (undated) DEVICE — ELECTRODE REM PLYHSV RETURN 9

## (undated) DEVICE — SYR ONLY LUER LOCK 20CC

## (undated) DEVICE — GOWN SMARTGOWN LVL4 X-LONG XL

## (undated) DEVICE — BNDG COFLEX FOAM LF2 ST 4X5YD

## (undated) DEVICE — SLINGSHOT 70DEG UP

## (undated) DEVICE — SUPPORT ULNA NERVE PROTECTOR

## (undated) DEVICE — COVER LIGHT HANDLE 80/CA

## (undated) DEVICE — MANIFOLD 4 PORT

## (undated) DEVICE — SEE MEDLINE ITEM 146292

## (undated) DEVICE — DRAPE MOBILE C-ARM

## (undated) DEVICE — BLADE SAMURAI CURVED

## (undated) DEVICE — SUT ETHILON 3-0 PS2 18 BLK

## (undated) DEVICE — PAD ABD 8X10 STERILE

## (undated) DEVICE — SOL IRR NACL .9% 3000ML

## (undated) DEVICE — APPLICATOR CHLORAPREP ORN 26ML

## (undated) DEVICE — DRAPE INCISE IOBAN 2 23X17IN

## (undated) DEVICE — MAT SURGICAL ECOSUCTIONER

## (undated) DEVICE — TOWEL OR DISP STRL BLUE 4/PK

## (undated) DEVICE — NDL SAFETY 22G X 1.5 ECLIPSE

## (undated) DEVICE — BIT DRILL CINCHLOCK SS

## (undated) DEVICE — DRAPE VERTICAL ISOLATION

## (undated) DEVICE — SYR 50CC LL

## (undated) DEVICE — TUBING PUMP ARTHROSCOPY STRL

## (undated) DEVICE — UNDERGLOVES BIOGEL PI SIZE 8

## (undated) DEVICE — DRAPE PLASTIC U 60X72

## (undated) DEVICE — SUT NANOPASS REACH CRESCENT

## (undated) DEVICE — PAD INSERT BOOT DISPOSABLE

## (undated) DEVICE — DRAPE SURGICAL STERI IRRG PCH

## (undated) DEVICE — SUT VICRYL PLUS 2-0 CT1 18

## (undated) DEVICE — TUBING SUCTION STRAIGHT .25X20

## (undated) DEVICE — GAUZE SPONGE 4X4 12PLY

## (undated) DEVICE — COVER CAMERA OPERATING ROOM

## (undated) DEVICE — SUT XBRAIDTT BLUE 1.2MM 45IN

## (undated) DEVICE — BLADE SHAVER HIP AGG XL 4MM

## (undated) DEVICE — PAD PERINEAL SUPINE

## (undated) DEVICE — POSITIONER HEAD DONUT 9IN FOAM

## (undated) DEVICE — KIT PORTAL ENTRY

## (undated) DEVICE — CANNULA TRANSPORT 8MM 7 8 9